# Patient Record
Sex: FEMALE | Race: WHITE | Employment: OTHER | URBAN - NONMETROPOLITAN AREA
[De-identification: names, ages, dates, MRNs, and addresses within clinical notes are randomized per-mention and may not be internally consistent; named-entity substitution may affect disease eponyms.]

---

## 2016-08-26 LAB — HBA1C MFR BLD: 7.8 %

## 2017-02-14 LAB
BUN BLDV-MCNC: NORMAL MG/DL
CHLORIDE: 100
CHLORIDE: NORMAL
CHOLESTEROL, TOTAL: 121 MG/DL
CHOLESTEROL/HDL RATIO: 2.9
CREATININE: 1.1 MG/DL
GFR CALCULATED: NORMAL
HDLC SERPL-MCNC: 42 MG/DL (ref 35–70)
LDL CHOLESTEROL CALCULATED: 50.8 MG/DL (ref 0–160)
POTASSIUM: 4.2
SODIUM: 140
TRIGL SERPL-MCNC: 141 MG/DL
VLDLC SERPL CALC-MCNC: 28 MG/DL

## 2017-06-13 DIAGNOSIS — I25.10 ATHEROSCLEROSIS OF NATIVE CORONARY ARTERY OF NATIVE HEART WITHOUT ANGINA PECTORIS: ICD-10-CM

## 2017-06-13 DIAGNOSIS — N18.1 TYPE 2 DIABETES MELLITUS WITH STAGE 1 CHRONIC KIDNEY DISEASE, WITHOUT LONG-TERM CURRENT USE OF INSULIN (HCC): ICD-10-CM

## 2017-06-13 DIAGNOSIS — E03.9 UNSPECIFIED HYPOTHYROIDISM: ICD-10-CM

## 2017-06-13 DIAGNOSIS — J30.0 VASOMOTOR RHINITIS: ICD-10-CM

## 2017-06-13 DIAGNOSIS — E11.22 TYPE 2 DIABETES MELLITUS WITH STAGE 1 CHRONIC KIDNEY DISEASE, WITHOUT LONG-TERM CURRENT USE OF INSULIN (HCC): ICD-10-CM

## 2017-06-13 DIAGNOSIS — G47.33 OBSTRUCTIVE SLEEP APNEA (ADULT) (PEDIATRIC): ICD-10-CM

## 2017-06-13 DIAGNOSIS — I10 ESSENTIAL HYPERTENSION: ICD-10-CM

## 2017-06-13 DIAGNOSIS — I34.0 MITRAL VALVE INSUFFICIENCY, UNSPECIFIED ETIOLOGY: ICD-10-CM

## 2017-06-13 DIAGNOSIS — I73.9 PERIPHERAL VASCULAR DISEASE, UNSPECIFIED (HCC): ICD-10-CM

## 2017-06-13 DIAGNOSIS — E78.49 FAMILIAL COMBINED HYPERLIPIDEMIA: ICD-10-CM

## 2017-06-13 RX ORDER — ACETAMINOPHEN 500 MG
1000 TABLET ORAL 3 TIMES DAILY
COMMUNITY

## 2017-06-13 RX ORDER — FLUTICASONE PROPIONATE 50 MCG
1 SPRAY, SUSPENSION (ML) NASAL DAILY
COMMUNITY

## 2017-06-13 RX ORDER — INSULIN GLARGINE 100 [IU]/ML
50 INJECTION, SOLUTION SUBCUTANEOUS NIGHTLY
COMMUNITY
End: 2022-05-17 | Stop reason: SDUPTHER

## 2017-06-13 RX ORDER — CHOLECALCIFEROL (VITAMIN D3) 125 MCG
1000 CAPSULE ORAL DAILY
COMMUNITY

## 2017-06-13 RX ORDER — CALCIUM CARBONATE 500(1250)
600 TABLET ORAL DAILY
COMMUNITY

## 2017-06-13 RX ORDER — ISOSORBIDE MONONITRATE 60 MG/1
60 TABLET, EXTENDED RELEASE ORAL EVERY MORNING
COMMUNITY

## 2017-06-13 RX ORDER — LEVOTHYROXINE SODIUM 0.15 MG/1
150 TABLET ORAL DAILY
COMMUNITY
End: 2017-06-28 | Stop reason: SDUPTHER

## 2017-06-13 RX ORDER — MONTELUKAST SODIUM 10 MG/1
10 TABLET ORAL NIGHTLY
COMMUNITY
End: 2017-06-28 | Stop reason: SDUPTHER

## 2017-06-13 RX ORDER — FUROSEMIDE 40 MG/1
40 TABLET ORAL DAILY PRN
COMMUNITY
End: 2019-11-27

## 2017-06-13 RX ORDER — IRBESARTAN 75 MG/1
75 TABLET ORAL NIGHTLY
COMMUNITY
End: 2017-06-29 | Stop reason: SDUPTHER

## 2017-06-13 RX ORDER — GLIMEPIRIDE 4 MG/1
4 TABLET ORAL
COMMUNITY
End: 2018-07-23 | Stop reason: ALTCHOICE

## 2017-06-13 RX ORDER — SIMVASTATIN 20 MG
20 TABLET ORAL NIGHTLY
COMMUNITY
End: 2017-06-28 | Stop reason: SDUPTHER

## 2017-06-13 RX ORDER — GABAPENTIN 300 MG/1
300 CAPSULE ORAL 3 TIMES DAILY
COMMUNITY
End: 2017-10-02 | Stop reason: SDUPTHER

## 2017-06-13 RX ORDER — FEXOFENADINE HCL 180 MG/1
180 TABLET ORAL DAILY
COMMUNITY
End: 2018-08-21 | Stop reason: ALTCHOICE

## 2017-06-15 ENCOUNTER — OFFICE VISIT (OUTPATIENT)
Dept: FAMILY MEDICINE CLINIC | Age: 77
End: 2017-06-15
Payer: MEDICARE

## 2017-06-15 VITALS
WEIGHT: 245 LBS | DIASTOLIC BLOOD PRESSURE: 74 MMHG | SYSTOLIC BLOOD PRESSURE: 138 MMHG | HEART RATE: 64 BPM | HEIGHT: 67 IN | BODY MASS INDEX: 38.45 KG/M2

## 2017-06-15 DIAGNOSIS — S98.132S: ICD-10-CM

## 2017-06-15 DIAGNOSIS — I10 ESSENTIAL HYPERTENSION: Primary | ICD-10-CM

## 2017-06-15 DIAGNOSIS — E03.9 UNSPECIFIED HYPOTHYROIDISM: ICD-10-CM

## 2017-06-15 DIAGNOSIS — E11.22 TYPE 2 DIABETES MELLITUS WITH STAGE 1 CHRONIC KIDNEY DISEASE, WITH LONG-TERM CURRENT USE OF INSULIN (HCC): ICD-10-CM

## 2017-06-15 DIAGNOSIS — E78.49 FAMILIAL COMBINED HYPERLIPIDEMIA: ICD-10-CM

## 2017-06-15 DIAGNOSIS — N18.1 TYPE 2 DIABETES MELLITUS WITH STAGE 1 CHRONIC KIDNEY DISEASE, WITH LONG-TERM CURRENT USE OF INSULIN (HCC): ICD-10-CM

## 2017-06-15 DIAGNOSIS — Z79.4 TYPE 2 DIABETES MELLITUS WITH STAGE 1 CHRONIC KIDNEY DISEASE, WITH LONG-TERM CURRENT USE OF INSULIN (HCC): ICD-10-CM

## 2017-06-15 DIAGNOSIS — L60.3 DYSTROPHIC NAIL: ICD-10-CM

## 2017-06-15 PROCEDURE — 3017F COLORECTAL CA SCREEN DOC REV: CPT | Performed by: FAMILY MEDICINE

## 2017-06-15 PROCEDURE — 1123F ACP DISCUSS/DSCN MKR DOCD: CPT | Performed by: FAMILY MEDICINE

## 2017-06-15 PROCEDURE — G8400 PT W/DXA NO RESULTS DOC: HCPCS | Performed by: FAMILY MEDICINE

## 2017-06-15 PROCEDURE — 1036F TOBACCO NON-USER: CPT | Performed by: FAMILY MEDICINE

## 2017-06-15 PROCEDURE — 99214 OFFICE O/P EST MOD 30 MIN: CPT | Performed by: FAMILY MEDICINE

## 2017-06-15 PROCEDURE — G8427 DOCREV CUR MEDS BY ELIG CLIN: HCPCS | Performed by: FAMILY MEDICINE

## 2017-06-15 PROCEDURE — 3046F HEMOGLOBIN A1C LEVEL >9.0%: CPT | Performed by: FAMILY MEDICINE

## 2017-06-15 PROCEDURE — G8598 ASA/ANTIPLAT THER USED: HCPCS | Performed by: FAMILY MEDICINE

## 2017-06-15 PROCEDURE — G8419 CALC BMI OUT NRM PARAM NOF/U: HCPCS | Performed by: FAMILY MEDICINE

## 2017-06-15 PROCEDURE — 4040F PNEUMOC VAC/ADMIN/RCVD: CPT | Performed by: FAMILY MEDICINE

## 2017-06-15 PROCEDURE — 1090F PRES/ABSN URINE INCON ASSESS: CPT | Performed by: FAMILY MEDICINE

## 2017-06-15 PROCEDURE — 3014F SCREEN MAMMO DOC REV: CPT | Performed by: FAMILY MEDICINE

## 2017-06-15 ASSESSMENT — ENCOUNTER SYMPTOMS: SHORTNESS OF BREATH: 1

## 2017-06-28 RX ORDER — MONTELUKAST SODIUM 10 MG/1
10 TABLET ORAL NIGHTLY
Qty: 30 TABLET | Refills: 5 | Status: SHIPPED | OUTPATIENT
Start: 2017-06-28 | End: 2017-12-26 | Stop reason: SDUPTHER

## 2017-06-28 RX ORDER — SIMVASTATIN 20 MG
20 TABLET ORAL NIGHTLY
Qty: 30 TABLET | Refills: 5 | Status: SHIPPED | OUTPATIENT
Start: 2017-06-28 | End: 2018-01-01 | Stop reason: SDUPTHER

## 2017-06-28 RX ORDER — LEVOTHYROXINE SODIUM 0.15 MG/1
150 TABLET ORAL DAILY
Qty: 30 TABLET | Refills: 5 | Status: SHIPPED | OUTPATIENT
Start: 2017-06-28 | End: 2017-12-26 | Stop reason: SDUPTHER

## 2017-06-29 ENCOUNTER — OFFICE VISIT (OUTPATIENT)
Dept: FAMILY MEDICINE CLINIC | Age: 77
End: 2017-06-29
Payer: MEDICARE

## 2017-06-29 VITALS
DIASTOLIC BLOOD PRESSURE: 62 MMHG | HEART RATE: 65 BPM | WEIGHT: 240 LBS | BODY MASS INDEX: 37.87 KG/M2 | OXYGEN SATURATION: 97 % | TEMPERATURE: 98.9 F | SYSTOLIC BLOOD PRESSURE: 106 MMHG

## 2017-06-29 DIAGNOSIS — Z79.4 TYPE 2 DIABETES MELLITUS WITH STAGE 1 CHRONIC KIDNEY DISEASE, WITH LONG-TERM CURRENT USE OF INSULIN (HCC): ICD-10-CM

## 2017-06-29 DIAGNOSIS — I10 ESSENTIAL HYPERTENSION: ICD-10-CM

## 2017-06-29 DIAGNOSIS — K52.9 GASTROENTERITIS, INFECTIOUS, PRESUMED: Primary | ICD-10-CM

## 2017-06-29 DIAGNOSIS — N18.1 TYPE 2 DIABETES MELLITUS WITH STAGE 1 CHRONIC KIDNEY DISEASE, WITH LONG-TERM CURRENT USE OF INSULIN (HCC): ICD-10-CM

## 2017-06-29 DIAGNOSIS — E11.22 TYPE 2 DIABETES MELLITUS WITH STAGE 1 CHRONIC KIDNEY DISEASE, WITH LONG-TERM CURRENT USE OF INSULIN (HCC): ICD-10-CM

## 2017-06-29 PROCEDURE — 1036F TOBACCO NON-USER: CPT | Performed by: FAMILY MEDICINE

## 2017-06-29 PROCEDURE — 1090F PRES/ABSN URINE INCON ASSESS: CPT | Performed by: FAMILY MEDICINE

## 2017-06-29 PROCEDURE — G8427 DOCREV CUR MEDS BY ELIG CLIN: HCPCS | Performed by: FAMILY MEDICINE

## 2017-06-29 PROCEDURE — 3014F SCREEN MAMMO DOC REV: CPT | Performed by: FAMILY MEDICINE

## 2017-06-29 PROCEDURE — 1123F ACP DISCUSS/DSCN MKR DOCD: CPT | Performed by: FAMILY MEDICINE

## 2017-06-29 PROCEDURE — 4040F PNEUMOC VAC/ADMIN/RCVD: CPT | Performed by: FAMILY MEDICINE

## 2017-06-29 PROCEDURE — G8400 PT W/DXA NO RESULTS DOC: HCPCS | Performed by: FAMILY MEDICINE

## 2017-06-29 PROCEDURE — 3046F HEMOGLOBIN A1C LEVEL >9.0%: CPT | Performed by: FAMILY MEDICINE

## 2017-06-29 PROCEDURE — G8417 CALC BMI ABV UP PARAM F/U: HCPCS | Performed by: FAMILY MEDICINE

## 2017-06-29 PROCEDURE — G8598 ASA/ANTIPLAT THER USED: HCPCS | Performed by: FAMILY MEDICINE

## 2017-06-29 PROCEDURE — 3017F COLORECTAL CA SCREEN DOC REV: CPT | Performed by: FAMILY MEDICINE

## 2017-06-29 PROCEDURE — 99213 OFFICE O/P EST LOW 20 MIN: CPT | Performed by: FAMILY MEDICINE

## 2017-06-29 RX ORDER — IRBESARTAN 75 MG/1
75 TABLET ORAL NIGHTLY
Qty: 30 TABLET | Refills: 5 | Status: SHIPPED | OUTPATIENT
Start: 2017-06-29 | End: 2017-12-26 | Stop reason: SDUPTHER

## 2017-06-29 RX ORDER — PROMETHAZINE HYDROCHLORIDE 25 MG/1
12.5 TABLET ORAL EVERY 6 HOURS PRN
Qty: 10 TABLET | Refills: 0 | Status: SHIPPED | OUTPATIENT
Start: 2017-06-29 | End: 2017-07-06

## 2017-06-29 ASSESSMENT — ENCOUNTER SYMPTOMS
VOMITING: 1
NAUSEA: 1
COUGH: 0
DIARRHEA: 0

## 2017-09-12 ENCOUNTER — OFFICE VISIT (OUTPATIENT)
Dept: FAMILY MEDICINE CLINIC | Age: 77
End: 2017-09-12
Payer: MEDICARE

## 2017-09-12 VITALS
HEART RATE: 100 BPM | HEIGHT: 67 IN | SYSTOLIC BLOOD PRESSURE: 152 MMHG | BODY MASS INDEX: 39.39 KG/M2 | WEIGHT: 251 LBS | DIASTOLIC BLOOD PRESSURE: 84 MMHG

## 2017-09-12 DIAGNOSIS — N18.1 TYPE 2 DIABETES MELLITUS WITH STAGE 1 CHRONIC KIDNEY DISEASE, WITH LONG-TERM CURRENT USE OF INSULIN (HCC): ICD-10-CM

## 2017-09-12 DIAGNOSIS — E03.9 UNSPECIFIED HYPOTHYROIDISM: ICD-10-CM

## 2017-09-12 DIAGNOSIS — Z79.4 TYPE 2 DIABETES MELLITUS WITH STAGE 1 CHRONIC KIDNEY DISEASE, WITH LONG-TERM CURRENT USE OF INSULIN (HCC): ICD-10-CM

## 2017-09-12 DIAGNOSIS — I73.9 PERIPHERAL VASCULAR DISEASE, UNSPECIFIED (HCC): ICD-10-CM

## 2017-09-12 DIAGNOSIS — I10 ESSENTIAL HYPERTENSION: Primary | ICD-10-CM

## 2017-09-12 DIAGNOSIS — E11.22 TYPE 2 DIABETES MELLITUS WITH STAGE 1 CHRONIC KIDNEY DISEASE, WITH LONG-TERM CURRENT USE OF INSULIN (HCC): ICD-10-CM

## 2017-09-12 DIAGNOSIS — Z23 NEED FOR PROPHYLACTIC VACCINATION AND INOCULATION AGAINST INFLUENZA: ICD-10-CM

## 2017-09-12 LAB — HBA1C MFR BLD: 8 %

## 2017-09-12 PROCEDURE — G8400 PT W/DXA NO RESULTS DOC: HCPCS | Performed by: FAMILY MEDICINE

## 2017-09-12 PROCEDURE — 3014F SCREEN MAMMO DOC REV: CPT | Performed by: FAMILY MEDICINE

## 2017-09-12 PROCEDURE — 83036 HEMOGLOBIN GLYCOSYLATED A1C: CPT | Performed by: FAMILY MEDICINE

## 2017-09-12 PROCEDURE — 3046F HEMOGLOBIN A1C LEVEL >9.0%: CPT | Performed by: FAMILY MEDICINE

## 2017-09-12 PROCEDURE — 4040F PNEUMOC VAC/ADMIN/RCVD: CPT | Performed by: FAMILY MEDICINE

## 2017-09-12 PROCEDURE — 1123F ACP DISCUSS/DSCN MKR DOCD: CPT | Performed by: FAMILY MEDICINE

## 2017-09-12 PROCEDURE — 1090F PRES/ABSN URINE INCON ASSESS: CPT | Performed by: FAMILY MEDICINE

## 2017-09-12 PROCEDURE — G0008 ADMIN INFLUENZA VIRUS VAC: HCPCS | Performed by: FAMILY MEDICINE

## 2017-09-12 PROCEDURE — G8598 ASA/ANTIPLAT THER USED: HCPCS | Performed by: FAMILY MEDICINE

## 2017-09-12 PROCEDURE — G8417 CALC BMI ABV UP PARAM F/U: HCPCS | Performed by: FAMILY MEDICINE

## 2017-09-12 PROCEDURE — 99214 OFFICE O/P EST MOD 30 MIN: CPT | Performed by: FAMILY MEDICINE

## 2017-09-12 PROCEDURE — G8427 DOCREV CUR MEDS BY ELIG CLIN: HCPCS | Performed by: FAMILY MEDICINE

## 2017-09-12 PROCEDURE — 90662 IIV NO PRSV INCREASED AG IM: CPT | Performed by: FAMILY MEDICINE

## 2017-09-12 PROCEDURE — 3017F COLORECTAL CA SCREEN DOC REV: CPT | Performed by: FAMILY MEDICINE

## 2017-09-12 PROCEDURE — 1036F TOBACCO NON-USER: CPT | Performed by: FAMILY MEDICINE

## 2017-09-12 RX ORDER — METOPROLOL TARTRATE 50 MG/1
50 TABLET, FILM COATED ORAL 2 TIMES DAILY
Qty: 180 TABLET | Refills: 1 | Status: SHIPPED | OUTPATIENT
Start: 2017-09-12 | End: 2018-02-06 | Stop reason: SDUPTHER

## 2017-09-12 ASSESSMENT — ENCOUNTER SYMPTOMS: SHORTNESS OF BREATH: 0

## 2017-10-02 RX ORDER — GABAPENTIN 300 MG/1
300 CAPSULE ORAL 3 TIMES DAILY
Qty: 90 CAPSULE | Refills: 5 | Status: SHIPPED | OUTPATIENT
Start: 2017-10-02 | End: 2018-03-28 | Stop reason: SDUPTHER

## 2017-11-02 ENCOUNTER — NURSE ONLY (OUTPATIENT)
Dept: FAMILY MEDICINE CLINIC | Age: 77
End: 2017-11-02

## 2017-11-02 VITALS — SYSTOLIC BLOOD PRESSURE: 168 MMHG | HEART RATE: 47 BPM | DIASTOLIC BLOOD PRESSURE: 113 MMHG

## 2017-11-02 NOTE — PROGRESS NOTES
In for BP check, didn't feel well earlier in the week and BP at home has been up and down with low pulse rate. Brought in a list of readings. Brought BP cuff to office and had 2 very different reading, both much higher than what was taken by office cuff.   Advised to stop checking with own cuff, either get a new one or check at pharmacy over the weekend, if not feeling well over the weekend or Monday to call for appt

## 2017-12-05 NOTE — TELEPHONE ENCOUNTER
[facility] is calling to request a refill on the following medication(s):  Requested Prescriptions     Pending Prescriptions Disp Refills    metFORMIN (GLUCOPHAGE) 1000 MG tablet 180 tablet 1     Sig: Take 1 tablet by mouth 2 times daily       Last Visit Date (If Applicable):  5/10/8192    Next Visit Date:    1/18/2018

## 2017-12-11 ENCOUNTER — OFFICE VISIT (OUTPATIENT)
Dept: FAMILY MEDICINE CLINIC | Age: 77
End: 2017-12-11
Payer: MEDICARE

## 2017-12-11 VITALS
WEIGHT: 249 LBS | BODY MASS INDEX: 39.29 KG/M2 | DIASTOLIC BLOOD PRESSURE: 64 MMHG | TEMPERATURE: 97 F | SYSTOLIC BLOOD PRESSURE: 140 MMHG | HEART RATE: 60 BPM

## 2017-12-11 DIAGNOSIS — E11.621 DIABETIC ULCER OF TOE OF LEFT FOOT ASSOCIATED WITH TYPE 2 DIABETES MELLITUS, LIMITED TO BREAKDOWN OF SKIN (HCC): Primary | ICD-10-CM

## 2017-12-11 DIAGNOSIS — L97.521 DIABETIC ULCER OF TOE OF LEFT FOOT ASSOCIATED WITH TYPE 2 DIABETES MELLITUS, LIMITED TO BREAKDOWN OF SKIN (HCC): Primary | ICD-10-CM

## 2017-12-11 PROCEDURE — 3017F COLORECTAL CA SCREEN DOC REV: CPT | Performed by: FAMILY MEDICINE

## 2017-12-11 PROCEDURE — 99213 OFFICE O/P EST LOW 20 MIN: CPT | Performed by: FAMILY MEDICINE

## 2017-12-11 PROCEDURE — G8417 CALC BMI ABV UP PARAM F/U: HCPCS | Performed by: FAMILY MEDICINE

## 2017-12-11 PROCEDURE — G8427 DOCREV CUR MEDS BY ELIG CLIN: HCPCS | Performed by: FAMILY MEDICINE

## 2017-12-11 PROCEDURE — G8400 PT W/DXA NO RESULTS DOC: HCPCS | Performed by: FAMILY MEDICINE

## 2017-12-11 PROCEDURE — 1090F PRES/ABSN URINE INCON ASSESS: CPT | Performed by: FAMILY MEDICINE

## 2017-12-11 PROCEDURE — 1036F TOBACCO NON-USER: CPT | Performed by: FAMILY MEDICINE

## 2017-12-11 PROCEDURE — G8484 FLU IMMUNIZE NO ADMIN: HCPCS | Performed by: FAMILY MEDICINE

## 2017-12-11 PROCEDURE — G8598 ASA/ANTIPLAT THER USED: HCPCS | Performed by: FAMILY MEDICINE

## 2017-12-11 PROCEDURE — 4040F PNEUMOC VAC/ADMIN/RCVD: CPT | Performed by: FAMILY MEDICINE

## 2017-12-11 PROCEDURE — 3045F PR MOST RECENT HEMOGLOBIN A1C LEVEL 7.0-9.0%: CPT | Performed by: FAMILY MEDICINE

## 2017-12-11 PROCEDURE — 1123F ACP DISCUSS/DSCN MKR DOCD: CPT | Performed by: FAMILY MEDICINE

## 2017-12-11 NOTE — LETTER
[address]  Phone: [phone]    Thomas Calvillo MD        December 11, 2017      To whom it may concern,    We saw Denis Romero in the office today with a wound on her left foot 2nd toe. I feel this is related to how her new diabetic shoe is fitting. If you have any questions please call my office.         Sincerely,        Thomas Calvillo MD

## 2017-12-11 NOTE — PROGRESS NOTES
[facility]  [address] [facility]  Dept: [phone]  Dept Fax: [phone]    Yaz Valerio is a 78 y.o. female who presents today for her medical conditions/complaints as noted below. Yaz Valerio c/o of Toe Pain      HPI:     HPI  Noted toe issue since Friday night. Got new shoes as last time couple weeks ago. Concerned with ensuring not going to lose another toe. Saw specialist and noted some changes in inserts and size of shoes compared to prior.        BP Readings from Last 3 Encounters:   12/11/17 (!) 140/64   11/02/17 (!) 168/113   09/12/17 (!) 152/84          (goal 120/80)    Past Medical History:   Diagnosis Date    CAD (coronary artery disease)     Diabetes mellitus ([facility] [address]     Hyperlipidemia     Hypertension     Hypothyroidism     Osteoarthritis     Sleep apnea       Past Surgical History:   Procedure Laterality Date    ANGIOPLASTY      EYE SURGERY      FOOT SURGERY Left 07/31/2009    3rd toe amputation    HYSTERECTOMY      TONSILLECTOMY         Family History   Problem Relation Age of Onset    High Blood Pressure Mother     Coronary Art Dis Father        Social History   Substance Use Topics    Smoking status: Never Smoker    Smokeless tobacco: Never Used    Alcohol use No      Current Outpatient Prescriptions   Medication Sig Dispense Refill    metFORMIN (GLUCOPHAGE) 1000 MG tablet Take 1 tablet by mouth 2 times daily 180 tablet 1    gabapentin (NEURONTIN) 300 MG capsule Take 1 capsule by mouth 3 times daily 90 capsule 5    fluticasone propionate (FLOVENT DISKUS) 100 MCG/BLIST AEPB inhaler Inhale 1 puff into the lungs daily      metoprolol tartrate (LOPRESSOR) 50 MG tablet Take 1 tablet by mouth 2 times daily 180 tablet 1    irbesartan (AVAPRO) 75 MG tablet Take 1 tablet by mouth nightly 30 tablet 5    simvastatin (ZOCOR) 20 MG tablet Take 1 tablet by mouth nightly 30 tablet 5    montelukast (SINGULAIR) 10 MG tablet Take 1 tablet by mouth nightly 30 tablet 5    levothyroxine (SYNTHROID) 150 MCG tablet Take 1 tablet by mouth Daily 30 tablet 5    fexofenadine (HM FEXOFENADINE HCL) 180 MG tablet Take 180 mg by mouth daily      insulin glargine (LANTUS) 100 UNIT/ML injection vial Inject 55 Units into the skin nightly      glimepiride (AMARYL) 4 MG tablet Take 4 mg by mouth every morning (before breakfast)      fluticasone (FLONASE) 50 MCG/ACT nasal spray 1 spray by Nasal route daily      furosemide (LASIX) 40 MG tablet Take 40 mg by mouth daily as needed      isosorbide mononitrate (IMDUR) 60 MG extended release tablet Take 60 mg by mouth every morning      insulin aspart (NOVOLOG) 100 UNIT/ML injection vial Inject 4 Units into the skin 3 times daily (before meals)       aspirin 81 MG tablet Take 81 mg by mouth daily      acetaminophen (TYLENOL) 500 MG tablet Take 1,000 mg by mouth nightly      vitamin B-12 (CYANOCOBALAMIN) 500 MCG tablet Take 500 mcg by mouth daily      CPAP Machine MISC by Does not apply route      calcium carbonate (OSCAL) 500 MG TABS tablet Take 500 mg by mouth daily      Omega-3 Fatty Acids (FISH OIL PO) Take by mouth 2 times daily       Multiple Vitamins-Minerals (MULTIVITAMIN ADULTS PO) Take by mouth daily        No current facility-administered medications for this visit.       Allergies   Allergen Reactions    Lisinopril Other (See Comments)     Cough    Penicillins Swelling     Facial swelling    Ranolazine Rash       Health Maintenance   Topic Date Due    Diabetic foot exam  09/26/1952    Diabetic retinal exam  09/26/1952    DTaP/Tdap/Td vaccine (1 - Tdap) 09/26/1961    Colon cancer screen colonoscopy  09/26/1992    Lipid screen  02/14/2018    Diabetic hemoglobin A1C test  09/12/2018    Zostavax vaccine  Completed    DEXA (modify frequency per FRAX score)  Completed    Flu vaccine  Completed    Pneumococcal low/med risk  Completed       Subjective:      Review of Systems Skin: Positive for wound (lt foot 2nd toe ulcer on top of toe,redness and swelling. first noticed on friday. ). Just got new Diabetic shoes beginning of November. She isn't sure the shoes are the correct one that she has had in the past    Objective:     BP (!) 140/64   Pulse 60   Temp 97 °F (36.1 °C)   Wt 249 lb (112.9 kg)   BMI 39.29 kg/m²     Physical Exam   Constitutional: She is oriented to person, place, and time. She appears well-developed and well-nourished. No distress. Pulmonary/Chest: Effort normal. No respiratory distress. Musculoskeletal: She exhibits no edema. Neurological: She is alert and oriented to person, place, and time. Skin:   6 x 7 mm ulceration no surrounding erythema or rolled edge. Psychiatric:   Anxious with foot    hammer toe deformity of left long toe      Assessment:     1. Diabetic ulcer of toe of left foot associated with type 2 diabetes mellitus, limited to breakdown of skin ([address]      No results found for this visit on 12/11/17. Plan:   No orders of the defined types were placed in this encounter. No orders of the defined types were placed in this encounter. Return in about 1 week (around 12/18/2017) for diabetic foot ulcer. Discussed use, benefit, and side effects of prescribed medications. All patient questions answered. Pt voiced understanding. Instructed to continue current medications, diet and exercise. Patient agreed with treatment plan. Follow up as directed.      Electronically signed by Noa Flores MD on 12/11/2017

## 2017-12-20 ENCOUNTER — OFFICE VISIT (OUTPATIENT)
Dept: FAMILY MEDICINE CLINIC | Age: 77
End: 2017-12-20
Payer: MEDICARE

## 2017-12-20 VITALS
BODY MASS INDEX: 39.24 KG/M2 | HEIGHT: 67 IN | DIASTOLIC BLOOD PRESSURE: 68 MMHG | HEART RATE: 88 BPM | WEIGHT: 250 LBS | SYSTOLIC BLOOD PRESSURE: 126 MMHG

## 2017-12-20 DIAGNOSIS — E11.621 DIABETIC ULCER OF TOE OF LEFT FOOT ASSOCIATED WITH TYPE 2 DIABETES MELLITUS, LIMITED TO BREAKDOWN OF SKIN (HCC): Primary | ICD-10-CM

## 2017-12-20 DIAGNOSIS — L97.521 DIABETIC ULCER OF TOE OF LEFT FOOT ASSOCIATED WITH TYPE 2 DIABETES MELLITUS, LIMITED TO BREAKDOWN OF SKIN (HCC): Primary | ICD-10-CM

## 2017-12-20 PROCEDURE — G8598 ASA/ANTIPLAT THER USED: HCPCS | Performed by: FAMILY MEDICINE

## 2017-12-20 PROCEDURE — G8417 CALC BMI ABV UP PARAM F/U: HCPCS | Performed by: FAMILY MEDICINE

## 2017-12-20 PROCEDURE — 1036F TOBACCO NON-USER: CPT | Performed by: FAMILY MEDICINE

## 2017-12-20 PROCEDURE — G8484 FLU IMMUNIZE NO ADMIN: HCPCS | Performed by: FAMILY MEDICINE

## 2017-12-20 PROCEDURE — 4040F PNEUMOC VAC/ADMIN/RCVD: CPT | Performed by: FAMILY MEDICINE

## 2017-12-20 PROCEDURE — G8400 PT W/DXA NO RESULTS DOC: HCPCS | Performed by: FAMILY MEDICINE

## 2017-12-20 PROCEDURE — 3045F PR MOST RECENT HEMOGLOBIN A1C LEVEL 7.0-9.0%: CPT | Performed by: FAMILY MEDICINE

## 2017-12-20 PROCEDURE — 3017F COLORECTAL CA SCREEN DOC REV: CPT | Performed by: FAMILY MEDICINE

## 2017-12-20 PROCEDURE — 1090F PRES/ABSN URINE INCON ASSESS: CPT | Performed by: FAMILY MEDICINE

## 2017-12-20 PROCEDURE — 1123F ACP DISCUSS/DSCN MKR DOCD: CPT | Performed by: FAMILY MEDICINE

## 2017-12-20 PROCEDURE — 99213 OFFICE O/P EST LOW 20 MIN: CPT | Performed by: FAMILY MEDICINE

## 2017-12-20 PROCEDURE — G8428 CUR MEDS NOT DOCUMENT: HCPCS | Performed by: FAMILY MEDICINE

## 2017-12-20 NOTE — PROGRESS NOTES
[facility]  [address] [facility]  Dept: [phone]  Dept Fax: [phone]    Eliezer Bain is a 78 y.o. female who presents today for her medical conditions/complaints as noted below. Eliezer Bain c/o of Wound Check      HPI:     HPI No new complaints  Had shoes stretched    BP Readings from Last 3 Encounters:   12/20/17 126/68   12/11/17 (!) 140/64   11/02/17 (!) 168/113          (goal 120/80)    Past Medical History:   Diagnosis Date    CAD (coronary artery disease)     Diabetes mellitus ([facility] [address]     Hyperlipidemia     Hypertension     Hypothyroidism     Osteoarthritis     Sleep apnea       Past Surgical History:   Procedure Laterality Date    ANGIOPLASTY      EYE SURGERY      FOOT SURGERY Left 07/31/2009    3rd toe amputation    HYSTERECTOMY      TONSILLECTOMY         Family History   Problem Relation Age of Onset    High Blood Pressure Mother     Coronary Art Dis Father        Social History   Substance Use Topics    Smoking status: Never Smoker    Smokeless tobacco: Never Used    Alcohol use No      Prior to Admission medications    Medication Sig Start Date End Date Taking?  Authorizing Provider   metFORMIN (GLUCOPHAGE) 1000 MG tablet Take 1 tablet by mouth 2 times daily 12/5/17   [address], MD   gabapentin (NEURONTIN) 300 MG capsule Take 1 capsule by mouth 3 times daily 10/2/17   [address], MD   fluticasone propionate (FLOVENT DISKUS) 100 MCG/BLIST AEPB inhaler Inhale 1 puff into the lungs daily    Historical Provider, MD   metoprolol tartrate (LOPRESSOR) 50 MG tablet Take 1 tablet by mouth 2 times daily 9/12/17   [address], MD   irbesartan (AVAPRO) 75 MG tablet Take 1 tablet by mouth nightly 6/29/17   [address], MD   simvastatin (ZOCOR) 20 MG tablet Take 1 tablet by mouth nightly 6/28/17   [address], MD   montelukast (SINGULAIR) 10 MG tablet Take 1 tablet by mouth nightly 6/28/17   [address], MD

## 2017-12-26 RX ORDER — LEVOTHYROXINE SODIUM 0.15 MG/1
TABLET ORAL
Qty: 30 TABLET | Refills: 0 | Status: SHIPPED | OUTPATIENT
Start: 2017-12-26 | End: 2018-01-23 | Stop reason: SDUPTHER

## 2017-12-26 RX ORDER — IRBESARTAN 75 MG/1
TABLET ORAL
Qty: 30 TABLET | Refills: 0 | Status: SHIPPED | OUTPATIENT
Start: 2017-12-26 | End: 2018-01-18 | Stop reason: SDUPTHER

## 2017-12-26 RX ORDER — MONTELUKAST SODIUM 10 MG/1
TABLET ORAL
Qty: 30 TABLET | Refills: 0 | Status: SHIPPED | OUTPATIENT
Start: 2017-12-26 | End: 2018-01-23 | Stop reason: SDUPTHER

## 2018-01-02 ENCOUNTER — OFFICE VISIT (OUTPATIENT)
Dept: FAMILY MEDICINE CLINIC | Age: 78
End: 2018-01-02
Payer: MEDICARE

## 2018-01-02 VITALS
HEIGHT: 67 IN | HEART RATE: 68 BPM | BODY MASS INDEX: 39.55 KG/M2 | WEIGHT: 252 LBS | DIASTOLIC BLOOD PRESSURE: 78 MMHG | SYSTOLIC BLOOD PRESSURE: 152 MMHG

## 2018-01-02 DIAGNOSIS — L97.521 DIABETIC ULCER OF TOE OF LEFT FOOT ASSOCIATED WITH TYPE 2 DIABETES MELLITUS, LIMITED TO BREAKDOWN OF SKIN (HCC): Primary | ICD-10-CM

## 2018-01-02 DIAGNOSIS — S98.132A AMPUTATED TOE OF LEFT FOOT (HCC): ICD-10-CM

## 2018-01-02 DIAGNOSIS — E11.22 TYPE 2 DIABETES MELLITUS WITH STAGE 1 CHRONIC KIDNEY DISEASE, WITHOUT LONG-TERM CURRENT USE OF INSULIN (HCC): ICD-10-CM

## 2018-01-02 DIAGNOSIS — E11.51 DIABETIC PERIPHERAL VASCULAR DISEASE (HCC): ICD-10-CM

## 2018-01-02 DIAGNOSIS — I73.9 PERIPHERAL VASCULAR DISEASE (HCC): ICD-10-CM

## 2018-01-02 DIAGNOSIS — E11.621 DIABETIC ULCER OF TOE OF LEFT FOOT ASSOCIATED WITH TYPE 2 DIABETES MELLITUS, LIMITED TO BREAKDOWN OF SKIN (HCC): Primary | ICD-10-CM

## 2018-01-02 DIAGNOSIS — E11.43 DIABETIC AUTONOMIC NEUROPATHY ASSOCIATED WITH TYPE 2 DIABETES MELLITUS (HCC): ICD-10-CM

## 2018-01-02 DIAGNOSIS — N18.1 TYPE 2 DIABETES MELLITUS WITH STAGE 1 CHRONIC KIDNEY DISEASE, WITHOUT LONG-TERM CURRENT USE OF INSULIN (HCC): ICD-10-CM

## 2018-01-02 DIAGNOSIS — I10 ESSENTIAL HYPERTENSION: ICD-10-CM

## 2018-01-02 PROCEDURE — 99213 OFFICE O/P EST LOW 20 MIN: CPT | Performed by: FAMILY MEDICINE

## 2018-01-02 RX ORDER — SIMVASTATIN 20 MG
TABLET ORAL
Qty: 30 TABLET | Refills: 11 | Status: SHIPPED | OUTPATIENT
Start: 2018-01-02 | End: 2018-02-06 | Stop reason: SDUPTHER

## 2018-01-02 ASSESSMENT — ENCOUNTER SYMPTOMS
COUGH: 1
CHEST TIGHTNESS: 0

## 2018-01-02 NOTE — PROGRESS NOTES
Health Maintenance   Topic Date Due    Diabetic foot exam  09/26/1950    Diabetic retinal exam  09/26/1950    DTaP/Tdap/Td vaccine (1 - Tdap) 09/26/1961    Colon cancer screen colonoscopy  09/26/1992    Lipid screen  02/14/2018    Diabetic hemoglobin A1C test  09/12/2018    Zostavax vaccine  Completed    DEXA (modify frequency per FRAX score)  Completed    Flu vaccine  Completed    Pneumococcal low/med risk  Completed       Subjective:      Review of Systems   Respiratory: Positive for cough (Has a cold, using OTC cold medicines (DM Tussin)). Negative for chest tightness. Cardiovascular: Negative for chest pain. Elevated Bp at home lately, brought cuff to compare   Skin: Positive for wound (Ulcer still draining on left foot long toe). Objective:     BP (!) 152/78   Pulse 68   Ht 5' 6.75\" (1.695 m)   Wt 252 lb (114.3 kg)   BMI 39.76 kg/m²     Physical Exam   Constitutional: She is oriented to person, place, and time. She appears well-developed. obese   Pulmonary/Chest: Effort normal.   Neurological: She is alert and oriented to person, place, and time. Skin:   Lesion top of left 2nd toe with 4 x 5 mm lesion decreased depth from prior. 1 x 4 mm area seems a little deeper. Lesion debrided thickened edge with good base granulation noted    Assessment:     1. Diabetic ulcer of toe of left foot associated with type 2 diabetes mellitus, limited to breakdown of skin ([address]    2. Essential hypertension      No results found for this visit on 01/02/18. Quality & Risk Score Accuracy - MEDICARE ADVANTAGE    Last edited 01/02/18 13:16 EST by Esperanza Kowalski MD           Plan:   No orders of the defined types were placed in this encounter. No orders of the defined types were placed in this encounter. Return in about 2 weeks (around 1/16/2018) for wound check. All patient questions answered. Patient agreed with treatment plan. Follow up as directed.    If not improving in

## 2018-01-15 ENCOUNTER — TELEPHONE (OUTPATIENT)
Dept: FAMILY MEDICINE CLINIC | Age: 78
End: 2018-01-15

## 2018-01-17 LAB
AGE FOR GFR: 75
ANION GAP SERPL CALCULATED.3IONS-SCNC: 15 MMOL/L
CHLORIDE BLD-SCNC: 100 MMOL/L
CO2: 30 MMOL/L
CREAT SERPL-MCNC: 1.3 MG/DL
EGFR BF: 48 ML/MIN/1.73 M2
EGFR BM: 65 ML/MIN/1.73 M2
EGFR WF: 40 ML/MIN/1.73 M2
EGFR WM: 54 ML/MIN/1.73 M2
POTASSIUM SERPL-SCNC: 4.7 MMOL/L
SODIUM BLD-SCNC: 140 MMOL/L
TSH SERPL DL<=0.05 MIU/L-ACNC: 2.94 MIU/ML

## 2018-01-18 ENCOUNTER — OFFICE VISIT (OUTPATIENT)
Dept: FAMILY MEDICINE CLINIC | Age: 78
End: 2018-01-18
Payer: MEDICARE

## 2018-01-18 VITALS
BODY MASS INDEX: 38.98 KG/M2 | SYSTOLIC BLOOD PRESSURE: 138 MMHG | HEART RATE: 80 BPM | WEIGHT: 247 LBS | DIASTOLIC BLOOD PRESSURE: 80 MMHG

## 2018-01-18 DIAGNOSIS — Z79.4 TYPE 2 DIABETES MELLITUS WITH STAGE 1 CHRONIC KIDNEY DISEASE, WITH LONG-TERM CURRENT USE OF INSULIN (HCC): ICD-10-CM

## 2018-01-18 DIAGNOSIS — I10 ESSENTIAL HYPERTENSION: Primary | ICD-10-CM

## 2018-01-18 DIAGNOSIS — E11.22 TYPE 2 DIABETES MELLITUS WITH STAGE 1 CHRONIC KIDNEY DISEASE, WITH LONG-TERM CURRENT USE OF INSULIN (HCC): ICD-10-CM

## 2018-01-18 DIAGNOSIS — N18.1 TYPE 2 DIABETES MELLITUS WITH STAGE 1 CHRONIC KIDNEY DISEASE, WITH LONG-TERM CURRENT USE OF INSULIN (HCC): ICD-10-CM

## 2018-01-18 LAB — HBA1C MFR BLD: 8.2 %

## 2018-01-18 PROCEDURE — 83036 HEMOGLOBIN GLYCOSYLATED A1C: CPT | Performed by: FAMILY MEDICINE

## 2018-01-18 PROCEDURE — G8427 DOCREV CUR MEDS BY ELIG CLIN: HCPCS | Performed by: FAMILY MEDICINE

## 2018-01-18 PROCEDURE — 3045F PR MOST RECENT HEMOGLOBIN A1C LEVEL 7.0-9.0%: CPT | Performed by: FAMILY MEDICINE

## 2018-01-18 PROCEDURE — 3017F COLORECTAL CA SCREEN DOC REV: CPT | Performed by: FAMILY MEDICINE

## 2018-01-18 PROCEDURE — 1036F TOBACCO NON-USER: CPT | Performed by: FAMILY MEDICINE

## 2018-01-18 PROCEDURE — G8417 CALC BMI ABV UP PARAM F/U: HCPCS | Performed by: FAMILY MEDICINE

## 2018-01-18 PROCEDURE — 1123F ACP DISCUSS/DSCN MKR DOCD: CPT | Performed by: FAMILY MEDICINE

## 2018-01-18 PROCEDURE — G8484 FLU IMMUNIZE NO ADMIN: HCPCS | Performed by: FAMILY MEDICINE

## 2018-01-18 PROCEDURE — G8598 ASA/ANTIPLAT THER USED: HCPCS | Performed by: FAMILY MEDICINE

## 2018-01-18 PROCEDURE — 1090F PRES/ABSN URINE INCON ASSESS: CPT | Performed by: FAMILY MEDICINE

## 2018-01-18 PROCEDURE — 4040F PNEUMOC VAC/ADMIN/RCVD: CPT | Performed by: FAMILY MEDICINE

## 2018-01-18 PROCEDURE — G8400 PT W/DXA NO RESULTS DOC: HCPCS | Performed by: FAMILY MEDICINE

## 2018-01-18 PROCEDURE — 99214 OFFICE O/P EST MOD 30 MIN: CPT | Performed by: FAMILY MEDICINE

## 2018-01-18 RX ORDER — IRBESARTAN 150 MG/1
150 TABLET ORAL DAILY
Qty: 90 TABLET | Refills: 1 | Status: SHIPPED | OUTPATIENT
Start: 2018-01-18 | End: 2018-07-23 | Stop reason: SDUPTHER

## 2018-01-18 ASSESSMENT — ENCOUNTER SYMPTOMS: SHORTNESS OF BREATH: 0

## 2018-01-23 RX ORDER — LEVOTHYROXINE SODIUM 0.15 MG/1
TABLET ORAL
Qty: 90 TABLET | Refills: 1 | Status: SHIPPED | OUTPATIENT
Start: 2018-01-23 | End: 2018-02-06 | Stop reason: SDUPTHER

## 2018-01-23 RX ORDER — MONTELUKAST SODIUM 10 MG/1
TABLET ORAL
Qty: 90 TABLET | Refills: 1 | Status: SHIPPED | OUTPATIENT
Start: 2018-01-23 | End: 2018-07-24 | Stop reason: SDUPTHER

## 2018-01-23 NOTE — TELEPHONE ENCOUNTER
[facility] is calling to request a refill on the following medication(s):  Requested Prescriptions     Pending Prescriptions Disp Refills    levothyroxine (SYNTHROID) 150 MCG tablet [Pharmacy Med Name: LEVOTHYROXIN 150MCG TAB] 30 tablet 0     Sig: TAKE ONE TABLET BY MOUTH ONCE DAILY    montelukast (SINGULAIR) 10 MG tablet [Pharmacy Med Name: MONTELUKAST 10MG TAB] 30 tablet 0     Sig: TAKE ONE TABLET BY MOUTH NIGHTLY       Last Visit Date (If Applicable):  2/84/6446    Next Visit Date:    2/1/2018

## 2018-02-01 ENCOUNTER — OFFICE VISIT (OUTPATIENT)
Dept: FAMILY MEDICINE CLINIC | Age: 78
End: 2018-02-01
Payer: MEDICARE

## 2018-02-01 VITALS
BODY MASS INDEX: 39.45 KG/M2 | DIASTOLIC BLOOD PRESSURE: 60 MMHG | SYSTOLIC BLOOD PRESSURE: 120 MMHG | WEIGHT: 250 LBS | HEART RATE: 72 BPM

## 2018-02-01 DIAGNOSIS — E11.621 DIABETIC ULCER OF TOE OF LEFT FOOT ASSOCIATED WITH TYPE 2 DIABETES MELLITUS, LIMITED TO BREAKDOWN OF SKIN (HCC): Primary | ICD-10-CM

## 2018-02-01 DIAGNOSIS — L97.521 DIABETIC ULCER OF TOE OF LEFT FOOT ASSOCIATED WITH TYPE 2 DIABETES MELLITUS, LIMITED TO BREAKDOWN OF SKIN (HCC): Primary | ICD-10-CM

## 2018-02-01 PROCEDURE — G8417 CALC BMI ABV UP PARAM F/U: HCPCS | Performed by: FAMILY MEDICINE

## 2018-02-01 PROCEDURE — 3045F PR MOST RECENT HEMOGLOBIN A1C LEVEL 7.0-9.0%: CPT | Performed by: FAMILY MEDICINE

## 2018-02-01 PROCEDURE — 1036F TOBACCO NON-USER: CPT | Performed by: FAMILY MEDICINE

## 2018-02-01 PROCEDURE — G8484 FLU IMMUNIZE NO ADMIN: HCPCS | Performed by: FAMILY MEDICINE

## 2018-02-01 PROCEDURE — 3017F COLORECTAL CA SCREEN DOC REV: CPT | Performed by: FAMILY MEDICINE

## 2018-02-01 PROCEDURE — 4040F PNEUMOC VAC/ADMIN/RCVD: CPT | Performed by: FAMILY MEDICINE

## 2018-02-01 PROCEDURE — G8400 PT W/DXA NO RESULTS DOC: HCPCS | Performed by: FAMILY MEDICINE

## 2018-02-01 PROCEDURE — G8427 DOCREV CUR MEDS BY ELIG CLIN: HCPCS | Performed by: FAMILY MEDICINE

## 2018-02-01 PROCEDURE — 1090F PRES/ABSN URINE INCON ASSESS: CPT | Performed by: FAMILY MEDICINE

## 2018-02-01 PROCEDURE — 1123F ACP DISCUSS/DSCN MKR DOCD: CPT | Performed by: FAMILY MEDICINE

## 2018-02-01 PROCEDURE — G8598 ASA/ANTIPLAT THER USED: HCPCS | Performed by: FAMILY MEDICINE

## 2018-02-01 PROCEDURE — 99212 OFFICE O/P EST SF 10 MIN: CPT | Performed by: FAMILY MEDICINE

## 2018-02-01 RX ORDER — CARVEDILOL 6.25 MG/1
1 TABLET ORAL 2 TIMES DAILY
COMMUNITY
Start: 2018-01-25 | End: 2019-11-20 | Stop reason: ALTCHOICE

## 2018-02-06 DIAGNOSIS — I10 ESSENTIAL HYPERTENSION: ICD-10-CM

## 2018-02-06 RX ORDER — SIMVASTATIN 20 MG
20 TABLET ORAL NIGHTLY
Qty: 90 TABLET | Refills: 1 | Status: SHIPPED | OUTPATIENT
Start: 2018-02-06 | End: 2018-08-21 | Stop reason: SDUPTHER

## 2018-02-06 RX ORDER — METOPROLOL TARTRATE 50 MG/1
50 TABLET, FILM COATED ORAL 2 TIMES DAILY
Qty: 180 TABLET | Refills: 1 | Status: SHIPPED | OUTPATIENT
Start: 2018-02-06 | End: 2018-02-12 | Stop reason: ALTCHOICE

## 2018-02-06 RX ORDER — LEVOTHYROXINE SODIUM 0.15 MG/1
150 TABLET ORAL DAILY
Qty: 90 TABLET | Refills: 1 | Status: SHIPPED | OUTPATIENT
Start: 2018-02-06 | End: 2018-07-24 | Stop reason: SDUPTHER

## 2018-02-12 ENCOUNTER — TELEPHONE (OUTPATIENT)
Dept: FAMILY MEDICINE CLINIC | Age: 78
End: 2018-02-12

## 2018-02-12 NOTE — TELEPHONE ENCOUNTER
Elma Santillan from [address] [place] [address] called to confirm that Yusuf Villalba is to be taking both Coreg and Metoprolol. In Dr. eRal Postal progress note date 1/25/18 Yusuf Villalba was to stop the Metoprolol. Elma Santillan notified to remove from her medication list. Yusuf Villalba was notified of this medication change as well.

## 2018-02-14 ENCOUNTER — OFFICE VISIT (OUTPATIENT)
Dept: FAMILY MEDICINE CLINIC | Age: 78
End: 2018-02-14
Payer: MEDICARE

## 2018-02-14 VITALS
DIASTOLIC BLOOD PRESSURE: 90 MMHG | SYSTOLIC BLOOD PRESSURE: 150 MMHG | HEART RATE: 76 BPM | WEIGHT: 242 LBS | BODY MASS INDEX: 38.19 KG/M2

## 2018-02-14 DIAGNOSIS — E11.22 TYPE 2 DIABETES MELLITUS WITH STAGE 1 CHRONIC KIDNEY DISEASE, WITH LONG-TERM CURRENT USE OF INSULIN (HCC): ICD-10-CM

## 2018-02-14 DIAGNOSIS — R42 DIZZINESS: ICD-10-CM

## 2018-02-14 DIAGNOSIS — I10 ESSENTIAL HYPERTENSION: ICD-10-CM

## 2018-02-14 DIAGNOSIS — Z79.4 TYPE 2 DIABETES MELLITUS WITH STAGE 1 CHRONIC KIDNEY DISEASE, WITH LONG-TERM CURRENT USE OF INSULIN (HCC): ICD-10-CM

## 2018-02-14 DIAGNOSIS — L97.521 DIABETIC ULCER OF TOE OF LEFT FOOT ASSOCIATED WITH TYPE 2 DIABETES MELLITUS, LIMITED TO BREAKDOWN OF SKIN (HCC): ICD-10-CM

## 2018-02-14 DIAGNOSIS — N18.1 TYPE 2 DIABETES MELLITUS WITH STAGE 1 CHRONIC KIDNEY DISEASE, WITH LONG-TERM CURRENT USE OF INSULIN (HCC): ICD-10-CM

## 2018-02-14 DIAGNOSIS — E11.621 DIABETIC ULCER OF TOE OF LEFT FOOT ASSOCIATED WITH TYPE 2 DIABETES MELLITUS, LIMITED TO BREAKDOWN OF SKIN (HCC): ICD-10-CM

## 2018-02-14 DIAGNOSIS — E16.2 HYPOGLYCEMIA: Primary | ICD-10-CM

## 2018-02-14 LAB — GLUCOSE BLD-MCNC: 297 MG/DL

## 2018-02-14 PROCEDURE — G8598 ASA/ANTIPLAT THER USED: HCPCS | Performed by: FAMILY MEDICINE

## 2018-02-14 PROCEDURE — 82962 GLUCOSE BLOOD TEST: CPT | Performed by: FAMILY MEDICINE

## 2018-02-14 PROCEDURE — G8427 DOCREV CUR MEDS BY ELIG CLIN: HCPCS | Performed by: FAMILY MEDICINE

## 2018-02-14 PROCEDURE — 3045F PR MOST RECENT HEMOGLOBIN A1C LEVEL 7.0-9.0%: CPT | Performed by: FAMILY MEDICINE

## 2018-02-14 PROCEDURE — 1123F ACP DISCUSS/DSCN MKR DOCD: CPT | Performed by: FAMILY MEDICINE

## 2018-02-14 PROCEDURE — 1090F PRES/ABSN URINE INCON ASSESS: CPT | Performed by: FAMILY MEDICINE

## 2018-02-14 PROCEDURE — G8484 FLU IMMUNIZE NO ADMIN: HCPCS | Performed by: FAMILY MEDICINE

## 2018-02-14 PROCEDURE — G8417 CALC BMI ABV UP PARAM F/U: HCPCS | Performed by: FAMILY MEDICINE

## 2018-02-14 PROCEDURE — 1036F TOBACCO NON-USER: CPT | Performed by: FAMILY MEDICINE

## 2018-02-14 PROCEDURE — 4040F PNEUMOC VAC/ADMIN/RCVD: CPT | Performed by: FAMILY MEDICINE

## 2018-02-14 PROCEDURE — 99214 OFFICE O/P EST MOD 30 MIN: CPT | Performed by: FAMILY MEDICINE

## 2018-02-14 PROCEDURE — G8400 PT W/DXA NO RESULTS DOC: HCPCS | Performed by: FAMILY MEDICINE

## 2018-02-14 PROCEDURE — 3017F COLORECTAL CA SCREEN DOC REV: CPT | Performed by: FAMILY MEDICINE

## 2018-02-14 RX ORDER — ACARBOSE 25 MG/1
25 TABLET ORAL
Qty: 90 TABLET | Refills: 3 | Status: SHIPPED | OUTPATIENT
Start: 2018-02-14 | End: 2018-03-14 | Stop reason: ALTCHOICE

## 2018-02-15 ENCOUNTER — TELEPHONE (OUTPATIENT)
Dept: FAMILY MEDICINE CLINIC | Age: 78
End: 2018-02-15

## 2018-02-16 PROBLEM — R42 DIZZINESS: Status: ACTIVE | Noted: 2018-02-16

## 2018-02-28 ENCOUNTER — OFFICE VISIT (OUTPATIENT)
Dept: FAMILY MEDICINE CLINIC | Age: 78
End: 2018-02-28
Payer: MEDICARE

## 2018-02-28 VITALS
WEIGHT: 248 LBS | HEART RATE: 64 BPM | SYSTOLIC BLOOD PRESSURE: 140 MMHG | BODY MASS INDEX: 39.13 KG/M2 | DIASTOLIC BLOOD PRESSURE: 60 MMHG

## 2018-02-28 DIAGNOSIS — L97.521 DIABETIC ULCER OF TOE OF LEFT FOOT ASSOCIATED WITH TYPE 2 DIABETES MELLITUS, LIMITED TO BREAKDOWN OF SKIN (HCC): Primary | ICD-10-CM

## 2018-02-28 DIAGNOSIS — E11.621 DIABETIC ULCER OF TOE OF LEFT FOOT ASSOCIATED WITH TYPE 2 DIABETES MELLITUS, LIMITED TO BREAKDOWN OF SKIN (HCC): Primary | ICD-10-CM

## 2018-02-28 PROCEDURE — G8427 DOCREV CUR MEDS BY ELIG CLIN: HCPCS | Performed by: FAMILY MEDICINE

## 2018-02-28 PROCEDURE — G8598 ASA/ANTIPLAT THER USED: HCPCS | Performed by: FAMILY MEDICINE

## 2018-02-28 PROCEDURE — 1090F PRES/ABSN URINE INCON ASSESS: CPT | Performed by: FAMILY MEDICINE

## 2018-02-28 PROCEDURE — G8400 PT W/DXA NO RESULTS DOC: HCPCS | Performed by: FAMILY MEDICINE

## 2018-02-28 PROCEDURE — 3045F PR MOST RECENT HEMOGLOBIN A1C LEVEL 7.0-9.0%: CPT | Performed by: FAMILY MEDICINE

## 2018-02-28 PROCEDURE — 1123F ACP DISCUSS/DSCN MKR DOCD: CPT | Performed by: FAMILY MEDICINE

## 2018-02-28 PROCEDURE — 3017F COLORECTAL CA SCREEN DOC REV: CPT | Performed by: FAMILY MEDICINE

## 2018-02-28 PROCEDURE — 4040F PNEUMOC VAC/ADMIN/RCVD: CPT | Performed by: FAMILY MEDICINE

## 2018-02-28 PROCEDURE — G8484 FLU IMMUNIZE NO ADMIN: HCPCS | Performed by: FAMILY MEDICINE

## 2018-02-28 PROCEDURE — G8417 CALC BMI ABV UP PARAM F/U: HCPCS | Performed by: FAMILY MEDICINE

## 2018-02-28 PROCEDURE — 99213 OFFICE O/P EST LOW 20 MIN: CPT | Performed by: FAMILY MEDICINE

## 2018-02-28 PROCEDURE — 1036F TOBACCO NON-USER: CPT | Performed by: FAMILY MEDICINE

## 2018-02-28 NOTE — PROGRESS NOTES
tablet Take 1 tablet by mouth 2 times daily 12/5/17  Yes Atilio Ibrahim MD   gabapentin (NEURONTIN) 300 MG capsule Take 1 capsule by mouth 3 times daily 10/2/17  Yes Atilio Ibrahim MD   fluticasone propionate (FLOVENT DISKUS) 100 MCG/BLIST AEPB inhaler Inhale 1 puff into the lungs daily   Yes Historical Provider, MD   fexofenadine (HM FEXOFENADINE HCL) 180 MG tablet Take 180 mg by mouth daily   Yes Historical Provider, MD   insulin glargine (LANTUS) 100 UNIT/ML injection vial Inject 55 Units into the skin nightly   Yes Historical Provider, MD   fluticasone (FLONASE) 50 MCG/ACT nasal spray 1 spray by Nasal route daily   Yes Historical Provider, MD   furosemide (LASIX) 40 MG tablet Take 40 mg by mouth daily as needed   Yes Historical Provider, MD   isosorbide mononitrate (IMDUR) 60 MG extended release tablet Take 60 mg by mouth every morning   Yes Historical Provider, MD   insulin aspart (NOVOLOG) 100 UNIT/ML injection vial Inject 4 Units into the skin 3 times daily (before meals)    Yes Atilio Ibrahim MD   aspirin 81 MG tablet Take 81 mg by mouth daily   Yes Historical Provider, MD   acetaminophen (TYLENOL) 500 MG tablet Take 1,000 mg by mouth nightly   Yes Historical Provider, MD   vitamin B-12 (CYANOCOBALAMIN) 500 MCG tablet Take 500 mcg by mouth daily   Yes Historical Provider, MD   CPAP Machine MISC by Does not apply route   Yes Atilio Ibrahim MD   calcium carbonate (OSCAL) 500 MG TABS tablet Take 500 mg by mouth daily   Yes Historical Provider, MD   Omega-3 Fatty Acids (FISH OIL PO) Take by mouth 2 times daily    Yes Historical Provider, MD   Multiple Vitamins-Minerals (MULTIVITAMIN ADULTS PO) Take by mouth daily    Yes Historical Provider, MD     Allergies   Allergen Reactions    Lisinopril Other (See Comments)     Cough    Penicillins Swelling     Facial swelling    Ranolazine Rash       Health Maintenance   Topic Date Due    Diabetic foot exam  09[birth year]26/1950    Diabetic retinal exam  09/26/1952   

## 2018-03-14 ENCOUNTER — OFFICE VISIT (OUTPATIENT)
Dept: FAMILY MEDICINE CLINIC | Age: 78
End: 2018-03-14
Payer: MEDICARE

## 2018-03-14 VITALS — BODY MASS INDEX: 38.82 KG/M2 | WEIGHT: 246 LBS | DIASTOLIC BLOOD PRESSURE: 72 MMHG | SYSTOLIC BLOOD PRESSURE: 130 MMHG

## 2018-03-14 DIAGNOSIS — E11.621 DIABETIC ULCER OF TOE OF LEFT FOOT ASSOCIATED WITH TYPE 2 DIABETES MELLITUS, LIMITED TO BREAKDOWN OF SKIN (HCC): Primary | ICD-10-CM

## 2018-03-14 DIAGNOSIS — L97.521 DIABETIC ULCER OF TOE OF LEFT FOOT ASSOCIATED WITH TYPE 2 DIABETES MELLITUS, LIMITED TO BREAKDOWN OF SKIN (HCC): Primary | ICD-10-CM

## 2018-03-14 DIAGNOSIS — L60.3 ONYCHODYSTROPHY: ICD-10-CM

## 2018-03-14 PROCEDURE — G8598 ASA/ANTIPLAT THER USED: HCPCS | Performed by: FAMILY MEDICINE

## 2018-03-14 PROCEDURE — G8482 FLU IMMUNIZE ORDER/ADMIN: HCPCS | Performed by: FAMILY MEDICINE

## 2018-03-14 PROCEDURE — 4040F PNEUMOC VAC/ADMIN/RCVD: CPT | Performed by: FAMILY MEDICINE

## 2018-03-14 PROCEDURE — G8427 DOCREV CUR MEDS BY ELIG CLIN: HCPCS | Performed by: FAMILY MEDICINE

## 2018-03-14 PROCEDURE — 3045F PR MOST RECENT HEMOGLOBIN A1C LEVEL 7.0-9.0%: CPT | Performed by: FAMILY MEDICINE

## 2018-03-14 PROCEDURE — 1090F PRES/ABSN URINE INCON ASSESS: CPT | Performed by: FAMILY MEDICINE

## 2018-03-14 PROCEDURE — 3017F COLORECTAL CA SCREEN DOC REV: CPT | Performed by: FAMILY MEDICINE

## 2018-03-14 PROCEDURE — G8417 CALC BMI ABV UP PARAM F/U: HCPCS | Performed by: FAMILY MEDICINE

## 2018-03-14 PROCEDURE — 99213 OFFICE O/P EST LOW 20 MIN: CPT | Performed by: FAMILY MEDICINE

## 2018-03-14 PROCEDURE — 1123F ACP DISCUSS/DSCN MKR DOCD: CPT | Performed by: FAMILY MEDICINE

## 2018-03-14 PROCEDURE — G8400 PT W/DXA NO RESULTS DOC: HCPCS | Performed by: FAMILY MEDICINE

## 2018-03-14 PROCEDURE — 1036F TOBACCO NON-USER: CPT | Performed by: FAMILY MEDICINE

## 2018-03-14 ASSESSMENT — PATIENT HEALTH QUESTIONNAIRE - PHQ9
SUM OF ALL RESPONSES TO PHQ9 QUESTIONS 1 & 2: 0
2. FEELING DOWN, DEPRESSED OR HOPELESS: 0
SUM OF ALL RESPONSES TO PHQ QUESTIONS 1-9: 0
1. LITTLE INTEREST OR PLEASURE IN DOING THINGS: 0

## 2018-03-14 ASSESSMENT — ENCOUNTER SYMPTOMS: COUGH: 1

## 2018-03-14 NOTE — PROGRESS NOTES
colonoscopy  09/26/1992    Lipid screen  02/14/2018    TSH testing  01/17/2019    Potassium monitoring  01/17/2019    Creatinine monitoring  01/17/2019    A1C test (Diabetic or Prediabetic)  01/18/2019    DEXA (modify frequency per FRAX score)  Completed    Flu vaccine  Completed    Pneumococcal low/med risk  Completed       Subjective:      Review of Systems   HENT: Positive for congestion (Nasal congestion, using Diabetic safe tussin). Respiratory: Positive for cough. Shortness of breath: using diabetic tussin. Endocrine:        Seen Endocrinology yesterday and some changes were made to her meds. Skin:        Here for follow up on left foot, 2nd digit wound       Objective:     /72   Wt 246 lb (111.6 kg)   BMI 38.82 kg/m²     Physical Exam  Left 2nd toe with area 6 x 10 mm crusted though when debrided only 2 x 5 mm lesion with 3 mm depth noted. Onychodystrophy bilateral great toes. Assessment:     1. Diabetic ulcer of toe of left foot associated with type 2 diabetes mellitus, limited to breakdown of skin ([address]    2. Onychodystrophy      No results found for this visit on 03/14/18. Plan:     Orders Placed This Encounter   Procedures   [address], Sima Banks, [place], Podiatry [place]     Referral Priority:   Routine     Referral Type:   Consult for Advice and Opinion     Referral Reason:   Specialty Services Required     Referred to Provider:   Timi Khan DPM     Requested Specialty:   Podiatry     Number of Visits Requested:   1       No orders of the defined types were placed in this encounter. Attempt wet to dry dressing into slight depth of wound. Return in about 2 weeks (around 3/28/2018) for wound check unless seeing podiatry for this. All patient questions answered. Pt voiced understanding. Patient agreed with treatment plan. Follow up as directed.      Electronically signed by Suhas Edwards MD on 3/14/2018

## 2018-03-19 ENCOUNTER — OFFICE VISIT (OUTPATIENT)
Dept: WOUND CARE | Age: 78
End: 2018-03-19
Payer: MEDICARE

## 2018-03-19 VITALS
BODY MASS INDEX: 38.82 KG/M2 | DIASTOLIC BLOOD PRESSURE: 80 MMHG | HEART RATE: 64 BPM | SYSTOLIC BLOOD PRESSURE: 142 MMHG | WEIGHT: 246 LBS

## 2018-03-19 DIAGNOSIS — S98.132A AMPUTATED TOE OF LEFT FOOT (HCC): ICD-10-CM

## 2018-03-19 DIAGNOSIS — L97.521 SKIN ULCER OF SECOND TOE OF LEFT FOOT, LIMITED TO BREAKDOWN OF SKIN (HCC): Primary | ICD-10-CM

## 2018-03-19 DIAGNOSIS — N18.1 TYPE 2 DIABETES MELLITUS WITH STAGE 1 CHRONIC KIDNEY DISEASE, WITH LONG-TERM CURRENT USE OF INSULIN (HCC): ICD-10-CM

## 2018-03-19 DIAGNOSIS — Z79.4 TYPE 2 DIABETES MELLITUS WITH STAGE 1 CHRONIC KIDNEY DISEASE, WITH LONG-TERM CURRENT USE OF INSULIN (HCC): ICD-10-CM

## 2018-03-19 DIAGNOSIS — E11.22 TYPE 2 DIABETES MELLITUS WITH STAGE 1 CHRONIC KIDNEY DISEASE, WITH LONG-TERM CURRENT USE OF INSULIN (HCC): ICD-10-CM

## 2018-03-19 DIAGNOSIS — M20.41 HAMMER TOES OF BOTH FEET: ICD-10-CM

## 2018-03-19 DIAGNOSIS — M20.42 HAMMER TOES OF BOTH FEET: ICD-10-CM

## 2018-03-19 PROCEDURE — 97597 DBRDMT OPN WND 1ST 20 CM/<: CPT | Performed by: PODIATRIST

## 2018-03-19 PROCEDURE — 3045F PR MOST RECENT HEMOGLOBIN A1C LEVEL 7.0-9.0%: CPT | Performed by: PODIATRIST

## 2018-03-19 PROCEDURE — G8427 DOCREV CUR MEDS BY ELIG CLIN: HCPCS | Performed by: PODIATRIST

## 2018-03-19 PROCEDURE — 1123F ACP DISCUSS/DSCN MKR DOCD: CPT | Performed by: PODIATRIST

## 2018-03-19 PROCEDURE — 4040F PNEUMOC VAC/ADMIN/RCVD: CPT | Performed by: PODIATRIST

## 2018-03-19 PROCEDURE — 99202 OFFICE O/P NEW SF 15 MIN: CPT | Performed by: PODIATRIST

## 2018-03-19 PROCEDURE — G8482 FLU IMMUNIZE ORDER/ADMIN: HCPCS | Performed by: PODIATRIST

## 2018-03-19 PROCEDURE — G8598 ASA/ANTIPLAT THER USED: HCPCS | Performed by: PODIATRIST

## 2018-03-19 PROCEDURE — G8400 PT W/DXA NO RESULTS DOC: HCPCS | Performed by: PODIATRIST

## 2018-03-19 PROCEDURE — 1036F TOBACCO NON-USER: CPT | Performed by: PODIATRIST

## 2018-03-19 PROCEDURE — 3017F COLORECTAL CA SCREEN DOC REV: CPT | Performed by: PODIATRIST

## 2018-03-19 PROCEDURE — 1090F PRES/ABSN URINE INCON ASSESS: CPT | Performed by: PODIATRIST

## 2018-03-19 PROCEDURE — L3260 AMBULATORY SURGICAL BOOT EAC: HCPCS | Performed by: PODIATRIST

## 2018-03-19 PROCEDURE — G8417 CALC BMI ABV UP PARAM F/U: HCPCS | Performed by: PODIATRIST

## 2018-03-19 NOTE — PATIENT INSTRUCTIONS
Wear surgical shoe at all times weight bearing. Discontinue previous dressing. Apply Silvadene, an antimicrobial cream which is a prescription medicine. Apply once or twice a day to your wound as instructed by your doctor. Cover with a dry dressing. Call with any concerns. SIGNS OF INFECTION  - Redness, swelling, skin hot  - Wound bed turns black or stringy yellow  - Foul odor  - Increased drainage or pus  - Increased pain  - Fever greater than 100F    CALL YOUR DOCTOR OR SEEK MEDICAL ATTENTION IF SIGNS OF INFECTION.   DO NOT WAIT UNTIL YOUR NEXT APPOINTMENT    Call the Wound Care Nurse with any other questions or concerns- [phone]

## 2018-03-19 NOTE — PROGRESS NOTES
Subjective: Yadira Tran is a 78 y.o. female who presents with the ulceration of the toe. Pt has seen PCP for a few months for this, getting better slowly. Pt states it started after her new DM shoes. Patient has been compliant with off loading. Patient relates pain is Absent . Pain is rated 0 out of 10 and is described as none. Currently denies F/C/N/V. Overall diabetic control is not changed. Allergies   Allergen Reactions    Lisinopril Other (See Comments)     Cough    Penicillins Swelling     Facial swelling    Ranolazine Rash       Past Medical History:   Diagnosis Date    CAD (coronary artery disease)     Diabetes mellitus ([facility] [address]     Hyperlipidemia     Hypertension     Hypothyroidism     Osteoarthritis     Sleep apnea        Prior to Admission medications    Medication Sig Start Date End Date Taking?  Authorizing Provider   levothyroxine (SYNTHROID) 150 MCG tablet Take 1 tablet by mouth daily 2/6/18  Yes Arch Dancer, MD   simvastatin (ZOCOR) 20 MG tablet Take 1 tablet by mouth nightly 2/6/18  Yes Arch Dancer, MD   carvedilol (COREG) 6.25 MG tablet 1 tablet 2 times daily 1/25/18  Yes Historical Provider, MD   montelukast (SINGULAIR) 10 MG tablet TAKE ONE TABLET BY MOUTH NIGHTLY 1/23/18  Yes Arch Dancer, MD   irbesartan (AVAPRO) 150 MG tablet Take 1 tablet by mouth daily 1/18/18  Yes Arch Dancer, MD   metFORMIN (GLUCOPHAGE) 1000 MG tablet Take 1 tablet by mouth 2 times daily 12/5/17  Yes Arch Dancer, MD   gabapentin (NEURONTIN) 300 MG capsule Take 1 capsule by mouth 3 times daily 10/2/17  Yes Arch Dancer, MD   fluticasone propionate (FLOVENT DISKUS) 100 MCG/BLIST AEPB inhaler Inhale 1 puff into the lungs daily   Yes Historical Provider, MD   fexofenadine (HM FEXOFENADINE HCL) 180 MG tablet Take 180 mg by mouth daily   Yes Historical Provider, MD   insulin glargine (LANTUS) 100 UNIT/ML injection vial Inject 55 Units into the skin nightly   Yes Historical Provider, MD intact  10/10 sites via 5.07/10g Gardner-Kiko Monofilament, bilateral.  negative Tinel's, bilateral.  negative Valleix sign, bilateral.  Vibratory abnormal  bilateral.  Reflexes Decreased bilateral.  Paresthesias positive. Dysthesias negative. Sharp/dull abnormal  bilateral.  Musculoskeletal: Muscle strength 5/5, bilateral.  Pain absent upon palpation bilateral. Normal medial longitudinal arch, bilateral.  Ankle ROM decreased,bilateral.  1st MPJ ROM within normal limits, bilateral.  Dorsally contracted digits present b/l. L 3 amp. No other foot deformities. Integument:   Open lesion present, Left. Ulcer dorsal l 2 PIPJ, 0.4x0.2x0.2cm, non viable tissue, no probing no undermining no malodor. No surrounding signs of infx. Interdigital maceration absent to web spaces , Bilateral.  Nails b/l thickened, dystrophic and crumbly, discolored with subungual debris. Fissures absent, Bilateral. Hyperkeratotic tissue is present. Wound 03/19/18 left 2nd toe wound (Active)   Dressing/Treatment Dry dressing 3/19/2018 11:16 AM   Wound Cleansed Rinsed/Irrigated with saline 3/19/2018 11:16 AM   Drainage Amount Scant 3/19/2018 11:16 AM   Drainage Description Sanguinous 3/19/2018 11:16 AM   Odor None 3/19/2018 11:16 AM   Dinorah-wound Assessment Dry;Calloused 3/19/2018 11:16 AM   [place]%Wound Bed 100 3/19/2018 11:16 AM   Number of days: 0         Asessment: Patient is a 78 y.o. female with:   1. Skin ulcer of second toe of left foot, limited to breakdown of skin (HCC)  Post Op Shoe    MN DEBRIDEMENT OPEN WOUND 20 SQ CM<   2. 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 Amputated toe of left foot (HCC)  Post Op Shoe    MN DEBRIDEMENT OPEN WOUND 20 SQ CM<   4. Hammer toes of both feet  Post Op Shoe    MN DEBRIDEMENT OPEN WOUND 20 SQ CM<     Ulcer Classification:  Diabetic ulcer grade 1 C.   IDSA  no

## 2018-04-02 ENCOUNTER — OFFICE VISIT (OUTPATIENT)
Dept: WOUND CARE | Age: 78
End: 2018-04-02
Payer: MEDICARE

## 2018-04-02 VITALS
SYSTOLIC BLOOD PRESSURE: 122 MMHG | HEART RATE: 76 BPM | HEIGHT: 67 IN | BODY MASS INDEX: 38.14 KG/M2 | DIASTOLIC BLOOD PRESSURE: 62 MMHG | WEIGHT: 243 LBS | RESPIRATION RATE: 20 BRPM | TEMPERATURE: 98.5 F

## 2018-04-02 DIAGNOSIS — L84 CORNS AND CALLOSITIES: ICD-10-CM

## 2018-04-02 DIAGNOSIS — E11.51 CONTROLLED TYPE 2 DM WITH PERIPHERAL CIRCULATORY DISORDER (HCC): ICD-10-CM

## 2018-04-02 DIAGNOSIS — L97.521 SKIN ULCER OF TOE OF LEFT FOOT, LIMITED TO BREAKDOWN OF SKIN (HCC): Primary | ICD-10-CM

## 2018-04-02 DIAGNOSIS — B35.1 DERMATOPHYTOSIS OF NAIL: ICD-10-CM

## 2018-04-02 DIAGNOSIS — S98.132A AMPUTATED TOE OF LEFT FOOT (HCC): ICD-10-CM

## 2018-04-02 PROCEDURE — 97597 DBRDMT OPN WND 1ST 20 CM/<: CPT | Performed by: PODIATRIST

## 2018-04-02 PROCEDURE — 11055 PARING/CUTG B9 HYPRKER LES 1: CPT | Performed by: PODIATRIST

## 2018-04-02 PROCEDURE — 11721 DEBRIDE NAIL 6 OR MORE: CPT | Performed by: PODIATRIST

## 2018-04-09 ENCOUNTER — OFFICE VISIT (OUTPATIENT)
Dept: WOUND CARE | Age: 78
End: 2018-04-09
Payer: MEDICARE

## 2018-04-09 VITALS
SYSTOLIC BLOOD PRESSURE: 130 MMHG | BODY MASS INDEX: 38.58 KG/M2 | TEMPERATURE: 98.3 F | WEIGHT: 245.8 LBS | DIASTOLIC BLOOD PRESSURE: 70 MMHG | HEART RATE: 64 BPM | HEIGHT: 67 IN | RESPIRATION RATE: 20 BRPM

## 2018-04-09 DIAGNOSIS — E11.51 CONTROLLED TYPE 2 DM WITH PERIPHERAL CIRCULATORY DISORDER (HCC): ICD-10-CM

## 2018-04-09 DIAGNOSIS — L97.521 SKIN ULCER OF SECOND TOE OF LEFT FOOT, LIMITED TO BREAKDOWN OF SKIN (HCC): Primary | ICD-10-CM

## 2018-04-09 PROCEDURE — 1123F ACP DISCUSS/DSCN MKR DOCD: CPT | Performed by: PODIATRIST

## 2018-04-09 PROCEDURE — G8598 ASA/ANTIPLAT THER USED: HCPCS | Performed by: PODIATRIST

## 2018-04-09 PROCEDURE — 4040F PNEUMOC VAC/ADMIN/RCVD: CPT | Performed by: PODIATRIST

## 2018-04-09 PROCEDURE — 3017F COLORECTAL CA SCREEN DOC REV: CPT | Performed by: PODIATRIST

## 2018-04-09 PROCEDURE — 3045F PR MOST RECENT HEMOGLOBIN A1C LEVEL 7.0-9.0%: CPT | Performed by: PODIATRIST

## 2018-04-09 PROCEDURE — G8400 PT W/DXA NO RESULTS DOC: HCPCS | Performed by: PODIATRIST

## 2018-04-09 PROCEDURE — 1090F PRES/ABSN URINE INCON ASSESS: CPT | Performed by: PODIATRIST

## 2018-04-09 PROCEDURE — 1036F TOBACCO NON-USER: CPT | Performed by: PODIATRIST

## 2018-04-09 PROCEDURE — G8427 DOCREV CUR MEDS BY ELIG CLIN: HCPCS | Performed by: PODIATRIST

## 2018-04-09 PROCEDURE — 99212 OFFICE O/P EST SF 10 MIN: CPT | Performed by: PODIATRIST

## 2018-04-09 PROCEDURE — G8417 CALC BMI ABV UP PARAM F/U: HCPCS | Performed by: PODIATRIST

## 2018-04-11 ENCOUNTER — OFFICE VISIT (OUTPATIENT)
Dept: FAMILY MEDICINE CLINIC | Age: 78
End: 2018-04-11
Payer: MEDICARE

## 2018-04-11 VITALS
WEIGHT: 245 LBS | DIASTOLIC BLOOD PRESSURE: 60 MMHG | BODY MASS INDEX: 38.66 KG/M2 | SYSTOLIC BLOOD PRESSURE: 136 MMHG | HEART RATE: 60 BPM

## 2018-04-11 DIAGNOSIS — Z91.81 AT HIGH RISK FOR FALLS: ICD-10-CM

## 2018-04-11 DIAGNOSIS — E11.22 TYPE 2 DIABETES MELLITUS WITH STAGE 1 CHRONIC KIDNEY DISEASE, WITH LONG-TERM CURRENT USE OF INSULIN (HCC): ICD-10-CM

## 2018-04-11 DIAGNOSIS — I73.9 PERIPHERAL VASCULAR DISEASE (HCC): ICD-10-CM

## 2018-04-11 DIAGNOSIS — E03.9 ACQUIRED HYPOTHYROIDISM: ICD-10-CM

## 2018-04-11 DIAGNOSIS — I10 ESSENTIAL HYPERTENSION: Primary | ICD-10-CM

## 2018-04-11 DIAGNOSIS — N18.1 TYPE 2 DIABETES MELLITUS WITH STAGE 1 CHRONIC KIDNEY DISEASE, WITH LONG-TERM CURRENT USE OF INSULIN (HCC): ICD-10-CM

## 2018-04-11 DIAGNOSIS — Z79.4 TYPE 2 DIABETES MELLITUS WITH STAGE 1 CHRONIC KIDNEY DISEASE, WITH LONG-TERM CURRENT USE OF INSULIN (HCC): ICD-10-CM

## 2018-04-11 DIAGNOSIS — L91.8 SKIN TAG: ICD-10-CM

## 2018-04-11 PROCEDURE — G8400 PT W/DXA NO RESULTS DOC: HCPCS | Performed by: FAMILY MEDICINE

## 2018-04-11 PROCEDURE — G8417 CALC BMI ABV UP PARAM F/U: HCPCS | Performed by: FAMILY MEDICINE

## 2018-04-11 PROCEDURE — 1090F PRES/ABSN URINE INCON ASSESS: CPT | Performed by: FAMILY MEDICINE

## 2018-04-11 PROCEDURE — 3017F COLORECTAL CA SCREEN DOC REV: CPT | Performed by: FAMILY MEDICINE

## 2018-04-11 PROCEDURE — G8427 DOCREV CUR MEDS BY ELIG CLIN: HCPCS | Performed by: FAMILY MEDICINE

## 2018-04-11 PROCEDURE — G8598 ASA/ANTIPLAT THER USED: HCPCS | Performed by: FAMILY MEDICINE

## 2018-04-11 PROCEDURE — 4040F PNEUMOC VAC/ADMIN/RCVD: CPT | Performed by: FAMILY MEDICINE

## 2018-04-11 PROCEDURE — 3045F PR MOST RECENT HEMOGLOBIN A1C LEVEL 7.0-9.0%: CPT | Performed by: FAMILY MEDICINE

## 2018-04-11 PROCEDURE — 1036F TOBACCO NON-USER: CPT | Performed by: FAMILY MEDICINE

## 2018-04-11 PROCEDURE — 2022F DILAT RTA XM EVC RTNOPTHY: CPT | Performed by: FAMILY MEDICINE

## 2018-04-11 PROCEDURE — 1123F ACP DISCUSS/DSCN MKR DOCD: CPT | Performed by: FAMILY MEDICINE

## 2018-04-11 PROCEDURE — 99215 OFFICE O/P EST HI 40 MIN: CPT | Performed by: FAMILY MEDICINE

## 2018-04-11 ASSESSMENT — ENCOUNTER SYMPTOMS: SHORTNESS OF BREATH: 0

## 2018-06-18 ENCOUNTER — OFFICE VISIT (OUTPATIENT)
Dept: PODIATRY | Age: 78
End: 2018-06-18
Payer: MEDICARE

## 2018-06-18 VITALS
BODY MASS INDEX: 37.98 KG/M2 | DIASTOLIC BLOOD PRESSURE: 74 MMHG | SYSTOLIC BLOOD PRESSURE: 136 MMHG | WEIGHT: 242 LBS | HEIGHT: 67 IN | RESPIRATION RATE: 20 BRPM | HEART RATE: 68 BPM

## 2018-06-18 DIAGNOSIS — E11.51 CONTROLLED TYPE 2 DM WITH PERIPHERAL CIRCULATORY DISORDER (HCC): Primary | ICD-10-CM

## 2018-06-18 DIAGNOSIS — L84 CORNS AND CALLOSITIES: ICD-10-CM

## 2018-06-18 DIAGNOSIS — B35.1 DERMATOPHYTOSIS OF NAIL: ICD-10-CM

## 2018-06-18 PROCEDURE — 99999 PR OFFICE/OUTPT VISIT,PROCEDURE ONLY: CPT | Performed by: PODIATRIST

## 2018-06-18 PROCEDURE — 11721 DEBRIDE NAIL 6 OR MORE: CPT | Performed by: PODIATRIST

## 2018-06-18 PROCEDURE — 11055 PARING/CUTG B9 HYPRKER LES 1: CPT | Performed by: PODIATRIST

## 2018-06-29 LAB
ALBUMIN SERPL-MCNC: NORMAL G/DL
ALP BLD-CCNC: NORMAL U/L
ALT SERPL-CCNC: NORMAL U/L
ANION GAP SERPL CALCULATED.3IONS-SCNC: NORMAL MMOL/L
AST SERPL-CCNC: NORMAL U/L
BILIRUB SERPL-MCNC: 0.4 MG/DL (ref 0.1–1.4)
BUN BLDV-MCNC: 20 MG/DL
CALCIUM SERPL-MCNC: 9.8 MG/DL
CHLORIDE BLD-SCNC: 103 MMOL/L
CHOLESTEROL, TOTAL: 124 MG/DL
CHOLESTEROL/HDL RATIO: 2.5
CO2: 29 MMOL/L
CREAT SERPL-MCNC: 1.3 MG/DL
GFR CALCULATED: NORMAL
GLUCOSE BLD-MCNC: 144 MG/DL
HDLC SERPL-MCNC: 49 MG/DL (ref 35–70)
LDL CHOLESTEROL CALCULATED: 45 MG/DL (ref 0–160)
MICROALBUMIN UR-MCNC: 1140
POTASSIUM SERPL-SCNC: 4.7 MMOL/L
SODIUM BLD-SCNC: 140 MMOL/L
TOTAL PROTEIN: 6
TRIGL SERPL-MCNC: 150 MG/DL
VITAMIN B-12: 522
VLDLC SERPL CALC-MCNC: 30 MG/DL

## 2018-07-23 ENCOUNTER — OFFICE VISIT (OUTPATIENT)
Dept: FAMILY MEDICINE CLINIC | Age: 78
End: 2018-07-23
Payer: MEDICARE

## 2018-07-23 VITALS
BODY MASS INDEX: 38.34 KG/M2 | HEART RATE: 56 BPM | WEIGHT: 243 LBS | SYSTOLIC BLOOD PRESSURE: 160 MMHG | DIASTOLIC BLOOD PRESSURE: 80 MMHG

## 2018-07-23 DIAGNOSIS — N39.46 MIXED STRESS AND URGE URINARY INCONTINENCE: ICD-10-CM

## 2018-07-23 DIAGNOSIS — R42 DIZZINESS: ICD-10-CM

## 2018-07-23 DIAGNOSIS — I10 ESSENTIAL HYPERTENSION: ICD-10-CM

## 2018-07-23 DIAGNOSIS — I95.1 ORTHOSTATIC HYPOTENSION: Primary | ICD-10-CM

## 2018-07-23 PROCEDURE — 0509F URINE INCON PLAN DOCD: CPT | Performed by: FAMILY MEDICINE

## 2018-07-23 PROCEDURE — G8598 ASA/ANTIPLAT THER USED: HCPCS | Performed by: FAMILY MEDICINE

## 2018-07-23 PROCEDURE — 1101F PT FALLS ASSESS-DOCD LE1/YR: CPT | Performed by: FAMILY MEDICINE

## 2018-07-23 PROCEDURE — 1123F ACP DISCUSS/DSCN MKR DOCD: CPT | Performed by: FAMILY MEDICINE

## 2018-07-23 PROCEDURE — G8428 CUR MEDS NOT DOCUMENT: HCPCS | Performed by: FAMILY MEDICINE

## 2018-07-23 PROCEDURE — 1090F PRES/ABSN URINE INCON ASSESS: CPT | Performed by: FAMILY MEDICINE

## 2018-07-23 PROCEDURE — 3017F COLORECTAL CA SCREEN DOC REV: CPT | Performed by: FAMILY MEDICINE

## 2018-07-23 PROCEDURE — G8417 CALC BMI ABV UP PARAM F/U: HCPCS | Performed by: FAMILY MEDICINE

## 2018-07-23 PROCEDURE — 1036F TOBACCO NON-USER: CPT | Performed by: FAMILY MEDICINE

## 2018-07-23 PROCEDURE — 99214 OFFICE O/P EST MOD 30 MIN: CPT | Performed by: FAMILY MEDICINE

## 2018-07-23 PROCEDURE — G8400 PT W/DXA NO RESULTS DOC: HCPCS | Performed by: FAMILY MEDICINE

## 2018-07-23 PROCEDURE — 4040F PNEUMOC VAC/ADMIN/RCVD: CPT | Performed by: FAMILY MEDICINE

## 2018-07-23 RX ORDER — OXYBUTYNIN CHLORIDE 5 MG/1
5 TABLET, EXTENDED RELEASE ORAL DAILY
Qty: 30 TABLET | Refills: 3 | Status: SHIPPED | OUTPATIENT
Start: 2018-07-23 | End: 2018-11-19 | Stop reason: SDUPTHER

## 2018-07-23 RX ORDER — IRBESARTAN 150 MG/1
300 TABLET ORAL DAILY
Qty: 180 TABLET | Refills: 1 | Status: SHIPPED | OUTPATIENT
Start: 2018-07-23 | End: 2018-07-25 | Stop reason: ALTCHOICE

## 2018-07-23 NOTE — PATIENT INSTRUCTIONS
Increase fluids water, gatorade or juice need at least 64 oz daily, does not include pop.  Not effective to use coffee, tea or alcohol

## 2018-07-23 NOTE — PROGRESS NOTES
[facility]  [address] [facility]  Dept: [phone]  Dept Fax: [phone]    Emily Sidhu is a 78 y.o. female who presents today for her medical conditions/complaints as noted below. Emily Sidhu c/o of Other (f/u ER 7/18/18)      HPI:     HPI  Pt had visit in [facility] ER with report at [place]  had eaten and taken insulin. Had fruit and no protein per pt. When pt went to [facility] had symptoms. BS when checked was not high, noted at 108 prior to meal and insulin. Dr Mariella Mercado adjusting higher with consistantly high levels to 15 units from 10 prior,   Pt reports when running lower has been using old scale per pt. When checked at 126    Vulcan faint with getting up to walk over and  script. BS at 98. BP Readings from Last 3 Encounters:   07/23/18 (!) 160/80   06/18/18 136/74   04/11/18 136/60          (goal 120/80)    Past Medical History:   Diagnosis Date    CAD (coronary artery disease)     Diabetes mellitus ([facility] [address]     Hyperlipidemia     Hypertension     Hypothyroidism     Osteoarthritis     Sleep apnea       Past Surgical History:   Procedure Laterality Date    ANGIOPLASTY      EYE SURGERY      FOOT SURGERY Left 07/31/2009    3rd toe amputation    HYSTERECTOMY      TOE AMPUTATION      left 3rd toe    TONSILLECTOMY         Family History   Problem Relation Age of Onset    High Blood Pressure Mother     Coronary Art Dis Father        Social History   Substance Use Topics    Smoking status: Never Smoker    Smokeless tobacco: Never Used    Alcohol use No      Prior to Admission medications    Medication Sig Start Date End Date Taking?  Authorizing Provider   metFORMIN (GLUCOPHAGE) 1000 MG tablet TAKE ONE TABLET BY MOUTH TWICE DAILY 6/20/18  Yes Mandi Harris MD   gabapentin (NEURONTIN) 300 MG capsule TAKE ONE CAPSULE BY MOUTH THREE TIMES DAILY 3/28/18 9/24/18 Yes Mandi Harris MD   levothyroxine (SYNTHROID) 150 MCG tablet Take 1 tablet by mouth daily 2/6/18  Yes Nick Hernández MD   simvastatin (ZOCOR) 20 MG tablet Take 1 tablet by mouth nightly 2/6/18  Yes Nick Hrenández MD   carvedilol (COREG) 6.25 MG tablet 1 tablet 2 times daily 1/25/18  Yes Historical Provider, MD   montelukast (SINGULAIR) 10 MG tablet TAKE ONE TABLET BY MOUTH NIGHTLY 1/23/18  Yes Nick Hernández MD   irbesartan (AVAPRO) 150 MG tablet Take 1 tablet by mouth daily 1/18/18  Yes Nick Hernández MD   fluticasone propionate (FLOVENT DISKUS) 100 MCG/BLIST AEPB inhaler Inhale 1 puff into the lungs daily   Yes Historical Provider, MD   fexofenadine (HM FEXOFENADINE HCL) 180 MG tablet Take 180 mg by mouth daily   Yes Historical Provider, MD   insulin glargine (LANTUS) 100 UNIT/ML injection vial Inject 60 Units into the skin nightly    Yes Historical Provider, MD   fluticasone (FLONASE) 50 MCG/ACT nasal spray 1 spray by Nasal route daily   Yes Historical Provider, MD   furosemide (LASIX) 40 MG tablet Take 40 mg by mouth daily as needed   Yes Historical Provider, MD   isosorbide mononitrate (IMDUR) 60 MG extended release tablet Take 60 mg by mouth every morning   Yes Historical Provider, MD   insulin aspart (NOVOLOG) 100 UNIT/ML injection vial Inject 4 Units into the skin 3 times daily (before meals)    Yes Nick Hernández MD   aspirin 81 MG tablet Take 81 mg by mouth daily   Yes Historical Provider, MD   acetaminophen (TYLENOL) 500 MG tablet Take 1,000 mg by mouth nightly   Yes Historical Provider, MD   vitamin B-12 (CYANOCOBALAMIN) 500 MCG tablet Take 500 mcg by mouth daily   Yes Historical Provider, MD   CPAP Machine MISC by Does not apply route   Yes Nick Hernández MD   calcium carbonate (OSCAL) 500 MG TABS tablet Take 500 mg by mouth daily   Yes Historical Provider, MD   Omega-3 Fatty Acids (FISH OIL PO) Take by mouth 2 times daily    Yes Historical Provider, MD   Multiple Vitamins-Minerals (MULTIVITAMIN ADULTS PO) Take by mouth daily    Yes Historical Provider, MD Musculoskeletal: She exhibits no edema. Neurological: She is alert and oriented to person, place, and time. Pt refocussing several times with diabetic use and diet as well as recommendations from endocrinology. Assessment:     1. Orthostatic hypotension    2. Dizziness    3. Mixed stress and urge urinary incontinence      No results found for this visit on 07/23/18. Plan:   No orders of the defined types were placed in this encounter. No orders of the defined types were placed in this encounter. Need to monitor for recurrent symptoms      Return in about 3 weeks (around 8/14/2018) for orthostasis, incontinence. Discussed use, benefit, and side effects of prescribed medications. All patient questions answered. Pt voiced understanding need to drink more fluids. Instructed to continue current medications, diet and exercise. Patient agreed with treatment plan. Follow up as directed.      Electronically signed by Judith Weston MD on 7/23/2018

## 2018-07-24 RX ORDER — MONTELUKAST SODIUM 10 MG/1
TABLET ORAL
Qty: 90 TABLET | Refills: 1 | Status: SHIPPED | OUTPATIENT
Start: 2018-07-24

## 2018-07-24 RX ORDER — LEVOTHYROXINE SODIUM 0.15 MG/1
150 TABLET ORAL DAILY
Qty: 90 TABLET | Refills: 1 | Status: SHIPPED | OUTPATIENT
Start: 2018-07-24 | End: 2019-01-15 | Stop reason: SDUPTHER

## 2018-07-25 ENCOUNTER — TELEPHONE (OUTPATIENT)
Dept: FAMILY MEDICINE CLINIC | Age: 78
End: 2018-07-25

## 2018-07-25 DIAGNOSIS — I10 ESSENTIAL HYPERTENSION: Primary | ICD-10-CM

## 2018-07-25 RX ORDER — LOSARTAN POTASSIUM 100 MG/1
100 TABLET ORAL DAILY
Qty: 30 TABLET | Refills: 5 | Status: SHIPPED | OUTPATIENT
Start: 2018-07-25 | End: 2018-12-19 | Stop reason: SDUPTHER

## 2018-08-21 ENCOUNTER — OFFICE VISIT (OUTPATIENT)
Dept: FAMILY MEDICINE CLINIC | Age: 78
End: 2018-08-21
Payer: MEDICARE

## 2018-08-21 VITALS
HEART RATE: 76 BPM | HEIGHT: 67 IN | WEIGHT: 245 LBS | BODY MASS INDEX: 38.45 KG/M2 | DIASTOLIC BLOOD PRESSURE: 64 MMHG | SYSTOLIC BLOOD PRESSURE: 126 MMHG

## 2018-08-21 DIAGNOSIS — N39.46 MIXED STRESS AND URGE URINARY INCONTINENCE: ICD-10-CM

## 2018-08-21 DIAGNOSIS — I10 ESSENTIAL HYPERTENSION: Primary | ICD-10-CM

## 2018-08-21 DIAGNOSIS — Z79.4 TYPE 2 DIABETES MELLITUS WITH STAGE 1 CHRONIC KIDNEY DISEASE, WITH LONG-TERM CURRENT USE OF INSULIN (HCC): ICD-10-CM

## 2018-08-21 DIAGNOSIS — S98.132A AMPUTATED TOE OF LEFT FOOT (HCC): ICD-10-CM

## 2018-08-21 DIAGNOSIS — E11.22 TYPE 2 DIABETES MELLITUS WITH STAGE 1 CHRONIC KIDNEY DISEASE, WITH LONG-TERM CURRENT USE OF INSULIN (HCC): ICD-10-CM

## 2018-08-21 DIAGNOSIS — E78.49 FAMILIAL COMBINED HYPERLIPIDEMIA: ICD-10-CM

## 2018-08-21 DIAGNOSIS — N18.1 TYPE 2 DIABETES MELLITUS WITH STAGE 1 CHRONIC KIDNEY DISEASE, WITH LONG-TERM CURRENT USE OF INSULIN (HCC): ICD-10-CM

## 2018-08-21 DIAGNOSIS — E03.9 ACQUIRED HYPOTHYROIDISM: ICD-10-CM

## 2018-08-21 LAB — HBA1C MFR BLD: 6.8 %

## 2018-08-21 PROCEDURE — 1123F ACP DISCUSS/DSCN MKR DOCD: CPT | Performed by: FAMILY MEDICINE

## 2018-08-21 PROCEDURE — G8400 PT W/DXA NO RESULTS DOC: HCPCS | Performed by: FAMILY MEDICINE

## 2018-08-21 PROCEDURE — 3017F COLORECTAL CA SCREEN DOC REV: CPT | Performed by: FAMILY MEDICINE

## 2018-08-21 PROCEDURE — 2022F DILAT RTA XM EVC RTNOPTHY: CPT | Performed by: FAMILY MEDICINE

## 2018-08-21 PROCEDURE — 3044F HG A1C LEVEL LT 7.0%: CPT | Performed by: FAMILY MEDICINE

## 2018-08-21 PROCEDURE — G8417 CALC BMI ABV UP PARAM F/U: HCPCS | Performed by: FAMILY MEDICINE

## 2018-08-21 PROCEDURE — G8427 DOCREV CUR MEDS BY ELIG CLIN: HCPCS | Performed by: FAMILY MEDICINE

## 2018-08-21 PROCEDURE — 0509F URINE INCON PLAN DOCD: CPT | Performed by: FAMILY MEDICINE

## 2018-08-21 PROCEDURE — 1036F TOBACCO NON-USER: CPT | Performed by: FAMILY MEDICINE

## 2018-08-21 PROCEDURE — 83036 HEMOGLOBIN GLYCOSYLATED A1C: CPT | Performed by: FAMILY MEDICINE

## 2018-08-21 PROCEDURE — 1101F PT FALLS ASSESS-DOCD LE1/YR: CPT | Performed by: FAMILY MEDICINE

## 2018-08-21 PROCEDURE — 1090F PRES/ABSN URINE INCON ASSESS: CPT | Performed by: FAMILY MEDICINE

## 2018-08-21 PROCEDURE — G8598 ASA/ANTIPLAT THER USED: HCPCS | Performed by: FAMILY MEDICINE

## 2018-08-21 PROCEDURE — 99214 OFFICE O/P EST MOD 30 MIN: CPT | Performed by: FAMILY MEDICINE

## 2018-08-21 PROCEDURE — 4040F PNEUMOC VAC/ADMIN/RCVD: CPT | Performed by: FAMILY MEDICINE

## 2018-08-21 RX ORDER — SIMVASTATIN 20 MG
20 TABLET ORAL NIGHTLY
Qty: 90 TABLET | Refills: 3 | Status: SHIPPED | OUTPATIENT
Start: 2018-08-21 | End: 2019-09-04 | Stop reason: SDUPTHER

## 2018-08-21 RX ORDER — GLIMEPIRIDE 4 MG/1
4 TABLET ORAL
COMMUNITY
End: 2018-12-13 | Stop reason: SINTOL

## 2018-08-21 RX ORDER — FEXOFENADINE HCL 180 MG/1
180 TABLET ORAL DAILY
COMMUNITY

## 2018-08-21 ASSESSMENT — ENCOUNTER SYMPTOMS: SHORTNESS OF BREATH: 0

## 2018-08-21 NOTE — PROGRESS NOTES
[facility]  [address] [facility] [place]  Dept: [phone]  Dept Fax: [phone]    Brant oCx is a 78 y.o. female who presents today for her medical conditions/complaints as noted below. Brant Cox is c/o of 3 Month Follow-Up; Hypertension; Diabetes; and Hyperthyroidism            HPI:     HPI   Here for follow up of HTN, DM and Hypothyroid  Taking all medications regularly, changes from prior avapro attempted to increase twice daily not covered by [employer], Noted change to losartan only at 100 mg. Did not have lab slip prior to today  No side effects noted    No longer having issues with urgency noted better. Noted following Dr Victorino Joshi for DM and A1C reviewed. Post amputation stable. No other complaint currently    Getting back from couple weeks at [employer]. Was noting more active and noted blood sugar dropped to 60's on 3 separate occasions.   Lowered during this high activity time      BP Readings from Last 3 Encounters:   08/21/18 126/64   07/23/18 (!) 160/80   06/18/18 136/74          (goal 120/80)    Past Medical History:   Diagnosis Date    CAD (coronary artery disease)     Diabetes mellitus ([address]     Hyperlipidemia     Hypertension     Hypothyroidism     Osteoarthritis     Sleep apnea       Past Surgical History:   Procedure Laterality Date    ANGIOPLASTY      EYE SURGERY      FOOT SURGERY Left 07/31/2009    3rd toe amputation    HYSTERECTOMY      TOE AMPUTATION      left 3rd toe    TONSILLECTOMY         Family History   Problem Relation Age of Onset    High Blood Pressure Mother     Coronary Art Dis Father        Social History   Substance Use Topics    Smoking status: Never Smoker    Smokeless tobacco: Never Used    Alcohol use No      Current Outpatient Prescriptions   Medication Sig Dispense Refill    fexofenadine (ALLEGRA) 180 MG tablet Take 180 mg by mouth daily      glimepiride (AMARYL) 4 MG tablet Take 4 mg by mouth every morning (before breakfast)      simvastatin (ZOCOR) 20 MG tablet Take 1 tablet by mouth nightly 90 tablet 3    losartan (COZAAR) 100 MG tablet Take 1 tablet by mouth daily 30 tablet 5    levothyroxine (SYNTHROID) 150 MCG tablet Take 1 tablet by mouth daily 90 tablet 1    montelukast (SINGULAIR) 10 MG tablet TAKE ONE TABLET BY MOUTH NIGHTLY 90 tablet 1    oxybutynin (DITROPAN-XL) 5 MG extended release tablet Take 1 tablet by mouth daily 30 tablet 3    metFORMIN (GLUCOPHAGE) 1000 MG tablet TAKE ONE TABLET BY MOUTH TWICE DAILY 180 tablet 1    gabapentin (NEURONTIN) 300 MG capsule TAKE ONE CAPSULE BY MOUTH THREE TIMES DAILY 270 capsule 1    carvedilol (COREG) 6.25 MG tablet 1 tablet 2 times daily      fluticasone propionate (FLOVENT DISKUS) 100 MCG/BLIST AEPB inhaler Inhale 1 puff into the lungs daily      insulin glargine (LANTUS) 100 UNIT/ML injection vial Inject 60 Units into the skin nightly       fluticasone (FLONASE) 50 MCG/ACT nasal spray 1 spray by Nasal route daily      furosemide (LASIX) 40 MG tablet Take 40 mg by mouth daily as needed      isosorbide mononitrate (IMDUR) 60 MG extended release tablet Take 60 mg by mouth every morning      insulin aspart (NOVOLOG) 100 UNIT/ML injection vial Inject 4 Units into the skin 3 times daily (before meals)       aspirin 81 MG tablet Take 81 mg by mouth daily      acetaminophen (TYLENOL) 500 MG tablet Take 500 mg by mouth nightly       vitamin B-12 (CYANOCOBALAMIN) 500 MCG tablet Take 500 mcg by mouth daily      CPAP Machine MISC by Does not apply route      calcium carbonate (OSCAL) 500 MG TABS tablet Take 600 mg by mouth daily       Omega-3 Fatty Acids (FISH OIL PO) Take 1,040 mg by mouth 2 times daily       Multiple Vitamins-Minerals (MULTIVITAMIN ADULTS PO) Take by mouth daily        No current facility-administered medications for this visit.       Allergies   Allergen Reactions    Lisinopril Other (See Comments)

## 2018-09-11 ENCOUNTER — OFFICE VISIT (OUTPATIENT)
Dept: PODIATRY | Age: 78
End: 2018-09-11
Payer: MEDICARE

## 2018-09-11 VITALS
HEIGHT: 66 IN | WEIGHT: 240 LBS | SYSTOLIC BLOOD PRESSURE: 118 MMHG | DIASTOLIC BLOOD PRESSURE: 60 MMHG | BODY MASS INDEX: 38.57 KG/M2 | HEART RATE: 90 BPM

## 2018-09-11 DIAGNOSIS — B35.1 DERMATOPHYTOSIS OF NAIL: ICD-10-CM

## 2018-09-11 DIAGNOSIS — Z79.4 TYPE 2 DIABETES MELLITUS WITH STAGE 1 CHRONIC KIDNEY DISEASE, WITH LONG-TERM CURRENT USE OF INSULIN (HCC): Primary | ICD-10-CM

## 2018-09-11 DIAGNOSIS — E11.22 TYPE 2 DIABETES MELLITUS WITH STAGE 1 CHRONIC KIDNEY DISEASE, WITH LONG-TERM CURRENT USE OF INSULIN (HCC): Primary | ICD-10-CM

## 2018-09-11 DIAGNOSIS — N18.1 TYPE 2 DIABETES MELLITUS WITH STAGE 1 CHRONIC KIDNEY DISEASE, WITH LONG-TERM CURRENT USE OF INSULIN (HCC): Primary | ICD-10-CM

## 2018-09-11 PROCEDURE — 11720 DEBRIDE NAIL 1-5: CPT | Performed by: PODIATRIST

## 2018-09-11 PROCEDURE — 99999 PR OFFICE/OUTPT VISIT,PROCEDURE ONLY: CPT | Performed by: PODIATRIST

## 2018-09-11 RX ORDER — OXYBUTYNIN CHLORIDE 5 MG/1
5 TABLET, EXTENDED RELEASE ORAL
COMMUNITY
Start: 2018-08-22 | End: 2018-11-19 | Stop reason: SDUPTHER

## 2018-09-11 RX ORDER — AMLODIPINE BESYLATE 5 MG/1
5 TABLET ORAL
COMMUNITY
Start: 2018-08-23 | End: 2019-02-14 | Stop reason: SDUPTHER

## 2018-09-11 NOTE — PROGRESS NOTES
Subjective:  Patient presents to [facility] today for routine diabetic foot care. Patient denies any new problems with their feet. Patient's diabetic control has been not changed. Allergies   Allergen Reactions    Lisinopril Other (See Comments)     Cough    Penicillins Swelling     Facial swelling    Ranolazine Rash       Past Medical History:   Diagnosis Date    CAD (coronary artery disease)     Diabetes mellitus ([address]     Hyperlipidemia     Hypertension     Hypothyroidism     Osteoarthritis     Sleep apnea        Prior to Admission medications    Medication Sig Start Date End Date Taking?  Authorizing Provider   oxybutynin (DITROPAN-XL) 5 MG extended release tablet Take 5 mg by mouth 8/22/18  Yes Historical Provider, MD   amLODIPine (NORVASC) 5 MG tablet Take 5 mg by mouth 8/23/18  Yes Historical Provider, MD   fexofenadine (ALLEGRA) 180 MG tablet Take 180 mg by mouth daily   Yes Historical Provider, MD   glimepiride (AMARYL) 4 MG tablet Take 4 mg by mouth every morning (before breakfast)   Yes Historical Provider, MD   simvastatin (ZOCOR) 20 MG tablet Take 1 tablet by mouth nightly 8/21/18  Yes Maryellen Fonseca MD   losartan (COZAAR) 100 MG tablet Take 1 tablet by mouth daily 7/25/18  Yes Maryellen Fonseca MD   levothyroxine (SYNTHROID) 150 MCG tablet Take 1 tablet by mouth daily 7/24/18  Yes Maryellen Fonseca MD   montelukast (SINGULAIR) 10 MG tablet TAKE ONE TABLET BY MOUTH NIGHTLY 7/24/18  Yes Maryellen Fonseca MD   oxybutynin (DITROPAN-XL) 5 MG extended release tablet Take 1 tablet by mouth daily 7/23/18  Yes Maryellen Fonseca MD   metFORMIN (GLUCOPHAGE) 1000 MG tablet TAKE ONE TABLET BY MOUTH TWICE DAILY 6/20/18  Yes Maryellen Fonseca MD   gabapentin (NEURONTIN) 300 MG capsule TAKE ONE CAPSULE BY MOUTH THREE TIMES DAILY 3/28/18 9/24/18 Yes Maryellen Fonseca MD   carvedilol (COREG) 6.25 MG tablet 1 tablet 2 times daily 1/25/18  Yes Historical Provider, MD   fluticasone propionate (FLOVENT DISKUS) 100 MCG/BLIST AEPB inhaler Inhale 1 puff into the lungs daily   Yes Historical Provider, MD   insulin glargine (LANTUS) 100 UNIT/ML injection vial Inject 60 Units into the skin nightly    Yes Historical Provider, MD   fluticasone (FLONASE) 50 MCG/ACT nasal spray 1 spray by Nasal route daily   Yes Historical Provider, MD   furosemide (LASIX) 40 MG tablet Take 40 mg by mouth daily as needed   Yes Historical Provider, MD   isosorbide mononitrate (IMDUR) 60 MG extended release tablet Take 60 mg by mouth every morning   Yes Historical Provider, MD   insulin aspart (NOVOLOG) 100 UNIT/ML injection vial Inject 4 Units into the skin 3 times daily (before meals)    Yes Shruthi Rodas MD   aspirin 81 MG tablet Take 81 mg by mouth daily   Yes Historical Provider, MD   acetaminophen (TYLENOL) 500 MG tablet Take 500 mg by mouth nightly    Yes Historical Provider, MD   vitamin B-12 (CYANOCOBALAMIN) 500 MCG tablet Take 500 mcg by mouth daily   Yes Historical Provider, MD   CPAP Machine MISC by Does not apply route   Yes Shruthi Rodas MD   calcium carbonate (OSCAL) 500 MG TABS tablet Take 600 mg by mouth daily    Yes Historical Provider, MD   Omega-3 Fatty Acids (FISH OIL PO) Take 1,040 mg by mouth 2 times daily    Yes Historical Provider, MD   Multiple Vitamins-Minerals (MULTIVITAMIN ADULTS PO) Take by mouth daily    Yes Historical Provider, MD       Social History   Substance Use Topics    Smoking status: Never Smoker    Smokeless tobacco: Never Used    Alcohol use No     Review of Systems: All 12 systems reviewed and pertinent positives noted above.   Objective:  Vascular: DP and PT pulses palpable 2/4, bilateral.  CFT <3 seconds, bilateral.  Hair growth present to the level of the digits, bilateral.  Edema present, bilateral.  Varicosities present, bilateral. Erythema absent, bilateral. Distal Rubor absent bilateral.  Temperature within normal limits bilateral. Hyperpigmentation present bilateral. Atrophic skin yes.  Neurological: Sensation intact to light touch to level of digits, bilateral.  Protective sensation intact 10/10 sites via 5.07/10g Zaleski-Kiko Monofilament, bilateral.  negative Tinel's, bilateral.  negative Valleix sign, bilateral.  Vibratory intact bilateral.  Reflexes Decreased bilateral.  Paresthesias negative. Dysthesias negative. Sharp/dull intact bilateral.    Musculoskeletal: Muscle strength 5/5, bilateral.  Pain absent upon palpation bilateral. Normal medial longitudinal arch, bilateral.  Ankle ROM decreased,bilateral.  1st MPJ ROM within normal limits, bilateral.  Dorsally contracted digits present. L 3 toe amp. No other foot deformities. Integument:   Open lesion absent, Bilateral.  Interdigital maceration absent to web spaces,absent Bilateral.  Nails left 1, 2, 4, 5 and right 1, 2 thickened, dystrophic and crumbly, discolored with subungual debris. Fissures absent, Bilateral. Hyperkeratotic tissue is absent. Seen dorsal l 2nd toe PIPJ    Assessment:    Diagnosis Orders   1. 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 Dermatophytosis of nail         Plan:  Diabetic foot education and exam.  Nails as mentioned above debrided in length and thickness. Patient advised about OTC treatments for nails and callous. Patient will follow up in 10 weeks for routine foot care or PRN if any further problems arise.

## 2018-10-08 LAB
AVERAGE GLUCOSE: NORMAL
HBA1C MFR BLD: 7.5 %

## 2018-10-18 RX ORDER — GABAPENTIN 300 MG/1
CAPSULE ORAL
Qty: 270 CAPSULE | Refills: 1 | Status: SHIPPED | OUTPATIENT
Start: 2018-10-18 | End: 2019-05-07 | Stop reason: SDUPTHER

## 2018-11-19 RX ORDER — OXYBUTYNIN CHLORIDE 5 MG/1
5 TABLET, EXTENDED RELEASE ORAL DAILY
Qty: 30 TABLET | Refills: 5 | Status: SHIPPED | OUTPATIENT
Start: 2018-11-19 | End: 2019-05-14 | Stop reason: SDUPTHER

## 2018-12-11 ENCOUNTER — OFFICE VISIT (OUTPATIENT)
Dept: PODIATRY | Age: 78
End: 2018-12-11
Payer: MEDICARE

## 2018-12-11 VITALS
BODY MASS INDEX: 38.73 KG/M2 | HEART RATE: 80 BPM | WEIGHT: 241 LBS | DIASTOLIC BLOOD PRESSURE: 74 MMHG | HEIGHT: 66 IN | SYSTOLIC BLOOD PRESSURE: 118 MMHG

## 2018-12-11 DIAGNOSIS — B35.1 DERMATOPHYTOSIS OF NAIL: ICD-10-CM

## 2018-12-11 DIAGNOSIS — E11.51 CONTROLLED TYPE 2 DM WITH PERIPHERAL CIRCULATORY DISORDER (HCC): Primary | ICD-10-CM

## 2018-12-11 PROCEDURE — 11721 DEBRIDE NAIL 6 OR MORE: CPT | Performed by: PODIATRIST

## 2018-12-11 PROCEDURE — 99999 PR OFFICE/OUTPT VISIT,PROCEDURE ONLY: CPT | Performed by: PODIATRIST

## 2018-12-11 ASSESSMENT — PATIENT HEALTH QUESTIONNAIRE - PHQ9
1. LITTLE INTEREST OR PLEASURE IN DOING THINGS: 0
SUM OF ALL RESPONSES TO PHQ QUESTIONS 1-9: 0
SUM OF ALL RESPONSES TO PHQ QUESTIONS 1-9: 0
SUM OF ALL RESPONSES TO PHQ9 QUESTIONS 1 & 2: 0
2. FEELING DOWN, DEPRESSED OR HOPELESS: 0

## 2018-12-11 NOTE — PROGRESS NOTES
Subjective:  Patient presents to [facility] today for routine diabetic foot care. Patient denies any new problems with their feet. Patient's diabetic control has been not changed. Allergies   Allergen Reactions    Lisinopril Other (See Comments)     Cough    Penicillins Swelling     Facial swelling    Ranolazine Rash       Past Medical History:   Diagnosis Date    CAD (coronary artery disease)     Diabetes mellitus ([facility] [address]     Hyperlipidemia     Hypertension     Hypothyroidism     Osteoarthritis     Sleep apnea        Prior to Admission medications    Medication Sig Start Date End Date Taking? Authorizing Provider   oxybutynin (DITROPAN-XL) 5 MG extended release tablet Take 1 tablet by mouth daily 11/19/18  Yes Malina Manuel MD   gabapentin (NEURONTIN) 300 MG capsule TAKE 1 CAPSULE BY MOUTH THREE TIMES DAILY.  10/18/18 4/18/19 Yes Malina Manuel MD   amLODIPine (NORVASC) 5 MG tablet Take 5 mg by mouth 8/23/18  Yes Historical Provider, MD   fexofenadine (ALLEGRA) 180 MG tablet Take 180 mg by mouth daily   Yes Historical Provider, MD   glimepiride (AMARYL) 4 MG tablet Take 4 mg by mouth every morning (before breakfast)   Yes Historical Provider, MD   simvastatin (ZOCOR) 20 MG tablet Take 1 tablet by mouth nightly 8/21/18  Yes Malina Manuel MD   losartan (COZAAR) 100 MG tablet Take 1 tablet by mouth daily 7/25/18  Yes Malina Manuel MD   levothyroxine (SYNTHROID) 150 MCG tablet Take 1 tablet by mouth daily 7/24/18  Yes Malina Manuel MD   montelukast (SINGULAIR) 10 MG tablet TAKE ONE TABLET BY MOUTH NIGHTLY 7/24/18  Yes Malina Manuel MD   metFORMIN (GLUCOPHAGE) 1000 MG tablet TAKE ONE TABLET BY MOUTH TWICE DAILY 6/20/18  Yes Malina Manuel MD   carvedilol (COREG) 6.25 MG tablet 1 tablet 2 times daily 1/25/18  Yes Historical Provider, MD   fluticasone propionate (FLOVENT DISKUS) 100 MCG/BLIST AEPB inhaler Inhale 1 puff into the lungs daily   Yes Historical Provider, MD   insulin glargine (LANTUS) 100 UNIT/ML injection vial Inject 60 Units into the skin nightly    Yes Historical Provider, MD   fluticasone (FLONASE) 50 MCG/ACT nasal spray 1 spray by Nasal route daily   Yes Historical Provider, MD   furosemide (LASIX) 40 MG tablet Take 40 mg by mouth daily as needed   Yes Historical Provider, MD   isosorbide mononitrate (IMDUR) 60 MG extended release tablet Take 60 mg by mouth every morning   Yes Historical Provider, MD   insulin aspart (NOVOLOG) 100 UNIT/ML injection vial Inject 4 Units into the skin 3 times daily (before meals)    Yes José Miguel Box MD   aspirin 81 MG tablet Take 81 mg by mouth daily   Yes Historical Provider, MD   acetaminophen (TYLENOL) 500 MG tablet Take 500 mg by mouth nightly    Yes Historical Provider, MD   vitamin B-12 (CYANOCOBALAMIN) 500 MCG tablet Take 500 mcg by mouth daily   Yes Historical Provider, MD   CPAP Machine MISC by Does not apply route   Yes José Miguel Box MD   calcium carbonate (OSCAL) 500 MG TABS tablet Take 600 mg by mouth daily    Yes Historical Provider, MD   Omega-3 Fatty Acids (FISH OIL PO) Take 1,040 mg by mouth 2 times daily    Yes Historical Provider, MD   Multiple Vitamins-Minerals (MULTIVITAMIN ADULTS PO) Take by mouth daily    Yes Historical Provider, MD       Social History   Substance Use Topics    Smoking status: Never Smoker    Smokeless tobacco: Never Used    Alcohol use No     Review of Systems: All 12 systems reviewed and pertinent positives noted above. Objective:  Vascular: DP and PT pulses palpable 2/4, bilateral.  CFT <3 seconds, bilateral.  Hair growth present to the level of the digits, bilateral.  Edema present, bilateral.  Varicosities present, bilateral. Erythema absent, bilateral. Distal Rubor absent bilateral.  Temperature within normal limits bilateral. Hyperpigmentation present bilateral. Atrophic skin yes.   Neurological: Sensation intact to light touch to level of digits, bilateral.  Protective sensation intact 10/10 sites

## 2018-12-13 ENCOUNTER — OFFICE VISIT (OUTPATIENT)
Dept: FAMILY MEDICINE CLINIC | Age: 78
End: 2018-12-13
Payer: MEDICARE

## 2018-12-13 VITALS
HEIGHT: 66 IN | SYSTOLIC BLOOD PRESSURE: 130 MMHG | DIASTOLIC BLOOD PRESSURE: 68 MMHG | BODY MASS INDEX: 39.37 KG/M2 | WEIGHT: 245 LBS

## 2018-12-13 DIAGNOSIS — N18.1 TYPE 2 DIABETES MELLITUS WITH STAGE 1 CHRONIC KIDNEY DISEASE, WITH LONG-TERM CURRENT USE OF INSULIN (HCC): ICD-10-CM

## 2018-12-13 DIAGNOSIS — E16.2 HYPOGLYCEMIA: Primary | ICD-10-CM

## 2018-12-13 DIAGNOSIS — I10 ESSENTIAL HYPERTENSION: ICD-10-CM

## 2018-12-13 DIAGNOSIS — E11.22 TYPE 2 DIABETES MELLITUS WITH STAGE 1 CHRONIC KIDNEY DISEASE, WITH LONG-TERM CURRENT USE OF INSULIN (HCC): ICD-10-CM

## 2018-12-13 DIAGNOSIS — Z79.4 TYPE 2 DIABETES MELLITUS WITH STAGE 1 CHRONIC KIDNEY DISEASE, WITH LONG-TERM CURRENT USE OF INSULIN (HCC): ICD-10-CM

## 2018-12-13 PROCEDURE — 1036F TOBACCO NON-USER: CPT | Performed by: FAMILY MEDICINE

## 2018-12-13 PROCEDURE — 1101F PT FALLS ASSESS-DOCD LE1/YR: CPT | Performed by: FAMILY MEDICINE

## 2018-12-13 PROCEDURE — 1090F PRES/ABSN URINE INCON ASSESS: CPT | Performed by: FAMILY MEDICINE

## 2018-12-13 PROCEDURE — 1123F ACP DISCUSS/DSCN MKR DOCD: CPT | Performed by: FAMILY MEDICINE

## 2018-12-13 PROCEDURE — 4040F PNEUMOC VAC/ADMIN/RCVD: CPT | Performed by: FAMILY MEDICINE

## 2018-12-13 PROCEDURE — G8400 PT W/DXA NO RESULTS DOC: HCPCS | Performed by: FAMILY MEDICINE

## 2018-12-13 PROCEDURE — 99214 OFFICE O/P EST MOD 30 MIN: CPT | Performed by: FAMILY MEDICINE

## 2018-12-13 PROCEDURE — G8417 CALC BMI ABV UP PARAM F/U: HCPCS | Performed by: FAMILY MEDICINE

## 2018-12-13 PROCEDURE — G8598 ASA/ANTIPLAT THER USED: HCPCS | Performed by: FAMILY MEDICINE

## 2018-12-13 PROCEDURE — G8427 DOCREV CUR MEDS BY ELIG CLIN: HCPCS | Performed by: FAMILY MEDICINE

## 2018-12-13 PROCEDURE — G8484 FLU IMMUNIZE NO ADMIN: HCPCS | Performed by: FAMILY MEDICINE

## 2018-12-19 DIAGNOSIS — I10 ESSENTIAL HYPERTENSION: ICD-10-CM

## 2018-12-19 RX ORDER — LOSARTAN POTASSIUM 100 MG/1
100 TABLET ORAL DAILY
Qty: 90 TABLET | Refills: 1 | Status: SHIPPED | OUTPATIENT
Start: 2018-12-19 | End: 2019-03-12 | Stop reason: ALTCHOICE

## 2019-02-12 DIAGNOSIS — E03.9 ACQUIRED HYPOTHYROIDISM: ICD-10-CM

## 2019-02-12 DIAGNOSIS — I10 ESSENTIAL HYPERTENSION: ICD-10-CM

## 2019-02-12 LAB
AGE FOR GFR: 76
ANION GAP SERPL CALCULATED.3IONS-SCNC: 13 MMOL/L
CHLORIDE BLD-SCNC: 100 MMOL/L
CO2: 29 MMOL/L
CREAT SERPL-MCNC: 1.7 MG/DL
CREAT SERPL-MCNC: 1.7 MG/DL
EGFR BF: 35 ML/MIN/1.73 M2
EGFR BM: 48 ML/MIN/1.73 M2
EGFR WF: 29 ML/MIN/1.73 M2
EGFR WM: 39 ML/MIN/1.73 M2
POTASSIUM SERPL-SCNC: 4.6 MMOL/L
SODIUM BLD-SCNC: 137 MMOL/L
TSH SERPL DL<=0.05 MIU/L-ACNC: 1.17 MIU/ML
TSH SERPL DL<=0.05 MIU/L-ACNC: 1.17 UIU/ML

## 2019-02-12 PROCEDURE — 82565 ASSAY OF CREATININE: CPT | Performed by: FAMILY MEDICINE

## 2019-02-12 PROCEDURE — 84443 ASSAY THYROID STIM HORMONE: CPT | Performed by: FAMILY MEDICINE

## 2019-02-12 PROCEDURE — 80051 ELECTROLYTE PANEL: CPT | Performed by: FAMILY MEDICINE

## 2019-02-14 ENCOUNTER — OFFICE VISIT (OUTPATIENT)
Dept: FAMILY MEDICINE CLINIC | Age: 79
End: 2019-02-14
Payer: MEDICARE

## 2019-02-14 VITALS
WEIGHT: 239 LBS | BODY MASS INDEX: 38.41 KG/M2 | OXYGEN SATURATION: 99 % | DIASTOLIC BLOOD PRESSURE: 70 MMHG | HEART RATE: 55 BPM | HEIGHT: 66 IN | SYSTOLIC BLOOD PRESSURE: 118 MMHG

## 2019-02-14 DIAGNOSIS — Z79.4 TYPE 2 DIABETES MELLITUS WITH STAGE 1 CHRONIC KIDNEY DISEASE, WITH LONG-TERM CURRENT USE OF INSULIN (HCC): ICD-10-CM

## 2019-02-14 DIAGNOSIS — I10 ESSENTIAL HYPERTENSION: Primary | ICD-10-CM

## 2019-02-14 DIAGNOSIS — E03.9 ACQUIRED HYPOTHYROIDISM: ICD-10-CM

## 2019-02-14 DIAGNOSIS — L97.521 DIABETIC ULCER OF TOE OF LEFT FOOT ASSOCIATED WITH TYPE 2 DIABETES MELLITUS, LIMITED TO BREAKDOWN OF SKIN (HCC): ICD-10-CM

## 2019-02-14 DIAGNOSIS — S98.132A AMPUTATED TOE OF LEFT FOOT (HCC): ICD-10-CM

## 2019-02-14 DIAGNOSIS — N18.1 TYPE 2 DIABETES MELLITUS WITH STAGE 1 CHRONIC KIDNEY DISEASE, WITH LONG-TERM CURRENT USE OF INSULIN (HCC): ICD-10-CM

## 2019-02-14 DIAGNOSIS — N39.46 MIXED STRESS AND URGE URINARY INCONTINENCE: ICD-10-CM

## 2019-02-14 DIAGNOSIS — I73.9 PERIPHERAL VASCULAR DISEASE (HCC): ICD-10-CM

## 2019-02-14 DIAGNOSIS — E78.49 FAMILIAL COMBINED HYPERLIPIDEMIA: ICD-10-CM

## 2019-02-14 DIAGNOSIS — E11.621 DIABETIC ULCER OF TOE OF LEFT FOOT ASSOCIATED WITH TYPE 2 DIABETES MELLITUS, LIMITED TO BREAKDOWN OF SKIN (HCC): ICD-10-CM

## 2019-02-14 DIAGNOSIS — E11.22 TYPE 2 DIABETES MELLITUS WITH STAGE 1 CHRONIC KIDNEY DISEASE, WITH LONG-TERM CURRENT USE OF INSULIN (HCC): ICD-10-CM

## 2019-02-14 PROCEDURE — 1123F ACP DISCUSS/DSCN MKR DOCD: CPT | Performed by: FAMILY MEDICINE

## 2019-02-14 PROCEDURE — G8400 PT W/DXA NO RESULTS DOC: HCPCS | Performed by: FAMILY MEDICINE

## 2019-02-14 PROCEDURE — G8417 CALC BMI ABV UP PARAM F/U: HCPCS | Performed by: FAMILY MEDICINE

## 2019-02-14 PROCEDURE — 99214 OFFICE O/P EST MOD 30 MIN: CPT | Performed by: FAMILY MEDICINE

## 2019-02-14 PROCEDURE — 1090F PRES/ABSN URINE INCON ASSESS: CPT | Performed by: FAMILY MEDICINE

## 2019-02-14 PROCEDURE — 1036F TOBACCO NON-USER: CPT | Performed by: FAMILY MEDICINE

## 2019-02-14 PROCEDURE — G8427 DOCREV CUR MEDS BY ELIG CLIN: HCPCS | Performed by: FAMILY MEDICINE

## 2019-02-14 PROCEDURE — 1101F PT FALLS ASSESS-DOCD LE1/YR: CPT | Performed by: FAMILY MEDICINE

## 2019-02-14 PROCEDURE — G8598 ASA/ANTIPLAT THER USED: HCPCS | Performed by: FAMILY MEDICINE

## 2019-02-14 PROCEDURE — 4040F PNEUMOC VAC/ADMIN/RCVD: CPT | Performed by: FAMILY MEDICINE

## 2019-02-14 PROCEDURE — G8482 FLU IMMUNIZE ORDER/ADMIN: HCPCS | Performed by: FAMILY MEDICINE

## 2019-02-14 PROCEDURE — 0509F URINE INCON PLAN DOCD: CPT | Performed by: FAMILY MEDICINE

## 2019-02-14 RX ORDER — AMLODIPINE BESYLATE 5 MG/1
2.5 TABLET ORAL DAILY
Qty: 45 TABLET | Refills: 1 | Status: SHIPPED | OUTPATIENT
Start: 2019-02-14 | End: 2019-10-31 | Stop reason: SDUPTHER

## 2019-02-14 RX ORDER — INSULIN GLARGINE 100 [IU]/ML
60 INJECTION, SOLUTION SUBCUTANEOUS
COMMUNITY
End: 2019-02-14 | Stop reason: SDUPTHER

## 2019-02-14 RX ORDER — INSULIN ASPART 100 [IU]/ML
INJECTION, SUSPENSION SUBCUTANEOUS
COMMUNITY
End: 2019-02-14 | Stop reason: SDUPTHER

## 2019-02-14 RX ORDER — CARVEDILOL 6.25 MG/1
TABLET ORAL
COMMUNITY
Start: 2019-01-21 | End: 2019-02-14 | Stop reason: SDUPTHER

## 2019-02-14 ASSESSMENT — PATIENT HEALTH QUESTIONNAIRE - PHQ9
SUM OF ALL RESPONSES TO PHQ9 QUESTIONS 1 & 2: 0
SUM OF ALL RESPONSES TO PHQ QUESTIONS 1-9: 0
1. LITTLE INTEREST OR PLEASURE IN DOING THINGS: 0
SUM OF ALL RESPONSES TO PHQ QUESTIONS 1-9: 0
2. FEELING DOWN, DEPRESSED OR HOPELESS: 0

## 2019-02-14 ASSESSMENT — ENCOUNTER SYMPTOMS: SHORTNESS OF BREATH: 0

## 2019-03-12 ENCOUNTER — OFFICE VISIT (OUTPATIENT)
Dept: PODIATRY | Age: 79
End: 2019-03-12
Payer: MEDICARE

## 2019-03-12 ENCOUNTER — TELEPHONE (OUTPATIENT)
Dept: FAMILY MEDICINE CLINIC | Age: 79
End: 2019-03-12

## 2019-03-12 VITALS
SYSTOLIC BLOOD PRESSURE: 124 MMHG | DIASTOLIC BLOOD PRESSURE: 62 MMHG | HEART RATE: 64 BPM | WEIGHT: 241 LBS | BODY MASS INDEX: 38.73 KG/M2 | HEIGHT: 66 IN

## 2019-03-12 DIAGNOSIS — L84 CORNS AND CALLOSITIES: ICD-10-CM

## 2019-03-12 DIAGNOSIS — E11.51 CONTROLLED TYPE 2 DM WITH PERIPHERAL CIRCULATORY DISORDER (HCC): Primary | ICD-10-CM

## 2019-03-12 DIAGNOSIS — B35.1 DERMATOPHYTOSIS OF NAIL: ICD-10-CM

## 2019-03-12 PROCEDURE — 11055 PARING/CUTG B9 HYPRKER LES 1: CPT | Performed by: PODIATRIST

## 2019-03-12 PROCEDURE — 11721 DEBRIDE NAIL 6 OR MORE: CPT | Performed by: PODIATRIST

## 2019-03-12 PROCEDURE — 99999 PR OFFICE/OUTPT VISIT,PROCEDURE ONLY: CPT | Performed by: PODIATRIST

## 2019-03-12 RX ORDER — OLMESARTAN MEDOXOMIL 40 MG/1
40 TABLET ORAL DAILY
Qty: 90 TABLET | Refills: 1 | Status: SHIPPED | OUTPATIENT
Start: 2019-03-12 | End: 2019-06-13 | Stop reason: ALTCHOICE

## 2019-03-14 LAB
AGE FOR GFR: 76
ANION GAP SERPL CALCULATED.3IONS-SCNC: 15 MMOL/L
CHLORIDE BLD-SCNC: 101 MMOL/L (ref 98–120)
CO2: 29 MMOL/L (ref 22–31)
CREAT SERPL-MCNC: 1.6 MG/DL (ref 0.5–1)
EGFR BF: 38 ML/MIN/1.73 M2
EGFR BM: 51 ML/MIN/1.73 M2
EGFR WF: 31 ML/MIN/1.73 M2
EGFR WM: 42 ML/MIN/1.73 M2
POTASSIUM SERPL-SCNC: 4.9 MMOL/L (ref 3.6–5)
SODIUM BLD-SCNC: 140 MMOL/L (ref 135–145)

## 2019-04-04 ENCOUNTER — NURSE ONLY (OUTPATIENT)
Dept: FAMILY MEDICINE CLINIC | Age: 79
End: 2019-04-04

## 2019-04-04 VITALS — WEIGHT: 245 LBS | BODY MASS INDEX: 39.54 KG/M2

## 2019-04-17 RX ORDER — LEVOTHYROXINE SODIUM 0.15 MG/1
TABLET ORAL
Qty: 90 TABLET | Refills: 1 | Status: SHIPPED | OUTPATIENT
Start: 2019-04-17 | End: 2019-04-23 | Stop reason: SDUPTHER

## 2019-04-17 NOTE — TELEPHONE ENCOUNTER
[address] [place] [address] is requesting a refill on the following medication(s):  Requested Prescriptions     Pending Prescriptions Disp Refills    levothyroxine (SYNTHROID) 150 MCG tablet [Pharmacy Med Name: LEVOTHYROXIN 150MCG TAB] 90 tablet 1     Sig: TAKE 1 TABLET BY MOUTH ONCE DAILY       Last Visit Date (If Applicable):  9/73/3266    Next Visit Date:    6/13/2019

## 2019-04-20 LAB
TOTAL CK: 123 U/L
TROPONIN: 0.01

## 2019-04-21 LAB
ALBUMIN SERPL-MCNC: NORMAL G/DL
ALP BLD-CCNC: NORMAL U/L
ALT SERPL-CCNC: NORMAL U/L
ANION GAP SERPL CALCULATED.3IONS-SCNC: 10 MMOL/L
AST SERPL-CCNC: NORMAL U/L
BASOPHILS ABSOLUTE: 0.08 /ΜL
BASOPHILS RELATIVE PERCENT: 1.12 %
BILIRUB SERPL-MCNC: NORMAL MG/DL (ref 0.1–1.4)
BUN BLDV-MCNC: 28 MG/DL
CALCIUM SERPL-MCNC: 8.1 MG/DL
CHLORIDE BLD-SCNC: 103 MMOL/L
CO2: 25 MMOL/L
CREAT SERPL-MCNC: 1.8 MG/DL
EOSINOPHILS ABSOLUTE: 0.14 /ΜL
EOSINOPHILS RELATIVE PERCENT: 1.79 %
GFR CALCULATED: 33
GLUCOSE BLD-MCNC: 264 MG/DL
HCT VFR BLD CALC: 33.7 % (ref 36–46)
HEMOGLOBIN: 11 G/DL (ref 12–16)
LYMPHOCYTES ABSOLUTE: 2.37 /ΜL
LYMPHOCYTES RELATIVE PERCENT: 31.1 %
MCH RBC QN AUTO: 28.7 PG
MCHC RBC AUTO-ENTMCNC: 32.6 G/DL
MCV RBC AUTO: 88.3 FL
MONOCYTES ABSOLUTE: 0.68 /ΜL
MONOCYTES RELATIVE PERCENT: 9 %
NEUTROPHILS ABSOLUTE: 4.34 /ΜL
NEUTROPHILS RELATIVE PERCENT: 57 %
PLATELET # BLD: 267 K/ΜL
PMV BLD AUTO: 7.3 FL
POTASSIUM SERPL-SCNC: 4 MMOL/L
RBC # BLD: 3.81 10^6/ΜL
SODIUM BLD-SCNC: 134 MMOL/L
TOTAL PROTEIN: NORMAL
WBC # BLD: 7.62 10^3/ML

## 2019-04-22 ENCOUNTER — TELEPHONE (OUTPATIENT)
Dept: FAMILY MEDICINE CLINIC | Age: 79
End: 2019-04-22

## 2019-04-22 NOTE — TELEPHONE ENCOUNTER
[facility] Initial Follow Up Call    Outreach made within 2 business days of discharge: Yes    Patient: Elin Arroyo Patient : 1940   MRN: [id number]  Reason for Admission: Low Blood Sugar  Discharge Date: 19       Spoke with: Keysha Baltazar    Discharge department/facility: [facility]    TCM Interactive Patient Contact:  Was patient able to fill all prescriptions: Yes  Was patient instructed to bring all medications to the follow-up visit: Yes  Is patient taking all medications as directed in the discharge summary?  Yes  Does patient understand their discharge instructions: Yes  Does patient have questions or concerns that need addressed prior to 7-14 day follow up office visit: no    Scheduled appointment with PCP within 7-14 days    Follow Up  Future Appointments   Date Time Provider Sachi Torres   2019 10:30 AM MD Jose Antonio Sanches [place]   2019  2:00 PM JENIFFER Mcclellan [place]   2019  2:00 PM MD PAO Sanches [place]

## 2019-04-23 ENCOUNTER — OFFICE VISIT (OUTPATIENT)
Dept: FAMILY MEDICINE CLINIC | Age: 79
End: 2019-04-23
Payer: MEDICARE

## 2019-04-23 VITALS
WEIGHT: 244 LBS | HEIGHT: 66 IN | SYSTOLIC BLOOD PRESSURE: 124 MMHG | BODY MASS INDEX: 39.21 KG/M2 | DIASTOLIC BLOOD PRESSURE: 68 MMHG | HEART RATE: 60 BPM | OXYGEN SATURATION: 98 %

## 2019-04-23 DIAGNOSIS — D50.9 IRON DEFICIENCY ANEMIA, UNSPECIFIED IRON DEFICIENCY ANEMIA TYPE: ICD-10-CM

## 2019-04-23 DIAGNOSIS — I10 ESSENTIAL HYPERTENSION: ICD-10-CM

## 2019-04-23 DIAGNOSIS — N18.30 STAGE 3 CHRONIC KIDNEY DISEASE (HCC): ICD-10-CM

## 2019-04-23 DIAGNOSIS — N18.1 TYPE 2 DIABETES MELLITUS WITH STAGE 1 CHRONIC KIDNEY DISEASE, UNSPECIFIED WHETHER LONG TERM INSULIN USE (HCC): Primary | ICD-10-CM

## 2019-04-23 DIAGNOSIS — E03.9 ACQUIRED HYPOTHYROIDISM: ICD-10-CM

## 2019-04-23 DIAGNOSIS — E11.22 TYPE 2 DIABETES MELLITUS WITH STAGE 1 CHRONIC KIDNEY DISEASE, UNSPECIFIED WHETHER LONG TERM INSULIN USE (HCC): Primary | ICD-10-CM

## 2019-04-23 LAB — HBA1C MFR BLD: 8 %

## 2019-04-23 PROCEDURE — 99496 TRANSJ CARE MGMT HIGH F2F 7D: CPT | Performed by: FAMILY MEDICINE

## 2019-04-23 PROCEDURE — 83036 HEMOGLOBIN GLYCOSYLATED A1C: CPT | Performed by: FAMILY MEDICINE

## 2019-04-23 PROCEDURE — 1111F DSCHRG MED/CURRENT MED MERGE: CPT | Performed by: FAMILY MEDICINE

## 2019-04-23 RX ORDER — LEVOTHYROXINE SODIUM 0.15 MG/1
150 TABLET ORAL DAILY
Qty: 90 TABLET | Refills: 1 | Status: SHIPPED | OUTPATIENT
Start: 2019-04-23 | End: 2019-11-22 | Stop reason: SDUPTHER

## 2019-04-23 ASSESSMENT — ENCOUNTER SYMPTOMS: SHORTNESS OF BREATH: 0

## 2019-04-23 NOTE — PATIENT INSTRUCTIONS
Must not take glimeperide   Need to see dietary from Dr Tessy Duvall office and appt with endocrinology to review

## 2019-04-23 NOTE — PROGRESS NOTES
[facility] [address] [facility], [address] [place], [place] [address]  Dept: [phone]  Dept Fax: [phone]    Transition of Care Office Visit    Date of Face-to-Face: 4/23/2019    Personsat visit: spouse    Here for follow after hospitalization for: Diabetes Mellitus after hypoglycemia admission. Reported had taken glimeperide which takes intermittantly when sugars are high  Follows with Dr Paula Gallagher for DM, has not had dietary follow up recently  Has not made appt yet with Dr Paula Gallagher after discharge ( reports very hard to get appts per pt)     Activity: activity as tolerated    Any medication changes sincepost-hospitalization phone call? No    Any treatment changes since post-hospitalization phone call? No    Any further education needed on medications/treatment plan? No    Reports sugars were high in hospital and has not been taking oral medications since home  Noted sugar over 400 at home 2 days ago after discharge.     Using crystal light for hydration    Current Medication List  Outpatient Encounter Medications as of 4/23/2019   Medication Sig Dispense Refill    levothyroxine (SYNTHROID) 150 MCG tablet TAKE 1 TABLET BY MOUTH ONCE DAILY 90 tablet 1    olmesartan (BENICAR) 40 MG tablet Take 1 tablet by mouth daily 90 tablet 1    B-D INS SYR ULTRAFINE 1CC/31G 31G X 5/16\" 1 ML MISC       amLODIPine (NORVASC) 5 MG tablet Take 0.5 tablets by mouth daily 45 tablet 1    oxybutynin (DITROPAN-XL) 5 MG extended release tablet Take 1 tablet by mouth daily 30 tablet 5    fexofenadine (ALLEGRA) 180 MG tablet Take 180 mg by mouth daily      simvastatin (ZOCOR) 20 MG tablet Take 1 tablet by mouth nightly 90 tablet 3    montelukast (SINGULAIR) 10 MG tablet TAKE ONE TABLET BY MOUTH NIGHTLY 90 tablet 1    carvedilol (COREG) 6.25 MG tablet 1 tablet 2 times daily      fluticasone propionate (FLOVENT DISKUS) 100 MCG/BLIST AEPB inhaler Inhale 1 puff into the lungs daily      insulin glargine (LANTUS) 100 UNIT/ML injection vial Inject 60 Units into the skin nightly       fluticasone (FLONASE) 50 MCG/ACT nasal spray 1 spray by Nasal route daily      furosemide (LASIX) 40 MG tablet Take 40 mg by mouth daily as needed      isosorbide mononitrate (IMDUR) 60 MG extended release tablet Take 60 mg by mouth every morning      insulin aspart (NOVOLOG) 100 UNIT/ML injection vial Inject 4 Units into the skin 3 times daily (before meals)       aspirin 81 MG tablet Take 81 mg by mouth daily      acetaminophen (TYLENOL) 500 MG tablet Take 500 mg by mouth nightly       vitamin B-12 (CYANOCOBALAMIN) 500 MCG tablet Take 500 mcg by mouth daily      CPAP Machine MISC by Does not apply route      calcium carbonate (OSCAL) 500 MG TABS tablet Take 600 mg by mouth daily       Omega-3 Fatty Acids (FISH OIL PO) Take 1,040 mg by mouth 2 times daily       Multiple Vitamins-Minerals (MULTIVITAMIN ADULTS PO) Take by mouth daily       metFORMIN (GLUCOPHAGE) 1000 MG tablet TAKE 1 TABLET BY MOUTH TWICE DAILY 180 tablet 1    gabapentin (NEURONTIN) 300 MG capsule TAKE 1 CAPSULE BY MOUTH THREE TIMES DAILY. 270 capsule 1     No facility-administered encounter medications on file as of 4/23/2019. Medication Reconciliation  Provider has reviewed medication record and it has been modified as necessary to accurately depict current medications since hospitalization. [facility] has been instructed on these changes. See transitional caretelephone note. HPI:     HPI       Review of Systems:     Review of Systems   Constitutional: Negative for chills and unexpected weight change. Here to follow up from [facility] overnight observation after having altered mental status d/t hypoglycemia. She was taken to [facility] ER by ambulance with BS of 40. She has appt with Dr. Derrick Mejia in June. [facility] stopped her Metformin that she has been taking since 1995 and she is a little anxious about that.  They did gp back on her sliding scale once she returned home, she was on a different one while in hospital. She is very anxious about the changes that were made and having some trouble understanding why the changes were made and how to count carbs. Eyes: Negative for visual disturbance. Respiratory: Negative for shortness of breath. Cardiovascular: Negative for chest pain, palpitations and leg swelling. Endocrine:        Since being home BS readings are 200-420. Genitourinary: Negative for frequency. Neurological: Negative for dizziness. Pt desired review of carb counting with dietary. Exam:     Physical Exam   Constitutional: She is oriented to person, place, and time. She appears well-developed and well-nourished. No distress. Obese, using rolling walker   HENT:   Head: Atraumatic. Neck: Neck supple. Carotid bruit is not present. No thyromegaly present. Cardiovascular: Normal rate and regular rhythm. No murmur heard. Pulmonary/Chest: Effort normal and breath sounds normal.   Abdominal: Bowel sounds are normal.   Musculoskeletal: She exhibits no edema. Neurological: She is alert and oriented to person, place, and time. Psychiatric: She has a normal mood and affect. Vitals:    04/23/19 1038   BP: 124/68   Pulse: 60   SpO2: 98%         A1C 8.0 noted     Assessment:      Diagnosis Orders   1. 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 BMI 39.0-39.9,adult     3. Essential hypertension     4. Stage 3 chronic kidney disease ([address]     5. Acquired hypothyroidism  levothyroxine (SYNTHROID) 150 MCG tablet   6. Iron deficiency anemia, unspecified iron deficiency anemia type           Plan:         Diagnostic Tests performed in hospital: noted  Requested/reviewed: Yes    Disease Education:  Diabetes Mellitus, Renal Failure      Home Health Care : Other dietary      Discussed with other providers:  To see dietary with Dr Elberta Burkitt and endocrinology    Decision making noted high with potential for worsening. Encourage addition of nephrology evaluation.   Monitoring thyroid dosing    Note:  CPT Coding - 07116 (Moderate level - seen within 14 days)      25001 (High level - seen within 7 days)  Needs to have associated phone contact within 2 business days of inpatient discharge      Electronically signed by Desiree Davila MD on 4/23/2019 at 11:32 AM

## 2019-04-24 LAB — TSH SERPL DL<=0.05 MIU/L-ACNC: 1.7 MIU/ML (ref 0.49–4.6)

## 2019-05-07 RX ORDER — GABAPENTIN 300 MG/1
CAPSULE ORAL
Qty: 270 CAPSULE | Refills: 1 | Status: SHIPPED | OUTPATIENT
Start: 2019-05-07 | End: 2019-12-02 | Stop reason: SDUPTHER

## 2019-05-07 NOTE — TELEPHONE ENCOUNTER
[facility] is calling to request a refill on the following medication(s):  Requested Prescriptions     Pending Prescriptions Disp Refills    gabapentin (NEURONTIN) 300 MG capsule [Pharmacy Med Name: GABAPENTIN 300MG    CAP] 270 capsule 1     Sig: TAKE 1 CAPSULE BY MOUTH THREE TIMES DAILY       Last Visit Date (If Applicable):  5/50/5658    Next Visit Date:    6/13/2019

## 2019-05-14 RX ORDER — OXYBUTYNIN CHLORIDE 5 MG/1
TABLET, EXTENDED RELEASE ORAL
Qty: 90 TABLET | Refills: 1 | Status: SHIPPED | OUTPATIENT
Start: 2019-05-14 | End: 2019-12-02 | Stop reason: SDUPTHER

## 2019-05-14 NOTE — TELEPHONE ENCOUNTER
[facility] is calling to request a refill on the following medication(s):  Requested Prescriptions     Pending Prescriptions Disp Refills    oxybutynin (DITROPAN-XL) 5 MG extended release tablet [Pharmacy Med Name: OXYBUTYNIN ER 5MG   TAB] 90 tablet 1     Sig: TAKE 1 TABLET BY MOUTH ONCE DAILY       Last Visit Date (If Applicable):  9/59/7427    Next Visit Date:    6/13/2019

## 2019-05-21 ENCOUNTER — OFFICE VISIT (OUTPATIENT)
Dept: PODIATRY | Age: 79
End: 2019-05-21
Payer: MEDICARE

## 2019-05-21 VITALS
BODY MASS INDEX: 38.6 KG/M2 | DIASTOLIC BLOOD PRESSURE: 68 MMHG | WEIGHT: 240.2 LBS | HEART RATE: 60 BPM | HEIGHT: 66 IN | SYSTOLIC BLOOD PRESSURE: 124 MMHG | RESPIRATION RATE: 20 BRPM

## 2019-05-21 DIAGNOSIS — B35.1 DERMATOPHYTOSIS OF NAIL: ICD-10-CM

## 2019-05-21 DIAGNOSIS — L84 CORNS AND CALLOSITIES: ICD-10-CM

## 2019-05-21 DIAGNOSIS — E11.51 CONTROLLED TYPE 2 DM WITH PERIPHERAL CIRCULATORY DISORDER (HCC): Primary | ICD-10-CM

## 2019-05-21 PROCEDURE — 11055 PARING/CUTG B9 HYPRKER LES 1: CPT | Performed by: PODIATRIST

## 2019-05-21 PROCEDURE — 99999 PR OFFICE/OUTPT VISIT,PROCEDURE ONLY: CPT | Performed by: PODIATRIST

## 2019-05-21 PROCEDURE — 11721 DEBRIDE NAIL 6 OR MORE: CPT | Performed by: PODIATRIST

## 2019-05-21 NOTE — PROGRESS NOTES
Subjective:  Patient presents to [facility] today for routine diabetic foot care. Patient denies any new problems with their feet. Patient's diabetic control has been not changed. Allergies   Allergen Reactions    Lisinopril Other (See Comments)     Cough    Penicillins Swelling     Facial swelling    Ranolazine Rash       Past Medical History:   Diagnosis Date    CAD (coronary artery disease)     Diabetes mellitus ([address]     Hyperlipidemia     Hypertension     Hypothyroidism     Osteoarthritis     Sleep apnea        Prior to Admission medications    Medication Sig Start Date End Date Taking?  Authorizing Provider   oxybutynin (DITROPAN-XL) 5 MG extended release tablet TAKE 1 TABLET BY MOUTH ONCE DAILY 5/14/19  Yes Shauna Dumont MD   gabapentin (NEURONTIN) 300 MG capsule TAKE 1 CAPSULE BY MOUTH THREE TIMES DAILY 5/7/19 11/7/19 Yes Shauna Dumont MD   levothyroxine (SYNTHROID) 150 MCG tablet Take 1 tablet by mouth Daily 4/23/19  Yes Shauna Dumont MD   olmesartan (BENICAR) 40 MG tablet Take 1 tablet by mouth daily 3/12/19  Yes Shauna Dumont MD   B-D INS SYR ULTRAFINE 1CC/31G 31G X 5/16\" 1 ML MISC  12/19/18  Yes Historical Provider, MD   amLODIPine (NORVASC) 5 MG tablet Take 0.5 tablets by mouth daily 2/14/19  Yes Shauna Dumont MD   metFORMIN (GLUCOPHAGE) 1000 MG tablet TAKE 1 TABLET BY MOUTH TWICE DAILY 12/18/18  Yes Shauna Dumont MD   fexofenadine (ALLEGRA) 180 MG tablet Take 180 mg by mouth daily   Yes Historical Provider, MD   simvastatin (ZOCOR) 20 MG tablet Take 1 tablet by mouth nightly 8/21/18  Yes Shauna Dumont MD   montelukast (SINGULAIR) 10 MG tablet TAKE ONE TABLET BY MOUTH NIGHTLY 7/24/18  Yes Shauna Dumont MD   carvedilol (COREG) 6.25 MG tablet 1 tablet 2 times daily 1/25/18  Yes Historical Provider, MD   fluticasone propionate (FLOVENT DISKUS) 100 MCG/BLIST AEPB inhaler Inhale 1 puff into the lungs daily   Yes Historical Provider, MD   insulin glargine (LANTUS) 100 UNIT/ML 5.07/10g [place] Monofilament, bilateral.  negative Tinel's, bilateral.  negative Valleix sign, bilateral.  Vibratory intact bilateral.  Reflexes Decreased bilateral.  Paresthesias negative. Dysthesias negative. Sharp/dull intact bilateral.    Musculoskeletal: Muscle strength 5/5, bilateral.  Pain absent upon palpation bilateral. Normal medial longitudinal arch, bilateral.  Ankle ROM decreased,bilateral.  1st MPJ ROM within normal limits, bilateral.  Dorsally contracted digits present. L 3 toe amp. No other foot deformities. Integument:   Open lesion absent, Bilateral.  Interdigital maceration absent to web spaces,absent Bilateral.  Nails left 1, 2, 4, 5 and right 1, 2 thickened, dystrophic and crumbly, discolored with subungual debris. Fissures absent, Bilateral. Hyperkeratotic tissue is present. Seen plantar medial R 1st ray, not reuruned dorsal l 2nd toe PIPJ    Assessment:    Diagnosis Orders   1. Controlled type 2 DM with peripheral circulatory disorder ([facility]     2. Dermatophytosis of nail     3. Corns and callosities         Plan:  Diabetic foot education and exam.  All nails as mentioned above debrided in length and thickness. Paring of 1 benign hyperkeratotic lesions (as listed above) took place with #10 blade or tissue nippers. Patient advised OTC methods for treatment of the mycotic nails. Patient will follow up in 10 weeks.

## 2019-05-21 NOTE — PROGRESS NOTES
Foot Care Worksheet  PCP: Dominic Iglesias MD  Last visit: 04/23/2019    Nail description:  Thick , Yellow , Crumbly , Marked limitation of ambulation     Pain resulting from thickened and dystrophy of nail plate No    Nails involved  Right   1 2 (T5-T9)  Left     1, 2, 4, 5  (TA-T4)    Q7 1 Class A Finding - Non traumatic amputation of foot Yes

## 2019-06-10 LAB
ALBUMIN SERPL-MCNC: 3.4 G/DL
ALP BLD-CCNC: 66 U/L
ALT SERPL-CCNC: 34 U/L
ANION GAP SERPL CALCULATED.3IONS-SCNC: 8 MMOL/L
AST SERPL-CCNC: 26 U/L
AVERAGE GLUCOSE: NORMAL
BILIRUB SERPL-MCNC: 0.4 MG/DL (ref 0.1–1.4)
BUN BLDV-MCNC: 20 MG/DL
CALCIUM SERPL-MCNC: 9.8 MG/DL
CHLORIDE BLD-SCNC: 103 MMOL/L
CHOLESTEROL, TOTAL: 124 MG/DL
CHOLESTEROL/HDL RATIO: 2.5
CO2: 29 MMOL/L
CREAT SERPL-MCNC: 1.3 MG/DL
CREATININE URINE: 97.9 MG/DL
GFR CALCULATED: 42.4
GLUCOSE BLD-MCNC: 119 MG/DL
HBA1C MFR BLD: 6.7 %
HDLC SERPL-MCNC: 49 MG/DL (ref 35–70)
LDL CHOLESTEROL CALCULATED: 45 MG/DL (ref 0–160)
MICROALBUMIN/CREAT 24H UR: >1140 MG/G{CREAT}
POTASSIUM SERPL-SCNC: 4.7 MMOL/L
SODIUM BLD-SCNC: 140 MMOL/L
TOTAL PROTEIN: 6
TRIGL SERPL-MCNC: 150 MG/DL
VITAMIN B-12: 522
VLDLC SERPL CALC-MCNC: 30 MG/DL

## 2019-06-13 ENCOUNTER — OFFICE VISIT (OUTPATIENT)
Dept: FAMILY MEDICINE CLINIC | Age: 79
End: 2019-06-13
Payer: MEDICARE

## 2019-06-13 VITALS
DIASTOLIC BLOOD PRESSURE: 68 MMHG | HEIGHT: 66 IN | WEIGHT: 247 LBS | OXYGEN SATURATION: 97 % | SYSTOLIC BLOOD PRESSURE: 126 MMHG | BODY MASS INDEX: 39.7 KG/M2 | HEART RATE: 60 BPM

## 2019-06-13 DIAGNOSIS — Z79.4 TYPE 2 DIABETES MELLITUS WITH STAGE 1 CHRONIC KIDNEY DISEASE, WITH LONG-TERM CURRENT USE OF INSULIN (HCC): ICD-10-CM

## 2019-06-13 DIAGNOSIS — L90.0 LICHEN SCLEROSUS: ICD-10-CM

## 2019-06-13 DIAGNOSIS — N89.8 VAGINAL IRRITATION: ICD-10-CM

## 2019-06-13 DIAGNOSIS — Z00.00 ROUTINE GENERAL MEDICAL EXAMINATION AT A HEALTH CARE FACILITY: Primary | ICD-10-CM

## 2019-06-13 DIAGNOSIS — I10 ESSENTIAL HYPERTENSION: ICD-10-CM

## 2019-06-13 DIAGNOSIS — E03.9 ACQUIRED HYPOTHYROIDISM: ICD-10-CM

## 2019-06-13 DIAGNOSIS — E11.22 TYPE 2 DIABETES MELLITUS WITH STAGE 1 CHRONIC KIDNEY DISEASE, WITH LONG-TERM CURRENT USE OF INSULIN (HCC): ICD-10-CM

## 2019-06-13 DIAGNOSIS — N90.89 LESION OF LABIA: ICD-10-CM

## 2019-06-13 DIAGNOSIS — N18.1 TYPE 2 DIABETES MELLITUS WITH STAGE 1 CHRONIC KIDNEY DISEASE, WITH LONG-TERM CURRENT USE OF INSULIN (HCC): ICD-10-CM

## 2019-06-13 PROCEDURE — 1090F PRES/ABSN URINE INCON ASSESS: CPT | Performed by: FAMILY MEDICINE

## 2019-06-13 PROCEDURE — G8598 ASA/ANTIPLAT THER USED: HCPCS | Performed by: FAMILY MEDICINE

## 2019-06-13 PROCEDURE — G8427 DOCREV CUR MEDS BY ELIG CLIN: HCPCS | Performed by: FAMILY MEDICINE

## 2019-06-13 PROCEDURE — 4040F PNEUMOC VAC/ADMIN/RCVD: CPT | Performed by: FAMILY MEDICINE

## 2019-06-13 PROCEDURE — 99213 OFFICE O/P EST LOW 20 MIN: CPT | Performed by: FAMILY MEDICINE

## 2019-06-13 PROCEDURE — G8400 PT W/DXA NO RESULTS DOC: HCPCS | Performed by: FAMILY MEDICINE

## 2019-06-13 PROCEDURE — 1036F TOBACCO NON-USER: CPT | Performed by: FAMILY MEDICINE

## 2019-06-13 PROCEDURE — 1123F ACP DISCUSS/DSCN MKR DOCD: CPT | Performed by: FAMILY MEDICINE

## 2019-06-13 PROCEDURE — G0439 PPPS, SUBSEQ VISIT: HCPCS | Performed by: FAMILY MEDICINE

## 2019-06-13 PROCEDURE — G8417 CALC BMI ABV UP PARAM F/U: HCPCS | Performed by: FAMILY MEDICINE

## 2019-06-13 ASSESSMENT — PATIENT HEALTH QUESTIONNAIRE - PHQ9
SUM OF ALL RESPONSES TO PHQ9 QUESTIONS 1 & 2: 0
1. LITTLE INTEREST OR PLEASURE IN DOING THINGS: 0
SUM OF ALL RESPONSES TO PHQ QUESTIONS 1-9: 0
2. FEELING DOWN, DEPRESSED OR HOPELESS: 0
SUM OF ALL RESPONSES TO PHQ QUESTIONS 1-9: 0

## 2019-06-13 ASSESSMENT — ANXIETY QUESTIONNAIRES: GAD7 TOTAL SCORE: 0

## 2019-06-13 ASSESSMENT — LIFESTYLE VARIABLES: HOW OFTEN DO YOU HAVE A DRINK CONTAINING ALCOHOL: 0

## 2019-06-13 NOTE — PATIENT INSTRUCTIONS
Learning About Durable Power of  for Health Care  What is a durable power of  for health care? A durable power of  for health care is one type of the legal forms called advance directives. It lets you decide who you want to make treatment decisions for you if you cannot speak or decide for yourself. The person you choose is called your health care agent. Another type of advance directive is a living will. It lets you write down what kinds of treatment or life support you want or do not want. What should you think about when choosing a health care agent? Choose your health care agent carefully. This person may or may not be a family member. Talk to the person before you make your final decision. Make sure he or she is comfortable with this responsibility. It's a good idea to choose someone who:  · Is at least 25years old. · Knows you well and understands what makes life meaningful for you. · Understands your Sikh and moral values. · Will do what you want, not what he or she wants. · Will be able to make difficult choices at a stressful time. · Will be able to refuse or stop treatment, if that is what you would want, even if you could die. · Will be firm and confident with health professionals if needed. · Will ask questions to get necessary information. · Lives near you or agrees to travel to you if needed. Your family may help you make medical decisions while you can still be part of that process. But it is important to choose one person to be your health care agent in case you are not able to make decisions for yourself. If you don't fill out the legal form and name a health care agent, the decisions your family can make may be limited. Who will make decisions for you if you do not have a health care agent? If you don't have a health care agent or a living will, your family members may disagree about your medical care.  And then some medical professionals who may not know you as well might have to make decisions for you. In some cases, a  makes the decisions. When you name a health care agent, it is very clear who has the power to make health decisions for you. How do you name a health care agent? You name your health care agent on a legal form. It is usually called a durable power of  for health care. Ask your hospital, state bar association, or office on aging where to find these forms. You must sign the form to make it legal. Some states require you to get the form notarized. This means that a person called a  watches you sign the form and then he or she signs the form. Some states also require that two or more witnesses sign the form. Be sure to tell your family members and doctors who your health care agent is. Keep your forms in a safe place. But make sure that your loved ones know where the forms are. This could be in your desk where you keep other important papers. Make sure your doctor has a copy of your forms. Where can you learn more? Go to [email or web address]. org and sign in to your [employer] account. Enter [id number] in the Gogo box to learn more about \"Learning About Durable Power of  for Health Care. \"     If you do not have an account, please click on the \"Sign Up Now\" link. Current as of: April 18, 2018  Content Version: 12.0  © 5529-5706 Healthwise, Incorporated. Care instructions adapted under license by [facility] ([facility]). If you have questions about a medical condition or this instruction, always ask your healthcare professional. [place] [address] any warranty or liability for your use of this information. Personalized Preventive Plan for Ap Born - 6/13/2019  Medicare offers a range of preventive health benefits. Some of the tests and screenings are paid in full while other may be subject to a deductible, co-insurance, and/or copay.     Some of these benefits include a comprehensive review of your medical history including lifestyle, illnesses that may run in your family, and various assessments and screenings as appropriate. After reviewing your medical record and screening and assessments performed today your provider may have ordered immunizations, labs, imaging, and/or referrals for you. A list of these orders (if applicable) as well as your Preventive Care list are included within your After Visit Summary for your review. Other Preventive Recommendations:    · A preventive eye exam performed by an eye specialist is recommended every 1-2 years to screen for glaucoma; cataracts, macular degeneration, and other eye disorders. · A preventive dental visit is recommended every 6 months. · Try to get at least 150 minutes of exercise per week or 10,000 steps per day on a pedometer . · Order or download the FREE \"Exercise & Physical Activity: Your Everyday Guide\" from [employer]. Call 9-918.197.2061 or search [employer] online. · You need 5747-3771 mg of calcium and 1346-0354 IU of vitamin D per day. It is possible to meet your calcium requirement with diet alone, but a vitamin D supplement is usually necessary to meet this goal.  · When exposed to the sun, use a sunscreen that protects against both UVA and UVB radiation with an SPF of 30 or greater. Reapply every 2 to 3 hours or after sweating, drying off with a towel, or swimming. · Always wear a seat belt when traveling in a car. Always wear a helmet when riding a bicycle or motorcycle.

## 2019-06-13 NOTE — PROGRESS NOTES
[address] [address]  Dept: [phone]  Dept Fax: [phone]    Iliana Harrison is a 70 y.o. female who presents today for her medical conditions/complaintsas noted below. Iliana Harrison c/o of Medicare AWV    MINI-MENTAL STATUS EXAM (Breanna Even)    What is the Year? Season? Date? Day? Month?   (indicate # correct)        5    Where are we: State? County? Town? Place? Floor? (indicate # correct)        5    Name 3 objects and have pt repeat.                (indicate # correct)        3    Serial 7's or spell \"world\" backwards.                 (indicate # correct)        5    Recall 3 objects                (indicate # correct)        3    Patient names a pencil & a watch                (indicate # correct)        2    Read and obey \"Close your eyes\"                (indicate 1 if correct)     1    Copy intersecting pentagons                (indicate 1 if correct)     1    Write a sentence                (indicate 1 if correct)     1    Repeat \"no ifs, ands, or buts\"                (indicate 1 if correct)     1    Follow 3-stage command                (indicate # done correctly) 3                                            ------------  TOTAL SCORE   (max = 30)                  30      REFERENCE INFORMATION FOR THE CLINICIAN:    \"Low\" score    = 0-23  \"Normal\" score = 24-30    HPI:     HPI  Pt here for annual medicare wellness visit. As well as HTN, CKD  Noted Cr improved to 1.3 on 6/10/19 lab  Taking all medications some improvement with ditropan. Not currently on benicar, back on losartan      Pt complaining of irritation like a burn on inside of right labia for past 3 years not better. Treating topically with monistat type creams    Past week have not been having as good of foods as prior.     BP Readings from Last 3 Encounters:   06/13/19 126/68   05/21/19 124/68   04/23/19 124/68          (goal 120/80)    Past Medical History: Diagnosis Date    CAD (coronary artery disease)     Diabetes mellitus ([facility] [address]     Hyperlipidemia     Hypertension     Hypothyroidism     Osteoarthritis     Sleep apnea       Past Surgical History:   Procedure Laterality Date    ANGIOPLASTY      EYE SURGERY      FOOT SURGERY Left 07/31/2009    3rd toe amputation    HYSTERECTOMY      TOE AMPUTATION      left 3rd toe    TONSILLECTOMY         Family History   Problem Relation Age of Onset    High Blood Pressure Mother     Coronary Art Dis Father        Social History     Tobacco Use    Smoking status: Never Smoker    Smokeless tobacco: Never Used   Substance Use Topics    Alcohol use: No      Prior to Admission medications    Medication Sig Start Date End Date Taking?  Authorizing Provider   losartan (COZAAR) 100 MG tablet TAKE 1 TABLET BY MOUTH ONCE DAILY 6/3/19  Yes Ingrid Montalvo MD   oxybutynin (DITROPAN-XL) 5 MG extended release tablet TAKE 1 TABLET BY MOUTH ONCE DAILY 5/14/19  Yes Ingrid Montalvo MD   gabapentin (NEURONTIN) 300 MG capsule TAKE 1 CAPSULE BY MOUTH THREE TIMES DAILY 5/7/19 11/7/19 Yes Ingrid Montalvo MD   levothyroxine (SYNTHROID) 150 MCG tablet Take 1 tablet by mouth Daily 4/23/19  Yes Ingrid Montalvo MD   B-D INS SYR ULTRAFINE 1CC/31G 31G X 5/16\" 1 ML MISC  12/19/18  Yes Historical Provider, MD   amLODIPine (NORVASC) 5 MG tablet Take 0.5 tablets by mouth daily 2/14/19  Yes Ingrid Montalvo MD   metFORMIN (GLUCOPHAGE) 1000 MG tablet TAKE 1 TABLET BY MOUTH TWICE DAILY 12/18/18  Yes Ingrid Montalvo MD   fexofenadine (ALLEGRA) 180 MG tablet Take 180 mg by mouth daily   Yes Historical Provider, MD   simvastatin (ZOCOR) 20 MG tablet Take 1 tablet by mouth nightly 8/21/18  Yes Ingrid Montalvo MD   montelukast (SINGULAIR) 10 MG tablet TAKE ONE TABLET BY MOUTH NIGHTLY 7/24/18  Yes Ingrid Montalvo MD   carvedilol (COREG) 6.25 MG tablet 1 tablet 2 times daily 1/25/18  Yes Historical Provider, MD   fluticasone propionate (FLOVENT DISKUS) 100 MCG/BLIST Vaccine  Completed       Subjective:      Review of Systems   Constitutional:        Here for Medicare wellness visit without concern. Following Endocrinology for DM, her insulin was just decreased, and her most recent A1C is in her chart. Objective:     /68   Pulse 60   Ht 5' 6\" (1.676 m)   Wt 247 lb (112 kg)   SpO2 97%   BMI 39.87 kg/m²     Physical Exam   Constitutional: She is oriented to person, place, and time. She appears well-developed and well-nourished. No distress. Obesity bumped back up a bit   Neck: Neck supple. No thyromegaly present. Cardiovascular: Normal rate and regular rhythm. No murmur heard. Pulmonary/Chest: Effort normal and breath sounds normal. No respiratory distress. She has no wheezes. Abdominal: Bowel sounds are normal.   Musculoskeletal: She exhibits edema (trace-1+). Lymphadenopathy:     She has no cervical adenopathy. Neurological: She is alert and oriented to person, place, and time. Psychiatric: She has a normal mood and affect. A1C 6/10/19 6.7  TSH 1.7 on 4/24/19    Assessment:     1. Vaginal irritation    2. Acquired hypothyroidism    3. Essential hypertension    4. Type 2 diabetes mellitus with stage 1 chronic kidney disease, with long-term current use of insulin ([facility] [address]    5. BMI 39.0-39.9,adult      No results found for this visit on 06/13/19. Plan:   No orders of the defined types were placed in this encounter. No orders of the defined types were placed in this encounter. No follow-ups on file. Discussed use, benefit, and side effects of prescribed medications. All patient questions answered. Pt voiced understanding. Instructed to continue current medications, diet and exercise. Patient agreed with treatment plan. Follow up as directed. Electronically signed by Scottie Pereira MD on 6/13/2019     Medicare Annual Wellness Visit  Name: Elías Morgan Date: 6/13/2019   MRN: [id number] Sex: Female   Age: 70 y.o. Ethnicity: Non-/Non    : 1940 Race: Milan Mcbride is here for Medicare AWV    Screenings for behavioral, psychosocial and functional/safety risks, and cognitive dysfunction are all negative except as indicated below. These results, as well as other patient data from the [address] form, are documented in Flowsheets linked to this Encounter. Allergies   Allergen Reactions    Lisinopril Other (See Comments)     Cough    Penicillins Swelling     Facial swelling    Ranolazine Rash     Prior to Visit Medications    Medication Sig Taking?  Authorizing Provider   losartan (COZAAR) 100 MG tablet TAKE 1 TABLET BY MOUTH ONCE DAILY Yes Ira Traylor MD   oxybutynin (DITROPAN-XL) 5 MG extended release tablet TAKE 1 TABLET BY MOUTH ONCE DAILY Yes Ira Traylor MD   gabapentin (NEURONTIN) 300 MG capsule TAKE 1 CAPSULE BY MOUTH THREE TIMES DAILY Yes Ira Traylor MD   levothyroxine (SYNTHROID) 150 MCG tablet Take 1 tablet by mouth Daily Yes Ira Traylor MD   B-D INS SYR ULTRAFINE 1CC/31G 31G X 5/16\" 1 ML MISC  Yes Historical Provider, MD   amLODIPine (NORVASC) 5 MG tablet Take 0.5 tablets by mouth daily Yes Ira Traylor MD   metFORMIN (GLUCOPHAGE) 1000 MG tablet TAKE 1 TABLET BY MOUTH TWICE DAILY Yes Ira Traylor MD   fexofenadine (ALLEGRA) 180 MG tablet Take 180 mg by mouth daily Yes Historical Provider, MD   simvastatin (ZOCOR) 20 MG tablet Take 1 tablet by mouth nightly Yes Ira Traylor MD   montelukast (SINGULAIR) 10 MG tablet TAKE ONE TABLET BY MOUTH NIGHTLY Yes Ira Traylor MD   carvedilol (COREG) 6.25 MG tablet 1 tablet 2 times daily Yes Historical Provider, MD   fluticasone propionate (FLOVENT DISKUS) 100 MCG/BLIST AEPB inhaler Inhale 1 puff into the lungs daily Yes Historical Provider, MD   insulin glargine (LANTUS) 100 UNIT/ML injection vial Inject 54 Units into the skin nightly  Yes Historical Provider, MD   fluticasone (FLONASE) 50 MCG/ACT nasal spray 1 spray by Nasal route daily Yes Historical Provider, MD   furosemide (LASIX) 40 MG tablet Take 40 mg by mouth daily as needed Yes Historical Provider, MD   isosorbide mononitrate (IMDUR) 60 MG extended release tablet Take 60 mg by mouth every morning Yes Historical Provider, MD   insulin aspart (NOVOLOG) 100 UNIT/ML injection vial Inject 4 Units into the skin 3 times daily (before meals) Indications: 10 units in AM, 10 units in afternoon, 10 units in PM with meals Plus sliding scale  Yes Luke Fan MD   aspirin 81 MG tablet Take 81 mg by mouth daily Yes Historical Provider, MD   acetaminophen (TYLENOL) 500 MG tablet Take 500 mg by mouth nightly  Yes Historical Provider, MD   vitamin B-12 (CYANOCOBALAMIN) 500 MCG tablet Take 500 mcg by mouth daily Yes Historical Provider, MD   CPAP Machine MISC by Does not apply route Yes Luke Fan MD   calcium carbonate (OSCAL) 500 MG TABS tablet Take 600 mg by mouth daily  Yes Historical Provider, MD   Omega-3 Fatty Acids (FISH OIL PO) Take 1,040 mg by mouth 2 times daily  Yes Historical Provider, MD   Multiple Vitamins-Minerals (MULTIVITAMIN ADULTS PO) Take by mouth daily  Yes Historical Provider, MD     Past Medical History:   Diagnosis Date    CAD (coronary artery disease)     Diabetes mellitus ([facility] [address]     Hyperlipidemia     Hypertension     Hypothyroidism     Osteoarthritis     Sleep apnea      Past Surgical History:   Procedure Laterality Date    ANGIOPLASTY      EYE SURGERY      FOOT SURGERY Left 07/31/2009    3rd toe amputation    HYSTERECTOMY      TOE AMPUTATION      left 3rd toe    TONSILLECTOMY       Family History   Problem Relation Age of Onset    High Blood Pressure Mother     Coronary Art Dis Father        CareTeam (Including outside providers/suppliers regularly involved in providing care):   Patient Care Team:  Luke Fan MD as PCP - General (Family Medicine)  Luke Fan MD as PCP - [facility] Empaneled Provider  Carlos A Barksdale LPN as LPN    Wt Readings from Last 3 Encounters:   06/13/19 247 lb (112 kg)   05/21/19 240 lb 3.2 oz (109 kg)   04/23/19 244 lb (110.7 kg)     Vitals:    06/13/19 1404   BP: 126/68   Pulse: 60   SpO2: 97%   Weight: 247 lb (112 kg)   Height: 5' 6\" (1.676 m)     Body mass index is 39.87 kg/m². Based upon direct observation of the patient, evaluation of cognition reveals recent and remote memory intact. A&O x 3,   Heart RRR without murmur  Lungs CTAB  Abd good BS soft NT  Ext no edema  Neck supple, no thyromegaly, no adenopathy, no bruits  Arms with ecchymosis noted. Patient's complete Health Risk Assessment and screening values have been reviewed and are found in Flowsheets. The following problems were reviewed today and where indicated follow up appointments were made and/or referrals ordered. Positive Risk Factor Screenings with Interventions:     General Health:  General  In general, how would you say your health is?: Fair  In the past 7 days, have you experienced any of the following? New or Increased Pain, New or Increased Fatigue, Loneliness, Social Isolation, Stress or Anger?: None of These  Do you get the social and emotional support that you need?: Yes  Do you have a Living Will?: (!) No  General Health Risk Interventions:  · No Living Will: patient declined and reviewed medical POA  · POA reviewed    Health Habits/Nutrition:  Health Habits/Nutrition  Do you exercise for at least 20 minutes 2-3 times per week?: (!) No  Have you lost any weight without trying in the past 3 months?: No  Do you eat fewer than 2 meals per day?: No  Have you seen a dentist within the past year?: (!) No  Body mass index is 39.87 kg/m².   Health Habits/Nutrition Interventions:  · Inadequate physical activity:  patient agrees to exercise for at least 150 minutes/week    Personalized Preventive Plan   Current Health Maintenance Status  Immunization History   Administered Date(s) Administered    Influenza Vaccine, unspecified

## 2019-06-13 NOTE — PROGRESS NOTES
Irritation of vaginal wall    Lichen sclerosis with lesion need to ensure no cancerous change reviewed    Treat with topical lidex twice daily x 2 weeks and evaluation with GYN regarding lesion

## 2019-07-01 PROBLEM — N90.4 LICHEN SCLEROSUS ET ATROPHICUS OF THE VULVA: Status: ACTIVE | Noted: 2019-07-01

## 2019-07-23 ENCOUNTER — HOSPITAL ENCOUNTER (OUTPATIENT)
Dept: LAB | Age: 79
Discharge: HOME | End: 2019-07-23
Payer: MEDICARE

## 2019-07-23 ENCOUNTER — OFFICE VISIT (OUTPATIENT)
Dept: NEPHROLOGY | Age: 79
End: 2019-07-23
Payer: MEDICARE

## 2019-07-23 VITALS
SYSTOLIC BLOOD PRESSURE: 146 MMHG | BODY MASS INDEX: 38.18 KG/M2 | HEIGHT: 66 IN | DIASTOLIC BLOOD PRESSURE: 58 MMHG | WEIGHT: 237.6 LBS

## 2019-07-23 DIAGNOSIS — I10 ESSENTIAL HYPERTENSION: ICD-10-CM

## 2019-07-23 DIAGNOSIS — N17.0 ACUTE RENAL FAILURE WITH TUBULAR NECROSIS (HCC): ICD-10-CM

## 2019-07-23 DIAGNOSIS — N18.30 CKD (CHRONIC KIDNEY DISEASE), STAGE III (HCC): ICD-10-CM

## 2019-07-23 DIAGNOSIS — N18.30 CKD (CHRONIC KIDNEY DISEASE), STAGE III (HCC): Primary | ICD-10-CM

## 2019-07-23 LAB
ANION GAP SERPL CALCULATED.3IONS-SCNC: 11 MMOL/L (ref 9–17)
BUN BLDV-MCNC: 37 MG/DL (ref 8–23)
BUN/CREAT BLD: 17 (ref 9–20)
CALCIUM IONIZED: 1.3 MMOL/L (ref 1.13–1.33)
CALCIUM SERPL-MCNC: 10 MG/DL (ref 8.6–10.4)
CHLORIDE BLD-SCNC: 101 MMOL/L (ref 98–107)
CO2: 28 MMOL/L (ref 20–31)
COMPLEMENT C3: 129 MG/DL (ref 90–180)
COMPLEMENT C4: 22 MG/DL (ref 10–40)
CREAT SERPL-MCNC: 2.14 MG/DL (ref 0.5–0.9)
CREATININE URINE: 38.5 MG/DL (ref 28–217)
FREE KAPPA/LAMBDA RATIO: 2.76 (ref 0.26–1.65)
GFR AFRICAN AMERICAN: 27 ML/MIN
GFR NON-AFRICAN AMERICAN: 22 ML/MIN
GFR SERPL CREATININE-BSD FRML MDRD: ABNORMAL ML/MIN/{1.73_M2}
GFR SERPL CREATININE-BSD FRML MDRD: ABNORMAL ML/MIN/{1.73_M2}
GLUCOSE BLD-MCNC: 168 MG/DL (ref 70–99)
KAPPA FREE LIGHT CHAINS QNT: 5.58 MG/DL (ref 0.37–1.94)
LAMBDA FREE LIGHT CHAINS QNT: 2.02 MG/DL (ref 0.57–2.63)
POTASSIUM SERPL-SCNC: 4.6 MMOL/L (ref 3.7–5.3)
PTH INTACT: 23.39 PG/ML (ref 15–65)
SODIUM BLD-SCNC: 140 MMOL/L (ref 135–144)
TOTAL PROTEIN, URINE: 109 MG/DL
URINE TOTAL PROTEIN CREATININE RATIO: 2.83 (ref 0–0.2)

## 2019-07-23 PROCEDURE — 99204 OFFICE O/P NEW MOD 45 MIN: CPT | Performed by: INTERNAL MEDICINE

## 2019-07-23 PROCEDURE — 83883 ASSAY NEPHELOMETRY NOT SPEC: CPT

## 2019-07-23 PROCEDURE — 84155 ASSAY OF PROTEIN SERUM: CPT

## 2019-07-23 PROCEDURE — 1036F TOBACCO NON-USER: CPT | Performed by: INTERNAL MEDICINE

## 2019-07-23 PROCEDURE — 1123F ACP DISCUSS/DSCN MKR DOCD: CPT | Performed by: INTERNAL MEDICINE

## 2019-07-23 PROCEDURE — 84165 PROTEIN E-PHORESIS SERUM: CPT

## 2019-07-23 PROCEDURE — 86038 ANTINUCLEAR ANTIBODIES: CPT

## 2019-07-23 PROCEDURE — G8400 PT W/DXA NO RESULTS DOC: HCPCS | Performed by: INTERNAL MEDICINE

## 2019-07-23 PROCEDURE — 1090F PRES/ABSN URINE INCON ASSESS: CPT | Performed by: INTERNAL MEDICINE

## 2019-07-23 PROCEDURE — G8427 DOCREV CUR MEDS BY ELIG CLIN: HCPCS | Performed by: INTERNAL MEDICINE

## 2019-07-23 PROCEDURE — G8417 CALC BMI ABV UP PARAM F/U: HCPCS | Performed by: INTERNAL MEDICINE

## 2019-07-23 PROCEDURE — 82330 ASSAY OF CALCIUM: CPT

## 2019-07-23 PROCEDURE — 80048 BASIC METABOLIC PNL TOTAL CA: CPT

## 2019-07-23 PROCEDURE — 83970 ASSAY OF PARATHORMONE: CPT

## 2019-07-23 PROCEDURE — 36415 COLL VENOUS BLD VENIPUNCTURE: CPT

## 2019-07-23 PROCEDURE — 82570 ASSAY OF URINE CREATININE: CPT

## 2019-07-23 PROCEDURE — G8598 ASA/ANTIPLAT THER USED: HCPCS | Performed by: INTERNAL MEDICINE

## 2019-07-23 PROCEDURE — 4040F PNEUMOC VAC/ADMIN/RCVD: CPT | Performed by: INTERNAL MEDICINE

## 2019-07-23 PROCEDURE — 86160 COMPLEMENT ANTIGEN: CPT

## 2019-07-23 PROCEDURE — 84156 ASSAY OF PROTEIN URINE: CPT

## 2019-07-23 RX ORDER — BETAMETHASONE DIPROPIONATE 0.05 %
OINTMENT (GRAM) TOPICAL 2 TIMES DAILY
COMMUNITY
End: 2021-09-02 | Stop reason: ALTCHOICE

## 2019-07-23 NOTE — PROGRESS NOTES
Nephrology Consult Note    Reason for Consult: Elevated creatinine   requesting Physician: Dr. Indio Ledezma  Chief Complaint: Elevated creatinine   history Obtained From: Patient and chart review    History of Present Illness: This is a 70 y.o. female who presents to the office for evaluation of an elevated creatinine. Patient is diabetic. She is taking oral hypoglycemics and then insulin since 1985. She states that her blood sugars are under fair control. Patient also has a history of hypertension. She was first diagnosed with hypertension and start taking medication in mid 1990s. Patient also suffers from coronary artery disease. She was admitted in [place] in 2001 with chest pain. She underwent angiogram and stent placement. Patient also has a history of diabetic retinopathy and diabetic neuropathy  Patient also suffers from obstructive sleep apnea. She is using CPAP from last 10 years. She states that she used CPAP most of the nights. Patient also suffers from congestive cardiac failure. She has increase in leg swelling and compression stockings were prescribed that she is using it on a very regular basis. Patient was referred to our office because of elevated creatinine. Pattern of her creatinine were as follows. Results for Mary Yee (MRN [id number]) as of 7/23/2019 09:43   Ref. Range 2/12/2019 00:00 2/12/2019 14:01 3/14/2019 14:56 4/21/2019 00:00 6/10/2019 00:00   Creatinine Unknown 1.7 1.7 (H) 1.6 (H) 1.8 1.3         In the later part of 2018 her creatinine was 1.3 however from 2019 her creatinine was in the range of 1.6 to 1.8 mg/dL. Therefore a referral was sent to our office. Her most recent creatinine is 1.3 mg/dL. Patient denies any history of recurrent urinary tract infection. She used to take Motrin Aleve and meloxicam over the. Of last 5 to 10 years but she stopped taking all NSAIDs around 3 years ago.   Patient denies any recent hospitalization due MOUTH TWICE DAILY 180 tablet 1    fexofenadine (ALLEGRA) 180 MG tablet Take 180 mg by mouth daily      simvastatin (ZOCOR) 20 MG tablet Take 1 tablet by mouth nightly 90 tablet 3    montelukast (SINGULAIR) 10 MG tablet TAKE ONE TABLET BY MOUTH NIGHTLY 90 tablet 1    carvedilol (COREG) 6.25 MG tablet 1 tablet 2 times daily      fluticasone propionate (FLOVENT DISKUS) 100 MCG/BLIST AEPB inhaler Inhale 1 puff into the lungs daily      insulin glargine (LANTUS) 100 UNIT/ML injection vial Inject 54 Units into the skin nightly       fluticasone (FLONASE) 50 MCG/ACT nasal spray 1 spray by Nasal route daily      furosemide (LASIX) 40 MG tablet Take 40 mg by mouth daily as needed      isosorbide mononitrate (IMDUR) 60 MG extended release tablet Take 60 mg by mouth every morning      insulin aspart (NOVOLOG) 100 UNIT/ML injection vial Inject 4 Units into the skin 3 times daily (before meals) Indications: 10 units in AM, 10 units in afternoon, 10 units in PM with meals Plus sliding scale       aspirin 81 MG tablet Take 81 mg by mouth daily      acetaminophen (TYLENOL) 500 MG tablet Take 500 mg by mouth nightly       vitamin B-12 (CYANOCOBALAMIN) 500 MCG tablet Take 500 mcg by mouth daily      CPAP Machine MISC by Does not apply route      calcium carbonate (OSCAL) 500 MG TABS tablet Take 600 mg by mouth daily       Omega-3 Fatty Acids (FISH OIL PO) Take 1,040 mg by mouth 2 times daily       Multiple Vitamins-Minerals (MULTIVITAMIN ADULTS PO) Take by mouth daily        No current facility-administered medications for this visit.         Allergies:  Lisinopril; Penicillins; and Ranolazine    Social History:   Social History     Socioeconomic History    Marital status:      Spouse name: Not on file    Number of children: Not on file    Years of education: Not on file    Highest education level: Not on file   Occupational History    Not on file   Social Needs    Financial resource strain: Not on file Physical Exam:  General appearance: Sitting comfortably alert and oriented x3. Skin: Warm to touch and no erythema  Eyes: Conjunctiva was pink  Neck: No JVD  pulmonary: Bilateral air entry and clear to auscultation  Cardiovascular: S1 and S2 audible no S3 or murmur   abdomen: Soft and nontender bowel sounds was positive  Extremities: Trace edema    Labs:  Results for Chelsy Dillon (MRN [id number]) as of 7/23/2019 09:43   Ref. Range 3/14/2019 14:56 4/21/2019 00:00 6/10/2019 00:00   Sodium Latest Units: mmol/L 140 134 140.0   Potassium Latest Units: mmol/L 4.9 4.0 4.7   Chloride Latest Units: mmol/L 101 103 103.0   CO2 Latest Units: mmol/L 29 25 29.0   BUN Latest Units: mg/dL  28 20.0   Creatinine Unknown 1.6 (H) 1.8 1.3   Anion Gap Latest Units: mmol/L 15 10 8.0   Gfr Calculated Unknown  33 42.4   Glucose Latest Units: mg/dL  264 119   Calcium Latest Units: mg/dL  8.1 9.8   Total Protein Unknown   6.0       Radiology:  Reviewed as available. Assessment:  1. Acute kidney injury etiology not clear may be related to prerenal azotemia due to hyperglycemia or excessive caffeine intake. However her creatinine is now back to her baseline of 1.3 mg/dL. 2.  Chronic kidney disease with a baseline creatinine of 1.3 mg/dL and stage III chronic kidney disease. Most likely due to diabetic kidney disease further complicated by use of NSAIDs in the past  3. History of coronary artery disease  4. Morbid obesity  5. Obstructive sleep apnea patient is fairly compliant to CPAP  6. Diabetic retinopathy and diabetic neuropathy    Plan:  1. Will Check Renal Ultrasound to r/o element of obstruction and to assess the kidney size/echotexture. 2. Comprehensive urine testing including Urinalysis, Urine  protein and creatinine ratio to   quantify the proteinuria   3. Will Order serum and urine protein electrophoresis to r/o element to occult paraprotein disease. 4. Will order , GIOVANI, ANCA, Complement levels.   5. Avoid any

## 2019-07-24 LAB
ALBUMIN (CALCULATED): 4.1 G/DL (ref 3.2–5.2)
ALBUMIN PERCENT: 61 % (ref 45–65)
ALPHA 1 PERCENT: 3 % (ref 3–6)
ALPHA 2 PERCENT: 14 % (ref 6–13)
ALPHA-1-GLOBULIN: 0.2 G/DL (ref 0.1–0.4)
ALPHA-2-GLOBULIN: 0.9 G/DL (ref 0.5–0.9)
ANTI-NUCLEAR ANTIBODY (ANA): NEGATIVE
BETA GLOBULIN: 0.8 G/DL (ref 0.5–1.1)
BETA PERCENT: 12 % (ref 11–19)
GAMMA GLOBULIN %: 11 % (ref 9–20)
GAMMA GLOBULIN: 0.7 G/DL (ref 0.5–1.5)
PATHOLOGIST: ABNORMAL
PROTEIN ELECTROPHORESIS, SERUM: ABNORMAL
TOTAL PROT. SUM,%: 101 % (ref 98–102)
TOTAL PROT. SUM: 6.7 G/DL (ref 6.3–8.2)
TOTAL PROTEIN: 6.7 G/DL (ref 6.4–8.3)

## 2019-07-29 LAB
AGE FOR GFR: 76
CREAT SERPL-MCNC: 1.8 MG/DL (ref 0.5–1)
EGFR BF: 33 ML/MIN/1.73 M2
EGFR BM: 45 ML/MIN/1.73 M2
EGFR WF: 27 ML/MIN/1.73 M2
EGFR WM: 37 ML/MIN/1.73 M2

## 2019-09-04 RX ORDER — SIMVASTATIN 20 MG
20 TABLET ORAL NIGHTLY
Qty: 90 TABLET | Refills: 3 | Status: SHIPPED | OUTPATIENT
Start: 2019-09-04 | End: 2020-10-06

## 2019-09-04 NOTE — TELEPHONE ENCOUNTER
Elke is requesting a refill on the following medication(s):  Requested Prescriptions     Pending Prescriptions Disp Refills    simvastatin (ZOCOR) 20 MG tablet [Pharmacy Med Name: SIMVASTATIN 20MG  TAB] 90 tablet 3     Sig: TAKE 1 TABLET BY MOUTH NIGHTLY       Last Visit Date (If Applicable):  2/64/0224    Next Visit Date:    10/14/2019

## 2019-09-05 ENCOUNTER — OFFICE VISIT (OUTPATIENT)
Dept: PODIATRY | Age: 79
End: 2019-09-05
Payer: MEDICARE

## 2019-09-05 VITALS
WEIGHT: 237 LBS | SYSTOLIC BLOOD PRESSURE: 138 MMHG | HEIGHT: 66 IN | BODY MASS INDEX: 38.09 KG/M2 | DIASTOLIC BLOOD PRESSURE: 80 MMHG | HEART RATE: 76 BPM

## 2019-09-05 DIAGNOSIS — S98.132A AMPUTATED TOE OF LEFT FOOT (HCC): ICD-10-CM

## 2019-09-05 DIAGNOSIS — E11.51 CONTROLLED TYPE 2 DM WITH PERIPHERAL CIRCULATORY DISORDER (HCC): Primary | ICD-10-CM

## 2019-09-05 DIAGNOSIS — B35.1 DERMATOPHYTOSIS OF NAIL: ICD-10-CM

## 2019-09-05 DIAGNOSIS — L84 CORNS AND CALLOSITIES: ICD-10-CM

## 2019-09-05 PROCEDURE — 11056 PARNG/CUTG B9 HYPRKR LES 2-4: CPT | Performed by: PODIATRIST

## 2019-09-05 PROCEDURE — 11721 DEBRIDE NAIL 6 OR MORE: CPT | Performed by: PODIATRIST

## 2019-09-05 PROCEDURE — 99999 PR OFFICE/OUTPT VISIT,PROCEDURE ONLY: CPT | Performed by: PODIATRIST

## 2019-09-05 NOTE — PROGRESS NOTES
Varicosities present, bilateral. Erythema absent, bilateral. Distal Rubor absent bilateral.  Temperature within normal limits bilateral. Hyperpigmentation present bilateral. Atrophic skin yes. Neurological: Sensation intact to light touch to level of digits, bilateral.  Protective sensation intact 10/10 sites via 5.07/10g [place] Monofilament, bilateral.  negative Tinel's, bilateral.  negative Valleix sign, bilateral.  Vibratory intact bilateral.  Reflexes Decreased bilateral.  Paresthesias negative. Dysthesias negative. Sharp/dull intact bilateral.    Musculoskeletal: Muscle strength 5/5, bilateral.  Pain absent upon palpation bilateral. Normal medial longitudinal arch, bilateral.  Ankle ROM decreased,bilateral.  1st MPJ ROM within normal limits, bilateral.  Dorsally contracted digits present. L 3 toe amp. No other foot deformities. Integument:   Open lesion absent, Bilateral.  Interdigital maceration absent to web spaces,absent Bilateral.  Nails left 1, 2, 4, 5 and right 1, 2 thickened, dystrophic and crumbly, discolored with subungual debris. Fissures absent, Bilateral. Hyperkeratotic tissue is present. Seen plantar medial b/l 1st ray, duistal l 2nd toe,  not reuruned dorsal l 2nd toe PIPJ    Assessment:    Diagnosis Orders   1. Controlled type 2 DM with peripheral circulatory disorder ([address]     2. Dermatophytosis of nail     3. Corns and callosities     4. Amputated toe of left foot ([address]         Plan:  Diabetic foot education and exam.  All nails as mentioned above debrided in length and thickness. Paring of 3 benign hyperkeratotic lesions (as listed above) took place with #10 blade or tissue nippers. Patient advised OTC methods for treatment of the mycotic nails. Patient will follow up in 10 weeks.

## 2019-09-18 ENCOUNTER — OFFICE VISIT (OUTPATIENT)
Dept: PODIATRY | Age: 79
End: 2019-09-18
Payer: MEDICARE

## 2019-09-18 VITALS
HEART RATE: 60 BPM | BODY MASS INDEX: 38.09 KG/M2 | RESPIRATION RATE: 20 BRPM | WEIGHT: 237 LBS | HEIGHT: 66 IN | SYSTOLIC BLOOD PRESSURE: 132 MMHG | DIASTOLIC BLOOD PRESSURE: 70 MMHG

## 2019-09-18 DIAGNOSIS — S98.132A AMPUTATED TOE OF LEFT FOOT (HCC): ICD-10-CM

## 2019-09-18 DIAGNOSIS — E11.51 CONTROLLED TYPE 2 DM WITH PERIPHERAL CIRCULATORY DISORDER (HCC): Primary | ICD-10-CM

## 2019-09-18 DIAGNOSIS — S91.109A OPEN WOUND OF TOE, INITIAL ENCOUNTER: ICD-10-CM

## 2019-09-18 PROCEDURE — 1036F TOBACCO NON-USER: CPT | Performed by: PODIATRIST

## 2019-09-18 PROCEDURE — 4040F PNEUMOC VAC/ADMIN/RCVD: CPT | Performed by: PODIATRIST

## 2019-09-18 PROCEDURE — G8598 ASA/ANTIPLAT THER USED: HCPCS | Performed by: PODIATRIST

## 2019-09-18 PROCEDURE — 1090F PRES/ABSN URINE INCON ASSESS: CPT | Performed by: PODIATRIST

## 2019-09-18 PROCEDURE — G8400 PT W/DXA NO RESULTS DOC: HCPCS | Performed by: PODIATRIST

## 2019-09-18 PROCEDURE — 97597 DBRDMT OPN WND 1ST 20 CM/<: CPT | Performed by: PODIATRIST

## 2019-09-18 PROCEDURE — 99213 OFFICE O/P EST LOW 20 MIN: CPT | Performed by: PODIATRIST

## 2019-09-18 PROCEDURE — G8427 DOCREV CUR MEDS BY ELIG CLIN: HCPCS | Performed by: PODIATRIST

## 2019-09-18 PROCEDURE — 1123F ACP DISCUSS/DSCN MKR DOCD: CPT | Performed by: PODIATRIST

## 2019-09-18 PROCEDURE — G8417 CALC BMI ABV UP PARAM F/U: HCPCS | Performed by: PODIATRIST

## 2019-09-18 NOTE — PROGRESS NOTES
use: No       Review of Systems: All 12 systems reviewed and pertinent positives noted above. Lower Extremity Physical Examination:     Vitals:   Vitals:    09/18/19 0843   BP: 132/70   Pulse: 60   Resp: 20     General: AAO x 3 in NAD. Vascular: DP and PT pulses palpable 2/4, bilateral.  CFT <3 seconds, bilateral.  Hair growth present to the level of the digits, bilateral.  Edema present, bilateral.  Varicosities present, bilateral. Erythema absent, bilateral. Distal Rubor absent bilateral.  Temperature within normal limits bilateral. Hyperpigmentation present bilateral. Atrophic skin yes. Neurological: Sensation Impaired to light touch to level of digits, bilateral.  Protective sensation intact  0/10 sites via 5.07/10g [place]iko Monofilament, bilateral.  negative Tinel's, bilateral.  negative Valleix sign, bilateral.  Vibratory abnormal  bilateral.  Reflexes Decreased bilateral.  Paresthesias positive. Dysthesias negative. Sharp/dull abnormal  bilateral.    Musculoskeletal: Muscle strength 5/5, bilateral.  Pain absent upon palpation bilateral. Normal medial longitudinal arch, bilateral.  Ankle ROM within normal limits,bilateral.  1st MPJ ROM within normal limits, bilateral.  Dorsally contracted digits absent. L toe amp. No other foot deformities. Integument:   Open lesion present, Left. Wound 1.3x0.6x0.1cm, positive fibrin, healthy red granular base, no probing no undermining no malodor. Mild proximal edema, no erythema or streaking. Interdigital maceration absent to web spaces , Bilateral.  Nails b/l thickened, dystrophic and crumbly, discolored with subungual debris. Fissures absent, Bilateral. Hyperkeratotic tissue is present. Asessment: Patient is a 70 y.o. female with:    Diagnosis Orders   1.  Controlled type 2 DM with peripheral circulatory disorder (HCC)  OK DEBRIDEMENT OPEN WOUND 20 SQ CM<   2. Amputated toe of left foot (HCC)  OK DEBRIDEMENT OPEN WOUND 20 SQ CM<   3. Open wound

## 2019-10-02 ENCOUNTER — OFFICE VISIT (OUTPATIENT)
Dept: FAMILY MEDICINE CLINIC | Age: 79
End: 2019-10-02
Payer: MEDICARE

## 2019-10-02 ENCOUNTER — HOSPITAL ENCOUNTER (OUTPATIENT)
Age: 79
Setting detail: SPECIMEN
Discharge: HOME | End: 2019-10-02
Payer: MEDICARE

## 2019-10-02 VITALS
HEIGHT: 66 IN | OXYGEN SATURATION: 98 % | WEIGHT: 235 LBS | TEMPERATURE: 97.8 F | SYSTOLIC BLOOD PRESSURE: 128 MMHG | HEART RATE: 74 BPM | DIASTOLIC BLOOD PRESSURE: 68 MMHG | BODY MASS INDEX: 37.77 KG/M2

## 2019-10-02 DIAGNOSIS — R35.0 INCREASED URINARY FREQUENCY: ICD-10-CM

## 2019-10-02 DIAGNOSIS — N30.00 ACUTE CYSTITIS WITHOUT HEMATURIA: ICD-10-CM

## 2019-10-02 DIAGNOSIS — N30.00 ACUTE CYSTITIS WITHOUT HEMATURIA: Primary | ICD-10-CM

## 2019-10-02 DIAGNOSIS — Z91.81 AT HIGH RISK FOR FALLS: ICD-10-CM

## 2019-10-02 LAB
BILIRUBIN, POC: NORMAL
BLOOD URINE, POC: NORMAL
CLARITY, POC: CLEAR
COLOR, POC: YELLOW
GLUCOSE URINE, POC: NORMAL
KETONES, POC: NORMAL
LEUKOCYTE EST, POC: NORMAL
NITRITE, POC: NORMAL
PH, POC: 5
PROTEIN, POC: NORMAL
SPECIFIC GRAVITY, POC: 1
UROBILINOGEN, POC: NORMAL

## 2019-10-02 PROCEDURE — 1090F PRES/ABSN URINE INCON ASSESS: CPT | Performed by: FAMILY MEDICINE

## 2019-10-02 PROCEDURE — G8427 DOCREV CUR MEDS BY ELIG CLIN: HCPCS | Performed by: FAMILY MEDICINE

## 2019-10-02 PROCEDURE — G8598 ASA/ANTIPLAT THER USED: HCPCS | Performed by: FAMILY MEDICINE

## 2019-10-02 PROCEDURE — G8400 PT W/DXA NO RESULTS DOC: HCPCS | Performed by: FAMILY MEDICINE

## 2019-10-02 PROCEDURE — 81002 URINALYSIS NONAUTO W/O SCOPE: CPT | Performed by: FAMILY MEDICINE

## 2019-10-02 PROCEDURE — 87077 CULTURE AEROBIC IDENTIFY: CPT

## 2019-10-02 PROCEDURE — 87086 URINE CULTURE/COLONY COUNT: CPT

## 2019-10-02 PROCEDURE — 4040F PNEUMOC VAC/ADMIN/RCVD: CPT | Performed by: FAMILY MEDICINE

## 2019-10-02 PROCEDURE — G8417 CALC BMI ABV UP PARAM F/U: HCPCS | Performed by: FAMILY MEDICINE

## 2019-10-02 PROCEDURE — G8484 FLU IMMUNIZE NO ADMIN: HCPCS | Performed by: FAMILY MEDICINE

## 2019-10-02 PROCEDURE — 1036F TOBACCO NON-USER: CPT | Performed by: FAMILY MEDICINE

## 2019-10-02 PROCEDURE — 1123F ACP DISCUSS/DSCN MKR DOCD: CPT | Performed by: FAMILY MEDICINE

## 2019-10-02 PROCEDURE — 99213 OFFICE O/P EST LOW 20 MIN: CPT | Performed by: FAMILY MEDICINE

## 2019-10-02 PROCEDURE — 87186 SC STD MICRODIL/AGAR DIL: CPT

## 2019-10-02 RX ORDER — SULFAMETHOXAZOLE AND TRIMETHOPRIM 800; 160 MG/1; MG/1
1 TABLET ORAL 2 TIMES DAILY
Qty: 20 TABLET | Refills: 0 | Status: SHIPPED | OUTPATIENT
Start: 2019-10-02 | End: 2019-10-12

## 2019-10-06 LAB
CULTURE: ABNORMAL
Lab: ABNORMAL
SPECIMEN DESCRIPTION: ABNORMAL

## 2019-10-14 ENCOUNTER — TELEPHONE (OUTPATIENT)
Dept: FAMILY MEDICINE CLINIC | Age: 79
End: 2019-10-14

## 2019-10-21 LAB
AGE FOR GFR: 77
ANION GAP SERPL CALCULATED.3IONS-SCNC: 13 MMOL/L
BASOPHILS # BLD: 0.12 THOU/MM3 (ref 0–0.3)
BUN BLDV-MCNC: 40 MG/DL (ref 7–17)
CALCIUM SERPL-MCNC: 8.5 MG/DL (ref 8.4–10.2)
CHLORIDE BLD-SCNC: 104 MMOL/L (ref 98–120)
CO2: 24 MMOL/L (ref 22–31)
CREAT SERPL-MCNC: 1.8 MG/DL (ref 0.5–1)
DIFFERENTIAL: AUTOMATED DIFF
EGFR BF: 33 ML/MIN/1.73 M2
EGFR BM: 45 ML/MIN/1.73 M2
EGFR WF: 27 ML/MIN/1.73 M2
EGFR WM: 37 ML/MIN/1.73 M2
EOSINOPHIL # BLD: 0.09 THOU/MM3 (ref 0–1.1)
GLUCOSE: 122 MG/DL (ref 65–105)
HCT VFR BLD CALC: 31.5 % (ref 37–47)
HEMOGLOBIN: 9.6 G/DL (ref 12–16)
LYMPHOCYTES # BLD: 1.58 THOU/MM3 (ref 1–5.5)
MCH RBC QN AUTO: 27.7 PG (ref 28.5–32)
MCHC RBC AUTO-ENTMCNC: 30.6 G/DL (ref 32–37)
MCV RBC AUTO: 90.6 FL (ref 80–94)
MONOCYTES # BLD: 0.74 THOU/MM3 (ref 0.1–1)
NEUTROPHILS: 5.69 THOU/MM3 (ref 2–8.1)
PDW BLD-RTO: 12.5 % (ref 8.5–15.5)
PLATELET # BLD: 339 THOU/MM3 (ref 130–400)
PMV BLD AUTO: 7.1 FL (ref 7.4–11)
POTASSIUM SERPL-SCNC: 4.6 MMOL/L (ref 3.6–5)
RBC # BLD: 3.48 M/UL (ref 4.2–5.4)
SODIUM BLD-SCNC: 136 MMOL/L (ref 135–145)
WBC # BLD: 8.22 THOU/ML3 (ref 4.8–10)

## 2019-10-23 DIAGNOSIS — M79.671 RIGHT FOOT PAIN: Primary | ICD-10-CM

## 2019-10-23 DIAGNOSIS — M25.571 ACUTE RIGHT ANKLE PAIN: ICD-10-CM

## 2019-10-30 ENCOUNTER — OFFICE VISIT (OUTPATIENT)
Dept: PODIATRY | Age: 79
End: 2019-10-30
Payer: MEDICARE

## 2019-10-30 ENCOUNTER — TELEPHONE (OUTPATIENT)
Dept: FAMILY MEDICINE CLINIC | Age: 79
End: 2019-10-30

## 2019-10-30 VITALS
WEIGHT: 235 LBS | DIASTOLIC BLOOD PRESSURE: 78 MMHG | BODY MASS INDEX: 37.77 KG/M2 | SYSTOLIC BLOOD PRESSURE: 128 MMHG | HEART RATE: 82 BPM | HEIGHT: 66 IN

## 2019-10-30 DIAGNOSIS — E11.51 CONTROLLED TYPE 2 DM WITH PERIPHERAL CIRCULATORY DISORDER (HCC): ICD-10-CM

## 2019-10-30 DIAGNOSIS — S93.601A FOOT SPRAIN, RIGHT, INITIAL ENCOUNTER: Primary | ICD-10-CM

## 2019-10-30 PROCEDURE — G8598 ASA/ANTIPLAT THER USED: HCPCS | Performed by: PODIATRIST

## 2019-10-30 PROCEDURE — G8427 DOCREV CUR MEDS BY ELIG CLIN: HCPCS | Performed by: PODIATRIST

## 2019-10-30 PROCEDURE — 1123F ACP DISCUSS/DSCN MKR DOCD: CPT | Performed by: PODIATRIST

## 2019-10-30 PROCEDURE — 1090F PRES/ABSN URINE INCON ASSESS: CPT | Performed by: PODIATRIST

## 2019-10-30 PROCEDURE — 4040F PNEUMOC VAC/ADMIN/RCVD: CPT | Performed by: PODIATRIST

## 2019-10-30 PROCEDURE — 99213 OFFICE O/P EST LOW 20 MIN: CPT | Performed by: PODIATRIST

## 2019-10-30 PROCEDURE — G8400 PT W/DXA NO RESULTS DOC: HCPCS | Performed by: PODIATRIST

## 2019-10-30 PROCEDURE — G8417 CALC BMI ABV UP PARAM F/U: HCPCS | Performed by: PODIATRIST

## 2019-10-30 PROCEDURE — 1036F TOBACCO NON-USER: CPT | Performed by: PODIATRIST

## 2019-10-30 PROCEDURE — G8484 FLU IMMUNIZE NO ADMIN: HCPCS | Performed by: PODIATRIST

## 2019-10-31 ENCOUNTER — OFFICE VISIT (OUTPATIENT)
Dept: FAMILY MEDICINE CLINIC | Age: 79
End: 2019-10-31
Payer: MEDICARE

## 2019-10-31 VITALS
HEART RATE: 64 BPM | WEIGHT: 235 LBS | HEIGHT: 66 IN | DIASTOLIC BLOOD PRESSURE: 60 MMHG | SYSTOLIC BLOOD PRESSURE: 126 MMHG | BODY MASS INDEX: 37.77 KG/M2 | OXYGEN SATURATION: 98 %

## 2019-10-31 DIAGNOSIS — E87.5 HYPERKALEMIA: Primary | ICD-10-CM

## 2019-10-31 DIAGNOSIS — N17.9 ACUTE RENAL FAILURE SUPERIMPOSED ON STAGE 3 CHRONIC KIDNEY DISEASE, UNSPECIFIED ACUTE RENAL FAILURE TYPE: ICD-10-CM

## 2019-10-31 DIAGNOSIS — N18.3 ACUTE RENAL FAILURE SUPERIMPOSED ON STAGE 3 CHRONIC KIDNEY DISEASE, UNSPECIFIED ACUTE RENAL FAILURE TYPE: ICD-10-CM

## 2019-10-31 DIAGNOSIS — I10 ESSENTIAL HYPERTENSION: ICD-10-CM

## 2019-10-31 DIAGNOSIS — D63.1 ANEMIA DUE TO CHRONIC KIDNEY DISEASE, UNSPECIFIED CKD STAGE: ICD-10-CM

## 2019-10-31 DIAGNOSIS — N18.9 ANEMIA DUE TO CHRONIC KIDNEY DISEASE, UNSPECIFIED CKD STAGE: ICD-10-CM

## 2019-10-31 DIAGNOSIS — R00.1 BRADYCARDIA: ICD-10-CM

## 2019-10-31 PROCEDURE — 1111F DSCHRG MED/CURRENT MED MERGE: CPT | Performed by: FAMILY MEDICINE

## 2019-10-31 PROCEDURE — 99496 TRANSJ CARE MGMT HIGH F2F 7D: CPT | Performed by: FAMILY MEDICINE

## 2019-10-31 RX ORDER — AMLODIPINE BESYLATE 2.5 MG/1
2.5 TABLET ORAL 2 TIMES DAILY
Qty: 180 TABLET | Refills: 1 | Status: SHIPPED | OUTPATIENT
Start: 2019-10-31 | End: 2019-11-27 | Stop reason: DRUGHIGH

## 2019-10-31 RX ORDER — LOSARTAN POTASSIUM 50 MG/1
50 TABLET ORAL DAILY
Qty: 90 TABLET | Refills: 1 | Status: SHIPPED | OUTPATIENT
Start: 2019-10-31 | End: 2020-09-21

## 2019-10-31 ASSESSMENT — ENCOUNTER SYMPTOMS: SHORTNESS OF BREATH: 1

## 2019-11-05 ENCOUNTER — CARE COORDINATION (OUTPATIENT)
Dept: CASE MANAGEMENT | Age: 79
End: 2019-11-05

## 2019-11-18 DIAGNOSIS — E03.9 ACQUIRED HYPOTHYROIDISM: Primary | ICD-10-CM

## 2019-11-19 ENCOUNTER — OFFICE VISIT (OUTPATIENT)
Dept: PODIATRY | Age: 79
End: 2019-11-19
Payer: MEDICARE

## 2019-11-19 VITALS
SYSTOLIC BLOOD PRESSURE: 128 MMHG | HEART RATE: 68 BPM | BODY MASS INDEX: 38.65 KG/M2 | DIASTOLIC BLOOD PRESSURE: 80 MMHG | WEIGHT: 232 LBS | HEIGHT: 65 IN

## 2019-11-19 DIAGNOSIS — M79.671 RIGHT FOOT PAIN: ICD-10-CM

## 2019-11-19 DIAGNOSIS — E11.51 CONTROLLED TYPE 2 DM WITH PERIPHERAL CIRCULATORY DISORDER (HCC): ICD-10-CM

## 2019-11-19 DIAGNOSIS — S93.601D SPRAIN OF RIGHT FOOT, SUBSEQUENT ENCOUNTER: Primary | ICD-10-CM

## 2019-11-19 PROCEDURE — G8400 PT W/DXA NO RESULTS DOC: HCPCS | Performed by: PODIATRIST

## 2019-11-19 PROCEDURE — 1036F TOBACCO NON-USER: CPT | Performed by: PODIATRIST

## 2019-11-19 PROCEDURE — G8482 FLU IMMUNIZE ORDER/ADMIN: HCPCS | Performed by: PODIATRIST

## 2019-11-19 PROCEDURE — 99213 OFFICE O/P EST LOW 20 MIN: CPT | Performed by: PODIATRIST

## 2019-11-19 PROCEDURE — G8417 CALC BMI ABV UP PARAM F/U: HCPCS | Performed by: PODIATRIST

## 2019-11-19 PROCEDURE — 1090F PRES/ABSN URINE INCON ASSESS: CPT | Performed by: PODIATRIST

## 2019-11-19 PROCEDURE — 1123F ACP DISCUSS/DSCN MKR DOCD: CPT | Performed by: PODIATRIST

## 2019-11-19 PROCEDURE — G8427 DOCREV CUR MEDS BY ELIG CLIN: HCPCS | Performed by: PODIATRIST

## 2019-11-19 PROCEDURE — 4040F PNEUMOC VAC/ADMIN/RCVD: CPT | Performed by: PODIATRIST

## 2019-11-19 PROCEDURE — G8598 ASA/ANTIPLAT THER USED: HCPCS | Performed by: PODIATRIST

## 2019-11-20 LAB
ANION GAP SERPL CALCULATED.3IONS-SCNC: 9.8 MMOL/L
CHLORIDE BLD-SCNC: 103 MMOL/L
CO2: 27 MMOL/L
CREAT SERPL-MCNC: 1.8 MG/DL
POTASSIUM SERPL-SCNC: 4.9 MMOL/L
SODIUM BLD-SCNC: 139 MMOL/L
TSH SERPL DL<=0.05 MIU/L-ACNC: 1.7 UIU/ML

## 2019-11-21 DIAGNOSIS — N17.9 ACUTE RENAL FAILURE SUPERIMPOSED ON STAGE 3 CHRONIC KIDNEY DISEASE, UNSPECIFIED ACUTE RENAL FAILURE TYPE: ICD-10-CM

## 2019-11-21 DIAGNOSIS — E03.9 ACQUIRED HYPOTHYROIDISM: ICD-10-CM

## 2019-11-21 DIAGNOSIS — N18.3 ACUTE RENAL FAILURE SUPERIMPOSED ON STAGE 3 CHRONIC KIDNEY DISEASE, UNSPECIFIED ACUTE RENAL FAILURE TYPE: ICD-10-CM

## 2019-11-21 DIAGNOSIS — I10 ESSENTIAL HYPERTENSION: ICD-10-CM

## 2019-11-21 PROCEDURE — 82565 ASSAY OF CREATININE: CPT | Performed by: FAMILY MEDICINE

## 2019-11-21 PROCEDURE — 80051 ELECTROLYTE PANEL: CPT | Performed by: FAMILY MEDICINE

## 2019-11-22 RX ORDER — LEVOTHYROXINE SODIUM 0.15 MG/1
TABLET ORAL
Qty: 90 TABLET | Refills: 1 | Status: SHIPPED | OUTPATIENT
Start: 2019-11-22 | End: 2019-11-22 | Stop reason: SDUPTHER

## 2019-11-26 ENCOUNTER — OFFICE VISIT (OUTPATIENT)
Dept: PODIATRY | Age: 79
End: 2019-11-26
Payer: MEDICARE

## 2019-11-26 VITALS
SYSTOLIC BLOOD PRESSURE: 132 MMHG | DIASTOLIC BLOOD PRESSURE: 80 MMHG | HEART RATE: 88 BPM | HEIGHT: 65 IN | BODY MASS INDEX: 38.15 KG/M2 | WEIGHT: 229 LBS

## 2019-11-26 DIAGNOSIS — L89.891 PRESSURE INJURY OF TOE OF LEFT FOOT, STAGE 1: Primary | ICD-10-CM

## 2019-11-26 DIAGNOSIS — E11.51 CONTROLLED TYPE 2 DM WITH PERIPHERAL CIRCULATORY DISORDER (HCC): ICD-10-CM

## 2019-11-26 DIAGNOSIS — M20.40 HAMMER TOE, UNSPECIFIED LATERALITY: ICD-10-CM

## 2019-11-26 PROCEDURE — 1036F TOBACCO NON-USER: CPT | Performed by: PODIATRIST

## 2019-11-26 PROCEDURE — G8427 DOCREV CUR MEDS BY ELIG CLIN: HCPCS | Performed by: PODIATRIST

## 2019-11-26 PROCEDURE — G8417 CALC BMI ABV UP PARAM F/U: HCPCS | Performed by: PODIATRIST

## 2019-11-26 PROCEDURE — 4040F PNEUMOC VAC/ADMIN/RCVD: CPT | Performed by: PODIATRIST

## 2019-11-26 PROCEDURE — G8482 FLU IMMUNIZE ORDER/ADMIN: HCPCS | Performed by: PODIATRIST

## 2019-11-26 PROCEDURE — G8598 ASA/ANTIPLAT THER USED: HCPCS | Performed by: PODIATRIST

## 2019-11-26 PROCEDURE — 99213 OFFICE O/P EST LOW 20 MIN: CPT | Performed by: PODIATRIST

## 2019-11-26 PROCEDURE — 1123F ACP DISCUSS/DSCN MKR DOCD: CPT | Performed by: PODIATRIST

## 2019-11-26 PROCEDURE — 1090F PRES/ABSN URINE INCON ASSESS: CPT | Performed by: PODIATRIST

## 2019-11-26 PROCEDURE — G8400 PT W/DXA NO RESULTS DOC: HCPCS | Performed by: PODIATRIST

## 2019-11-27 ENCOUNTER — OFFICE VISIT (OUTPATIENT)
Dept: FAMILY MEDICINE CLINIC | Age: 79
End: 2019-11-27
Payer: MEDICARE

## 2019-11-27 VITALS
OXYGEN SATURATION: 98 % | BODY MASS INDEX: 38.15 KG/M2 | HEART RATE: 78 BPM | DIASTOLIC BLOOD PRESSURE: 62 MMHG | HEIGHT: 65 IN | WEIGHT: 229 LBS | SYSTOLIC BLOOD PRESSURE: 110 MMHG

## 2019-11-27 DIAGNOSIS — I10 ESSENTIAL HYPERTENSION: ICD-10-CM

## 2019-11-27 DIAGNOSIS — R00.1 BRADYCARDIA: ICD-10-CM

## 2019-11-27 DIAGNOSIS — E87.5 HYPERKALEMIA: ICD-10-CM

## 2019-11-27 DIAGNOSIS — N18.9 ANEMIA DUE TO CHRONIC KIDNEY DISEASE, UNSPECIFIED CKD STAGE: ICD-10-CM

## 2019-11-27 DIAGNOSIS — N18.30 STAGE 3 CHRONIC KIDNEY DISEASE (HCC): Primary | ICD-10-CM

## 2019-11-27 DIAGNOSIS — E03.9 ACQUIRED HYPOTHYROIDISM: ICD-10-CM

## 2019-11-27 DIAGNOSIS — D63.1 ANEMIA DUE TO CHRONIC KIDNEY DISEASE, UNSPECIFIED CKD STAGE: ICD-10-CM

## 2019-11-27 DIAGNOSIS — I73.9 PERIPHERAL VASCULAR DISEASE (HCC): ICD-10-CM

## 2019-11-27 PROCEDURE — G8482 FLU IMMUNIZE ORDER/ADMIN: HCPCS | Performed by: FAMILY MEDICINE

## 2019-11-27 PROCEDURE — G8417 CALC BMI ABV UP PARAM F/U: HCPCS | Performed by: FAMILY MEDICINE

## 2019-11-27 PROCEDURE — 1123F ACP DISCUSS/DSCN MKR DOCD: CPT | Performed by: FAMILY MEDICINE

## 2019-11-27 PROCEDURE — G8598 ASA/ANTIPLAT THER USED: HCPCS | Performed by: FAMILY MEDICINE

## 2019-11-27 PROCEDURE — 1090F PRES/ABSN URINE INCON ASSESS: CPT | Performed by: FAMILY MEDICINE

## 2019-11-27 PROCEDURE — G8400 PT W/DXA NO RESULTS DOC: HCPCS | Performed by: FAMILY MEDICINE

## 2019-11-27 PROCEDURE — 1036F TOBACCO NON-USER: CPT | Performed by: FAMILY MEDICINE

## 2019-11-27 PROCEDURE — 99214 OFFICE O/P EST MOD 30 MIN: CPT | Performed by: FAMILY MEDICINE

## 2019-11-27 PROCEDURE — G8427 DOCREV CUR MEDS BY ELIG CLIN: HCPCS | Performed by: FAMILY MEDICINE

## 2019-11-27 PROCEDURE — 4040F PNEUMOC VAC/ADMIN/RCVD: CPT | Performed by: FAMILY MEDICINE

## 2019-11-27 RX ORDER — AMLODIPINE BESYLATE 2.5 MG/1
2.5 TABLET ORAL DAILY
Qty: 180 TABLET | Refills: 1 | Status: SHIPPED
Start: 2019-11-27 | End: 2020-10-06 | Stop reason: SDUPTHER

## 2019-11-27 RX ORDER — FERROUS GLUCONATE 324(37.5)
324 TABLET ORAL DAILY
Qty: 30 TABLET | Refills: 5 | Status: SHIPPED | OUTPATIENT
Start: 2019-11-27 | End: 2020-06-03

## 2019-11-27 ASSESSMENT — ENCOUNTER SYMPTOMS: SHORTNESS OF BREATH: 0

## 2019-12-02 RX ORDER — GABAPENTIN 300 MG/1
CAPSULE ORAL
Qty: 270 CAPSULE | Refills: 1 | Status: SHIPPED | OUTPATIENT
Start: 2019-12-02 | End: 2020-07-09

## 2019-12-02 RX ORDER — OXYBUTYNIN CHLORIDE 5 MG/1
TABLET, EXTENDED RELEASE ORAL
Qty: 90 TABLET | Refills: 1 | Status: SHIPPED | OUTPATIENT
Start: 2019-12-02 | End: 2020-06-16

## 2019-12-05 ENCOUNTER — HOSPITAL ENCOUNTER (OUTPATIENT)
Dept: GENERAL RADIOLOGY | Age: 79
Discharge: HOME | End: 2019-12-07
Payer: MEDICARE

## 2019-12-05 ENCOUNTER — OFFICE VISIT (OUTPATIENT)
Dept: PODIATRY | Age: 79
End: 2019-12-05
Payer: MEDICARE

## 2019-12-05 VITALS
BODY MASS INDEX: 38.25 KG/M2 | HEART RATE: 68 BPM | DIASTOLIC BLOOD PRESSURE: 66 MMHG | RESPIRATION RATE: 20 BRPM | WEIGHT: 229.6 LBS | HEIGHT: 65 IN | SYSTOLIC BLOOD PRESSURE: 132 MMHG

## 2019-12-05 DIAGNOSIS — E11.51 CONTROLLED TYPE 2 DM WITH PERIPHERAL CIRCULATORY DISORDER (HCC): ICD-10-CM

## 2019-12-05 DIAGNOSIS — B35.1 DERMATOPHYTOSIS OF NAIL: ICD-10-CM

## 2019-12-05 DIAGNOSIS — M19.079 INFLAMMATION OF FOOT JOINT: ICD-10-CM

## 2019-12-05 DIAGNOSIS — S98.132A AMPUTATED TOE OF LEFT FOOT (HCC): ICD-10-CM

## 2019-12-05 DIAGNOSIS — R60.0 EDEMA OF RIGHT FOOT: Primary | ICD-10-CM

## 2019-12-05 DIAGNOSIS — R60.0 EDEMA OF RIGHT FOOT: ICD-10-CM

## 2019-12-05 PROCEDURE — G8598 ASA/ANTIPLAT THER USED: HCPCS | Performed by: PODIATRIST

## 2019-12-05 PROCEDURE — 11721 DEBRIDE NAIL 6 OR MORE: CPT | Performed by: PODIATRIST

## 2019-12-05 PROCEDURE — 1036F TOBACCO NON-USER: CPT | Performed by: PODIATRIST

## 2019-12-05 PROCEDURE — G8482 FLU IMMUNIZE ORDER/ADMIN: HCPCS | Performed by: PODIATRIST

## 2019-12-05 PROCEDURE — 1123F ACP DISCUSS/DSCN MKR DOCD: CPT | Performed by: PODIATRIST

## 2019-12-05 PROCEDURE — 73630 X-RAY EXAM OF FOOT: CPT

## 2019-12-05 PROCEDURE — 1090F PRES/ABSN URINE INCON ASSESS: CPT | Performed by: PODIATRIST

## 2019-12-05 PROCEDURE — G8427 DOCREV CUR MEDS BY ELIG CLIN: HCPCS | Performed by: PODIATRIST

## 2019-12-05 PROCEDURE — G8417 CALC BMI ABV UP PARAM F/U: HCPCS | Performed by: PODIATRIST

## 2019-12-05 PROCEDURE — 4040F PNEUMOC VAC/ADMIN/RCVD: CPT | Performed by: PODIATRIST

## 2019-12-05 PROCEDURE — 99213 OFFICE O/P EST LOW 20 MIN: CPT | Performed by: PODIATRIST

## 2019-12-05 PROCEDURE — G8400 PT W/DXA NO RESULTS DOC: HCPCS | Performed by: PODIATRIST

## 2019-12-10 ENCOUNTER — OFFICE VISIT (OUTPATIENT)
Dept: PODIATRY | Age: 79
End: 2019-12-10
Payer: MEDICARE

## 2019-12-10 ENCOUNTER — HOSPITAL ENCOUNTER (OUTPATIENT)
Dept: GENERAL RADIOLOGY | Age: 79
Discharge: HOME | End: 2019-12-12
Payer: MEDICARE

## 2019-12-10 VITALS
RESPIRATION RATE: 20 BRPM | HEIGHT: 65 IN | DIASTOLIC BLOOD PRESSURE: 74 MMHG | SYSTOLIC BLOOD PRESSURE: 132 MMHG | BODY MASS INDEX: 38.25 KG/M2 | HEART RATE: 76 BPM | WEIGHT: 229.6 LBS

## 2019-12-10 DIAGNOSIS — S82.891A CLOSED FRACTURE OF RIGHT ANKLE, INITIAL ENCOUNTER: Primary | ICD-10-CM

## 2019-12-10 DIAGNOSIS — M19.079 ARTHRITIS, MIDFOOT: ICD-10-CM

## 2019-12-10 DIAGNOSIS — M25.571 ACUTE RIGHT ANKLE PAIN: ICD-10-CM

## 2019-12-10 DIAGNOSIS — M79.671 RIGHT FOOT PAIN: ICD-10-CM

## 2019-12-10 PROCEDURE — 27786 TREATMENT OF ANKLE FRACTURE: CPT | Performed by: PODIATRIST

## 2019-12-10 PROCEDURE — 1123F ACP DISCUSS/DSCN MKR DOCD: CPT | Performed by: PODIATRIST

## 2019-12-10 PROCEDURE — 73630 X-RAY EXAM OF FOOT: CPT

## 2019-12-10 PROCEDURE — 1090F PRES/ABSN URINE INCON ASSESS: CPT | Performed by: PODIATRIST

## 2019-12-10 PROCEDURE — 73610 X-RAY EXAM OF ANKLE: CPT

## 2019-12-10 PROCEDURE — G8427 DOCREV CUR MEDS BY ELIG CLIN: HCPCS | Performed by: PODIATRIST

## 2019-12-10 PROCEDURE — G8400 PT W/DXA NO RESULTS DOC: HCPCS | Performed by: PODIATRIST

## 2019-12-10 PROCEDURE — G8598 ASA/ANTIPLAT THER USED: HCPCS | Performed by: PODIATRIST

## 2019-12-10 PROCEDURE — 4040F PNEUMOC VAC/ADMIN/RCVD: CPT | Performed by: PODIATRIST

## 2019-12-10 PROCEDURE — 1036F TOBACCO NON-USER: CPT | Performed by: PODIATRIST

## 2019-12-10 PROCEDURE — 99213 OFFICE O/P EST LOW 20 MIN: CPT | Performed by: PODIATRIST

## 2019-12-10 PROCEDURE — G8417 CALC BMI ABV UP PARAM F/U: HCPCS | Performed by: PODIATRIST

## 2019-12-10 PROCEDURE — G8482 FLU IMMUNIZE ORDER/ADMIN: HCPCS | Performed by: PODIATRIST

## 2019-12-26 LAB
ANION GAP SERPL CALCULATED.3IONS-SCNC: 13.3 MMOL/L
BASOPHILS ABSOLUTE: 0.11 /ΜL
BASOPHILS RELATIVE PERCENT: 1.39 %
CHLORIDE BLD-SCNC: 103 MMOL/L
CO2: 25 MMOL/L
CREAT SERPL-MCNC: 2.1 MG/DL
EOSINOPHILS ABSOLUTE: 0.19 /ΜL
EOSINOPHILS RELATIVE PERCENT: 2.33 %
HCT VFR BLD CALC: 33.2 % (ref 36–46)
HEMOGLOBIN: 10.4 G/DL (ref 12–16)
LYMPHOCYTES ABSOLUTE: 1.52 /ΜL
LYMPHOCYTES RELATIVE PERCENT: 19.05 %
MCH RBC QN AUTO: 27.9 PG
MCHC RBC AUTO-ENTMCNC: 31.3 G/DL
MCV RBC AUTO: 89.2 FL
MONOCYTES ABSOLUTE: 0.63 /ΜL
MONOCYTES RELATIVE PERCENT: 7.87 %
NEUTROPHILS ABSOLUTE: 5.53 /ΜL
NEUTROPHILS RELATIVE PERCENT: 69.36 %
PDW BLD-RTO: 12 %
PLATELET # BLD: 321.4 K/ΜL
PMV BLD AUTO: ABNORMAL FL
POTASSIUM SERPL-SCNC: 5.3 MMOL/L
RBC # BLD: 3.72 10^6/ΜL
SODIUM BLD-SCNC: 136 MMOL/L
WBC # BLD: 8 10^3/ML

## 2019-12-27 DIAGNOSIS — N18.9 ANEMIA DUE TO CHRONIC KIDNEY DISEASE, UNSPECIFIED CKD STAGE: ICD-10-CM

## 2019-12-27 DIAGNOSIS — I10 ESSENTIAL HYPERTENSION: ICD-10-CM

## 2019-12-27 DIAGNOSIS — D63.1 ANEMIA DUE TO CHRONIC KIDNEY DISEASE, UNSPECIFIED CKD STAGE: ICD-10-CM

## 2019-12-30 ENCOUNTER — OFFICE VISIT (OUTPATIENT)
Dept: FAMILY MEDICINE CLINIC | Age: 79
End: 2019-12-30
Payer: MEDICARE

## 2019-12-30 VITALS
HEART RATE: 62 BPM | WEIGHT: 224 LBS | SYSTOLIC BLOOD PRESSURE: 116 MMHG | OXYGEN SATURATION: 98 % | DIASTOLIC BLOOD PRESSURE: 78 MMHG | BODY MASS INDEX: 37.32 KG/M2 | HEIGHT: 65 IN

## 2019-12-30 DIAGNOSIS — C51.9 VULVAR CANCER (HCC): ICD-10-CM

## 2019-12-30 DIAGNOSIS — K59.00 CONSTIPATION, UNSPECIFIED CONSTIPATION TYPE: ICD-10-CM

## 2019-12-30 DIAGNOSIS — N18.30 STAGE 3 CHRONIC KIDNEY DISEASE (HCC): ICD-10-CM

## 2019-12-30 DIAGNOSIS — D63.1 ANEMIA DUE TO CHRONIC KIDNEY DISEASE, UNSPECIFIED CKD STAGE: Primary | ICD-10-CM

## 2019-12-30 DIAGNOSIS — N18.9 ANEMIA DUE TO CHRONIC KIDNEY DISEASE, UNSPECIFIED CKD STAGE: Primary | ICD-10-CM

## 2019-12-30 PROCEDURE — 1090F PRES/ABSN URINE INCON ASSESS: CPT | Performed by: FAMILY MEDICINE

## 2019-12-30 PROCEDURE — G8427 DOCREV CUR MEDS BY ELIG CLIN: HCPCS | Performed by: FAMILY MEDICINE

## 2019-12-30 PROCEDURE — 1036F TOBACCO NON-USER: CPT | Performed by: FAMILY MEDICINE

## 2019-12-30 PROCEDURE — G8400 PT W/DXA NO RESULTS DOC: HCPCS | Performed by: FAMILY MEDICINE

## 2019-12-30 PROCEDURE — 4040F PNEUMOC VAC/ADMIN/RCVD: CPT | Performed by: FAMILY MEDICINE

## 2019-12-30 PROCEDURE — G8598 ASA/ANTIPLAT THER USED: HCPCS | Performed by: FAMILY MEDICINE

## 2019-12-30 PROCEDURE — 1123F ACP DISCUSS/DSCN MKR DOCD: CPT | Performed by: FAMILY MEDICINE

## 2019-12-30 PROCEDURE — G8482 FLU IMMUNIZE ORDER/ADMIN: HCPCS | Performed by: FAMILY MEDICINE

## 2019-12-30 PROCEDURE — G8417 CALC BMI ABV UP PARAM F/U: HCPCS | Performed by: FAMILY MEDICINE

## 2019-12-30 PROCEDURE — 99214 OFFICE O/P EST MOD 30 MIN: CPT | Performed by: FAMILY MEDICINE

## 2019-12-30 ASSESSMENT — ENCOUNTER SYMPTOMS
ABDOMINAL PAIN: 0
SINUS PAIN: 0
SHORTNESS OF BREATH: 0
VOMITING: 0
COUGH: 0
NAUSEA: 0
CONSTIPATION: 1

## 2020-01-07 ENCOUNTER — OFFICE VISIT (OUTPATIENT)
Dept: PODIATRY | Age: 80
End: 2020-01-07
Payer: MEDICARE

## 2020-01-07 ENCOUNTER — HOSPITAL ENCOUNTER (OUTPATIENT)
Dept: GENERAL RADIOLOGY | Age: 80
Discharge: HOME | End: 2020-01-09
Payer: MEDICARE

## 2020-01-07 VITALS
SYSTOLIC BLOOD PRESSURE: 138 MMHG | DIASTOLIC BLOOD PRESSURE: 70 MMHG | RESPIRATION RATE: 20 BRPM | HEIGHT: 65 IN | HEART RATE: 84 BPM | BODY MASS INDEX: 37.49 KG/M2 | WEIGHT: 225 LBS

## 2020-01-07 PROCEDURE — 73610 X-RAY EXAM OF ANKLE: CPT

## 2020-01-07 PROCEDURE — 99024 POSTOP FOLLOW-UP VISIT: CPT | Performed by: PODIATRIST

## 2020-01-07 PROCEDURE — 99214 OFFICE O/P EST MOD 30 MIN: CPT | Performed by: PODIATRIST

## 2020-01-07 NOTE — PROGRESS NOTES
TAKE 1 CAPSULE BY MOUTH THREE TIMES DAILY 270 capsule 1    amLODIPine (NORVASC) 2.5 MG tablet Take 1 tablet by mouth daily 180 tablet 1    ferrous gluconate 324 (37.5 Fe) MG TABS Take 1 tablet by mouth daily 30 tablet 5    EUTHYROX 150 MCG tablet TAKE 1 TABLET BY MOUTH ONCE DAILY 90 tablet 1    losartan (COZAAR) 50 MG tablet Take 1 tablet by mouth daily (Patient taking differently: Take 50 mg by mouth 2 times daily ) 90 tablet 1    simvastatin (ZOCOR) 20 MG tablet TAKE 1 TABLET BY MOUTH NIGHTLY 90 tablet 3    betamethasone dipropionate (DIPROLENE) 0.05 % ointment Apply topically 2 times daily Apply topically 2 times daily.  fluocinonide (LIDEX) 0.05 % cream Apply topically 2 times daily.  30 g 1    B-D INS SYR ULTRAFINE 1CC/31G 31G X 5/16\" 1 ML MISC       fexofenadine (ALLEGRA) 180 MG tablet Take 180 mg by mouth daily      montelukast (SINGULAIR) 10 MG tablet TAKE ONE TABLET BY MOUTH NIGHTLY 90 tablet 1    fluticasone propionate (FLOVENT DISKUS) 100 MCG/BLIST AEPB inhaler Inhale 1 puff into the lungs daily      insulin glargine (LANTUS) 100 UNIT/ML injection vial Inject 54 Units into the skin nightly       fluticasone (FLONASE) 50 MCG/ACT nasal spray 1 spray by Nasal route daily      isosorbide mononitrate (IMDUR) 60 MG extended release tablet Take 60 mg by mouth every morning      insulin aspart (NOVOLOG) 100 UNIT/ML injection vial Inject 4 Units into the skin 3 times daily (before meals) Indications: 10 units in AM, 12 units in afternoon, 12 units in PM with meals Plus sliding scale       aspirin 81 MG tablet Take 81 mg by mouth daily      acetaminophen (TYLENOL) 500 MG tablet Take 500 mg by mouth nightly       vitamin B-12 (CYANOCOBALAMIN) 500 MCG tablet Take 500 mcg by mouth daily      CPAP Machine MISC by Does not apply route      calcium carbonate (OSCAL) 500 MG TABS tablet Take 600 mg by mouth daily       Omega-3 Fatty Acids (FISH OIL PO) Take 1,040 mg by mouth 2 times daily       Multiple Vitamins-Minerals (MULTIVITAMIN ADULTS PO) Take by mouth daily        No current facility-administered medications for this visit. Allergies   Allergen Reactions    Lisinopril Other (See Comments)     Cough    Penicillins Swelling     Facial swelling    Ranolazine Rash       Review of Systems: All 12 systems reviewed and pertinent positives noted above. Lower Extremity Physical Examination:     Vitals:   Vitals:    01/07/20 1503   BP: 138/70   Pulse: 84   Resp: 20     General: AAO x 3 in NAD. Vascular: DP and PT pulses palpable 2/4, bilateral.  CFT <3 seconds, bilateral.  Hair growth present to the level of the digits, bilateral.  Edema Present, bilateral.  Ecchymosis is Absent . Varicosities absent, bilateral. Erythema absent, bilateral. Distal Rubor absent bilateral.  Temperature within normal limits bilateral. Hyperpigmentation absent bilateral. No atrophic skin. Neurological: Sensation intact to light touch to level of digits, bilateral.  Protective sensation intact 10/10 sites via 5.07/10g [place] Monofilament, bilateral.  negative Tinel's, bilateral.  negative Valleix sign, bilateral.  Vibratory intact bilateral.  Reflexes Decreased bilateral.  Paresthesias negative. Dysthesias negative. Sharp/dull intact bilateral.  Musculoskeletal: Muscle strength 5/5, Bilateral.  Pain present upon palpation of distal fibula  Right. within normal limits medial longitudinal arch, Bilateral.  Ankle ROM decreased,Bilateral.  1st MPJ ROM within normal limits, Bilateral.  Dorsally contracted digits present digits  Bilateral. Other foot deformities none. Integument: Warm, dry, supple, bilateral.  Open lesion absent, bilateral.  Interdigital maceration absent to web spaces bilateral.  Nails within normal limits. Fissures absent, bilateral. Hyperkeratotic tissue is absent.    Radiographic interpretation:  3 views ankle weightbearing right : no soft tissue deficits, no soft tissue emphysema, no masses, all joints appear well-aligned. Fracture is distal tibia non-displaced and oblique Fracture position acceptable with healing appropriate for time frame no real bone consolidation seen. No other significant foot deformities. Assessment:   Diagnosis Orders   1. Closed fracture of right ankle with delayed healing, subsequent encounter         Plan:  Patient condition was discussed and all questions answered. X-rays were reviewed with the patient. Patient will continue fracture care due to the distal fibula fracture. Patient will ice and elevate as directed. Patient will be PWB with use of cam walker for appropriate offloading. Education on normal bone healing in 4-6 weeks and risks of bone healing complications including malunion, delayed union, and nonunion if patient is non-compliant with offloading. For pain management patient will use OTC anti-inflammatory PRN. Patient will be seen back in  3 week(s) with new x-rays 1st.  Ankle X rays reviewed with the pt in detail. All questions answered.

## 2020-01-21 ENCOUNTER — TELEPHONE (OUTPATIENT)
Dept: FAMILY MEDICINE CLINIC | Age: 80
End: 2020-01-21

## 2020-01-21 NOTE — TELEPHONE ENCOUNTER
[facility] Transitions Initial Follow Up Call    Outreach made within 2 business days of discharge: Yes    Patient: Lazarus Banda Patient : 1940   MRN: [id number]  Reason for Admission: Carcinoma of vulva  Discharge Date: 2020       Spoke with: patient     Discharge department/facility: [facility]    TCM Interactive Patient Contact:  Was patient able to fill all prescriptions: Yes  Was patient instructed to bring all medications to the follow-up visit: Yes  Is patient taking all medications as directed in the discharge summary? She is taking all d/c meds except the pain med that was prescribed. She does not want to be on a pain medication and is tolerating the pain with just tylenol. Does patient understand their discharge instructions: Yes  Does patient have questions or concerns that need addressed prior to 7-14 day follow up office visit: no    Scheduled appointment with PCP within 7-14 days    Patient is doing ok since d/c from hospital. She states she is really tired today. She has home health set up and they will start coming to her house today. She was able to  all her medications and is taking them with exception of the pain medication. She is taking tylenol for pain prn. She denies any questions or concerns right now. Advised patient to call office if needed prior to her follow up appointment.      Follow Up  Future Appointments   Date Time Provider Sachi Torres   2020 11:40 AM Anuel Connolly MD [facility]   2020  4:00 PM JENIFFER UlloaOD [facility]   2020  1:30 PM Anitra Gardner MD [facility] None   2020  2:00 PM JENIFFER UlloaOD [facility]   2020  2:20 PM Anuel Connolly MD [address] [facility]       Merlin Mora, LPN

## 2020-01-23 ENCOUNTER — OFFICE VISIT (OUTPATIENT)
Dept: FAMILY MEDICINE CLINIC | Age: 80
End: 2020-01-23
Payer: MEDICARE

## 2020-01-23 VITALS
OXYGEN SATURATION: 99 % | HEART RATE: 68 BPM | WEIGHT: 228 LBS | BODY MASS INDEX: 37.99 KG/M2 | HEIGHT: 65 IN | SYSTOLIC BLOOD PRESSURE: 126 MMHG | DIASTOLIC BLOOD PRESSURE: 64 MMHG

## 2020-01-23 PROBLEM — Z89.419 HISTORY OF AMPUTATION OF GREAT TOE (HCC): Status: ACTIVE | Noted: 2017-09-12

## 2020-01-23 PROCEDURE — 99211 OFF/OP EST MAY X REQ PHY/QHP: CPT | Performed by: FAMILY MEDICINE

## 2020-01-23 PROCEDURE — 99495 TRANSJ CARE MGMT MOD F2F 14D: CPT | Performed by: FAMILY MEDICINE

## 2020-01-23 PROCEDURE — 1111F DSCHRG MED/CURRENT MED MERGE: CPT | Performed by: FAMILY MEDICINE

## 2020-01-23 RX ORDER — SENNA PLUS 8.6 MG/1
1 TABLET ORAL DAILY
COMMUNITY
End: 2020-01-23 | Stop reason: SDUPTHER

## 2020-01-23 RX ORDER — SENNA PLUS 8.6 MG/1
1 TABLET ORAL DAILY
Qty: 90 TABLET | Refills: 3 | Status: SHIPPED | OUTPATIENT
Start: 2020-01-23 | End: 2021-01-07 | Stop reason: SDUPTHER

## 2020-01-23 RX ORDER — OXYCODONE HYDROCHLORIDE 5 MG/1
5 TABLET ORAL EVERY 4 HOURS PRN
COMMUNITY
End: 2020-01-23 | Stop reason: ALTCHOICE

## 2020-01-23 ASSESSMENT — ENCOUNTER SYMPTOMS
NAUSEA: 0
ABDOMINAL PAIN: 0
VOMITING: 0
SHORTNESS OF BREATH: 0

## 2020-01-23 ASSESSMENT — PATIENT HEALTH QUESTIONNAIRE - PHQ9
SUM OF ALL RESPONSES TO PHQ9 QUESTIONS 1 & 2: 0
2. FEELING DOWN, DEPRESSED OR HOPELESS: 0
1. LITTLE INTEREST OR PLEASURE IN DOING THINGS: 0
SUM OF ALL RESPONSES TO PHQ QUESTIONS 1-9: 0
SUM OF ALL RESPONSES TO PHQ QUESTIONS 1-9: 0

## 2020-01-23 NOTE — PROGRESS NOTES
[place] [address] [address] [address]  Dept: [phone]  Dept Fax: [phone]    Transition of Care Office Visit    Date of Face-to-Face: 1/23/2020    Personsat visit: self    Here for follow after hospitalization for: vulvectomy, vulvar carcinoma per Dr Marvin Matrin    Activity: activity as tolerated    Any medication changes since post-hospitalization phone call? No  No further need for oxycontin- only took initial dose in hospital, controlling pain with only tylenol  10/12/4 units lowered insulin with hospitalization plus SS  Lantus still 48 units. Any treatment changes since post-hospitalization phone call? No    Any further education needed on medications/treatment plan? No    Questions taking tylenol prior twice daily vs as directed by discharge  No longer taking iron due to constipation prior    Current Medication List  Outpatient Encounter Medications as of 1/23/2020   Medication Sig Dispense Refill    senna (SENOKOT) 8.6 MG tablet Take 1 tablet by mouth daily 90 tablet 3    oxybutynin (DITROPAN-XL) 5 MG extended release tablet TAKE 1 TABLET BY MOUTH ONCE DAILY 90 tablet 1    gabapentin (NEURONTIN) 300 MG capsule TAKE 1 CAPSULE BY MOUTH THREE TIMES DAILY 270 capsule 1    amLODIPine (NORVASC) 2.5 MG tablet Take 1 tablet by mouth daily 180 tablet 1    EUTHYROX 150 MCG tablet TAKE 1 TABLET BY MOUTH ONCE DAILY 90 tablet 1    losartan (COZAAR) 50 MG tablet Take 1 tablet by mouth daily (Patient taking differently: Take 50 mg by mouth 2 times daily ) 90 tablet 1    simvastatin (ZOCOR) 20 MG tablet TAKE 1 TABLET BY MOUTH NIGHTLY 90 tablet 3    betamethasone dipropionate (DIPROLENE) 0.05 % ointment Apply topically 2 times daily Apply topically 2 times daily.  fluocinonide (LIDEX) 0.05 % cream Apply topically 2 times daily.  30 g 1    B-D INS SYR ULTRAFINE 1CC/31G 31G X 5/16\" 1 ML MISC       fexofenadine (ALLEGRA) 180 MG tablet Take 180 mg by mouth daily as requested. HPI:     HPI     Had endocrinology evaluation 1/2020 last visit with Dr Teddie Saint. Still at 10/10/12 per that visit. Had sugar as low as 25 per pt. Review of Systems:     Review of Systems   Constitutional:        Has a home health nurse coming out three times a week. Not taking the oxycodone for pain, taking tylenol. Respiratory: Negative for shortness of breath. Cardiovascular: Negative for chest pain. Gastrointestinal: Negative for abdominal pain, nausea and vomiting. Neurological: Negative for dizziness and headaches. Exam:     Physical Exam  Constitutional:       Appearance: Normal appearance. She is obese. She is not ill-appearing. HENT:      Head: Normocephalic. Cardiovascular:      Rate and Rhythm: Normal rate and regular rhythm. Heart sounds: No murmur. Pulmonary:      Effort: Pulmonary effort is normal. No respiratory distress. Breath sounds: Normal breath sounds. Abdominal:      General: Bowel sounds are normal. There is no distension. Palpations: Abdomen is soft. Tenderness: There is no tenderness. Musculoskeletal:         General: No swelling. Neurological:      Mental Status: She is alert. Psychiatric:      Comments: Pt still with slight flight of ideas. Anemia noted still at 10.3 Hgb on 1/20/2020    Assessment:      Diagnosis Orders   1. Vulvar cancer ([facility] [address]     2. BMI 37.0-37.9, adult     3. Stage 3 chronic kidney disease ([address]     4. Type 2 diabetes mellitus with stage 1 chronic kidney disease, with long-term current use of insulin ([address]     5. Peripheral vascular disease ([address]     6. History of amputation of great toe ([address]     7. Iron deficiency anemia, unspecified iron deficiency anemia type     8.  Constipation, unspecified constipation type  senna (SENOKOT) 8.6 MG tablet         Plan:         Diagnostic Tests performed in hospital: reviewed bx/reviewed: Yes  Noted invasive SCC vulvar carcinoma on chart review.     Disease Education:  None, unchanged      Home Health Care :  [address] 3x week after initial set up daily per pt  Monitoring for redness or signs of infection ( pt unable to see)      Discussed with other providers: Dr Kyle Rinaldi notes reviewed    NOted A1C 1/13/20 on endocrine note of 8.7      Note:  CPT Coding - 30561 (Moderate level - seen within 14 days)      44742 (High level - seen within 7 days)  Needs to have associated phone contact within 2 business days of inpatient discharge      Electronically signed by Speedy Hammer MD on 1/23/2020 at 12:33 PM

## 2020-01-29 ENCOUNTER — OFFICE VISIT (OUTPATIENT)
Dept: PODIATRY | Age: 80
End: 2020-01-29
Payer: MEDICARE

## 2020-01-29 ENCOUNTER — HOSPITAL ENCOUNTER (OUTPATIENT)
Dept: GENERAL RADIOLOGY | Age: 80
Discharge: HOME | End: 2020-01-31
Payer: MEDICARE

## 2020-01-29 VITALS
SYSTOLIC BLOOD PRESSURE: 118 MMHG | HEART RATE: 88 BPM | HEIGHT: 65 IN | DIASTOLIC BLOOD PRESSURE: 78 MMHG | BODY MASS INDEX: 37.15 KG/M2 | WEIGHT: 223 LBS

## 2020-01-29 PROCEDURE — 99213 OFFICE O/P EST LOW 20 MIN: CPT | Performed by: PODIATRIST

## 2020-01-29 PROCEDURE — 99214 OFFICE O/P EST MOD 30 MIN: CPT | Performed by: PODIATRIST

## 2020-01-29 PROCEDURE — 99214 OFFICE O/P EST MOD 30 MIN: CPT

## 2020-01-29 PROCEDURE — 97597 DBRDMT OPN WND 1ST 20 CM/<: CPT | Performed by: PODIATRIST

## 2020-01-29 PROCEDURE — 99024 POSTOP FOLLOW-UP VISIT: CPT | Performed by: PODIATRIST

## 2020-01-29 PROCEDURE — 73610 X-RAY EXAM OF ANKLE: CPT

## 2020-01-29 NOTE — PROGRESS NOTES
Subjective: Jordyn Vickers is a 70 y.o. female who presents with the new issue of ulceration of the toe l foot Patient has been compliant with off loading. Went back to sx shoe once she saw drainage last week. Patient relates pain is Absent . Pain is rated 0 out of 10 and is described as none. Currently denies F/C/N/V. Overall diabetic control is not changed. Pt also has new xrays R ankle. No pain. Pt has been compliant with wearing offloading boot. 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  Authorizing Provider   senna (SENOKOT) 8.6 MG tablet Take 1 tablet by mouth daily 1/23/20  Yes Tracy Liang MD   oxybutynin (DITROPAN-XL) 5 MG extended release tablet TAKE 1 TABLET BY MOUTH ONCE DAILY 12/2/19  Yes Tracy Liang MD   gabapentin (NEURONTIN) 300 MG capsule TAKE 1 CAPSULE BY MOUTH THREE TIMES DAILY 12/2/19 6/2/20 Yes Tracy Liang MD   amLODIPine (NORVASC) 2.5 MG tablet Take 1 tablet by mouth daily 11/27/19  Yes Tracy Liang MD   ferrous gluconate 324 (37.5 Fe) MG TABS Take 1 tablet by mouth daily 11/27/19  Yes Tracy Liang MD   EUTHYROX 150 MCG tablet TAKE 1 TABLET BY MOUTH ONCE DAILY 11/22/19  Yes Tracy Liang MD   losartan (COZAAR) 50 MG tablet Take 1 tablet by mouth daily  Patient taking differently: Take 50 mg by mouth 2 times daily  10/31/19  Yes Tracy Liang MD   simvastatin (ZOCOR) 20 MG tablet TAKE 1 TABLET BY MOUTH NIGHTLY 9/4/19  Yes Tracy Liang MD   betamethasone dipropionate (DIPROLENE) 0.05 % ointment Apply topically 2

## 2020-02-05 LAB
ANION GAP SERPL CALCULATED.3IONS-SCNC: 10.1 MMOL/L
BASOPHILS %: 1.04 (ref 0–3)
BASOPHILS ABSOLUTE: 0.07 (ref 0–0.3)
BUN BLDV-MCNC: 44 MG/DL (ref 7–17)
CALCIUM SERPL-MCNC: 9.3 MG/DL (ref 8.4–10.2)
CHLORIDE BLD-SCNC: 102 MMOL/L (ref 98–120)
CO2: 26 MMOL/L (ref 22–31)
CREAT SERPL-MCNC: 2 MG/DL (ref 0.5–1)
EOSINOPHILS %: 1.4 (ref 0–10)
EOSINOPHILS ABSOLUTE: 0.1 (ref 0–1.1)
GFR CALCULATED: 25.7
GLUCOSE: 220 MG/DL (ref 65–105)
HCT VFR BLD CALC: 35.7 % (ref 37–47)
HEMOGLOBIN: 11.3 (ref 12–16)
LYMPHOCYTE %: 23.45 (ref 20–51.1)
LYMPHOCYTES ABSOLUTE: 1.62 (ref 1–5.5)
MCH RBC QN AUTO: 28.8 PG (ref 28.5–32.5)
MCHC RBC AUTO-ENTMCNC: 31.7 G/DL (ref 32–37)
MCV RBC AUTO: 90.8 FL (ref 80–94)
MONOCYTES %: 7.72 (ref 1.7–9.3)
MONOCYTES ABSOLUTE: 0.53 (ref 0.1–1)
NEUTROPHILS %: 66.39 (ref 42.2–75.2)
NEUTROPHILS ABSOLUTE: 4.6 (ref 2–8.1)
PDW BLD-RTO: 12.9 % (ref 8.5–15.5)
PLATELET # BLD: 391.1 THOU/MM3 (ref 130–400)
POTASSIUM SERPL-SCNC: 4.8 MMOL/L (ref 3.6–5)
RBC: 3.93 M/UL (ref 4.2–5.4)
SODIUM BLD-SCNC: 138 MMOL/L (ref 135–145)
WBC: 6.9 THOU/ML3 (ref 4.8–10.8)

## 2020-02-06 LAB
CREATININE, RANDOM URINE: 100.3 MG/DL (ref 20–370)
FREE KAPPA LIGHT CHAINS: NORMAL
FREE KAPPA/LAMBDA RATIO: NORMAL
FREE LAMBDA LIGHT CHAINS: NORMAL
PROTEIN, URINE: 370 MG/DL (ref 0–12)

## 2020-02-10 ENCOUNTER — OFFICE VISIT (OUTPATIENT)
Dept: WOUND CARE | Age: 80
End: 2020-02-10
Payer: MEDICARE

## 2020-02-10 VITALS
DIASTOLIC BLOOD PRESSURE: 80 MMHG | SYSTOLIC BLOOD PRESSURE: 122 MMHG | BODY MASS INDEX: 37.32 KG/M2 | WEIGHT: 224 LBS | HEIGHT: 65 IN | HEART RATE: 68 BPM

## 2020-02-10 PROCEDURE — 97597 DBRDMT OPN WND 1ST 20 CM/<: CPT | Performed by: PODIATRIST

## 2020-02-10 PROCEDURE — 99214 OFFICE O/P EST MOD 30 MIN: CPT

## 2020-02-10 PROCEDURE — 99214 OFFICE O/P EST MOD 30 MIN: CPT | Performed by: PODIATRIST

## 2020-02-10 PROCEDURE — 99999 PR OFFICE/OUTPT VISIT,PROCEDURE ONLY: CPT | Performed by: PODIATRIST

## 2020-02-10 NOTE — PROGRESS NOTES
Per Dr Jamil Joshi patient to be seen in 1 week, patient can not be seen until until 2/20/2020 due to other medical appointments
Size % (l*w) -400 2/10/2020  1:02 PM   Drainage Amount Scant 2/10/2020  1:02 PM   Drainage Description Serosanguinous 2/10/2020  1:02 PM   Odor None 2/10/2020  1:02 PM   [place]-wound Assessment [place] 2/10/2020  1:02 PM   [place]%Wound Bed 100 2/10/2020  1:02 PM   Number of days: 693       Asessment: Patient is a 70 y.o. female with:    Diagnosis Orders   1. Skin ulcer of toe of left foot, limited to breakdown of skin ([address]     2. Controlled type 2 DM with peripheral circulatory disorder (HCC)     3. Hammer toe of left foot       Ulcer Classification:  Diabetic ulcer grade 2 C. IDSA  no infection. Plan:   Patient examined and evaluated. Diabetic foot education and exam.  Current condition and treatment options discussed in detail. Topical lidocaine applied to wound. Debridement Active wound management took place 100% non excisional with the use of  tissue nippers. All non viable tissue (including epidermis and/or dermis) and bio burden was removed to promote healing. Bleeding was present. Please see chart for exact measurements, if not documented then size was less than 20 sq cm. .  Minimal blood loss noted and hemostasis was obtained with direct pressure. Patient related pain absent post debridement. Patient advised importance of overall diabetic control and will continue offloading as advised and stressed the importance of this in regards to wound healing. Today's dressing:silver alginate qd  Pt has sx shoe to wear L foot  Contact clinic with any questions/problems/concerns or new signs of infection. Follow-up at [facility] in 1 week(s).

## 2020-02-20 ENCOUNTER — OFFICE VISIT (OUTPATIENT)
Dept: PODIATRY | Age: 80
End: 2020-02-20
Payer: MEDICARE

## 2020-02-20 VITALS
HEART RATE: 64 BPM | WEIGHT: 230 LBS | DIASTOLIC BLOOD PRESSURE: 74 MMHG | HEIGHT: 67 IN | BODY MASS INDEX: 36.1 KG/M2 | SYSTOLIC BLOOD PRESSURE: 130 MMHG

## 2020-02-20 PROCEDURE — 11721 DEBRIDE NAIL 6 OR MORE: CPT | Performed by: PODIATRIST

## 2020-02-20 PROCEDURE — 97597 DBRDMT OPN WND 1ST 20 CM/<: CPT | Performed by: PODIATRIST

## 2020-02-20 PROCEDURE — 99999 PR OFFICE/OUTPT VISIT,PROCEDURE ONLY: CPT | Performed by: PODIATRIST

## 2020-02-20 PROCEDURE — 99214 OFFICE O/P EST MOD 30 MIN: CPT

## 2020-02-20 NOTE — PROGRESS NOTES
Foot Care Worksheet  PCP: Alex Pace MD  Last visit: 1/23/2020        Nail description:  Thick , Yellow , Crumbly , Marked limitation of ambulation     Pain resulting from thickened and dystrophy of nail plate No    Nails involved  Right   1 2 (T5-T9)  Left     1, 2, 4, 5  (TA-T4)    Q7 1 Class A Finding - Non traumatic amputation of foot Yes

## 2020-02-20 NOTE — PATIENT INSTRUCTIONS
Cleanse wound with normal saline or in the shower if allowed. Pat dry. Cut silver alginate to fit toe wound, pack lightly. (Silver Alginate will swell as it absorbs drainage.) Cover with dry gauze. Hold with tape, roll gauze or coban. Change at least every three days and whenever drainage comes through the outer dressing. Mail order dressings are from [facility] [employer] [facility]. You have 2 refills. Call [phone], ext. [phone]. SIGNS OF INFECTION  - Redness, swelling, skin hot  - Wound bed turns black or stringy yellow  - Foul odor  - Increased drainage or pus  - Increased pain  - Fever greater than 100F    CALL YOUR DOCTOR OR SEEK MEDICAL ATTENTION IF SIGNS OF INFECTION. DO NOT WAIT UNTIL YOUR NEXT APPOINTMENT    Call the Wound Care Nurse with any other questions or concerns- [phone]    Continue to wear surgical shoe at all times. Return to see Denman Hashimoto as scheduled.

## 2020-02-20 NOTE — PROGRESS NOTES
Subjective: Nba Mao is a 70 y.o. female who presents with the ulceration of the toe L toe. Patient has been compliant with off loading. Patient relates pain is Absent . Pain is rated 0 out of 10 and is described as none. Currently denies F/C/N/V. Overall diabetic control is not changed. Pt also presents for routine foot care. 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  Authorizing Provider   enoxaparin (LOVENOX) 30 MG/0.3ML injection INJECT 0.3 ML SUBCUTANEOUSLY ONCE DAILY 1/22/20  Yes Historical Provider, MD   senna (SENOKOT) 8.6 MG tablet Take 1 tablet by mouth daily 1/23/20  Yes Ambika Mendoza MD   oxybutynin (DITROPAN-XL) 5 MG extended release tablet TAKE 1 TABLET BY MOUTH ONCE DAILY 12/2/19  Yes Ambika Mendoza MD   gabapentin (NEURONTIN) 300 MG capsule TAKE 1 CAPSULE BY MOUTH THREE TIMES DAILY 12/2/19 6/2/20 Yes Ambika Mendoza MD   amLODIPine (NORVASC) 2.5 MG tablet Take 1 tablet by mouth daily 11/27/19  Yes Ambika Mendoza MD   ferrous gluconate 324 (37.5 Fe) MG TABS Take 1 tablet by mouth daily 11/27/19  Yes Ambika Mendoaz MD   EUTHYROX 150 MCG tablet TAKE 1 TABLET BY MOUTH ONCE DAILY 11/22/19  Yes Ambika Mendoza MD   losartan (COZAAR) 50 MG tablet Take 1 tablet by mouth daily  Patient taking differently: Take 50 mg by mouth 2 times daily  10/31/19  Yes Ambika Mendoza MD   simvastatin (ZOCOR) 20 MG tablet TAKE 1 TABLET BY MOUTH NIGHTLY 9/4/19  Yes Ambika Mendoza MD   betamethasone dipropionate (DIPROLENE) 0.05 % ointment Apply topically 2 times daily Apply topically 2 times daily. Yes Historical Provider, MD   fluocinonide (LIDEX) 0.05 % cream Apply topically 2 times daily.  6/13/19  Yes Maritza Salmon MD   B-D INS SYR ULTRAFINE 1CC/31G 31G X 5/16\" 1 ML MISC  12/19/18  Yes Historical Provider, MD   fexofenadine (ALLEGRA) 180 MG tablet Take 180 mg by mouth daily   Yes Historical Provider, MD   montelukast (SINGULAIR) 10 MG tablet TAKE ONE TABLET BY MOUTH NIGHTLY 7/24/18  Yes Maritza Salmon MD   fluticasone propionate (FLOVENT DISKUS) 100 MCG/BLIST AEPB inhaler Inhale 1 puff into the lungs daily   Yes Historical Provider, MD   insulin glargine (LANTUS) 100 UNIT/ML injection vial Inject 48 Units into the skin nightly    Yes Historical Provider, MD   fluticasone (FLONASE) 50 MCG/ACT nasal spray 1 spray by Nasal route daily   Yes Historical Provider, MD   isosorbide mononitrate (IMDUR) 60 MG extended release tablet Take 60 mg by mouth every morning   Yes Historical Provider, MD   insulin aspart (NOVOLOG) 100 UNIT/ML injection vial Inject 4 Units into the skin 3 times daily (before meals) Indications: 10 units in AM, 12 units in afternoon, 4 units in PM with meals Plus sliding scale    Yes Maritza Salmon MD   aspirin 81 MG tablet Take 81 mg by mouth daily   Yes Historical Provider, MD   acetaminophen (TYLENOL) 500 MG tablet Take 500 mg by mouth nightly    Yes Historical Provider, MD   vitamin B-12 (CYANOCOBALAMIN) 500 MCG tablet Take 500 mcg by mouth daily   Yes Historical Provider, MD   CPAP Machine MISC by Does not apply route   Yes Maritza Salmon MD   calcium carbonate (OSCAL) 500 MG TABS tablet Take 600 mg by mouth daily    Yes Historical Provider, MD   Omega-3 Fatty Acids (FISH OIL PO) Take 1,040 mg by mouth 2 times daily    Yes Historical Provider, MD   Multiple Vitamins-Minerals (MULTIVITAMIN ADULTS PO) Take by mouth daily    Yes Historical Provider, MD       Social History     Tobacco Use    Smoking status: Never Smoker    Smokeless tobacco: Never Used   Substance Use Topics    Alcohol use: No       ROS: All 14 ROS systems reviewed and pertinent positives noted above, all others negative. Lower Extremity Physical Examination:     Vitals:   Vitals:    02/20/20 1407   BP: 130/74   Pulse: 64     General: AAO x 3 in NAD. Vascular: DP and PT pulses palpable 2/4, bilateral.  CFT <3 seconds, bilateral.  Hair growth absent to the level of the digits, bilateral.  Edema present, bilateral.  Varicosities present, bilateral. Erythema absent, bilateral. Distal Rubor absent bilateral.  Temperature decreased bilateral. Hyperpigmentation present bilateral. Atrophic skin yes. Neurological: Sensation Impaired to light touch to level of digits, bilateral.  Protective sensation intact  0/10 sites via 5.07/10g [place]-Kiko Monofilament, bilateral.  negative Tinel's, bilateral.  negative Valleix sign, bilateral.  Vibratory abnormal  bilateral.  Reflexes Decreased bilateral.  Paresthesias positive. Dysthesias negative. Sharp/dull abnormal  bilateral.    Musculoskeletal: Muscle strength 5/5, bilateral.  Pain absent upon palpation bilateral. Normal medial longitudinal arch, bilateral.  Ankle ROM decreased,bilateral.  1st MPJ ROM within normal limits, bilateral.  Dorsally contracted digits present. No other foot deformities. Integument:   Open lesion present, Left. Ulcer dorsal L 2nd toe PIPJ, 0.3x0.2x0.2cm,  positive fibrin, filled in more this week, healthy red granular base, no probing no undermining no malodor. No surrounding signs of infx. Interdigital maceration absent to web spaces , Bilateral.  Nails L 1 2 4 5 R 1 2  thickened, dystrophic and crumbly, discolored with subungual debris. Fissures absent, Bilateral. Hyperkeratotic tissue is absent    Asessment: Patient is a 70 y.o. female with:    Diagnosis Orders   1. Skin ulcer of toe of left foot, limited to breakdown of skin ([address]     2. Amputated toe of left foot ([address]     3.  Controlled

## 2020-03-02 ENCOUNTER — OFFICE VISIT (OUTPATIENT)
Dept: WOUND CARE | Age: 80
End: 2020-03-02
Payer: MEDICARE

## 2020-03-02 VITALS
BODY MASS INDEX: 35.94 KG/M2 | HEART RATE: 68 BPM | SYSTOLIC BLOOD PRESSURE: 128 MMHG | DIASTOLIC BLOOD PRESSURE: 74 MMHG | WEIGHT: 229 LBS | HEIGHT: 67 IN

## 2020-03-02 PROCEDURE — 99213 OFFICE O/P EST LOW 20 MIN: CPT

## 2020-03-02 PROCEDURE — 97597 DBRDMT OPN WND 1ST 20 CM/<: CPT | Performed by: PODIATRIST

## 2020-03-02 PROCEDURE — 99999 PR OFFICE/OUTPT VISIT,PROCEDURE ONLY: CPT | Performed by: PODIATRIST

## 2020-03-02 NOTE — PROGRESS NOTES
Subjective: Mariela Beach is a 70 y.o. female who presents with the ulceration of the toe L toe. Patient has not been compliant with off loading. Presents with regular shoes on today. Patient relates pain is Absent . Pain is rated 0 out of 10 and is described as none. No signs of infx seen at home. Currently denies F/C/N/V. Overall diabetic control is not changed. 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  Authorizing Provider   enoxaparin (LOVENOX) 30 MG/0.3ML injection INJECT 0.3 ML SUBCUTANEOUSLY ONCE DAILY 1/22/20  Yes Historical Provider, MD   senna (SENOKOT) 8.6 MG tablet Take 1 tablet by mouth daily 1/23/20  Yes Teddy Hernandez MD   oxybutynin (DITROPAN-XL) 5 MG extended release tablet TAKE 1 TABLET BY MOUTH ONCE DAILY 12/2/19  Yes Teddy Hernandez MD   gabapentin (NEURONTIN) 300 MG capsule TAKE 1 CAPSULE BY MOUTH THREE TIMES DAILY 12/2/19 6/2/20 Yes Teddy Hernandez MD   amLODIPine (NORVASC) 2.5 MG tablet Take 1 tablet by mouth daily 11/27/19  Yes Teddy Hernandez MD   ferrous gluconate 324 (37.5 Fe) MG TABS Take 1 tablet by mouth daily 11/27/19  Yes Teddy Hernandez MD   EUTHYROX 150 MCG tablet TAKE 1 TABLET BY MOUTH ONCE DAILY 11/22/19  Yes Teddy Hernandez MD   losartan (COZAAR) 50 MG tablet Take 1 tablet by mouth daily  Patient taking differently: Take 50 mg by mouth 2 times daily  10/31/19  Yes Teddy Hernandez MD   simvastatin (ZOCOR) 20 MG tablet TAKE 1 TABLET BY MOUTH NIGHTLY 9/4/19  Yes Teddy Hernandez MD   betamethasone dipropionate (DIPROLENE) 0.05 % ointment Apply topically 2 times daily Apply topically 2 times daily. Yes Historical Provider, MD   fluocinonide (LIDEX) 0.05 % cream Apply topically 2 times daily.  6/13/19  Yes Speedy Hammer MD   B-D INS SYR ULTRAFINE 1CC/31G 31G X 5/16\" 1 ML MISC  12/19/18  Yes Historical Provider, MD   fexofenadine (ALLEGRA) 180 MG tablet Take 180 mg by mouth daily   Yes Historical Provider, MD   montelukast (SINGULAIR) 10 MG tablet TAKE ONE TABLET BY MOUTH NIGHTLY 7/24/18  Yes Speedy Hammer MD   fluticasone propionate (FLOVENT DISKUS) 100 MCG/BLIST AEPB inhaler Inhale 1 puff into the lungs daily   Yes Historical Provider, MD   insulin glargine (LANTUS) 100 UNIT/ML injection vial Inject 48 Units into the skin nightly    Yes Historical Provider, MD   fluticasone (FLONASE) 50 MCG/ACT nasal spray 1 spray by Nasal route daily   Yes Historical Provider, MD   isosorbide mononitrate (IMDUR) 60 MG extended release tablet Take 60 mg by mouth every morning   Yes Historical Provider, MD   insulin aspart (NOVOLOG) 100 UNIT/ML injection vial Inject 4 Units into the skin 3 times daily (before meals) Indications: 10 units in AM, 12 units in afternoon, 4 units in PM with meals Plus sliding scale    Yes Speedy Hammer MD   aspirin 81 MG tablet Take 81 mg by mouth daily   Yes Historical Provider, MD   acetaminophen (TYLENOL) 500 MG tablet Take 500 mg by mouth nightly    Yes Historical Provider, MD   vitamin B-12 (CYANOCOBALAMIN) 500 MCG tablet Take 500 mcg by mouth daily   Yes Historical Provider, MD   CPAP Machine MISC by Does not apply route   Yes Speedy Hammer MD   calcium carbonate (OSCAL) 500 MG TABS tablet Take 600 mg by mouth daily    Yes Historical Provider, MD   Omega-3 Fatty Acids (FISH OIL PO) Take 1,040 mg by mouth 2 times daily    Yes Historical Provider, MD   Multiple Vitamins-Minerals (MULTIVITAMIN ADULTS PO) Take by mouth daily    Yes Historical Provider, MD       Social History     Tobacco Use    Smoking status: Never Smoker    Smokeless tobacco: Never Used   Substance Use Topics    Alcohol use: No       ROS: All 14 ROS systems reviewed and pertinent positives noted above, all others negative. Lower Extremity Physical Examination:     Vitals:   Vitals:    03/02/20 1304   BP: 128/74   Pulse: 68     General: AAO x 3 in NAD. Vascular: DP and PT pulses palpable 2/4, bilateral.  CFT <3 seconds, bilateral.  Hair growth absent to the level of the digits, bilateral.  Edema present, bilateral.  Varicosities present, bilateral. Erythema absent, bilateral. Distal Rubor absent bilateral.  Temperature decreased bilateral. Hyperpigmentation present bilateral. Atrophic skin yes. Neurological: Sensation Impaired to light touch to level of digits, bilateral.  Protective sensation intact  0/10 sites via 5.07/10g [place] Monofilament, bilateral.  negative Tinel's, bilateral.  negative Valleix sign, bilateral.  Vibratory abnormal  bilateral.  Reflexes Decreased bilateral.  Paresthesias positive. Dysthesias negative. Sharp/dull abnormal  bilateral.    Musculoskeletal: Muscle strength 5/5, bilateral.  Pain absent upon palpation bilateral. Normal medial longitudinal arch, bilateral.  Ankle ROM decreased,bilateral.  1st MPJ ROM within normal limits, bilateral.  Dorsally contracted digits present. No other foot deformities. Integument:   Open lesion present, Left. Ulcer dorsal L 2nd toe PIPJ,  positive fibrin, filled in lateral edge, medial most edge probes to bone this week, healthy red granular base lateral only, positive probing no undermining no malodor. No surrounding signs of infx. Interdigital maceration absent to web spaces , Bilateral.  Nails L 1 2 4 5 R 1 2  thickened, dystrophic and crumbly, discolored with subungual debris.   Fissures absent, Bilateral. Hyperkeratotic tissue is absent  Wound 03/19/18 left 2nd toe wound (Active)   Dressing/Treatment Alginate with Ag 3/2/2020 12:56 PM   Wound Length (cm) 0.5 cm 3/2/2020 12:56 PM   Wound Width (cm) 0.5 cm 3/2/2020 12:56 PM   Wound Depth (cm)  0.1 3/2/2020 12:56 PM   Calculated Wound Size (cm^2) (l*w) 0.25 cm^2 3/2/2020 12:56 PM   Change in Wound Size % (l*w) -525 3/2/2020 12:56 PM   Drainage Amount Scant 3/2/2020 12:56 PM   Drainage Description Serosanguinous 3/2/2020 12:56 PM   Odor None 3/2/2020 12:56 PM   Dinorah-wound Assessment Calloused 3/2/2020 12:56 PM   [place]%Wound Bed 100 3/2/2020 12:56 PM   Red%Wound Bed 100 2/20/2020  2:38 PM   Number of days: 12           Asessment: Patient is a 70 y.o. female with:    Diagnosis Orders   1. Skin ulcer of toe of left foot, limited to breakdown of skin ([address]     2. Controlled type 2 DM with peripheral circulatory disorder (HCC)         Ulcer Classification:  Diabetic ulcer grade 2 C. IDSA  no infection. Plan:   Patient examined and evaluated. Diabetic foot education and exam.  Current condition and treatment options discussed in detail. Topical lidocaine applied to wound. Debridement Active wound management took place 100% non excisional with the use of  tissue nippers. All non viable tissue (including epidermis and/or dermis) and bio burden was removed to promote healing. Bleeding was present. Please see chart for exact measurements, if not documented then size was less than 20 sq cm. .  Minimal blood loss noted and hemostasis was obtained with direct pressure. Patient related pain absent post debridement. Patient advised importance of overall diabetic control and will continue offloading as advised and stressed the importance of this in regards to wound healing. Today's dressing:silver alginate qd  continue sx shoe to wear L foot  Contact clinic with any questions/problems/concerns or new signs of infection. Follow-up at [facility] in 1 week(s).

## 2020-03-09 ENCOUNTER — OFFICE VISIT (OUTPATIENT)
Dept: WOUND CARE | Age: 80
End: 2020-03-09
Payer: MEDICARE

## 2020-03-09 ENCOUNTER — HOSPITAL ENCOUNTER (OUTPATIENT)
Dept: GENERAL RADIOLOGY | Age: 80
Discharge: HOME | End: 2020-03-11
Payer: MEDICARE

## 2020-03-09 VITALS
BODY MASS INDEX: 35.82 KG/M2 | RESPIRATION RATE: 20 BRPM | TEMPERATURE: 98.1 F | HEIGHT: 67 IN | WEIGHT: 228.2 LBS | SYSTOLIC BLOOD PRESSURE: 136 MMHG | HEART RATE: 76 BPM | DIASTOLIC BLOOD PRESSURE: 80 MMHG

## 2020-03-09 PROCEDURE — 73630 X-RAY EXAM OF FOOT: CPT

## 2020-03-09 PROCEDURE — 99999 PR OFFICE/OUTPT VISIT,PROCEDURE ONLY: CPT | Performed by: PODIATRIST

## 2020-03-09 PROCEDURE — 11043 DBRDMT MUSC&/FSCA 1ST 20/<: CPT | Performed by: PODIATRIST

## 2020-03-09 PROCEDURE — 99214 OFFICE O/P EST MOD 30 MIN: CPT

## 2020-03-09 RX ORDER — SULFAMETHOXAZOLE AND TRIMETHOPRIM 800; 160 MG/1; MG/1
1 TABLET ORAL 2 TIMES DAILY
Qty: 20 TABLET | Refills: 0 | Status: SHIPPED | OUTPATIENT
Start: 2020-03-09 | End: 2020-03-19

## 2020-03-09 NOTE — PATIENT INSTRUCTIONS
Cleanse wound with normal saline or in the shower if allowed. Pat dry. Cut silver alginate to fit wound, pack lightly. (Silver Alginate will swell as it absorbs drainage.) Cover with dry gauze. Hold with tape, roll gauze or coban. Change every day and whenever drainage comes through the outer dressing. Mail order dressings are from [facility]. You have 2 refills. Call [phone], ext. [phone]. SIGNS OF INFECTION  - Redness, swelling, skin hot  - Wound bed turns black or stringy yellow  - Foul odor  - Increased drainage or pus  - Increased pain  - Fever greater than 100F    CALL YOUR DOCTOR OR SEEK MEDICAL ATTENTION IF SIGNS OF INFECTION. DO NOT WAIT UNTIL YOUR NEXT APPOINTMENT    Call the Wound Care Nurse with any other questions or concerns- [phone]    Get antibiotic from pharmacy. Get xray of left foot. Continue to wear off loading foot gear at all times.

## 2020-03-09 NOTE — PROGRESS NOTES
Subjective: Wing Wright is a 70 y.o. female who presents with the ulceration of the toe L pt was a lot more active and noticed a lot more redness today . Pt states it wasn't there a couple days ago when she last changed the dressing. .  Patient has not been compliant with off loading. Patient relates pain is Absent . Pain is rated 0 out of 10 and is described as none. Currently denies F/C/N/V. Overall diabetic control is not changed. 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  Authorizing Provider   enoxaparin (LOVENOX) 30 MG/0.3ML injection INJECT 0.3 ML SUBCUTANEOUSLY ONCE DAILY 1/22/20  Yes Historical Provider, MD   senna (SENOKOT) 8.6 MG tablet Take 1 tablet by mouth daily 1/23/20  Yes Speedy Hammer MD   oxybutynin (DITROPAN-XL) 5 MG extended release tablet TAKE 1 TABLET BY MOUTH ONCE DAILY 12/2/19  Yes Speedy Hammer MD   gabapentin (NEURONTIN) 300 MG capsule TAKE 1 CAPSULE BY MOUTH THREE TIMES DAILY 12/2/19 6/2/20 Yes Speedy Hammer MD   amLODIPine (NORVASC) 2.5 MG tablet Take 1 tablet by mouth daily 11/27/19  Yes Speedy Hammer MD   ferrous gluconate 324 (37.5 Fe) MG TABS Take 1 tablet by mouth daily 11/27/19  Yes Speedy Hammer MD   EUTHYROX 150 MCG tablet TAKE 1 TABLET BY MOUTH ONCE DAILY 11/22/19  Yes Speedy Hammer MD   losartan (COZAAR) 50 MG tablet Take 1 tablet by mouth daily  Patient taking differently: Take 50 mg by mouth 2 times daily  10/31/19  Yes Speedy Hammer MD   simvastatin (ZOCOR) 20 MG tablet TAKE 1 TABLET BY MOUTH NIGHTLY 9/4/19  Yes Kaylin Gallagher MD   betamethasone dipropionate (DIPROLENE) 0.05 % ointment Apply topically 2 times daily Apply topically 2 times daily. Yes Historical Provider, MD   fluocinonide (LIDEX) 0.05 % cream Apply topically 2 times daily.  6/13/19  Yes Kaylin Gallagher MD   B-D INS SYR ULTRAFINE 1CC/31G 31G X 5/16\" 1 ML MISC  12/19/18  Yes Historical Provider, MD   fexofenadine (ALLEGRA) 180 MG tablet Take 180 mg by mouth daily   Yes Historical Provider, MD   montelukast (SINGULAIR) 10 MG tablet TAKE ONE TABLET BY MOUTH NIGHTLY 7/24/18  Yes Kaylin Gallagher MD   fluticasone propionate (FLOVENT DISKUS) 100 MCG/BLIST AEPB inhaler Inhale 1 puff into the lungs daily   Yes Historical Provider, MD   insulin glargine (LANTUS) 100 UNIT/ML injection vial Inject 48 Units into the skin nightly    Yes Historical Provider, MD   fluticasone (FLONASE) 50 MCG/ACT nasal spray 1 spray by Nasal route daily   Yes Historical Provider, MD   isosorbide mononitrate (IMDUR) 60 MG extended release tablet Take 60 mg by mouth every morning   Yes Historical Provider, MD   insulin aspart (NOVOLOG) 100 UNIT/ML injection vial Inject 4 Units into the skin 3 times daily (before meals) Indications: 10 units in AM, 12 units in afternoon, 4 units in PM with meals Plus sliding scale    Yes Kaylin Gallagher MD   aspirin 81 MG tablet Take 81 mg by mouth daily   Yes Historical Provider, MD   acetaminophen (TYLENOL) 500 MG tablet Take 500 mg by mouth nightly    Yes Historical Provider, MD   vitamin B-12 (CYANOCOBALAMIN) 500 MCG tablet Take 500 mcg by mouth daily   Yes Historical Provider, MD   CPAP Machine MISC by Does not apply route   Yes Kaylin Gallagher MD   calcium carbonate (OSCAL) 500 MG TABS tablet Take 600 mg by mouth daily    Yes Historical Provider, MD   Omega-3 Fatty Acids (FISH OIL PO) Take 1,040 mg by mouth 2 times daily    Yes Historical Provider, MD   Multiple Vitamins-Minerals (MULTIVITAMIN ADULTS PO) Take by mouth daily    Yes Hyperkeratotic tissue is absent  Wound 03/19/18 left 2nd toe wound (Active)   Dressing/Treatment Alginate with Ag 3/2/2020 12:56 PM   Wound Length (cm) 0.4 cm 3/9/2020  1:10 PM   Wound Width (cm) 0.2 cm 3/9/2020  1:10 PM   Wound Depth (cm)  0.1 3/9/2020  1:10 PM   Calculated Wound Size (cm^2) (l*w) 0.08 cm^2 3/9/2020  1:10 PM   Change in Wound Size % (l*w) -100 3/9/2020  1:10 PM   Drainage Amount Scant 3/9/2020  1:10 PM   Drainage Description Serous 3/9/2020  1:10 PM   Odor None 3/9/2020  1:10 PM   Dinorah-wound Assessment Calloused 3/2/2020 12:56 PM   [place]%Wound Bed 100 3/2/2020 12:56 PM   Red%Wound Bed 100 3/9/2020  1:10 PM   Number of days: 721               Asessment: Patient is a 70 y.o. female with:    Diagnosis Orders   1. 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 Controlled type 2 DM with peripheral circulatory disorder (HCC)  OK DEBRIDEMENT, SKIN, SUB-Q TISSUE,MUSCLE,=<20 SQ CM   3. Cellulitis of toe of left foot  OK DEBRIDEMENT, SKIN, SUB-Q TISSUE,MUSCLE,=<20 SQ CM    XR FOOT LEFT (MIN 3 VIEWS)       Ulcer Classification:  Diabetic ulcer grade 2 C. IDSA  no infection. Plan:   Patient examined and evaluated. Diabetic foot education and exam.  Current condition and treatment options discussed in detail. Sharp excisional debridement took place of the ulceration, tendon muscle or fascia was debrided,  tissue nippers were used for debridement. All non viable and necrotic tissue was removed to promote healing. Bleeding was present. See wound assessment note for post debridement measurements. Patient advised importance of overall diabetic control and will continue offloading as advised and stressed the importance of this in regards to wound healing.    Today's dressing:silver alginate qd  continue sx shoe to wear L foot  Orders Placed This Encounter   Procedures    XR FOOT LEFT (MIN 3 VIEWS)

## 2020-03-10 ENCOUNTER — TELEPHONE (OUTPATIENT)
Dept: PODIATRY | Age: 80
End: 2020-03-10

## 2020-03-10 NOTE — TELEPHONE ENCOUNTER
Patient called the office today. Patient could not find the supplies at [facility] last night. Patient would like to speak to a nurse about what she purchased. Please call her back today @ # [phone].

## 2020-03-12 ENCOUNTER — OFFICE VISIT (OUTPATIENT)
Dept: FAMILY MEDICINE CLINIC | Age: 80
End: 2020-03-12
Payer: MEDICARE

## 2020-03-12 VITALS
HEART RATE: 62 BPM | TEMPERATURE: 97.8 F | OXYGEN SATURATION: 98 % | BODY MASS INDEX: 35.16 KG/M2 | SYSTOLIC BLOOD PRESSURE: 120 MMHG | HEIGHT: 67 IN | DIASTOLIC BLOOD PRESSURE: 80 MMHG | WEIGHT: 224 LBS

## 2020-03-12 PROCEDURE — 99211 OFF/OP EST MAY X REQ PHY/QHP: CPT | Performed by: FAMILY MEDICINE

## 2020-03-12 PROCEDURE — G8482 FLU IMMUNIZE ORDER/ADMIN: HCPCS | Performed by: FAMILY MEDICINE

## 2020-03-12 PROCEDURE — 1090F PRES/ABSN URINE INCON ASSESS: CPT | Performed by: FAMILY MEDICINE

## 2020-03-12 PROCEDURE — 1123F ACP DISCUSS/DSCN MKR DOCD: CPT | Performed by: FAMILY MEDICINE

## 2020-03-12 PROCEDURE — G8427 DOCREV CUR MEDS BY ELIG CLIN: HCPCS | Performed by: FAMILY MEDICINE

## 2020-03-12 PROCEDURE — 1036F TOBACCO NON-USER: CPT | Performed by: FAMILY MEDICINE

## 2020-03-12 PROCEDURE — G8417 CALC BMI ABV UP PARAM F/U: HCPCS | Performed by: FAMILY MEDICINE

## 2020-03-12 PROCEDURE — 4040F PNEUMOC VAC/ADMIN/RCVD: CPT | Performed by: FAMILY MEDICINE

## 2020-03-12 PROCEDURE — 99213 OFFICE O/P EST LOW 20 MIN: CPT | Performed by: FAMILY MEDICINE

## 2020-03-12 PROCEDURE — G8400 PT W/DXA NO RESULTS DOC: HCPCS | Performed by: FAMILY MEDICINE

## 2020-03-12 RX ORDER — TRIAMCINOLONE ACETONIDE 0.25 MG/G
CREAM TOPICAL
Qty: 80 G | Refills: 1 | Status: SHIPPED | OUTPATIENT
Start: 2020-03-12 | End: 2020-11-02 | Stop reason: ALTCHOICE

## 2020-03-12 ASSESSMENT — ENCOUNTER SYMPTOMS
ABDOMINAL PAIN: 0
SHORTNESS OF BREATH: 0
NAUSEA: 0
VOMITING: 0

## 2020-03-12 NOTE — PATIENT INSTRUCTIONS
Ensure if any breathing difficulty or spreading rash to stop bactrim and let us know  Continue lubriderm twice daily, avoid rubbing or scratching arms. Ensure taking fexofenidine daily  Keep follow up with Dr Debra Carter.

## 2020-03-12 NOTE — PROGRESS NOTES
[address] [facility]  Dept: [phone]  Dept Fax:[phone]      Migdalia Dang is a 70 y.o. female who presents today for her medical conditions/complaints as noted below. Chief Complaint   Patient presents with    Rash       HPI:     HPI   Pt here for evaluation of rash, feels has had similar issues in past also. Seems to get worse as day goes on. Patient reports she was placed on Bactrim beginning on Monday itching in both arms started on Tuesday has attempted hydrocortisone cream in cold water only helping for short time. Similar sensations noted prior when not on bactrim. No fevers been noted  No respiratory difficulty. Patient has been treated for potential osteomyelitis lesion in the toe. Still taking fexofenadine nightly yet for allergies. Prior to Visit Medications    Medication Sig Taking?  Authorizing Provider   sulfamethoxazole-trimethoprim (BACTRIM DS) 800-160 MG per tablet Take 1 tablet by mouth 2 times daily for 10 days  Rene Brown DPM   enoxaparin (LOVENOX) 30 MG/0.3ML injection INJECT 0.3 ML SUBCUTANEOUSLY ONCE DAILY  Historical Provider, MD   senna (SENOKOT) 8.6 MG tablet Take 1 tablet by mouth daily  Saji Gold MD   oxybutynin (DITROPAN-XL) 5 MG extended release tablet TAKE 1 TABLET BY MOUTH ONCE DAILY  Saji Gold MD   gabapentin (NEURONTIN) 300 MG capsule TAKE 1 CAPSULE BY MOUTH THREE TIMES DAILY  Ki Sandhu MD   amLODIPine (NORVASC) 2.5 MG tablet Take 1 tablet by mouth daily  Saji Gold MD   ferrous gluconate 324 (37.5 Fe) MG TABS Take 1 tablet by mouth daily  Saji Gold MD   EUTHYROX 150 MCG tablet TAKE 1 TABLET BY MOUTH ONCE DAILY  Saji Gold MD   losartan (COZAAR) 50 MG tablet Take 1 tablet by mouth daily  Patient taking differently: Take 50 mg by mouth 2 times daily   Saji Gold MD   simvastatin (ZOCOR) 20 MG tablet TAKE 1 TABLET BY MOUTH NIGHTLY  Saji Gold MD betamethasone dipropionate (DIPROLENE) 0.05 % ointment Apply topically 2 times daily Apply topically 2 times daily. Historical Provider, MD   fluocinonide (LIDEX) 0.05 % cream Apply topically 2 times daily.   Speedy Hammer MD   B-D INS SYR ULTRAFINE 1CC/31G 31G X 5/16\" 1 ML MISC   Historical Provider, MD   fexofenadine (ALLEGRA) 180 MG tablet Take 180 mg by mouth daily  Historical Provider, MD   montelukast (SINGULAIR) 10 MG tablet TAKE ONE TABLET BY MOUTH NIGHTLY  Ki Sandhu MD   fluticasone propionate (FLOVENT DISKUS) 100 MCG/BLIST AEPB inhaler Inhale 1 puff into the lungs daily  Historical Provider, MD   insulin glargine (LANTUS) 100 UNIT/ML injection vial Inject 48 Units into the skin nightly   Historical Provider, MD   fluticasone (FLONASE) 50 MCG/ACT nasal spray 1 spray by Nasal route daily  Historical Provider, MD   isosorbide mononitrate (IMDUR) 60 MG extended release tablet Take 60 mg by mouth every morning  Historical Provider, MD   insulin aspart (NOVOLOG) 100 UNIT/ML injection vial Inject 4 Units into the skin 3 times daily (before meals) Indications: 10 units in AM, 12 units in afternoon, 4 units in PM with meals Plus sliding scale   Speedy Hammer MD   aspirin 81 MG tablet Take 81 mg by mouth daily  Historical Provider, MD   acetaminophen (TYLENOL) 500 MG tablet Take 500 mg by mouth nightly   Historical Provider, MD   vitamin B-12 (CYANOCOBALAMIN) 500 MCG tablet Take 500 mcg by mouth daily  Historical Provider, MD   CPAP Machine MISC by Does not apply route  Speedy Hammer MD   calcium carbonate (OSCAL) 500 MG TABS tablet Take 600 mg by mouth daily   Historical Provider, MD   Omega-3 Fatty Acids (FISH OIL PO) Take 1,040 mg by mouth 2 times daily   Historical Provider, MD   Multiple Vitamins-Minerals (MULTIVITAMIN ADULTS PO) Take by mouth daily   Historical Provider, MD       Allergies   Allergen Reactions    Lisinopril Other (See Comments)     Cough    Penicillins Swelling     Facial swelling  Ranolazine Rash       Past Medical History:   Diagnosis Date    CAD (coronary artery disease)     Diabetes mellitus ([address]     Hyperlipidemia     Hypertension     Hypothyroidism     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    Bx proven by Dr Sana Argueta Sleep apnea      Past Surgical History:   Procedure Laterality Date    ANGIOPLASTY      EYE SURGERY      FOOT SURGERY Left 07/31/2009    3rd toe amputation    HYSTERECTOMY      TOE AMPUTATION      left 3rd toe    TONSILLECTOMY      VULVECTOMY  01/17/2020    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     Social History     Socioeconomic History    Marital status:      Spouse name: Not on file    Number of children: Not on file    Years of education: Not on file    Highest education level: Not on file   Occupational History    Not on file   Social Needs    Financial resource strain: Not on file    Food insecurity     Worry: Not on file     Inability: Not on file    Transportation needs     Medical: Not on file     Non-medical: Not on file   Tobacco Use    Smoking status: Never Smoker    Smokeless tobacco: Never Used   Substance and Sexual Activity    Alcohol use: No    Drug use: No    Sexual activity: Not on file   Lifestyle    Physical activity     Days per week: Not on file     Minutes per session: Not on file    Stress: Not on file   Relationships    Social connections     Talks on phone: Not on file     Gets together: Not on file     Attends Bahai service: Not on file     Active member of club or organization: Not on file     Attends meetings of clubs or organizations: Not on file     Relationship status: Not on file    Intimate partner violence     Fear of current or ex partner: Not on file     Emotionally abused: Not on file     Physically abused: Not on file     Forced sexual activity: Not on file   Other Topics Concern    Not on file   Social History Narrative    Not on file     Family History defined types were placed in this encounter.       Electronically signed by Wilmer Flood MD on 3/12/2020 at10:25 PM

## 2020-03-16 ENCOUNTER — HOSPITAL ENCOUNTER (OUTPATIENT)
Age: 80
Setting detail: SPECIMEN
Discharge: HOME | End: 2020-03-16
Payer: MEDICARE

## 2020-03-16 ENCOUNTER — OFFICE VISIT (OUTPATIENT)
Dept: WOUND CARE | Age: 80
End: 2020-03-16
Payer: MEDICARE

## 2020-03-16 VITALS
BODY MASS INDEX: 35.07 KG/M2 | RESPIRATION RATE: 20 BRPM | SYSTOLIC BLOOD PRESSURE: 130 MMHG | TEMPERATURE: 98.5 F | DIASTOLIC BLOOD PRESSURE: 72 MMHG | WEIGHT: 223.99 LBS | HEART RATE: 78 BPM

## 2020-03-16 PROCEDURE — 1090F PRES/ABSN URINE INCON ASSESS: CPT | Performed by: PODIATRIST

## 2020-03-16 PROCEDURE — 87176 TISSUE HOMOGENIZATION CULTR: CPT

## 2020-03-16 PROCEDURE — 87070 CULTURE OTHR SPECIMN AEROBIC: CPT

## 2020-03-16 PROCEDURE — 87075 CULTR BACTERIA EXCEPT BLOOD: CPT

## 2020-03-16 PROCEDURE — G8482 FLU IMMUNIZE ORDER/ADMIN: HCPCS | Performed by: PODIATRIST

## 2020-03-16 PROCEDURE — 99214 OFFICE O/P EST MOD 30 MIN: CPT

## 2020-03-16 PROCEDURE — G8400 PT W/DXA NO RESULTS DOC: HCPCS | Performed by: PODIATRIST

## 2020-03-16 PROCEDURE — 1036F TOBACCO NON-USER: CPT | Performed by: PODIATRIST

## 2020-03-16 PROCEDURE — 87205 SMEAR GRAM STAIN: CPT

## 2020-03-16 PROCEDURE — G8417 CALC BMI ABV UP PARAM F/U: HCPCS | Performed by: PODIATRIST

## 2020-03-16 PROCEDURE — 4040F PNEUMOC VAC/ADMIN/RCVD: CPT | Performed by: PODIATRIST

## 2020-03-16 PROCEDURE — G8427 DOCREV CUR MEDS BY ELIG CLIN: HCPCS | Performed by: PODIATRIST

## 2020-03-16 PROCEDURE — 1123F ACP DISCUSS/DSCN MKR DOCD: CPT | Performed by: PODIATRIST

## 2020-03-16 PROCEDURE — 99213 OFFICE O/P EST LOW 20 MIN: CPT | Performed by: PODIATRIST

## 2020-03-16 NOTE — PATIENT INSTRUCTIONS
Continue with silver alginate dressing to left foot. Place alginate in wound bed and apply dry dressing. Change dressing every other day. SIGNS OF INFECTION  - Redness, swelling, skin hot  - Wound bed turns black or stringy yellow  - Foul odor  - Increased drainage or pus  - Increased pain  - Fever greater than 100F    CALL YOUR DOCTOR OR SEEK MEDICAL ATTENTION IF SIGNS OF INFECTION. DO NOT WAIT UNTIL YOUR NEXT APPOINTMENT    Call the Wound Care Nurse with any other questions or concerns- [phone]    Follow up with Dr. Michelle Mendoza.

## 2020-03-16 NOTE — PROGRESS NOTES
differently: Take 50 mg by mouth 2 times daily  10/31/19  Yes Valentina Francis MD   simvastatin (ZOCOR) 20 MG tablet TAKE 1 TABLET BY MOUTH NIGHTLY 9/4/19  Yes Valentina Francis MD   betamethasone dipropionate (DIPROLENE) 0.05 % ointment Apply topically 2 times daily Apply topically 2 times daily. Yes Historical Provider, MD   fluocinonide (LIDEX) 0.05 % cream Apply topically 2 times daily.  6/13/19  Yes Valentina Francis MD   B-D INS SYR ULTRAFINE 1CC/31G 31G X 5/16\" 1 ML MISC  12/19/18  Yes Historical Provider, MD   fexofenadine (ALLEGRA) 180 MG tablet Take 180 mg by mouth daily   Yes Historical Provider, MD   montelukast (SINGULAIR) 10 MG tablet TAKE ONE TABLET BY MOUTH NIGHTLY 7/24/18  Yes Valentina Francis MD   fluticasone propionate (FLOVENT DISKUS) 100 MCG/BLIST AEPB inhaler Inhale 1 puff into the lungs daily   Yes Historical Provider, MD   insulin glargine (LANTUS) 100 UNIT/ML injection vial Inject 48 Units into the skin nightly    Yes Historical Provider, MD   fluticasone (FLONASE) 50 MCG/ACT nasal spray 1 spray by Nasal route daily   Yes Historical Provider, MD   isosorbide mononitrate (IMDUR) 60 MG extended release tablet Take 60 mg by mouth every morning   Yes Historical Provider, MD   insulin aspart (NOVOLOG) 100 UNIT/ML injection vial Inject 4 Units into the skin 3 times daily (before meals) Indications: 10 units in AM, 12 units in afternoon, 4 units in PM with meals Plus sliding scale    Yes Valentina Francis MD   aspirin 81 MG tablet Take 81 mg by mouth daily   Yes Historical Provider, MD   acetaminophen (TYLENOL) 500 MG tablet Take 500 mg by mouth nightly    Yes Historical Provider, MD   vitamin B-12 (CYANOCOBALAMIN) 500 MCG tablet Take 500 mcg by mouth daily   Yes Historical Provider, MD   CPAP Machine MISC by Does not apply route   Yes Valentina Francis MD   calcium carbonate (OSCAL) 500 MG TABS tablet Take 600 mg by mouth daily    Yes Historical Provider, MD   Omega-3 Fatty Acids (FISH OIL PO) Take 1,040 mg by mouth 2 times daily    Yes Historical Provider, MD   Multiple Vitamins-Minerals (MULTIVITAMIN ADULTS PO) Take by mouth daily    Yes Historical Provider, MD       Social History     Tobacco Use    Smoking status: Never Smoker    Smokeless tobacco: Never Used   Substance Use Topics    Alcohol use: No       ROS: All 14 ROS systems reviewed and pertinent positives noted above, all others negative. Lower Extremity Physical Examination:     Vitals:   Vitals:    03/16/20 1318   BP: 130/72   Pulse: 78   Resp: 20   Temp: 98.5 °F (36.9 °C)     General: AAO x 3 in NAD. Vascular: DP and PT pulses palpable 2/4, bilateral.  CFT <3 seconds, bilateral.  Hair growth absent to the level of the digits, bilateral.  Edema present, bilateral.  Varicosities present, bilateral. Erythema absent, bilateral. Distal Rubor absent bilateral.  Temperature decreased bilateral. Hyperpigmentation present bilateral. Atrophic skin yes. Neurological: Sensation Impaired to light touch to level of digits, bilateral.  Protective sensation intact  0/10 sites via 5.07/10g Hannacroix-Kiko Monofilament, bilateral.  negative Tinel's, bilateral.  negative Valleix sign, bilateral.  Vibratory abnormal  bilateral.  Reflexes Decreased bilateral.  Paresthesias positive. Dysthesias negative. Sharp/dull abnormal  bilateral.    Musculoskeletal: Muscle strength 5/5, bilateral.  Pain absent upon palpation bilateral. Normal medial longitudinal arch, bilateral.  Ankle ROM decreased,bilateral.  1st MPJ ROM within normal limits, bilateral.  Dorsally contracted digits present. No other foot deformities. Integument:   Open lesion present, Left. Ulcer dorsal L 2nd toe PIPJ,  Depth to bone post debridement,positive fibrin,  medial most edge probes to bone this week, positive probing no undermining no malodor. Dinorah ulcer edema and erythema are mild but improved. . Interdigital maceration absent to web spaces , Bilateral.  Nails L 1 2 4 5 R 1 2  thickened, dystrophic and crumbly, discolored with subungual debris. Fissures absent, Bilateral. Hyperkeratotic tissue is absent  Wound 03/19/18 left 2nd toe wound (Active)   Wound Type Wound 3/16/2020  1:24 PM   Wound Traumatic 3/16/2020  1:24 PM   Dressing Status Old drainage 3/16/2020  1:24 PM   Dressing Changed Changed/New 3/16/2020  1:24 PM   Dressing/Treatment Alginate with Ag 3/16/2020  1:24 PM   Wound Cleansed Rinsed/Irrigated with saline 3/16/2020  1:24 PM   Dressing Change Due 03/18/20 3/16/2020  1:24 PM   Wound Length (cm) 0.5 cm 3/16/2020  1:24 PM   Wound Width (cm) 0.5 cm 3/16/2020  1:24 PM   Wound Depth (cm)  0.3 3/16/2020  1:24 PM   Calculated Wound Size (cm^2) (l*w) 0.25 cm^2 3/16/2020  1:24 PM   Change in Wound Size % (l*w) -525 3/16/2020  1:24 PM   Drainage Amount Scant 3/16/2020  1:24 PM   Drainage Description Serous 3/16/2020  1:24 PM   Odor None 3/16/2020  1:24 PM   Margins Defined edges 3/16/2020  1:24 PM   Exposed structure Bone 3/16/2020  1:24 PM   Dinorah-wound Assessment Pink 3/16/2020  1:24 PM   Non-staged Wound Description Full thickness 3/16/2020  1:24 PM   [place]%Wound Bed 100 3/2/2020 12:56 PM   Red%Wound Bed 100 3/16/2020  1:24 PM   Culture Taken Yes 3/16/2020  1:24 PM   Number of days: 12             Asessment: Patient is a 70 y.o. female with:    Diagnosis Orders   1. 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 Controlled type 2 DM with peripheral circulatory disorder (HCC)         Ulcer Classification:  Diabetic ulcer grade 2 C. IDSA  no infection. Plan:   Patient examined and evaluated. Diabetic foot education and exam.  Current condition and treatment options discussed in detail. Sharp excisional debridement took place of the ulceration, bone was debrided,  tissue nippers were used for debridement. All non viable and necrotic tissue was removed to promote healing. Bleeding was present. See wound assessment note for post debridement measurements.   Bone sent for

## 2020-03-19 ENCOUNTER — TELEPHONE (OUTPATIENT)
Dept: PODIATRY | Age: 80
End: 2020-03-19

## 2020-03-20 ENCOUNTER — TELEPHONE (OUTPATIENT)
Dept: PODIATRY | Age: 80
End: 2020-03-20

## 2020-03-21 LAB
CULTURE: ABNORMAL
CULTURE: ABNORMAL
DIRECT EXAM: ABNORMAL
DIRECT EXAM: ABNORMAL
Lab: ABNORMAL
SPECIMEN DESCRIPTION: ABNORMAL

## 2020-03-23 ENCOUNTER — OFFICE VISIT (OUTPATIENT)
Dept: WOUND CARE | Age: 80
End: 2020-03-23
Payer: MEDICARE

## 2020-03-23 VITALS
DIASTOLIC BLOOD PRESSURE: 74 MMHG | HEART RATE: 72 BPM | SYSTOLIC BLOOD PRESSURE: 118 MMHG | WEIGHT: 227 LBS | TEMPERATURE: 97.6 F | HEIGHT: 67 IN | BODY MASS INDEX: 35.63 KG/M2

## 2020-03-23 PROCEDURE — 99999 PR OFFICE/OUTPT VISIT,PROCEDURE ONLY: CPT | Performed by: PODIATRIST

## 2020-03-23 PROCEDURE — 11042 DBRDMT SUBQ TIS 1ST 20SQCM/<: CPT | Performed by: PODIATRIST

## 2020-03-23 PROCEDURE — 99214 OFFICE O/P EST MOD 30 MIN: CPT | Performed by: PODIATRIST

## 2020-03-23 PROCEDURE — 99214 OFFICE O/P EST MOD 30 MIN: CPT

## 2020-03-23 RX ORDER — SULFAMETHOXAZOLE AND TRIMETHOPRIM 800; 160 MG/1; MG/1
1 TABLET ORAL 2 TIMES DAILY
Qty: 20 TABLET | Refills: 0 | Status: SHIPPED | OUTPATIENT
Start: 2020-03-23 | End: 2020-04-02

## 2020-03-23 NOTE — PROGRESS NOTES
Subjective: Angy Emery is a 70 y.o. female who presents with the ulceration of the toe L. Patient has been compliant with off loading. Redness seems to have gotten worse when she finished the abx last week. Patient relates pain is Absent . Pain is rated 0 out of 10 and is described as none. Currently denies F/C/N/V. Overall diabetic control is not changed. 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 Authorizing Provider   triamcinolone (KENALOG) 0.025 % cream Apply topically 2 times daily.  No more than 2 weeks max 3/12/20  Yes Aramis Nash MD   enoxaparin (LOVENOX) 30 MG/0.3ML injection INJECT 0.3 ML SUBCUTANEOUSLY ONCE DAILY 1/22/20  Yes Historical Provider, MD   senna (SENOKOT) 8.6 MG tablet Take 1 tablet by mouth daily 1/23/20  Yes Aramis Nash MD   oxybutynin (DITROPAN-XL) 5 MG extended release tablet TAKE 1 TABLET BY MOUTH ONCE DAILY 12/2/19  Yes Aramis Nash MD   gabapentin (NEURONTIN) 300 MG capsule TAKE 1 CAPSULE BY MOUTH THREE TIMES DAILY 12/2/19 6/2/20 Yes Aramis Nash MD   amLODIPine (NORVASC) 2.5 MG tablet Take 1 tablet by mouth daily 11/27/19  Yes Aramis Nash MD   ferrous gluconate 324 (37.5 Fe) MG TABS Take 1 tablet by mouth daily 11/27/19  Yes Aramis Nash MD   EUTHYROX 150 MCG tablet TAKE 1 TABLET BY MOUTH ONCE DAILY 11/22/19  Yes Aramis Nash MD   losartan (COZAAR) 50 MG tablet Take 1 tablet by mouth daily  Patient taking differently: Take 50 mg by mouth 2 times daily  10/31/19  Yes Aramis Nash MD simvastatin (ZOCOR) 20 MG tablet TAKE 1 TABLET BY MOUTH NIGHTLY 9/4/19  Yes Ki Sandhu MD   betamethasone dipropionate (DIPROLENE) 0.05 % ointment Apply topically 2 times daily Apply topically 2 times daily. Yes Historical Provider, MD   fluocinonide (LIDEX) 0.05 % cream Apply topically 2 times daily.  6/13/19  Yes Kaylin Gallagher MD   B-D INS SYR ULTRAFINE 1CC/31G 31G X 5/16\" 1 ML MISC  12/19/18  Yes Historical Provider, MD   fexofenadine (ALLEGRA) 180 MG tablet Take 180 mg by mouth daily   Yes Historical Provider, MD   montelukast (SINGULAIR) 10 MG tablet TAKE ONE TABLET BY MOUTH NIGHTLY 7/24/18  Yes Kaylin Gallagher MD   fluticasone propionate (FLOVENT DISKUS) 100 MCG/BLIST AEPB inhaler Inhale 1 puff into the lungs daily   Yes Historical Provider, MD   insulin glargine (LANTUS) 100 UNIT/ML injection vial Inject 48 Units into the skin nightly    Yes Historical Provider, MD   fluticasone (FLONASE) 50 MCG/ACT nasal spray 1 spray by Nasal route daily   Yes Historical Provider, MD   isosorbide mononitrate (IMDUR) 60 MG extended release tablet Take 60 mg by mouth every morning   Yes Historical Provider, MD   insulin aspart (NOVOLOG) 100 UNIT/ML injection vial Inject 4 Units into the skin 3 times daily (before meals) Indications: 10 units in AM, 12 units in afternoon, 4 units in PM with meals Plus sliding scale    Yes Kaylin Gallagher MD   aspirin 81 MG tablet Take 81 mg by mouth daily   Yes Historical Provider, MD   acetaminophen (TYLENOL) 500 MG tablet Take 500 mg by mouth nightly    Yes Historical Provider, MD   vitamin B-12 (CYANOCOBALAMIN) 500 MCG tablet Take 500 mcg by mouth daily   Yes Historical Provider, MD   CPAP Machine MISC by Does not apply route   Yes Kaylin Gallagher MD   calcium carbonate (OSCAL) 500 MG TABS tablet Take 600 mg by mouth daily    Yes Historical Provider, MD   Omega-3 Fatty Acids (FISH OIL PO) Take 1,040 mg by mouth 2 times daily    Yes Historical Provider, MD   Multiple Vitamins-Minerals removed to promote healing. Bleeding was present. See wound assessment note for post debridement measurements. Patient advised importance of overall diabetic control and will continue offloading as advised and stressed the importance of this in regards to wound healing. Orders Placed This Encounter   Medications    sulfamethoxazole-trimethoprim (BACTRIM DS) 800-160 MG per tablet     Sig: Take 1 tablet by mouth 2 times daily for 10 days     Dispense:  20 tablet     Refill:  0     Labs reviewed with pt in detail. All questions answered. Today's dressing:betadine then silver alginate qd  continue sx shoe to wear L foot  Contact clinic with any questions/problems/concerns or new signs of infection. Follow-up at [facility] in 1 week(s).

## 2020-03-23 NOTE — PATIENT INSTRUCTIONS
Betadine OTC, apply first to left toe wound, apply silver alginate and dry dressing   To wear hammer toe crest to left foot   Continue with surgical shoe at all times weight bearing  Follow up in 1 week in wound care

## 2020-03-30 ENCOUNTER — OFFICE VISIT (OUTPATIENT)
Dept: WOUND CARE | Age: 80
End: 2020-03-30
Payer: MEDICARE

## 2020-03-30 ENCOUNTER — HOSPITAL ENCOUNTER (OUTPATIENT)
Age: 80
Setting detail: SPECIMEN
Discharge: HOME | End: 2020-03-30
Payer: MEDICARE

## 2020-03-30 VITALS
TEMPERATURE: 98.4 F | RESPIRATION RATE: 20 BRPM | WEIGHT: 231.8 LBS | HEART RATE: 72 BPM | BODY MASS INDEX: 36.38 KG/M2 | HEIGHT: 67 IN

## 2020-03-30 PROCEDURE — G8400 PT W/DXA NO RESULTS DOC: HCPCS | Performed by: PODIATRIST

## 2020-03-30 PROCEDURE — 1123F ACP DISCUSS/DSCN MKR DOCD: CPT | Performed by: PODIATRIST

## 2020-03-30 PROCEDURE — 4040F PNEUMOC VAC/ADMIN/RCVD: CPT | Performed by: PODIATRIST

## 2020-03-30 PROCEDURE — G8417 CALC BMI ABV UP PARAM F/U: HCPCS | Performed by: PODIATRIST

## 2020-03-30 PROCEDURE — 87075 CULTR BACTERIA EXCEPT BLOOD: CPT

## 2020-03-30 PROCEDURE — 1090F PRES/ABSN URINE INCON ASSESS: CPT | Performed by: PODIATRIST

## 2020-03-30 PROCEDURE — 1036F TOBACCO NON-USER: CPT | Performed by: PODIATRIST

## 2020-03-30 PROCEDURE — 87070 CULTURE OTHR SPECIMN AEROBIC: CPT

## 2020-03-30 PROCEDURE — G8482 FLU IMMUNIZE ORDER/ADMIN: HCPCS | Performed by: PODIATRIST

## 2020-03-30 PROCEDURE — 11044 DBRDMT BONE 1ST 20 SQ CM/<: CPT | Performed by: PODIATRIST

## 2020-03-30 PROCEDURE — G8427 DOCREV CUR MEDS BY ELIG CLIN: HCPCS | Performed by: PODIATRIST

## 2020-03-30 PROCEDURE — 99215 OFFICE O/P EST HI 40 MIN: CPT

## 2020-03-30 PROCEDURE — 99213 OFFICE O/P EST LOW 20 MIN: CPT | Performed by: PODIATRIST

## 2020-03-30 PROCEDURE — 87205 SMEAR GRAM STAIN: CPT

## 2020-03-30 NOTE — PROGRESS NOTES
Subjective: Brenda Mauricio is a 70 y.o. female who presents with the worsening ulceration of the toe L. Patient has been partially compliant with off loading. Redness worse again. Pt worried about look of the toe this week. Patient relates pain is Absent . Pain is rated 0 out of 10 and is described as none. Currently denies F/C/N/V. Overall diabetic control is not changed. 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 Authorizing Provider   sulfamethoxazole-trimethoprim (BACTRIM DS) 800-160 MG per tablet Take 1 tablet by mouth 2 times daily for 10 days 3/23/20 4/2/20 Yes Larissa Malone DPM   triamcinolone (KENALOG) 0.025 % cream Apply topically 2 times daily.  No more than 2 weeks max 3/12/20  Yes Gadiel West MD   enoxaparin (LOVENOX) 30 MG/0.3ML injection INJECT 0.3 ML SUBCUTANEOUSLY ONCE DAILY 1/22/20  Yes Historical Provider, MD   senna (SENOKOT) 8.6 MG tablet Take 1 tablet by mouth daily 1/23/20  Yes Gadiel West MD   oxybutynin (DITROPAN-XL) 5 MG extended release tablet TAKE 1 TABLET BY MOUTH ONCE DAILY 12/2/19  Yes Gadiel West MD   gabapentin (NEURONTIN) 300 MG capsule TAKE 1 CAPSULE BY MOUTH THREE TIMES DAILY 12/2/19 6/2/20 Yes Gadiel West MD   amLODIPine (NORVASC) 2.5 MG tablet Take 1 tablet by mouth daily 11/27/19  Yes Gadiel West MD   ferrous gluconate 324 (37.5 Fe) MG TABS Take 1 tablet by mouth daily 11/27/19  Yes Gadiel West MD   EUTHYROX 150 MCG tablet TAKE 1 TABLET BY MOUTH ONCE DAILY 11/22/19  Yes Gadiel West MD   losartan (COZAAR) 50 MG tablet Take 1 tablet by mouth daily  Patient taking differently: Take 50 mg by mouth 2 times daily  10/31/19  Yes Mami Ac MD   simvastatin (ZOCOR) 20 MG tablet TAKE 1 TABLET BY MOUTH NIGHTLY 9/4/19  Yes Mami Ac MD   betamethasone dipropionate (DIPROLENE) 0.05 % ointment Apply topically 2 times daily Apply topically 2 times daily. Yes Historical Provider, MD   fluocinonide (LIDEX) 0.05 % cream Apply topically 2 times daily.  6/13/19  Yes Mami Ac MD   B-D INS SYR ULTRAFINE 1CC/31G 31G X 5/16\" 1 ML MISC  12/19/18  Yes Historical Provider, MD   fexofenadine (ALLEGRA) 180 MG tablet Take 180 mg by mouth daily   Yes Historical Provider, MD   montelukast (SINGULAIR) 10 MG tablet TAKE ONE TABLET BY MOUTH NIGHTLY 7/24/18  Yes Mami Ac MD   fluticasone propionate (FLOVENT DISKUS) 100 MCG/BLIST AEPB inhaler Inhale 1 puff into the lungs daily   Yes Historical Provider, MD   insulin glargine (LANTUS) 100 UNIT/ML injection vial Inject 48 Units into the skin nightly    Yes Historical Provider, MD   fluticasone (FLONASE) 50 MCG/ACT nasal spray 1 spray by Nasal route daily   Yes Historical Provider, MD   isosorbide mononitrate (IMDUR) 60 MG extended release tablet Take 60 mg by mouth every morning   Yes Historical Provider, MD   insulin aspart (NOVOLOG) 100 UNIT/ML injection vial Inject 4 Units into the skin 3 times daily (before meals) Indications: 10 units in AM, 12 units in afternoon, 4 units in PM with meals Plus sliding scale    Yes Mami Ac MD   aspirin 81 MG tablet Take 81 mg by mouth daily   Yes Historical Provider, MD   acetaminophen (TYLENOL) 500 MG tablet Take 500 mg by mouth nightly    Yes Historical Provider, MD   vitamin B-12 (CYANOCOBALAMIN) 500 MCG tablet Take 500 mcg by mouth daily   Yes Historical Provider, MD   CPAP Machine MISC by Does not apply route   Yes Mami Ac MD   calcium carbonate (OSCAL) 500 MG TABS tablet Take 600 mg by mouth daily Yes Historical Provider, MD   Omega-3 Fatty Acids (FISH OIL PO) Take 1,040 mg by mouth 2 times daily    Yes Historical Provider, MD   Multiple Vitamins-Minerals (MULTIVITAMIN ADULTS PO) Take by mouth daily    Yes Historical Provider, MD       Social History     Tobacco Use    Smoking status: Never Smoker    Smokeless tobacco: Never Used   Substance Use Topics    Alcohol use: No       ROS: All 14 ROS systems reviewed and pertinent positives noted above, all others negative. Lower Extremity Physical Examination:     Vitals:   Vitals:    03/30/20 1303   Pulse: 72   Resp: 20   Temp: 98.4 °F (36.9 °C)     General: AAO x 3 in NAD. Vascular: DP and PT pulses palpable 2/4, bilateral.  CFT <3 seconds, bilateral.  Hair growth absent to the level of the digits, bilateral.  Edema present, bilateral.  Varicosities present, bilateral. Erythema absent, bilateral. Distal Rubor absent bilateral.  Temperature decreased bilateral. Hyperpigmentation present bilateral. Atrophic skin yes. Neurological: Sensation Impaired to light touch to level of digits, bilateral.  Protective sensation intact  0/10 sites via 5.07/10g Ideal-Kiko Monofilament, bilateral.  negative Tinel's, bilateral.  negative Valleix sign, bilateral.  Vibratory abnormal  bilateral.  Reflexes Decreased bilateral.  Paresthesias positive. Dysthesias negative. Sharp/dull abnormal  bilateral.    Musculoskeletal: Muscle strength 5/5, bilateral.  Pain absent upon palpation bilateral. Normal medial longitudinal arch, bilateral.  Ankle ROM decreased,bilateral.  1st MPJ ROM within normal limits, bilateral.  Dorsally contracted digits present. No other foot deformities. Integument:   Open lesion present, Left. Ulcer dorsal L 2nd toe PIPJ, lateral edge early abscess formation with granuloma tissue also noted, Probing to bone positive fibrin and non viable drainage,  medial most edge ulcer still present also, positive probing no undermining no malodor.   [place] ([facility] [address]  DE DEBRIDEMENT, SKIN, SUB-Q TISSUE,MUSCLE,BONE,=<20 SQ CM           Ulcer Classification:  Diabetic ulcer UT grade 3D  IDSA  no infection. Plan:   Patient examined and evaluated. Diabetic foot education and exam.  Current condition and treatment options discussed in detail. Sharp excisional debridement took place of the ulceration, bone was debrided,  curette and tissue nippers were used for debridement. Bone was very soft at this visit upon debridement. All non viable and necrotic tissue was removed to promote healing. Bleeding was present. See wound assessment note for post debridement measurements. Bone sent for culture from L 2nd toe. Long term tx options for osteomyelitis discussed. Removal of infected bone vs longer term abx therapy discussed. Pt has very low healing potential due to destructive nature of bone and bone being soft in the ulcer at this time. Pt in agreement that more aggressive intervention is best tx choice at this time. Patient advised importance of overall diabetic control and will continue offloading as advised and stressed the importance of this in regards to wound healing. Pt requesting sx intervention at this time for the infected L 2nd toe. Procedure will be a amputation left, 2nd toe foot. Patient was educated on the pre-op, anesthesia, and post op course. Risks and complications were discussed such as wound infection, wound dehiscence, re-ocurrence of deformity, hematoma/seroma, neurovascular injury, and RSD. All questions were answered and patient should call back quicker if any further questions arise. Patient was also set up for pre-op clearance from their PCP. Today's dressing:betadine then silver alginate qd  continue sx shoe to wear L foot  Contact clinic with any questions/problems/concerns or new signs of infection. Follow-up at [facility] in 1 week(s).

## 2020-03-31 ENCOUNTER — HOSPITAL ENCOUNTER (OUTPATIENT)
Age: 80
Setting detail: OUTPATIENT SURGERY
Discharge: HOME | End: 2020-03-31
Attending: PODIATRIST | Admitting: PODIATRIST
Payer: MEDICARE

## 2020-03-31 ENCOUNTER — ANESTHESIA (OUTPATIENT)
Dept: OPERATING ROOM | Age: 80
End: 2020-03-31
Payer: MEDICARE

## 2020-03-31 ENCOUNTER — ANESTHESIA EVENT (OUTPATIENT)
Dept: OPERATING ROOM | Age: 80
End: 2020-03-31
Payer: MEDICARE

## 2020-03-31 ENCOUNTER — OFFICE VISIT (OUTPATIENT)
Dept: FAMILY MEDICINE CLINIC | Age: 80
End: 2020-03-31
Payer: MEDICARE

## 2020-03-31 ENCOUNTER — APPOINTMENT (OUTPATIENT)
Dept: GENERAL RADIOLOGY | Age: 80
End: 2020-03-31
Attending: PODIATRIST
Payer: MEDICARE

## 2020-03-31 VITALS
OXYGEN SATURATION: 95 % | TEMPERATURE: 98.4 F | SYSTOLIC BLOOD PRESSURE: 173 MMHG | RESPIRATION RATE: 14 BRPM | DIASTOLIC BLOOD PRESSURE: 76 MMHG

## 2020-03-31 VITALS
OXYGEN SATURATION: 97 % | SYSTOLIC BLOOD PRESSURE: 166 MMHG | RESPIRATION RATE: 16 BRPM | TEMPERATURE: 98.4 F | HEART RATE: 74 BPM | DIASTOLIC BLOOD PRESSURE: 76 MMHG

## 2020-03-31 VITALS
HEART RATE: 56 BPM | TEMPERATURE: 97.5 F | SYSTOLIC BLOOD PRESSURE: 120 MMHG | WEIGHT: 229 LBS | DIASTOLIC BLOOD PRESSURE: 60 MMHG | BODY MASS INDEX: 35.94 KG/M2 | HEIGHT: 67 IN | OXYGEN SATURATION: 98 %

## 2020-03-31 LAB — GLUCOSE BLD-MCNC: 109 MG/DL (ref 65–105)

## 2020-03-31 PROCEDURE — 3600000012 HC SURGERY LEVEL 2 ADDTL 15MIN: Performed by: PODIATRIST

## 2020-03-31 PROCEDURE — 7100000010 HC PHASE II RECOVERY - FIRST 15 MIN: Performed by: PODIATRIST

## 2020-03-31 PROCEDURE — 88305 TISSUE EXAM BY PATHOLOGIST: CPT

## 2020-03-31 PROCEDURE — 3600000002 HC SURGERY LEVEL 2 BASE: Performed by: PODIATRIST

## 2020-03-31 PROCEDURE — 6360000002 HC RX W HCPCS: Performed by: PODIATRIST

## 2020-03-31 PROCEDURE — 73630 X-RAY EXAM OF FOOT: CPT

## 2020-03-31 PROCEDURE — 28820 AMPUTATION OF TOE: CPT | Performed by: PODIATRIST

## 2020-03-31 PROCEDURE — G8482 FLU IMMUNIZE ORDER/ADMIN: HCPCS | Performed by: FAMILY MEDICINE

## 2020-03-31 PROCEDURE — G8427 DOCREV CUR MEDS BY ELIG CLIN: HCPCS | Performed by: FAMILY MEDICINE

## 2020-03-31 PROCEDURE — 2580000003 HC RX 258: Performed by: PODIATRIST

## 2020-03-31 PROCEDURE — 82947 ASSAY GLUCOSE BLOOD QUANT: CPT

## 2020-03-31 PROCEDURE — 99211 OFF/OP EST MAY X REQ PHY/QHP: CPT

## 2020-03-31 PROCEDURE — 1090F PRES/ABSN URINE INCON ASSESS: CPT | Performed by: FAMILY MEDICINE

## 2020-03-31 PROCEDURE — 6360000002 HC RX W HCPCS: Performed by: NURSE ANESTHETIST, CERTIFIED REGISTERED

## 2020-03-31 PROCEDURE — 3700000000 HC ANESTHESIA ATTENDED CARE: Performed by: PODIATRIST

## 2020-03-31 PROCEDURE — 2709999900 HC NON-CHARGEABLE SUPPLY: Performed by: PODIATRIST

## 2020-03-31 PROCEDURE — G8417 CALC BMI ABV UP PARAM F/U: HCPCS | Performed by: FAMILY MEDICINE

## 2020-03-31 PROCEDURE — 7100000011 HC PHASE II RECOVERY - ADDTL 15 MIN: Performed by: PODIATRIST

## 2020-03-31 PROCEDURE — 2500000003 HC RX 250 WO HCPCS: Performed by: PODIATRIST

## 2020-03-31 PROCEDURE — 88311 DECALCIFY TISSUE: CPT

## 2020-03-31 PROCEDURE — 3700000001 HC ADD 15 MINUTES (ANESTHESIA): Performed by: PODIATRIST

## 2020-03-31 PROCEDURE — 99213 OFFICE O/P EST LOW 20 MIN: CPT | Performed by: FAMILY MEDICINE

## 2020-03-31 RX ORDER — ONDANSETRON 2 MG/ML
4 INJECTION INTRAMUSCULAR; INTRAVENOUS
Status: DISCONTINUED | OUTPATIENT
Start: 2020-03-31 | End: 2020-03-31 | Stop reason: HOSPADM

## 2020-03-31 RX ORDER — HYDROCODONE BITARTRATE AND ACETAMINOPHEN 5; 325 MG/1; MG/1
2 TABLET ORAL PRN
Status: DISCONTINUED | OUTPATIENT
Start: 2020-03-31 | End: 2020-03-31 | Stop reason: HOSPADM

## 2020-03-31 RX ORDER — HYDROCODONE BITARTRATE AND ACETAMINOPHEN 5; 325 MG/1; MG/1
1 TABLET ORAL PRN
Status: DISCONTINUED | OUTPATIENT
Start: 2020-03-31 | End: 2020-03-31 | Stop reason: HOSPADM

## 2020-03-31 RX ORDER — SODIUM CHLORIDE, SODIUM LACTATE, POTASSIUM CHLORIDE, CALCIUM CHLORIDE 600; 310; 30; 20 MG/100ML; MG/100ML; MG/100ML; MG/100ML
INJECTION, SOLUTION INTRAVENOUS CONTINUOUS
Status: DISCONTINUED | OUTPATIENT
Start: 2020-03-31 | End: 2020-03-31 | Stop reason: HOSPADM

## 2020-03-31 RX ORDER — MIDAZOLAM HYDROCHLORIDE 1 MG/ML
INJECTION INTRAMUSCULAR; INTRAVENOUS PRN
Status: DISCONTINUED | OUTPATIENT
Start: 2020-03-31 | End: 2020-03-31 | Stop reason: SDUPTHER

## 2020-03-31 RX ORDER — DIPHENHYDRAMINE HYDROCHLORIDE 50 MG/ML
12.5 INJECTION INTRAMUSCULAR; INTRAVENOUS
Status: DISCONTINUED | OUTPATIENT
Start: 2020-03-31 | End: 2020-03-31 | Stop reason: HOSPADM

## 2020-03-31 RX ORDER — MORPHINE SULFATE 2 MG/ML
2 INJECTION, SOLUTION INTRAMUSCULAR; INTRAVENOUS EVERY 5 MIN PRN
Status: DISCONTINUED | OUTPATIENT
Start: 2020-03-31 | End: 2020-03-31 | Stop reason: HOSPADM

## 2020-03-31 RX ORDER — HYDROCODONE BITARTRATE AND ACETAMINOPHEN 5; 325 MG/1; MG/1
1 TABLET ORAL EVERY 6 HOURS PRN
Qty: 20 TABLET | Refills: 0 | Status: SHIPPED | OUTPATIENT
Start: 2020-03-31 | End: 2020-04-05

## 2020-03-31 RX ADMIN — SODIUM CHLORIDE, POTASSIUM CHLORIDE, SODIUM LACTATE AND CALCIUM CHLORIDE: 600; 310; 30; 20 INJECTION, SOLUTION INTRAVENOUS at 11:47

## 2020-03-31 RX ADMIN — MIDAZOLAM HYDROCHLORIDE 1 MG: 1 INJECTION, SOLUTION INTRAMUSCULAR; INTRAVENOUS at 12:41

## 2020-03-31 RX ADMIN — MIDAZOLAM HYDROCHLORIDE 1 MG: 1 INJECTION, SOLUTION INTRAMUSCULAR; INTRAVENOUS at 12:59

## 2020-03-31 RX ADMIN — VANCOMYCIN HYDROCHLORIDE 1500 MG: 1 INJECTION, POWDER, LYOPHILIZED, FOR SOLUTION INTRAVENOUS at 12:03

## 2020-03-31 ASSESSMENT — ENCOUNTER SYMPTOMS
SHORTNESS OF BREATH: 1
ABDOMINAL PAIN: 0
NAUSEA: 0
SINUS PAIN: 0
CHEST TIGHTNESS: 0
SHORTNESS OF BREATH: 0
COUGH: 0
VOMITING: 0

## 2020-03-31 ASSESSMENT — PAIN SCALES - GENERAL
PAINLEVEL_OUTOF10: 0
PAINLEVEL_OUTOF10: 0

## 2020-03-31 ASSESSMENT — PAIN - FUNCTIONAL ASSESSMENT: PAIN_FUNCTIONAL_ASSESSMENT: 0-10

## 2020-03-31 NOTE — OP NOTE
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 This patient has failed outpatient conservative therapy. Patient is NPO since midnight. H&P, allergy history, and consent form are signed and in the chart. In the pre-operative area an IV was started then patient taken to the operating room and placed on the operating table in a supine position. After induction of anesthesia, a total of 10 cc 50:50 mixture of 1% lidocaine plain and .25% marcaine plain were used to block the operative site. The Left foot was prepped and draped in the usual sterile manner. Anesthesia was checked and was adequate. A #15 blade was used for a racquet type incision. Dissection went deep and care was used to protect or ligate any neurovascular structures as needed. During freeing up of proximal phalanx working towards the mpj level, the toe itself was removed due to the bone easily giving way due to underlying destructive bone osteomyelitis. Remaining bone was then freed up down to mpj level and complete toe amputated, tissue healthy at that level, area flushed with normal saline, 3-0 vicryl to aurelia sub q tissue, 4-0 nylon to close skin in simple interuppted technique. A dry sterile dressing took place with adaptic, 4X4s,abd pad  adrian and ace wrap. Patient had a prompt hyperemic response noted to the digits upon release of the tourniquet. Patient was taken to the post operative unit with vital signs stable and neurovascular status intact. They will ice and elevate as directed. Other orders were: use walker at home.   A sx shoe was

## 2020-03-31 NOTE — ANESTHESIA PRE PROCEDURE
Department of Anesthesiology  Preprocedure Note       Name:  Sindhu Way   Age:  70 y.o.  :  1940                                          MRN:  [id number]         Date:  3/31/2020      Surgeon: Nancy Zepeda):  Chad Merlos DPM    Procedure: Left 2nd TOE AMPUTATION (Left Foot)    Medications prior to admission:   Prior to Admission medications    Medication Sig Start Date End Date Taking?  Authorizing Provider   sulfamethoxazole-trimethoprim (BACTRIM DS) 800-160 MG per tablet Take 1 tablet by mouth 2 times daily for 10 days 3/23/20 4/2/20 Yes Chad Merlos DPM   senna (SENOKOT) 8.6 MG tablet Take 1 tablet by mouth daily 20  Yes Kaylin Gallagher MD   oxybutynin (DITROPAN-XL) 5 MG extended release tablet TAKE 1 TABLET BY MOUTH ONCE DAILY 19  Yes Kaylin Gallagher MD   gabapentin (NEURONTIN) 300 MG capsule TAKE 1 CAPSULE BY MOUTH THREE TIMES DAILY 19 Yes Kaylin Gallagher MD   amLODIPine (NORVASC) 2.5 MG tablet Take 1 tablet by mouth daily 19  Yes Kaylin Gallagher MD   ferrous gluconate 324 (37.5 Fe) MG TABS Take 1 tablet by mouth daily 19  Yes Kaylin Galalgher MD   EUTHYROX 150 MCG tablet TAKE 1 TABLET BY MOUTH ONCE DAILY 19  Yes Kaylin Gallagher MD   losartan (COZAAR) 50 MG tablet Take 1 tablet by mouth daily  Patient taking differently: Take 50 mg by mouth 2 times daily  10/31/19  Yes Kaylin Gallagher MD   simvastatin (ZOCOR) 20 MG tablet TAKE 1 TABLET BY MOUTH NIGHTLY 19  Yes Kaylin Gallagher MD   fexofenadine (ALLEGRA) 180 MG tablet Take 180 mg by mouth daily   Yes Historical Provider, MD   montelukast (SINGULAIR) 10 MG tablet TAKE ONE TABLET BY MOUTH NIGHTLY 18  Yes Kaylin Gallagher MD   fluticasone propionate (FLOVENT DISKUS) 100 MCG/BLIST AEPB inhaler Inhale 1 puff into the lungs daily   Yes Historical Provider, MD   insulin glargine (LANTUS) 100 UNIT/ML injection vial Inject 48 Units into the skin nightly    Yes Historical Provider, MD   isosorbide mononitrate (IMDUR) 60 MG extended release tablet Take 60 mg by mouth every morning   Yes Historical Provider, MD   insulin aspart (NOVOLOG) 100 UNIT/ML injection vial Inject 4 Units into the skin 3 times daily (before meals) Indications: 10 units in AM, 12 units in afternoon, 4 units in PM with meals Plus sliding scale    Yes Connie Craven MD   acetaminophen (TYLENOL) 500 MG tablet Take 500 mg by mouth nightly    Yes Historical Provider, MD   vitamin B-12 (CYANOCOBALAMIN) 500 MCG tablet Take 500 mcg by mouth daily   Yes Historical Provider, MD   CPAP Machine MISC by Does not apply route   Yes Connie Craven MD   calcium carbonate (OSCAL) 500 MG TABS tablet Take 600 mg by mouth daily    Yes Historical Provider, MD   Omega-3 Fatty Acids (FISH OIL PO) Take 1,040 mg by mouth 2 times daily    Yes Historical Provider, MD   Multiple Vitamins-Minerals (MULTIVITAMIN ADULTS PO) Take by mouth daily    Yes Historical Provider, MD   triamcinolone (KENALOG) 0.025 % cream Apply topically 2 times daily. No more than 2 weeks max 3/12/20   Ki Sandhu MD   betamethasone dipropionate (DIPROLENE) 0.05 % ointment Apply topically 2 times daily Apply topically 2 times daily. Historical Provider, MD   fluocinonide (LIDEX) 0.05 % cream Apply topically 2 times daily. 6/13/19   Ki Jones MD   B-D INS SYR ULTRAFINE 1CC/31G 31G X 5/16\" 1 ML MISC  12/19/18   Historical Provider, MD   fluticasone (FLONASE) 50 MCG/ACT nasal spray 1 spray by Nasal route daily    Historical Provider, MD   aspirin 81 MG tablet Take 81 mg by mouth daily    Historical Provider, MD       Current medications:    Current Facility-Administered Medications   Medication Dose Route Frequency Provider Last Rate Last Dose    lactated ringers infusion   Intravenous Continuous Hans Holt DPM 50 mL/hr at 03/31/20 1147      vancomycin (VANCOCIN) 1,500 mg in dextrose 5 % 250 mL IVPB  1,500 mg Intravenous On Call to [address] [facility], [place]           Allergies:     Allergies Allergen Reactions    Lisinopril Other (See Comments)     Cough    Penicillins Swelling     Facial swelling    Ranolazine Rash       Problem List:    Patient Active Problem List   Diagnosis Code    Essential hypertension I10    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    Hypothyroidism E03.9    Vasomotor rhinitis J30.0    Coronary atherosclerosis of native coronary artery I25.10    Peripheral vascular disease (HCC) I73.9    Mitral regurgitation I34.0    Obstructive sleep apnea G47.33    Type 2 diabetes mellitus with stage 1 chronic kidney disease (HCC) E11.22, N18.1    History of amputation of great toe ([facility] [address] Z89.419    Diabetic ulcer of toe of left foot associated with type 2 diabetes mellitus, limited to breakdown of skin ([facility] [address] E11.621, L97.521    Dizziness R42    Orthostatic hypotension I95.1    Mixed stress and urge urinary incontinence N39.46    BMI 37.0-37.9, adult Z68.37    Stage 3 chronic kidney disease (HCC) N18.3    Iron deficiency anemia Q87.3    Lichen sclerosus et atrophicus of the vulva N90.4    Acute cystitis without hematuria N30.00    Hyperkalemia E87.5    Anemia due to chronic kidney disease N18.9, D63.1    Vulvar cancer ([facility]) C51.9    Constipation K59.00       Past Medical History:        Diagnosis Date    CAD (coronary artery disease)     Diabetes mellitus ([facility] [address]     Hyperlipidemia     Hypertension     Hypothyroidism     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    Bx proven by Dr Anna Drummond Osteoarthritis     Sleep apnea        Past Surgical History:        Procedure Laterality Date    ANGIOPLASTY  2001    stent x 1    EYE SURGERY      FOOT SURGERY Left 07/31/2009    3rd toe amputation    HYSTERECTOMY, TOTAL ABDOMINAL  1985    TOE AMPUTATION      left 3rd toe    TONSILLECTOMY  1949    VULVECTOMY  01/17/2020    Dr Rosi Lyman- vulvar SCC carcinoma       Social History:    Social History     Tobacco Use    Smoking status: Never Smoker    Smokeless tobacco: Never Used   Substance Use Topics    Alcohol use:  No                                Counseling given: Not Answered      Vital Signs (Current):   Vitals:    03/31/20 1134   BP: (!) 178/72   Pulse: 60   Resp: 16   Temp: 36.9 °C (98.4 °F)   TempSrc: Oral   SpO2: 98%                                              BP Readings from Last 3 Encounters:   03/31/20 (!) 178/72   03/31/20 120/60   03/23/20 118/74       NPO Status: Time of last liquid consumption: 2300                        Time of last solid consumption: 1800                        Date of last liquid consumption: 03/30/20                        Date of last solid food consumption: 03/30/20    BMI:   Wt Readings from Last 3 Encounters:   03/31/20 229 lb (103.9 kg)   03/30/20 231 lb 12.8 oz (105.1 kg)   03/23/20 227 lb (103 kg)     There is no height or weight on file to calculate BMI.    CBC:   Lab Results   Component Value Date    WBC 6.9 02/05/2020    WBC 8.0 12/26/2019    RBC 3.93 02/05/2020    HGB 11.3 02/05/2020    HCT 35.7 02/05/2020    MCV 90.8 02/05/2020    RDW 12.9 02/05/2020    .1 02/05/2020       CMP:   Lab Results   Component Value Date     02/05/2020     02/14/2017    K 4.8 02/05/2020    K 4.2 02/14/2017     02/05/2020     02/14/2017    CO2 26 02/05/2020    BUN 44 02/05/2020    CREATININE 2.0 02/05/2020    CREATININE 1.1 02/14/2017    GFRAA 27 07/23/2019    LABGLOM 25.7 02/05/2020    LABGLOM 22 07/23/2019    GLUCOSE 220 02/05/2020    PROT 6.7 07/23/2019    CALCIUM 9.3 02/05/2020    BILITOT 0.40 06/10/2019    ALKPHOS 66.0 06/10/2019    AST 26.0 06/10/2019    ALT 34.0 06/10/2019       POC Tests:   Recent Labs     03/31/20  1144   POCGLU 109*       Coags: No results found for: PROTIME, INR, APTT    HCG (If Applicable): No results found for: PREGTESTUR, PREGSERUM, HCG, HCGQUANT     ABGs: No results found for: PHART, PO2ART, LWZ0CDM, ZMH6FAD, BEART, C3CRRYYD     Type & Screen (If Applicable):  No results found for: [facility]    Anesthesia Evaluation  Patient summary reviewed and Nursing notes reviewed no history of anesthetic complications:   Airway: Mallampati: II  TM distance: >3 FB   Neck ROM: full  Mouth opening: > = 3 FB Dental:          Pulmonary: breath sounds clear to auscultation  (+) shortness of breath: chronic,  sleep apnea: on CPAP,                             Cardiovascular:  Exercise tolerance: good (>4 METS),   (+) hypertension:, valvular problems/murmurs: MR, CAD:, CABG/stent:, hyperlipidemia    (-)  angina    ECG reviewed  Rhythm: regular  Rate: normal           Beta Blocker:  Not on Beta Blocker         Neuro/Psych:               GI/Hepatic/Renal:   (+) morbid obesity          Endo/Other:    (+) DiabetesType I DM, , hypothyroidism: arthritis:., .                 Abdominal:   (+) obese,         Vascular:   + PVD, aortic or cerebral, . Anesthesia Plan      MAC     ASA 3       Induction: intravenous. Anesthetic plan and risks discussed with patient.       Plan discussed with surgical team.                  TESFAYE Hummel - TOMMIE   3/31/2020

## 2020-03-31 NOTE — ANESTHESIA POSTPROCEDURE EVALUATION
Department of Anesthesiology  Postprocedure Note    Patient: James Valdez  MRN: [id number]  YOB: 1940  Date of evaluation: 3/31/2020  Time:  1:10 PM     Procedure Summary     Date:  03/31/20 Room / Location:  [address] [place] [address]    Anesthesia Start:  1233 Anesthesia Stop:  1310    Procedure:  Left 2nd TOE AMPUTATION (Left Foot) Diagnosis:  (left 2nd toe Osteomyelitis & ulcer, necrosis  of bone)    Surgeon:  Lena Rodríguez DPM Responsible Provider:  TESFAYE Lam CRNA    Anesthesia Type:  MAC ASA Status:  3          Anesthesia Type: MAC    Richard Phase I: Richard Score: 10    Richard Phase II:      Last vitals: Reviewed and per EMR flowsheets.        Anesthesia Post Evaluation    Patient location during evaluation: PACU  Patient participation: complete - patient participated  Level of consciousness: awake and alert  Pain score: 0  Airway patency: patent  Nausea & Vomiting: no nausea and no vomiting  Complications: no  Cardiovascular status: blood pressure returned to baseline and hemodynamically stable  Respiratory status: acceptable, spontaneous ventilation and room air  Hydration status: euvolemic

## 2020-03-31 NOTE — PROGRESS NOTES
times daily for 10 days  Rene Brown DPM   triamcinolone (KENALOG) 0.025 % cream Apply topically 2 times daily. No more than 2 weeks max  Saji Gold MD   enoxaparin (LOVENOX) 30 MG/0.3ML injection INJECT 0.3 ML SUBCUTANEOUSLY ONCE DAILY  Historical Provider, MD   senna (SENOKOT) 8.6 MG tablet Take 1 tablet by mouth daily  Saji Gold MD   oxybutynin (DITROPAN-XL) 5 MG extended release tablet TAKE 1 TABLET BY MOUTH ONCE DAILY  Saji Gold MD   gabapentin (NEURONTIN) 300 MG capsule TAKE 1 CAPSULE BY MOUTH THREE TIMES DAILY  Ki Sandhu MD   amLODIPine (NORVASC) 2.5 MG tablet Take 1 tablet by mouth daily  Saji Gold MD   ferrous gluconate 324 (37.5 Fe) MG TABS Take 1 tablet by mouth daily  Saji Gold MD   EUTHYROX 150 MCG tablet TAKE 1 TABLET BY MOUTH ONCE DAILY  Saji Gold MD   losartan (COZAAR) 50 MG tablet Take 1 tablet by mouth daily  Patient taking differently: Take 50 mg by mouth 2 times daily   Saji Gold MD   simvastatin (ZOCOR) 20 MG tablet TAKE 1 TABLET BY MOUTH NIGHTLY  Ki Sandhu MD   betamethasone dipropionate (DIPROLENE) 0.05 % ointment Apply topically 2 times daily Apply topically 2 times daily. Historical Provider, MD   fluocinonide (LIDEX) 0.05 % cream Apply topically 2 times daily.   Saji Gold MD   B-D INS SYR ULTRAFINE 1CC/31G 31G X 5/16\" 1 ML MISC   Historical Provider, MD   fexofenadine (ALLEGRA) 180 MG tablet Take 180 mg by mouth daily  Historical Provider, MD   montelukast (SINGULAIR) 10 MG tablet TAKE ONE TABLET BY MOUTH NIGHTLY  Ki Sandhu MD   fluticasone propionate (FLOVENT DISKUS) 100 MCG/BLIST AEPB inhaler Inhale 1 puff into the lungs daily  Historical Provider, MD   insulin glargine (LANTUS) 100 UNIT/ML injection vial Inject 48 Units into the skin nightly   Historical Provider, MD   fluticasone (FLONASE) 50 MCG/ACT nasal spray 1 spray by Nasal route daily  Historical Provider, MD   isosorbide mononitrate (IMDUR) 60 MG extended release tablet Take 60 mg by mouth every morning  Historical Provider, MD   insulin aspart (NOVOLOG) 100 UNIT/ML injection vial Inject 4 Units into the skin 3 times daily (before meals) Indications: 10 units in AM, 12 units in afternoon, 4 units in PM with meals Plus sliding scale   Ki Sandhu MD   aspirin 81 MG tablet Take 81 mg by mouth daily  Historical Provider, MD   acetaminophen (TYLENOL) 500 MG tablet Take 500 mg by mouth nightly   Historical Provider, MD   vitamin B-12 (CYANOCOBALAMIN) 500 MCG tablet Take 500 mcg by mouth daily  Historical Provider, MD   CPAP Machine MISC by Does not apply route  Lyly Stewart MD   calcium carbonate (OSCAL) 500 MG TABS tablet Take 600 mg by mouth daily   Historical Provider, MD   Omega-3 Fatty Acids (FISH OIL PO) Take 1,040 mg by mouth 2 times daily   Historical Provider, MD   Multiple Vitamins-Minerals (MULTIVITAMIN ADULTS PO) Take by mouth daily   Historical Provider, MD     Allergies   Allergen Reactions    Lisinopril Other (See Comments)     Cough    Penicillins Swelling     Facial swelling    Ranolazine Rash       Health Maintenance   Topic Date Due    DTaP/Tdap/Td vaccine (1 - Tdap) 09/26/1961    Shingles Vaccine (2 of 3) 04/02/2021 (Originally 7/24/2015)    Lipid screen  06/10/2020    Annual Wellness Visit (AWV)  06/13/2020    TSH testing  11/20/2020    Potassium monitoring  02/05/2021    Creatinine monitoring  02/05/2021    DEXA (modify frequency per FRAX score)  Completed    Flu vaccine  Completed    Pneumococcal 65+ yrs at Risk Vaccine  Completed    Hepatitis A vaccine  Aged Out    Hib vaccine  Aged Out    Meningococcal (ACWY) vaccine  Aged Out       Subjective:      Review of Systems   Constitutional: Positive for fatigue. Negative for activity change and appetite change. Pt is having surgery today with Dr. Gregorio Juan at [facility] in [place] on left foot inner toe.   No prior reaction to anesthesia in patient of family  No bleeding tendencies   HENT: Negative for congestion and sinus pain. Eyes: Negative for visual disturbance. Respiratory: Negative for cough, chest tightness and shortness of breath. Cardiovascular: Positive for leg swelling (left leg). Negative for chest pain and palpitations. Gastrointestinal: Negative for abdominal pain, nausea and vomiting. Genitourinary: Negative for dysuria and frequency. Musculoskeletal: Negative for arthralgias and myalgias. Neurological: Negative for dizziness. Psychiatric/Behavioral: Negative for confusion and sleep disturbance. The patient is not nervous/anxious. Issues with nonhealing ulcer of toe ongoing for couple years. No SOB noted    Objective:     /60 (Site: Left Upper Arm, Position: Sitting, Cuff Size: Large Adult)   Pulse 56   Temp 97.5 °F (36.4 °C) (Tympanic)   Ht 5' 7\" (1.702 m)   Wt 229 lb (103.9 kg)   SpO2 98%   BMI 35.87 kg/m²     Physical Exam  Vitals signs reviewed. Constitutional:       General: She is not in acute distress. Appearance: She is well-developed. She is obese. She is not ill-appearing. HENT:      Head: Atraumatic. Right Ear: Tympanic membrane and ear canal normal.      Left Ear: Tympanic membrane and ear canal normal.      Mouth/Throat:      Mouth: Mucous membranes are moist.      Pharynx: No oropharyngeal exudate or posterior oropharyngeal erythema. Eyes:      Conjunctiva/sclera: Conjunctivae normal.   Neck:      Musculoskeletal: Neck supple. Thyroid: No thyromegaly. Vascular: No carotid bruit. Cardiovascular:      Rate and Rhythm: Normal rate and regular rhythm. Heart sounds: No murmur. Pulmonary:      Effort: Pulmonary effort is normal.      Breath sounds: Normal breath sounds. Abdominal:      General: Bowel sounds are normal.      Palpations: Abdomen is soft. Musculoskeletal:         General: Swelling (BLE 1 +) present. Lymphadenopathy:      Cervical: No cervical adenopathy.    Neurological:      Mental Status: She is alert and oriented to person, place, and time. Psychiatric:         Mood and Affect: Mood normal.        today sugar 109 per pt    Assessment:     1. Preoperative clearance    2. Type 2 diabetes mellitus with stage 3 chronic kidney disease, with long-term current use of insulin ([facility]    3. Iron deficiency anemia, unspecified iron deficiency anemia type    4. Essential hypertension    5. Acquired hypothyroidism    6. Stage 3 chronic kidney disease ([facility] [facility]      No results found for this visit on 03/31/20. Plan:   No orders of the defined types were placed in this encounter. Return as planned. Pt medically cleared for planned surgery  Pt without balance ability to use crutches, may use walker or wheelchair if needed for avoidance of weight bearing. There are no Patient Instructions on file for this visit. Discussed use, benefit, and side effects of prescribed medications. All patient questions answered. Pt voiced understanding. Instructed to continue current medications, diet and exercise. Patient agreed with treatment plan. Follow up as directed.      Electronically signed by Dina Doty MD on 3/31/2020

## 2020-04-02 LAB — SURGICAL PATHOLOGY REPORT: NORMAL

## 2020-04-04 LAB
CULTURE: NORMAL
DIRECT EXAM: NORMAL
DIRECT EXAM: NORMAL
Lab: NORMAL
SPECIMEN DESCRIPTION: NORMAL

## 2020-04-06 ENCOUNTER — OFFICE VISIT (OUTPATIENT)
Dept: PODIATRY | Age: 80
End: 2020-04-06
Payer: MEDICARE

## 2020-04-06 VITALS
WEIGHT: 225.2 LBS | HEART RATE: 64 BPM | BODY MASS INDEX: 35.35 KG/M2 | DIASTOLIC BLOOD PRESSURE: 84 MMHG | HEIGHT: 67 IN | TEMPERATURE: 97.2 F | SYSTOLIC BLOOD PRESSURE: 132 MMHG | RESPIRATION RATE: 20 BRPM

## 2020-04-06 PROCEDURE — 99024 POSTOP FOLLOW-UP VISIT: CPT | Performed by: PODIATRIST

## 2020-04-06 PROCEDURE — 99213 OFFICE O/P EST LOW 20 MIN: CPT

## 2020-04-07 ENCOUNTER — HOSPITAL ENCOUNTER (OUTPATIENT)
Age: 80
Setting detail: SPECIMEN
Discharge: HOME | End: 2020-04-07
Payer: MEDICARE

## 2020-04-07 ENCOUNTER — OFFICE VISIT (OUTPATIENT)
Dept: FAMILY MEDICINE CLINIC | Age: 80
End: 2020-04-07
Payer: MEDICARE

## 2020-04-07 VITALS
HEART RATE: 60 BPM | WEIGHT: 226.6 LBS | BODY MASS INDEX: 35.49 KG/M2 | OXYGEN SATURATION: 98 % | SYSTOLIC BLOOD PRESSURE: 148 MMHG | DIASTOLIC BLOOD PRESSURE: 70 MMHG | TEMPERATURE: 97.9 F

## 2020-04-07 LAB
-: ABNORMAL
AMORPHOUS: ABNORMAL
BACTERIA: ABNORMAL
BILIRUBIN URINE: ABNORMAL
BILIRUBIN, POC: ABNORMAL
BLOOD URINE, POC: ABNORMAL
CASTS UA: ABNORMAL /LPF (ref 0–2)
CLARITY, POC: ABNORMAL
COLOR, POC: ABNORMAL
COLOR: ABNORMAL
COMMENT UA: ABNORMAL
CRYSTALS, UA: ABNORMAL /HPF
EPITHELIAL CELLS UA: ABNORMAL /HPF (ref 0–5)
GLUCOSE URINE, POC: ABNORMAL
GLUCOSE URINE: NEGATIVE
KETONES, POC: ABNORMAL
KETONES, URINE: NEGATIVE
LEUKOCYTE EST, POC: ABNORMAL
LEUKOCYTE ESTERASE, URINE: ABNORMAL
MUCUS: ABNORMAL
NITRITE, POC: ABNORMAL
NITRITE, URINE: POSITIVE
OTHER OBSERVATIONS UA: ABNORMAL
PH UA: 5.5 (ref 5–6)
PH, POC: 5.5
PROTEIN UA: ABNORMAL
PROTEIN, POC: ABNORMAL
RBC UA: >100 /HPF (ref 0–4)
RENAL EPITHELIAL, UA: ABNORMAL /HPF
SPECIFIC GRAVITY UA: 1.02 (ref 1.01–1.02)
SPECIFIC GRAVITY, POC: >=1.03
TRICHOMONAS: ABNORMAL
TURBIDITY: ABNORMAL
URINE HGB: ABNORMAL
UROBILINOGEN, POC: 0.2
UROBILINOGEN, URINE: NORMAL
WBC UA: ABNORMAL /HPF (ref 0–4)
YEAST: ABNORMAL

## 2020-04-07 PROCEDURE — 99213 OFFICE O/P EST LOW 20 MIN: CPT | Performed by: FAMILY MEDICINE

## 2020-04-07 PROCEDURE — 87086 URINE CULTURE/COLONY COUNT: CPT

## 2020-04-07 PROCEDURE — 87186 SC STD MICRODIL/AGAR DIL: CPT

## 2020-04-07 PROCEDURE — 1036F TOBACCO NON-USER: CPT | Performed by: FAMILY MEDICINE

## 2020-04-07 PROCEDURE — G8417 CALC BMI ABV UP PARAM F/U: HCPCS | Performed by: FAMILY MEDICINE

## 2020-04-07 PROCEDURE — 81002 URINALYSIS NONAUTO W/O SCOPE: CPT | Performed by: FAMILY MEDICINE

## 2020-04-07 PROCEDURE — 4040F PNEUMOC VAC/ADMIN/RCVD: CPT | Performed by: FAMILY MEDICINE

## 2020-04-07 PROCEDURE — 1090F PRES/ABSN URINE INCON ASSESS: CPT | Performed by: FAMILY MEDICINE

## 2020-04-07 PROCEDURE — G8400 PT W/DXA NO RESULTS DOC: HCPCS | Performed by: FAMILY MEDICINE

## 2020-04-07 PROCEDURE — 81001 URINALYSIS AUTO W/SCOPE: CPT

## 2020-04-07 PROCEDURE — 1123F ACP DISCUSS/DSCN MKR DOCD: CPT | Performed by: FAMILY MEDICINE

## 2020-04-07 PROCEDURE — 87077 CULTURE AEROBIC IDENTIFY: CPT

## 2020-04-07 PROCEDURE — G8427 DOCREV CUR MEDS BY ELIG CLIN: HCPCS | Performed by: FAMILY MEDICINE

## 2020-04-07 PROCEDURE — 99211 OFF/OP EST MAY X REQ PHY/QHP: CPT | Performed by: FAMILY MEDICINE

## 2020-04-07 RX ORDER — CIPROFLOXACIN 250 MG/1
250 TABLET, FILM COATED ORAL 2 TIMES DAILY
Qty: 10 TABLET | Refills: 0 | Status: SHIPPED | OUTPATIENT
Start: 2020-04-07 | End: 2020-04-12

## 2020-04-07 ASSESSMENT — ENCOUNTER SYMPTOMS
BACK PAIN: 1
GASTROINTESTINAL NEGATIVE: 1

## 2020-04-09 LAB
CULTURE: ABNORMAL
Lab: ABNORMAL
SPECIMEN DESCRIPTION: ABNORMAL

## 2020-04-13 ENCOUNTER — OFFICE VISIT (OUTPATIENT)
Dept: PODIATRY | Age: 80
End: 2020-04-13
Payer: MEDICARE

## 2020-04-13 VITALS
HEIGHT: 67 IN | WEIGHT: 230 LBS | BODY MASS INDEX: 36.1 KG/M2 | HEART RATE: 80 BPM | RESPIRATION RATE: 20 BRPM | SYSTOLIC BLOOD PRESSURE: 132 MMHG | TEMPERATURE: 98.3 F | DIASTOLIC BLOOD PRESSURE: 80 MMHG

## 2020-04-13 PROCEDURE — 99024 POSTOP FOLLOW-UP VISIT: CPT | Performed by: PODIATRIST

## 2020-04-13 PROCEDURE — 99214 OFFICE O/P EST MOD 30 MIN: CPT

## 2020-04-13 NOTE — PROGRESS NOTES
Subjective:  Patient presents today status post 2 weeks from a Left 2 toe amp. Pain has been under control. Pain is described as none. Patient has been compliant with the off loading device. Patient has no fever or chills no nausea and no vomiting. Objective:  Vascular: DP and PT pulses palpable 2/4, bilateral.  CFT <3 seconds, bilateral.  Hair growth absent to the level of the digits, bilateral.  Edema present, bilateral.  Varicosities present, bilateral. Erythema absent, bilateral. Distal Rubor absent bilateral.  Temperature decreased bilateral. Hyperpigmentation present bilateral.   Neuro: intact  Musculoskeletal: Unremarkable. Derm: Dinorah-incision edema is  absent. Dinorah-incision erythema is  absent. Wound dehiscence is  absent. Assessment:  Status post 2  weeks from L 2 amp    Plan:  Patient had the dressing removed and sutures removed at this visit. Patient will use steri-strips over the incision line as advised. Patient will discontinue the off loading device. Continue ice and elevation. Patient will continue same pain control regimen. X-rays were reviewed. Patient will follow up in 2 weeks. Patient will call clinic immediately if any increase in pain or signs and symptoms of infection arise.

## 2020-04-30 ENCOUNTER — HOSPITAL ENCOUNTER (OUTPATIENT)
Age: 80
Setting detail: SPECIMEN
Discharge: HOME | End: 2020-04-30
Payer: MEDICARE

## 2020-04-30 ENCOUNTER — OFFICE VISIT (OUTPATIENT)
Dept: FAMILY MEDICINE CLINIC | Age: 80
End: 2020-04-30
Payer: MEDICARE

## 2020-04-30 VITALS
WEIGHT: 229 LBS | DIASTOLIC BLOOD PRESSURE: 88 MMHG | SYSTOLIC BLOOD PRESSURE: 126 MMHG | HEART RATE: 82 BPM | HEIGHT: 67 IN | BODY MASS INDEX: 35.94 KG/M2

## 2020-04-30 LAB
ANION GAP SERPL CALCULATED.3IONS-SCNC: 13 MMOL/L (ref 9–17)
CHLORIDE BLD-SCNC: 100 MMOL/L (ref 98–107)
CO2: 26 MMOL/L (ref 20–31)
CREAT SERPL-MCNC: 1.97 MG/DL (ref 0.5–0.9)
GFR AFRICAN AMERICAN: 30 ML/MIN
GFR NON-AFRICAN AMERICAN: 25 ML/MIN
GFR SERPL CREATININE-BSD FRML MDRD: ABNORMAL ML/MIN/{1.73_M2}
GFR SERPL CREATININE-BSD FRML MDRD: ABNORMAL ML/MIN/{1.73_M2}
POTASSIUM SERPL-SCNC: 4.5 MMOL/L (ref 3.7–5.3)
SODIUM BLD-SCNC: 139 MMOL/L (ref 135–144)

## 2020-04-30 PROCEDURE — 36415 COLL VENOUS BLD VENIPUNCTURE: CPT

## 2020-04-30 PROCEDURE — G8427 DOCREV CUR MEDS BY ELIG CLIN: HCPCS | Performed by: FAMILY MEDICINE

## 2020-04-30 PROCEDURE — 1123F ACP DISCUSS/DSCN MKR DOCD: CPT | Performed by: FAMILY MEDICINE

## 2020-04-30 PROCEDURE — 1036F TOBACCO NON-USER: CPT | Performed by: FAMILY MEDICINE

## 2020-04-30 PROCEDURE — G8400 PT W/DXA NO RESULTS DOC: HCPCS | Performed by: FAMILY MEDICINE

## 2020-04-30 PROCEDURE — 4040F PNEUMOC VAC/ADMIN/RCVD: CPT | Performed by: FAMILY MEDICINE

## 2020-04-30 PROCEDURE — G8417 CALC BMI ABV UP PARAM F/U: HCPCS | Performed by: FAMILY MEDICINE

## 2020-04-30 PROCEDURE — 80051 ELECTROLYTE PANEL: CPT

## 2020-04-30 PROCEDURE — 82565 ASSAY OF CREATININE: CPT

## 2020-04-30 PROCEDURE — 99214 OFFICE O/P EST MOD 30 MIN: CPT | Performed by: FAMILY MEDICINE

## 2020-04-30 PROCEDURE — 99211 OFF/OP EST MAY X REQ PHY/QHP: CPT | Performed by: FAMILY MEDICINE

## 2020-04-30 PROCEDURE — 87086 URINE CULTURE/COLONY COUNT: CPT

## 2020-04-30 PROCEDURE — 1090F PRES/ABSN URINE INCON ASSESS: CPT | Performed by: FAMILY MEDICINE

## 2020-04-30 ASSESSMENT — ENCOUNTER SYMPTOMS: SHORTNESS OF BREATH: 0

## 2020-04-30 NOTE — PROGRESS NOTES
afternoon, 4 units in PM with meals Plus sliding scale   Nena Dillard MD   aspirin 81 MG tablet Take 81 mg by mouth daily  Historical Provider, MD   acetaminophen (TYLENOL) 500 MG tablet Take 1,000 mg by mouth 3 times daily   Historical Provider, MD   vitamin B-12 (CYANOCOBALAMIN) 500 MCG tablet Take 500 mcg by mouth daily  Historical Provider, MD   CPAP Machine MISC by Does not apply route  Nena Dillard MD   calcium carbonate (OSCAL) 500 MG TABS tablet Take 600 mg by mouth daily   Historical Provider, MD   Omega-3 Fatty Acids (FISH OIL PO) Take 1,040 mg by mouth daily   Historical Provider, MD   Multiple Vitamins-Minerals (MULTIVITAMIN ADULTS PO) Take by mouth daily   Historical Provider, MD     Allergies   Allergen Reactions    Lisinopril Other (See Comments)     Cough    Penicillins Swelling     Facial swelling    Ranolazine Rash       Health Maintenance   Topic Date Due    DTaP/Tdap/Td vaccine (1 - Tdap) 09/26/1961    Shingles Vaccine (2 of 3) 04/02/2021 (Originally 7/24/2015)    Lipid screen  06/10/2020    Annual Wellness Visit (AWV)  06/13/2020    TSH testing  11/20/2020    Potassium monitoring  04/30/2021    Creatinine monitoring  04/30/2021    DEXA (modify frequency per FRAX score)  Completed    Flu vaccine  Completed    Pneumococcal 65+ yrs at Risk Vaccine  Completed    Hepatitis A vaccine  Aged Out    Hib vaccine  Aged Out    Meningococcal (ACWY) vaccine  Aged Out       Subjective:      Review of Systems   Constitutional: Positive for fatigue. Eyes: Negative for visual disturbance. Respiratory: Negative for shortness of breath. Cardiovascular: Negative for chest pain, palpitations and leg swelling. Genitourinary: Positive for difficulty urinating (burns when she urinates) and vaginal bleeding (has had some spotting of blood). Negative for frequency. Had a UTI a few weeks ago was treated. Neurological: Negative for dizziness.        Objective:     /88 (Site: Left Upper Arm, Position: Sitting, Cuff Size: Large Adult)   Pulse 82   Ht 5' 7.01\" (1.702 m)   Wt 229 lb (103.9 kg)   BMI 35.86 kg/m²     Physical Exam  Constitutional:       General: She is not in acute distress. Appearance: She is obese. She is not ill-appearing. Neck:      Musculoskeletal: No muscular tenderness. Cardiovascular:      Rate and Rhythm: Normal rate. Heart sounds: No murmur. Lymphadenopathy:      Cervical: No cervical adenopathy. Neurological:      Mental Status: She is alert. Lab Results   Component Value Date     04/30/2020    K 4.5 04/30/2020     04/30/2020    CO2 26 04/30/2020     Lab Results   Component Value Date    CREATININE 1.97 (H) 04/30/2020       Assessment:     1. Essential hypertension    2. Type 2 diabetes mellitus with stage 1 chronic kidney disease, with long-term current use of insulin ([facility] [address]    3. Acquired hypothyroidism    4. Dysuria    5. Stage 3 chronic kidney disease ([facility] [address]      No results found for this visit on 04/30/20. Plan:     Orders Placed This Encounter   Procedures    Culture, Urine     Standing Status:   Future     Number of Occurrences:   1     Standing Expiration Date:   4/30/2021     Order Specific Question:   Specify (ex-cath, midstream, cysto, etc)? Answer:   midstream    Electrolyte Panel     Standing Status:   Future     Number of Occurrences:   1     Standing Expiration Date:   4/30/2021    Creatinine, Serum     Standing Status:   Future     Number of Occurrences:   1     Standing Expiration Date:   4/30/2021        Return in about 6 months (around 10/30/2020) for HTN. There are no Patient Instructions on file for this visit. Discussed use, benefit, and side effects of prescribed medications. All patient questions answered. Pt voiced understanding. Instructed to continue current medications, diet and exercise. Patient agreed with treatment plan. Follow up as directed.      Electronically signed by Mandi Mckeon

## 2020-05-02 LAB
CULTURE: NO GROWTH
Lab: NORMAL
SPECIMEN DESCRIPTION: NORMAL

## 2020-05-06 ENCOUNTER — OFFICE VISIT (OUTPATIENT)
Dept: PODIATRY | Age: 80
End: 2020-05-06
Payer: MEDICARE

## 2020-05-06 VITALS
DIASTOLIC BLOOD PRESSURE: 74 MMHG | WEIGHT: 230.8 LBS | BODY MASS INDEX: 36.14 KG/M2 | SYSTOLIC BLOOD PRESSURE: 132 MMHG | RESPIRATION RATE: 20 BRPM | HEART RATE: 68 BPM

## 2020-05-06 PROCEDURE — G8417 CALC BMI ABV UP PARAM F/U: HCPCS | Performed by: PODIATRIST

## 2020-05-06 PROCEDURE — 1123F ACP DISCUSS/DSCN MKR DOCD: CPT | Performed by: PODIATRIST

## 2020-05-06 PROCEDURE — 99213 OFFICE O/P EST LOW 20 MIN: CPT

## 2020-05-06 PROCEDURE — 99213 OFFICE O/P EST LOW 20 MIN: CPT | Performed by: PODIATRIST

## 2020-05-06 PROCEDURE — 4040F PNEUMOC VAC/ADMIN/RCVD: CPT | Performed by: PODIATRIST

## 2020-05-06 PROCEDURE — 11721 DEBRIDE NAIL 6 OR MORE: CPT | Performed by: PODIATRIST

## 2020-05-06 PROCEDURE — 1090F PRES/ABSN URINE INCON ASSESS: CPT | Performed by: PODIATRIST

## 2020-05-06 PROCEDURE — G8427 DOCREV CUR MEDS BY ELIG CLIN: HCPCS | Performed by: PODIATRIST

## 2020-05-06 PROCEDURE — G8400 PT W/DXA NO RESULTS DOC: HCPCS | Performed by: PODIATRIST

## 2020-05-06 PROCEDURE — 1036F TOBACCO NON-USER: CPT | Performed by: PODIATRIST

## 2020-05-06 NOTE — PROGRESS NOTES
Foot Care Worksheet  PCP: Stephanie Hawley MD  Last visit: 04/30/2020        Nail description:  Thick , Yellow , Crumbly , Marked limitation of ambulation     Pain resulting from thickened and dystrophy of nail plate No    Nails involved  Right   1 2 3  4 5 (T5-T9)  Left     1, , 4, 5  (TA-T4)    Q7 1 Class A Finding - Non traumatic amputation of foot Yes
% ointment Apply topically 2 times daily Apply topically 2 times daily. Yes Historical Provider, MD   fluocinonide (LIDEX) 0.05 % cream Apply topically 2 times daily.  6/13/19  Yes Easton Toussaint MD   B-D INS SYR ULTRAFINE 1CC/31G 31G X 5/16\" 1 ML MISC  12/19/18  Yes Historical Provider, MD   fexofenadine (ALLEGRA) 180 MG tablet Take 180 mg by mouth daily   Yes Historical Provider, MD   montelukast (SINGULAIR) 10 MG tablet TAKE ONE TABLET BY MOUTH NIGHTLY 7/24/18  Yes Easton Toussaint MD   fluticasone propionate (FLOVENT DISKUS) 100 MCG/BLIST AEPB inhaler Inhale 1 puff into the lungs daily   Yes Historical Provider, MD   insulin glargine (LANTUS) 100 UNIT/ML injection vial Inject 48 Units into the skin nightly    Yes Historical Provider, MD   fluticasone (FLONASE) 50 MCG/ACT nasal spray 1 spray by Nasal route daily   Yes Historical Provider, MD   isosorbide mononitrate (IMDUR) 60 MG extended release tablet Take 60 mg by mouth every morning   Yes Historical Provider, MD   insulin aspart (NOVOLOG) 100 UNIT/ML injection vial Inject 4 Units into the skin 3 times daily (before meals) Indications: 10 units in AM, 12 units in afternoon, 4 units in PM with meals Plus sliding scale    Yes Easton Toussaint MD   aspirin 81 MG tablet Take 81 mg by mouth daily   Yes Historical Provider, MD   acetaminophen (TYLENOL) 500 MG tablet Take 1,000 mg by mouth 3 times daily    Yes Historical Provider, MD   vitamin B-12 (CYANOCOBALAMIN) 500 MCG tablet Take 500 mcg by mouth daily   Yes Historical Provider, MD   CPAP Machine MISC by Does not apply route   Yes Easton Toussaint MD   calcium carbonate (OSCAL) 500 MG TABS tablet Take 600 mg by mouth daily    Yes Historical Provider, MD   Omega-3 Fatty Acids (FISH OIL PO) Take 1,040 mg by mouth daily    Yes Historical Provider, MD   Multiple Vitamins-Minerals (MULTIVITAMIN ADULTS PO) Take by mouth daily    Yes Historical Provider, MD       Past Surgical History:   Procedure Laterality Date   

## 2020-06-09 LAB
ANION GAP SERPL CALCULATED.3IONS-SCNC: 7.7 MMOL/L
BUN BLDV-MCNC: 37 MG/DL (ref 7–17)
CALCIUM SERPL-MCNC: 8.9 MG/DL (ref 8.4–10.2)
CHLORIDE BLD-SCNC: 103 MMOL/L (ref 98–120)
CO2: 27 MMOL/L (ref 22–31)
CREAT SERPL-MCNC: 2.1 MG/DL (ref 0.5–1)
CREATININE, RANDOM URINE: 72.6 MG/DL (ref 20–370)
GFR CALCULATED: 24.3
GLUCOSE: 249 MG/DL (ref 65–105)
HCT VFR BLD CALC: 34.1 % (ref 37–47)
HEMOGLOBIN: 11 (ref 12–16)
MCH RBC QN AUTO: 29.6 PG (ref 28.5–32.5)
MCHC RBC AUTO-ENTMCNC: 32.4 G/DL (ref 32–37)
MCV RBC AUTO: 91.5 FL (ref 80–94)
PDW BLD-RTO: 12.3 % (ref 8.5–15.5)
PLATELET # BLD: 267.1 THOU/MM3 (ref 130–400)
POTASSIUM SERPL-SCNC: 4.8 MMOL/L (ref 3.6–5)
PROTEIN, URINE: 318 MG/DL (ref 0–12)
PROTEIN/CREAT RATIO URINE RAN: 4.4 (ref 0–2)
RBC: 3.73 M/UL (ref 4.2–5.4)
SODIUM BLD-SCNC: 137 MMOL/L (ref 135–145)
WBC: 7.7 THOU/ML3 (ref 4.8–10.8)

## 2020-08-21 LAB — TSH SERPL DL<=0.05 MIU/L-ACNC: 1.08 MIU/ML (ref 0.49–4.67)

## 2020-09-01 LAB
ANION GAP SERPL CALCULATED.3IONS-SCNC: 8.9 MMOL/L
BUN BLDV-MCNC: 47 MG/DL (ref 7–17)
CALCIUM SERPL-MCNC: 8.2 MG/DL (ref 8.4–10.2)
CHLORIDE BLD-SCNC: 102 MMOL/L (ref 98–120)
CO2: 25 MMOL/L (ref 22–31)
CREAT SERPL-MCNC: 2.6 MG/DL (ref 0.5–1)
CREATININE, RANDOM URINE: 83.4 MG/DL (ref 20–370)
GFR CALCULATED: 19
GLUCOSE: 342 MG/DL (ref 65–105)
HCT VFR BLD CALC: 36.5 % (ref 37–47)
HEMOGLOBIN: 11.7 (ref 12–16)
MCH RBC QN AUTO: 28.8 PG (ref 28.5–32.5)
MCHC RBC AUTO-ENTMCNC: 32.2 G/DL (ref 32–37)
MCV RBC AUTO: 89.5 FL (ref 80–94)
PDW BLD-RTO: 10.9 % (ref 8.5–15.5)
PLATELET # BLD: 313.1 THOU/MM3 (ref 130–400)
POTASSIUM SERPL-SCNC: 4.7 MMOL/L (ref 3.6–5)
PROTEIN, URINE: 225 MG/DL (ref 0–12)
PROTEIN/CREAT RATIO URINE RAN: 2.69 (ref 0–2)
RBC: 4.08 M/UL (ref 4.2–5.4)
SODIUM BLD-SCNC: 136 MMOL/L (ref 135–145)
WBC: 5.6 THOU/ML3 (ref 4.8–10.8)

## 2020-09-03 LAB
CALCIUM IONIZED: 1.15 MMOL/L (ref 1.13–1.33)
PTH INTACT: 41.27 PG/ML (ref 15–65)

## 2020-09-08 LAB
ANION GAP SERPL CALCULATED.3IONS-SCNC: 7.6 MMOL/L
BUN BLDV-MCNC: 35 MG/DL (ref 7–17)
CALCIUM SERPL-MCNC: 9 MG/DL (ref 8.4–10.2)
CHLORIDE BLD-SCNC: 101 MMOL/L (ref 98–120)
CO2: 27 MMOL/L (ref 22–31)
CREAT SERPL-MCNC: 2.3 MG/DL (ref 0.5–1)
GFR CALCULATED: 21.9
GLUCOSE: 223 MG/DL (ref 65–105)
POTASSIUM SERPL-SCNC: 4.9 MMOL/L (ref 3.6–5)
SODIUM BLD-SCNC: 135 MMOL/L (ref 135–145)

## 2020-10-05 ENCOUNTER — TELEPHONE (OUTPATIENT)
Dept: FAMILY MEDICINE CLINIC | Age: 80
End: 2020-10-05

## 2020-10-05 NOTE — TELEPHONE ENCOUNTER
Pt called stating as of over a year ago she is only taking taking one tab a day of the losartan and amlodipine, wants to have that changed for when we send in refills.

## 2020-10-06 RX ORDER — AMLODIPINE BESYLATE 2.5 MG/1
2.5 TABLET ORAL DAILY
Qty: 90 TABLET | Refills: 1 | Status: SHIPPED | OUTPATIENT
Start: 2020-10-06 | End: 2021-04-06

## 2020-10-27 ENCOUNTER — TELEPHONE (OUTPATIENT)
Dept: FAMILY MEDICINE CLINIC | Age: 80
End: 2020-10-27

## 2020-11-02 ENCOUNTER — OFFICE VISIT (OUTPATIENT)
Dept: FAMILY MEDICINE CLINIC | Age: 80
End: 2020-11-02
Payer: MEDICARE

## 2020-11-02 VITALS
WEIGHT: 236.6 LBS | SYSTOLIC BLOOD PRESSURE: 128 MMHG | DIASTOLIC BLOOD PRESSURE: 72 MMHG | BODY MASS INDEX: 37.13 KG/M2 | OXYGEN SATURATION: 98 % | HEART RATE: 68 BPM | HEIGHT: 67 IN

## 2020-11-02 PROBLEM — E66.01 MORBIDLY OBESE (HCC): Status: ACTIVE | Noted: 2020-11-02

## 2020-11-02 PROBLEM — N25.81 SECONDARY HYPERPARATHYROIDISM (HCC): Status: ACTIVE | Noted: 2020-11-02

## 2020-11-02 PROCEDURE — 1036F TOBACCO NON-USER: CPT | Performed by: FAMILY MEDICINE

## 2020-11-02 PROCEDURE — 99213 OFFICE O/P EST LOW 20 MIN: CPT | Performed by: FAMILY MEDICINE

## 2020-11-02 PROCEDURE — 1090F PRES/ABSN URINE INCON ASSESS: CPT | Performed by: FAMILY MEDICINE

## 2020-11-02 PROCEDURE — 1123F ACP DISCUSS/DSCN MKR DOCD: CPT | Performed by: FAMILY MEDICINE

## 2020-11-02 PROCEDURE — 0509F URINE INCON PLAN DOCD: CPT | Performed by: FAMILY MEDICINE

## 2020-11-02 PROCEDURE — G8427 DOCREV CUR MEDS BY ELIG CLIN: HCPCS | Performed by: FAMILY MEDICINE

## 2020-11-02 PROCEDURE — 4040F PNEUMOC VAC/ADMIN/RCVD: CPT | Performed by: FAMILY MEDICINE

## 2020-11-02 PROCEDURE — G8484 FLU IMMUNIZE NO ADMIN: HCPCS | Performed by: FAMILY MEDICINE

## 2020-11-02 PROCEDURE — 99212 OFFICE O/P EST SF 10 MIN: CPT

## 2020-11-02 PROCEDURE — G8400 PT W/DXA NO RESULTS DOC: HCPCS | Performed by: FAMILY MEDICINE

## 2020-11-02 PROCEDURE — G8417 CALC BMI ABV UP PARAM F/U: HCPCS | Performed by: FAMILY MEDICINE

## 2020-11-02 PROCEDURE — G0439 PPPS, SUBSEQ VISIT: HCPCS | Performed by: FAMILY MEDICINE

## 2020-11-02 RX ORDER — OXYBUTYNIN CHLORIDE 10 MG/1
10 TABLET, EXTENDED RELEASE ORAL DAILY
Qty: 90 TABLET | Refills: 1 | Status: SHIPPED | OUTPATIENT
Start: 2020-11-02 | End: 2021-05-03 | Stop reason: SDUPTHER

## 2020-11-02 RX ORDER — LOSARTAN POTASSIUM 50 MG/1
50 TABLET ORAL DAILY
Qty: 90 TABLET | Refills: 1 | Status: SHIPPED | OUTPATIENT
Start: 2020-11-02 | End: 2021-09-27

## 2020-11-02 ASSESSMENT — PATIENT HEALTH QUESTIONNAIRE - PHQ9
SUM OF ALL RESPONSES TO PHQ QUESTIONS 1-9: 1
1. LITTLE INTEREST OR PLEASURE IN DOING THINGS: 0
SUM OF ALL RESPONSES TO PHQ9 QUESTIONS 1 & 2: 1
2. FEELING DOWN, DEPRESSED OR HOPELESS: 1
SUM OF ALL RESPONSES TO PHQ QUESTIONS 1-9: 1
SUM OF ALL RESPONSES TO PHQ QUESTIONS 1-9: 1

## 2020-11-02 ASSESSMENT — ENCOUNTER SYMPTOMS
DIARRHEA: 0
WHEEZING: 0
CONSTIPATION: 1
COUGH: 0
ABDOMINAL PAIN: 0
SHORTNESS OF BREATH: 0

## 2020-11-02 ASSESSMENT — LIFESTYLE VARIABLES: HOW OFTEN DO YOU HAVE A DRINK CONTAINING ALCOHOL: 0

## 2020-11-02 NOTE — PATIENT INSTRUCTIONS
Encourage reattempt 10-15 # weight loss  Increase fluid intake at minimum 64 oz daily water, juice, milk, gatorade; not including coffee,tea, alcohol or pop to help with constipation. May use 2 tabs of 5 mg oxybutinin or the 1- 10 mg tab  Personalized Preventive Plan for Trishon Sa - 11/2/2020  Medicare offers a range of preventive health benefits. Some of the tests and screenings are paid in full while other may be subject to a deductible, co-insurance, and/or copay. Some of these benefits include a comprehensive review of your medical history including lifestyle, illnesses that may run in your family, and various assessments and screenings as appropriate. After reviewing your medical record and screening and assessments performed today your provider may have ordered immunizations, labs, imaging, and/or referrals for you. A list of these orders (if applicable) as well as your Preventive Care list are included within your After Visit Summary for your review. Other Preventive Recommendations:    · A preventive eye exam performed by an eye specialist is recommended every 1-2 years to screen for glaucoma; cataracts, macular degeneration, and other eye disorders. · A preventive dental visit is recommended every 6 months. · Try to get at least 150 minutes of exercise per week or 10,000 steps per day on a pedometer . · Order or download the FREE \"Exercise & Physical Activity: Your Everyday Guide\" from [employer]. Call 2-226.128.4405 or search [employer] online. · You need 7365-5523 mg of calcium and 3330-1693 IU of vitamin D per day. It is possible to meet your calcium requirement with diet alone, but a vitamin D supplement is usually necessary to meet this goal.  · When exposed to the sun, use a sunscreen that protects against both UVA and UVB radiation with an SPF of 30 or greater.  Reapply every 2 to 3 hours or after sweating, drying off with a towel, or swimming. · Always wear a seat belt when traveling in a car. Always wear a helmet when riding a bicycle or motorcycle.

## 2020-11-02 NOTE — PROGRESS NOTES
[place] [address] [address]  Dept: [phone]  Dept Fax: [phone]    Aaron Varma is a 68 y.o. female who presents today for her medical conditions/complaints as noted below. Aaron Varma is c/o of Medicare AWV and Hypertension      HPI:     HPI  Pt here for annual medicare wellness evaluation     HTN follow up doing well  Noted BP cuff not giving accurate results. Only on losartan and amlodipine daily not bid.   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    Bx proven by Dr Gustavo Woo Sleep apnea       Past Surgical History:   Procedure Laterality Date    ANGIOPLASTY  2001    stent x 1    EYE SURGERY      FOOT SURGERY Left 07/31/2009    3rd toe amputation    HYSTERECTOMY, TOTAL ABDOMINAL  1985    TOE AMPUTATION      left 3rd toe    TOE AMPUTATION Left 3/31/2020    Left 2nd TOE AMPUTATION performed by Bk Ochoa DPM at [address] [place] [address]  01/17/2020    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       Family History   Problem Relation Age of Onset    High Blood Pressure Mother     Coronary Art Dis Father        Social History     Tobacco Use    Smoking status: Never Smoker    Smokeless tobacco: Never Used   Substance Use Topics    Alcohol use: No      Current Outpatient Medications   Medication Sig Dispense Refill    simvastatin (ZOCOR) 20 MG tablet Take 1 tablet by mouth nightly 90 tablet 3    amLODIPine (NORVASC) 2.5 MG tablet Take 1 tablet by mouth daily 90 tablet 1    levothyroxine (SYNTHROID) 150 MCG tablet Take 1 tablet by mouth once daily 90 tablet 1    losartan (COZAAR) 50 MG tablet Take 1 tablet by mouth 2 times daily (Patient taking differently: Take 50 mg by mouth daily ) 180 tablet 1    gabapentin (NEURONTIN) 300 MG capsule TAKE 1 CAPSULE BY MOUTH THREE TIMES DAILY 270 capsule 1    oxybutynin (DITROPAN-XL) 5 MG extended release tablet Take 1 tablet by mouth once daily 90 tablet 1    ferrous gluconate 324 (37.5 Fe) MG TABS Take 1 tablet by mouth once daily 90 tablet 3    senna (SENOKOT) 8.6 MG tablet Take 1 tablet by mouth daily 90 tablet 3    B-D INS SYR ULTRAFINE 1CC/31G 31G X 5/16\" 1 ML MISC       fexofenadine (ALLEGRA) 180 MG tablet Take 180 mg by mouth daily      montelukast (SINGULAIR) 10 MG tablet TAKE ONE TABLET BY MOUTH NIGHTLY 90 tablet 1    fluticasone propionate (FLOVENT DISKUS) 100 MCG/BLIST AEPB inhaler Inhale 1 puff into the lungs daily      insulin glargine (LANTUS) 100 UNIT/ML injection vial Inject 50 Units into the skin nightly       fluticasone (FLONASE) 50 MCG/ACT nasal spray 1 spray by Nasal route daily      isosorbide mononitrate (IMDUR) 60 MG extended release tablet Take 60 mg by mouth every morning      insulin aspart (NOVOLOG) 100 UNIT/ML injection vial Inject 4 Units into the skin 3 times daily (before meals) Indications: 12 units in AM, 14 units in afternoon, 16 units in PM with meals Plus sliding scale       aspirin 81 MG tablet Take 81 mg by mouth daily      acetaminophen (TYLENOL) 500 MG tablet Take 1,000 mg by mouth 3 times daily       vitamin B-12 (CYANOCOBALAMIN) 500 MCG tablet Take 500 mcg by mouth daily      CPAP Machine MISC by Does not apply route      calcium carbonate (OSCAL) 500 MG TABS tablet Take 600 mg by mouth daily       Omega-3 Fatty Acids (FISH OIL PO) Take 1,040 mg by mouth daily       Multiple Vitamins-Minerals (MULTIVITAMIN ADULTS PO) Take by mouth daily       triamcinolone (KENALOG) 0.025 % cream Apply topically 2 times daily. No more than 2 weeks max (Patient not taking: Reported on 11/2/2020) 80 g 1    betamethasone dipropionate (DIPROLENE) 0.05 % ointment Apply topically 2 times daily Apply topically 2 times daily.  fluocinonide (LIDEX) 0.05 % cream Apply topically 2 times daily. (Patient not taking: Reported on 11/2/2020) 30 g 1     No current facility-administered medications for this visit. Allergies   Allergen Reactions    Lisinopril Other (See Comments)     Cough    Penicillins Swelling     Facial swelling    Ranolazine Rash       Health Maintenance   Topic Date Due    DTaP/Tdap/Td vaccine (1 - Tdap) 09/26/1961    Annual Wellness Visit (AWV)  05/29/2019    Lipid screen  06/10/2020    Shingles Vaccine (2 of 3) 04/02/2021 (Originally 7/24/2015)    TSH testing  08/21/2021    Potassium monitoring  09/08/2021    Creatinine monitoring  09/08/2021    DEXA (modify frequency per FRAX score)  Completed    Flu vaccine  Completed    Pneumococcal 65+ yrs at Risk Vaccine  Completed    Hepatitis A vaccine  Aged Out    Hib vaccine  Aged Out    Meningococcal (ACWY) vaccine  Aged Out       Subjective:      Review of Systems   Constitutional: Positive for fatigue ( all the time for me. ). Negative for fever. Is taking one tablet of Losartan daily. Script has always said two, please advise. Respiratory: Negative for cough, shortness of breath and wheezing. Cardiovascular: Negative for chest pain, palpitations and leg swelling. Gastrointestinal: Positive for constipation. Negative for abdominal pain and diarrhea. Genitourinary: Negative for frequency and urgency. Neurological: Positive for headaches. Negative for dizziness. Sometimes I do get headaches when my blood sugar goes up. MINI-MENTAL STATUS EXAM ([place])    What is the Year? Season? Date? Day?  Month?   (indicate # correct) 5    Where are we: State? County? Town? Place? Floor? (indicate # correct)        5    Name 3 objects and have pt repeat.                (indicate # correct)        3    Serial 7's or spell \"world\" backwards.                 (indicate # correct)        5    Recall 3 objects                (indicate # correct)        3    Patient names a pencil & a watch                (indicate # correct)        2    Read and obey \"Close your eyes\"                (indicate 1 if correct)     1    Copy intersecting pentagons                (indicate 1 if correct)     1    Write a sentence                (indicate 1 if correct)     1    Repeat \"no ifs, ands, or buts\"                (indicate 1 if correct)     1    Follow 3-stage command                (indicate # done correctly) 3                                            ------------  TOTAL SCORE   (max = 30)                  30      REFERENCE INFORMATION FOR THE CLINICIAN:    \"Low\" score    = 0-23  \"Normal\" score = 24-30        Objective:     /72   Pulse 68   Ht 5' 7\" (1.702 m)   Wt 236 lb 9.6 oz (107.3 kg)   SpO2 98%   BMI 37.06 kg/m²   Physical Exam    Weight back up 6 # after prior significant weight loss. Assessment:      No diagnosis found. Plan:     There are no Patient Instructions on file for this visit. No orders of the defined types were placed in this encounter. No orders of the defined types were placed in this encounter. No follow-ups on file. Discussed use, benefit, and side effects of prescribed medications. All patient questions answered. Pt voiced understanding. Reviewed health maintenance. Instructed to continue current medications, diet and exercise. Patient agreed with treatment plan. Follow up as directed. Electronically signedby Robi Rogers MD on 11/2/2020           Medicare Annual Wellness Visit  Name: Gibson Kraus Date: 11/2/2020   MRN: [id number] Sex: Female   Age: 68 y.o. Ethnicity: Non-/Non    : 1940 Race: Johanna Fraire is here for Medicare AWV and Hypertension    Screenings for behavioral, psychosocial and functional/safety risks, and cognitive dysfunction are all negative except as indicated below. These results, as well as other patient data from the [address] form, are documented in Flowsheets linked to this Encounter. Allergies   Allergen Reactions    Lisinopril Other (See Comments)     Cough    Penicillins Swelling     Facial swelling    Ranolazine Rash       Prior to Visit Medications    Medication Sig Taking?  Authorizing Provider   losartan (COZAAR) 50 MG tablet Take 1 tablet by mouth daily Yes Dalton Robison MD   simvastatin (ZOCOR) 20 MG tablet Take 1 tablet by mouth nightly Yes Dalton Robison MD   amLODIPine (NORVASC) 2.5 MG tablet Take 1 tablet by mouth daily Yes Dalton Robison MD   levothyroxine (SYNTHROID) 150 MCG tablet Take 1 tablet by mouth once daily Yes Dalton Robison MD   gabapentin (NEURONTIN) 300 MG capsule TAKE 1 CAPSULE BY MOUTH THREE TIMES DAILY Yes Dalton Robison MD   oxybutynin (DITROPAN-XL) 5 MG extended release tablet Take 1 tablet by mouth once daily Yes Dalton Robison MD   ferrous gluconate 324 (37.5 Fe) MG TABS Take 1 tablet by mouth once daily Yes Dalton Robison MD   senna (SENOKOT) 8.6 MG tablet Take 1 tablet by mouth daily Yes Dalton Robison MD   B-D INS SYR ULTRAFINE 1CC/31G 31G X /16\" 1 ML MISC  Yes Historical Provider, MD   fexofenadine (ALLEGRA) 180 MG tablet Take 180 mg by mouth daily Yes Historical Provider, MD   montelukast (SINGULAIR) 10 MG tablet TAKE ONE TABLET BY MOUTH NIGHTLY Yes Dalton Robison MD   fluticasone propionate (FLOVENT DISKUS) 100 MCG/BLIST AEPB inhaler Inhale 1 puff into the lungs daily Yes Historical Provider, MD   insulin glargine (LANTUS) 100 UNIT/ML injection vial Inject 50 Units into the skin nightly  Yes Historical Provider, MD   fluticasone (FLONASE) 50 MCG/ACT nasal spray 1 spray by Nasal route daily Yes Historical Provider, MD   isosorbide mononitrate (IMDUR) 60 MG extended release tablet Take 60 mg by mouth every morning Yes Historical Provider, MD   insulin aspart (NOVOLOG) 100 UNIT/ML injection vial Inject 4 Units into the skin 3 times daily (before meals) Indications: 12 units in AM, 14 units in afternoon, 16 units in PM with meals Plus sliding scale  Yes Bj Eduardo MD   aspirin 81 MG tablet Take 81 mg by mouth daily Yes Historical Provider, MD   acetaminophen (TYLENOL) 500 MG tablet Take 1,000 mg by mouth 3 times daily  Yes Historical Provider, MD   vitamin B-12 (CYANOCOBALAMIN) 500 MCG tablet Take 500 mcg by mouth daily Yes Historical Provider, MD   CPAP Machine MISC by Does not apply route Yes Bj Eduardo MD   calcium carbonate (OSCAL) 500 MG TABS tablet Take 600 mg by mouth daily  Yes Historical Provider, MD   Omega-3 Fatty Acids (FISH OIL PO) Take 1,040 mg by mouth daily  Yes Historical Provider, MD   Multiple Vitamins-Minerals (MULTIVITAMIN ADULTS PO) Take by mouth daily  Yes Historical Provider, MD   betamethasone dipropionate (DIPROLENE) 0.05 % ointment Apply topically 2 times daily Apply topically 2 times daily.   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  01/17/2020    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 Family History   Problem Relation Age of Onset    High Blood Pressure Mother     Coronary Art Dis Father        CareTeam (Including outside providers/suppliers regularly involved in providing care):   Patient Care Team:  Sofie Us MD as PCP - General (Family Medicine)  Sofie Us MD as PCP - [facility] Empaneled Provider  Wendy Murphy LPN as LPN    Wt Readings from Last 3 Encounters:   11/02/20 236 lb 9.6 oz (107.3 kg)   09/16/20 230 lb (104.3 kg)   06/17/20 229 lb 6.4 oz (104.1 kg)     Vitals:    11/02/20 1419   BP: 128/72   Pulse: 68   SpO2: 98%   Weight: 236 lb 9.6 oz (107.3 kg)   Height: 5' 7\" (1.702 m)     Body mass index is 37.06 kg/m². Based upon direct observation of the patient, evaluation of cognition reveals recent and remote memory intact. A&O x 3,   Heart RRR without murmur  Lungs CTAB  Abd good BS soft NT  Ext no edema  Neck supple, no thyromegaly, no adenopathy, no bruits      Patient's complete Health Risk Assessment and screening values have been reviewed and are found in Flowsheets. The following problems were reviewed today and where indicated follow up appointments were made and/or referrals ordered. Positive Risk Factor Screenings with Interventions:     Cognitive:  Clock Drawing Test (CDT) Score: Normal  Total Score Interpretation: Positive Mini-Cog  Did the patient refuse to take the cognition test?: No  Cognitive Impairment Interventions:  · Patient declines any further evaluation/treatment for cognitive impairment    General Health and ACP:  General  In general, how would you say your health is?: Good  In the past 7 days, have you experienced any of the following?  New or Increased Pain, New or Increased Fatigue, Loneliness, Social Isolation, Stress or Anger?: None of These  Do you get the social and emotional support that you need?: (!) No  Do you have a Living Will?: (!) No  Advance Directives     Power of  Living Will ACP-Advance Directive ACP-Power of     Not on File Not on File Filed [address] [place] Risk Interventions:  · Loneliness: patient declines any further intervention for this issue  · ongoing    Health Habits/Nutrition:  Health Habits/Nutrition  Do you exercise for at least 20 minutes 2-3 times per week?: (!) No  Have you lost any weight without trying in the past 3 months?: No  Do you eat fewer than 2 meals per day?: No  Have you seen a dentist within the past year?: (!) No  Body mass index: (!) 37.05  Health Habits/Nutrition Interventions:  · Inadequate physical activity:  patient is not ready to increase his/her physical activity level at this time    Hearing/Vision:  No exam data present  Hearing/Vision  Do you or your family notice any trouble with your hearing?: (!) Yes  Do you have difficulty driving, watching TV, or doing any of your daily activities because of your eyesight?: No  Have you had an eye exam within the past year?: Yes  Hearing/Vision Interventions:  · Vision concerns:  patient encouraged to make appointment with his/her eye specialist    Safety:  Safety  Do you have working smoke detectors?: Yes  Have all throw rugs been removed or fastened?: (!) No(has one in the bathroom by the tub.)  Do you have non-slip mats or surfaces in all bathtubs/showers?: (!) No  Do all of your stairways have a railing or banister?: Yes  Are your doorways, halls and stairs free of clutter?: Yes  Do you always fasten your seatbelt when you are in a car?: Yes  Safety Interventions:  · Home safety tips provided    ADL:  ADLs  In the past 7 days, did you need help from others to perform any of the following everyday activities? Eating, dressing, grooming, bathing, toileting, or walking/balance?: None  In the past 7 days, did you need help from others to take care of any of the following?  Laundry, housekeeping, banking/finances, shopping, telephone use, food preparation, transportation, or taking medications?: [employer]  ADL Interventions:  · Patient declines any further evaluation/treatment for this issue    Personalized Preventive Plan   Current Health Maintenance Status  Immunization History   Administered Date(s) Administered    Influenza Vaccine, unspecified formulation 09/12/2016, 10/13/2019    Influenza, High Dose (Fluzone 65 yrs and older) 10/22/2014, 10/19/2015, 09/12/2016, 09/12/2017, 09/19/2018, 10/13/2019    Influenza, High-dose, Quadv, 65 yrs +, IM (Fluzone) 10/11/2020    Pneumococcal Conjugate 13-valent (Iarnrwk22) 02/03/2016    Pneumococcal Polysaccharide ([id number]) 08/19/2009    Zoster Live (Zostavax) 05/29/2015        Health Maintenance   Topic Date Due    DTaP/Tdap/Td vaccine (1 - Tdap) 09/26/1961    Annual Wellness Visit (AWV)  05/29/2019    Lipid screen  06/10/2020    Shingles Vaccine (2 of 3) 04/02/2021 (Originally 7/24/2015)    TSH testing  08/21/2021    Potassium monitoring  09/08/2021    Creatinine monitoring  09/08/2021    DEXA (modify frequency per FRAX score)  Completed    Flu vaccine  Completed    Pneumococcal 65+ yrs at Risk Vaccine  Completed    Hepatitis A vaccine  Aged Out    Hib vaccine  Aged Out    Meningococcal (ACWY) vaccine  Aged Out     Recommendations for [facility] Due: see orders and patient instructions/AVS.  . Recommended screening schedule for the next 5-10 years is provided to the patient in written form: see Patient Angelica Prakash was seen today for medicare awv and hypertension. Diagnoses and all orders for this visit:    Essential hypertension  -     losartan (COZAAR) 50 MG tablet;  Take 1 tablet by mouth daily    Diabetic ulcer of toe of left foot associated with type 2 diabetes mellitus, limited to breakdown of skin (HCC)    Secondary hyperparathyroidism (HCC)    BMI 37.0-37.9, adult    Urinary incontinence, unspecified type

## 2020-11-02 NOTE — PROGRESS NOTES
Medicare Annual Wellness Visit  Name: Kvng Ferguson Date: 2020   MRN: [id number] Sex: Female   Age: 68 y.o. Ethnicity: Non-/Non    : 1940 Race: Patricia Duong is here for Medicare AWV and Hypertension    Screenings for behavioral, psychosocial and functional/safety risks, and cognitive dysfunction are all negative except as indicated below. These results, as well as other patient data from the [address] form, are documented in Flowsheets linked to this Encounter. Allergies   Allergen Reactions    Lisinopril Other (See Comments)     Cough    Penicillins Swelling     Facial swelling    Ranolazine Rash       Prior to Visit Medications    Medication Sig Taking?  Authorizing Provider   losartan (COZAAR) 50 MG tablet Take 1 tablet by mouth daily Yes Jason Tripp, MD   oxybutynin (DITROPAN-XL) 10 MG extended release tablet Take 1 tablet by mouth daily Yes Jason Crimes, MD   simvastatin (ZOCOR) 20 MG tablet Take 1 tablet by mouth nightly Yes Jason Crimes, MD   amLODIPine (NORVASC) 2.5 MG tablet Take 1 tablet by mouth daily Yes Jason Crimes, MD   levothyroxine (SYNTHROID) 150 MCG tablet Take 1 tablet by mouth once daily Yes Jason Crimes, MD   gabapentin (NEURONTIN) 300 MG capsule TAKE 1 CAPSULE BY MOUTH THREE TIMES DAILY Yes Jason Crimes, MD   ferrous gluconate 324 (37.5 Fe) MG TABS Take 1 tablet by mouth once daily Yes Jason Crimes, MD   senna (SENOKOT) 8.6 MG tablet Take 1 tablet by mouth daily Yes Jason Tripp, MD   B-D INS SYR ULTRAFINE 1CC/31G 31G X /16\" 1 ML MISC  Yes Historical Provider, MD   fexofenadine (ALLEGRA) 180 MG tablet Take 180 mg by mouth daily Yes Historical Provider, MD   montelukast (SINGULAIR) 10 MG tablet TAKE ONE TABLET BY MOUTH NIGHTLY Yes Jason Tripp, MD   fluticasone propionate (FLOVENT DISKUS) 100 MCG/BLIST AEPB inhaler Inhale 1 puff into the lungs daily Yes Historical Provider, MD   insulin glargine (LANTUS) 100 Darlene Burgos DPM at [facility]  01/17/2020    Dr Ken Jones- vulvar SCC carcinoma       Family History   Problem Relation Age of Onset    High Blood Pressure Mother     Coronary Art Dis Father        CareTeam (Including outside providers/suppliers regularly involved in providing care):   Patient Care Team:  Sofie Us MD as PCP - General (Family Medicine)  Sofie Us MD as PCP - [facility] Empaneled Provider  Wendy Murphy LPN as LPN    Wt Readings from Last 3 Encounters:   11/02/20 236 lb 9.6 oz (107.3 kg)   09/16/20 230 lb (104.3 kg)   06/17/20 229 lb 6.4 oz (104.1 kg)     Vitals:    11/02/20 1419   BP: 128/72   Pulse: 68   SpO2: 98%   Weight: 236 lb 9.6 oz (107.3 kg)   Height: 5' 7\" (1.702 m)     Body mass index is 37.06 kg/m². Based upon direct observation of the patient, evaluation of cognition reveals recent and remote memory intact. A&O x 3,   Heart RRR without murmur, obese  Lungs CTAB  Abd good BS soft NT  Ext no edema  Neck supple, no thyromegaly, no adenopathy, no bruits      Patient's complete Health Risk Assessment and screening values have been reviewed and are found in Flowsheets. The following problems were reviewed today and where indicated follow up appointments were made and/or referrals ordered. Positive Risk Factor Screenings with Interventions:     Cognitive:  Clock Drawing Test (CDT) Score: Normal  Total Score Interpretation: Positive Mini-Cog  Did the patient refuse to take the cognition test?: No  Cognitive Impairment Interventions:  · Patient declines any further evaluation/treatment for cognitive impairment    General Health and ACP:  General  In general, how would you say your health is?: Good  In the past 7 days, have you experienced any of the following?  New or Increased Pain, New or Increased Fatigue, Loneliness, Social Isolation, Stress or Anger?: None of These  Do you get the social and emotional support that you need?: (!) No  Do you have a Living Will?: (!) No  Advance Directives     Power of [address] Will ACP-Advance Directive ACP-Power of     Not on File Not on [address] Risk Interventions:  · encourage medical power of     Health Habits/Nutrition:  Health Habits/Nutrition  Do you exercise for at least 20 minutes 2-3 times per week?: (!) No  Have you lost any weight without trying in the past 3 months?: No  Do you eat fewer than 2 meals per day?: No  Have you seen a dentist within the past year?: (!) No  Body mass index: (!) 37.05  Health Habits/Nutrition Interventions:  · Inadequate physical activity:  patient is not ready to increase his/her physical activity level at this time  · Dental exam overdue:  patient encouraged to make appointment with his/her dentist    Hearing/Vision:  No exam data present  Hearing/Vision  Do you or your family notice any trouble with your hearing?: (!) Yes  Do you have difficulty driving, watching TV, or doing any of your daily activities because of your eyesight?: No  Have you had an eye exam within the past year?: Yes  Hearing/Vision Interventions:  · Hearing concerns:  patient declines any further evaluation/treatment for hearing issues    Safety:  Safety  Do you have working smoke detectors?: Yes  Have all throw rugs been removed or fastened?: (!) No(has one in the bathroom by the tub.)  Do you have non-slip mats or surfaces in all bathtubs/showers?: (!) No  Do all of your stairways have a railing or banister?: Yes  Are your doorways, halls and stairs free of clutter?: Yes  Do you always fasten your seatbelt when you are in a car?: Yes  Safety Interventions:  · Home safety tips provided    ADL:  ADLs  In the past 7 days, did you need help from others to perform any of the following everyday activities?  Eating, dressing, grooming, bathing, toileting, or walking/balance?: None  In the past 7 days, did you need help from others to take care of any of the adult    Urinary incontinence, unspecified type  -     oxybutynin (DITROPAN-XL) 10 MG extended release tablet;  Take 1 tablet by mouth daily

## 2020-11-05 ENCOUNTER — NURSE ONLY (OUTPATIENT)
Dept: FAMILY MEDICINE CLINIC | Age: 80
End: 2020-11-05
Payer: MEDICARE

## 2020-11-06 VITALS — DIASTOLIC BLOOD PRESSURE: 74 MMHG | SYSTOLIC BLOOD PRESSURE: 128 MMHG

## 2020-12-29 LAB
ANION GAP SERPL CALCULATED.3IONS-SCNC: 8.3 MMOL/L
BUN BLDV-MCNC: 47 MG/DL (ref 7–17)
CALCIUM IONIZED: 1.24 MMOL/L (ref 1.13–1.33)
CALCIUM SERPL-MCNC: 9.3 MG/DL (ref 8.4–10.2)
CHLORIDE BLD-SCNC: 101 MMOL/L (ref 98–120)
CO2: 29 MMOL/L (ref 22–31)
CREAT SERPL-MCNC: 2.7 MG/DL (ref 0.5–1)
CREATININE, RANDOM URINE: 125.4 MG/DL (ref 20–370)
GFR CALCULATED: 18.1
GLUCOSE: 126 MG/DL (ref 65–105)
HCT VFR BLD CALC: 37.6 % (ref 37–47)
HEMOGLOBIN: 12.1 (ref 12–16)
MCH RBC QN AUTO: 29.4 PG (ref 28.5–32.5)
MCHC RBC AUTO-ENTMCNC: 32.2 G/DL (ref 32–37)
MCV RBC AUTO: 91.1 FL (ref 80–94)
PDW BLD-RTO: 12.1 % (ref 8.5–15.5)
PLATELET # BLD: 276.4 THOU/MM3 (ref 130–400)
POTASSIUM SERPL-SCNC: 4.8 MMOL/L (ref 3.6–5)
PROTEIN, URINE: 409 MG/DL (ref 0–12)
PROTEIN/CREAT RATIO URINE RAN: 3.26 (ref 0–2)
PTH INTACT: 25.95 PG/ML (ref 15–65)
RBC: 4.13 M/UL (ref 4.2–5.4)
SODIUM BLD-SCNC: 138 MMOL/L (ref 135–145)
WBC: 7.6 THOU/ML3 (ref 4.8–10.8)

## 2021-01-07 DIAGNOSIS — K59.00 CONSTIPATION, UNSPECIFIED CONSTIPATION TYPE: ICD-10-CM

## 2021-01-07 RX ORDER — SENNA PLUS 8.6 MG/1
1 TABLET ORAL DAILY
Qty: 90 TABLET | Refills: 3 | Status: SHIPPED | OUTPATIENT
Start: 2021-01-07 | End: 2021-05-03 | Stop reason: SDUPTHER

## 2021-01-07 NOTE — TELEPHONE ENCOUNTER
Ladonna Beckford is requesting a refill on the following medication(s):  Requested Prescriptions     Pending Prescriptions Disp Refills    senna (SENOKOT) 8.6 MG tablet 90 tablet 3     Sig: Take 1 tablet by mouth daily       Last Visit Date (If Applicable):  22/0/1278    Next Visit Date:    5/3/2021

## 2021-01-26 ENCOUNTER — OFFICE VISIT (OUTPATIENT)
Dept: FAMILY MEDICINE CLINIC | Age: 81
End: 2021-01-26
Payer: MEDICARE

## 2021-01-26 VITALS
HEIGHT: 67 IN | SYSTOLIC BLOOD PRESSURE: 136 MMHG | BODY MASS INDEX: 37.07 KG/M2 | DIASTOLIC BLOOD PRESSURE: 82 MMHG | OXYGEN SATURATION: 97 % | HEART RATE: 61 BPM | WEIGHT: 236.2 LBS

## 2021-01-26 DIAGNOSIS — N18.4 STAGE 4 CHRONIC KIDNEY DISEASE (HCC): ICD-10-CM

## 2021-01-26 DIAGNOSIS — R07.89 CHEST PRESSURE: Primary | ICD-10-CM

## 2021-01-26 PROCEDURE — 99212 OFFICE O/P EST SF 10 MIN: CPT

## 2021-01-26 PROCEDURE — G8427 DOCREV CUR MEDS BY ELIG CLIN: HCPCS | Performed by: FAMILY MEDICINE

## 2021-01-26 PROCEDURE — 1036F TOBACCO NON-USER: CPT | Performed by: FAMILY MEDICINE

## 2021-01-26 PROCEDURE — 99211 OFF/OP EST MAY X REQ PHY/QHP: CPT

## 2021-01-26 PROCEDURE — G8400 PT W/DXA NO RESULTS DOC: HCPCS | Performed by: FAMILY MEDICINE

## 2021-01-26 PROCEDURE — G8417 CALC BMI ABV UP PARAM F/U: HCPCS | Performed by: FAMILY MEDICINE

## 2021-01-26 PROCEDURE — 1090F PRES/ABSN URINE INCON ASSESS: CPT | Performed by: FAMILY MEDICINE

## 2021-01-26 PROCEDURE — 99213 OFFICE O/P EST LOW 20 MIN: CPT | Performed by: FAMILY MEDICINE

## 2021-01-26 PROCEDURE — 4040F PNEUMOC VAC/ADMIN/RCVD: CPT | Performed by: FAMILY MEDICINE

## 2021-01-26 PROCEDURE — G8484 FLU IMMUNIZE NO ADMIN: HCPCS | Performed by: FAMILY MEDICINE

## 2021-01-26 PROCEDURE — 1123F ACP DISCUSS/DSCN MKR DOCD: CPT | Performed by: FAMILY MEDICINE

## 2021-01-26 RX ORDER — NITROGLYCERIN 0.4 MG/1
0.4 TABLET SUBLINGUAL EVERY 5 MIN PRN
COMMUNITY

## 2021-01-26 ASSESSMENT — ENCOUNTER SYMPTOMS
WHEEZING: 0
COUGH: 0
SHORTNESS OF BREATH: 1

## 2021-01-26 ASSESSMENT — PATIENT HEALTH QUESTIONNAIRE - PHQ9
SUM OF ALL RESPONSES TO PHQ QUESTIONS 1-9: 2
1. LITTLE INTEREST OR PLEASURE IN DOING THINGS: 1
SUM OF ALL RESPONSES TO PHQ QUESTIONS 1-9: 2

## 2021-01-26 NOTE — PROGRESS NOTES
[address] [facility]  [address] [place] [address]  Dept: [phone]  Dept Fax: [phone]    Mirna Davalos is a 68 y.o. female who presents today for her medical conditions/complaints as noted below. Mirna Davalos c/o of ED Follow-up Krystian Delacruz to [facility] ED on 1/18/21 for chest pain. had a heaviness in her chest. took to nitro and it didnt help so she went in. states they told me that my EKG was ok. followed up with cardiology already. will have a stress test on thursday and also getting a holter monitor. )      HPI:     HPI  Pt here for chest pressure follow up from ER evaluation, has seen cardiology since and has planned stress testing this week and Holter monitor after,    No recurrence since then though reporting \" not feeling up to par\"   Not been able to get to crafting products. Only flavored water, stopped diet caffeine free pepsi. Pt using face shield as not able to tolerate mask per pt. Completed cipro 250 mg yesterday for UTI  BP cuff brought in to office.       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    Bx proven by Dr Ne Valdivia Sleep apnea       Past Surgical History:   Procedure Laterality Date    ANGIOPLASTY  2001    stent x 1    EYE SURGERY      FOOT SURGERY Left 07/31/2009    3rd toe amputation    HYSTERECTOMY, TOTAL ABDOMINAL  1985    TOE AMPUTATION      left 3rd toe    TOE AMPUTATION Left 3/31/2020    Left 2nd TOE AMPUTATION performed by Olegario Kaur DPM at [address]  01/17/2020    Dr Susan Marte- vulvar SCC carcinoma       Family History   Problem Relation Age of Onset  High Blood Pressure Mother     Coronary Art Dis Father        Social History     Tobacco Use    Smoking status: Never Smoker    Smokeless tobacco: Never Used   Substance Use Topics    Alcohol use: No      Prior to Visit Medications    Medication Sig Taking? Authorizing Provider   nitroGLYCERIN (NITROSTAT) 0.4 MG SL tablet Place 0.4 mg under the tongue every 5 minutes as needed for Chest pain up to max of 3 total doses. If no relief after 1 dose, call 911. Yes Historical Provider, MD   senna (SENOKOT) 8.6 MG tablet Take 1 tablet by mouth daily Yes Robbi Bello MD   losartan (COZAAR) 50 MG tablet Take 1 tablet by mouth daily Yes Robbi Bello MD   oxybutynin (DITROPAN-XL) 10 MG extended release tablet Take 1 tablet by mouth daily Yes Robbi Bello MD   simvastatin (ZOCOR) 20 MG tablet Take 1 tablet by mouth nightly Yes Robbi Bello MD   amLODIPine (NORVASC) 2.5 MG tablet Take 1 tablet by mouth daily Yes Robbi Bello MD   levothyroxine (SYNTHROID) 150 MCG tablet Take 1 tablet by mouth once daily Yes Robbi Bello MD   gabapentin (NEURONTIN) 300 MG capsule TAKE 1 CAPSULE BY MOUTH THREE TIMES DAILY Yes Robbi Bello MD   ferrous gluconate 324 (37.5 Fe) MG TABS Take 1 tablet by mouth once daily Yes Robbi Bello MD   betamethasone dipropionate (DIPROLENE) 0.05 % ointment Apply topically 2 times daily Apply topically 2 times daily.  Yes Historical Provider, MD PAEZ INS SYR ULTRAFINE 1CC/31G 31G X 5/16\" 1 ML MISC  Yes Historical Provider, MD   fexofenadine (ALLEGRA) 180 MG tablet Take 180 mg by mouth daily Yes Historical Provider, MD   montelukast (SINGULAIR) 10 MG tablet TAKE ONE TABLET BY MOUTH NIGHTLY Yes Robbi Bello MD   fluticasone propionate (FLOVENT DISKUS) 100 MCG/BLIST AEPB inhaler Inhale 1 puff into the lungs daily Yes Historical Provider, MD   insulin glargine (LANTUS) 100 UNIT/ML injection vial Inject 50 Units into the skin nightly  Yes Historical Provider, MD fluticasone (FLONASE) 50 MCG/ACT nasal spray 1 spray by Nasal route daily Yes Historical Provider, MD   isosorbide mononitrate (IMDUR) 60 MG extended release tablet Take 60 mg by mouth every morning Yes Historical Provider, MD   insulin aspart (NOVOLOG) 100 UNIT/ML injection vial Inject 4 Units into the skin 3 times daily (before meals) Indications: 12 units in AM, 14 units in afternoon, 16 units in PM with meals Plus sliding scale  Yes Robbi Bello MD   aspirin 81 MG tablet Take 81 mg by mouth daily Yes Historical Provider, MD   acetaminophen (TYLENOL) 500 MG tablet Take 1,000 mg by mouth 3 times daily  Yes Historical Provider, MD   vitamin B-12 (CYANOCOBALAMIN) 500 MCG tablet Take 500 mcg by mouth daily Yes Historical Provider, MD   CPAP Machine MISC by Does not apply route Yes Robbi Bello MD   calcium carbonate (OSCAL) 500 MG TABS tablet Take 600 mg by mouth daily  Yes Historical Provider, MD   Omega-3 Fatty Acids (FISH OIL PO) Take 1,040 mg by mouth daily  Yes Historical Provider, MD   Multiple Vitamins-Minerals (MULTIVITAMIN ADULTS PO) Take by mouth daily  Yes Historical Provider, MD     Allergies   Allergen Reactions    Lisinopril Other (See Comments)     Cough    Penicillins Swelling     Facial swelling    Ranolazine Rash       Health Maintenance   Topic Date Due    Hepatitis C screen  1940    COVID-19 Vaccine (1 of 2) 09/26/1958    DTaP/Tdap/Td vaccine (1 - Tdap) 09/26/1961    Lipid screen  06/10/2020    Shingles Vaccine (2 of 3) 04/02/2021 (Originally 7/24/2015)    TSH testing  08/21/2021    Annual Wellness Visit (AWV)  11/03/2021    Potassium monitoring  01/15/2022    Creatinine monitoring  01/15/2022    DEXA (modify frequency per FRAX score)  Completed    Flu vaccine  Completed    Pneumococcal 65+ yrs at Risk Vaccine  Completed    Hepatitis A vaccine  Aged Out    Hib vaccine  Aged Out    Meningococcal (ACWY) vaccine  Aged Out       Subjective:      Review of Systems Electronically signed by Iglesia Escobedo MD on 1/26/2021

## 2021-01-26 NOTE — PATIENT INSTRUCTIONS
Encourage covid vaccine when able. May attempt mylanta 30 ml up to 4 times daily if needed for pressure.

## 2021-03-09 RX ORDER — LEVOTHYROXINE SODIUM 0.15 MG/1
TABLET ORAL
Qty: 90 TABLET | Refills: 0 | Status: SHIPPED | OUTPATIENT
Start: 2021-03-09 | End: 2021-06-10

## 2021-04-06 DIAGNOSIS — R00.1 BRADYCARDIA: ICD-10-CM

## 2021-04-06 DIAGNOSIS — I10 ESSENTIAL HYPERTENSION: ICD-10-CM

## 2021-04-06 RX ORDER — AMLODIPINE BESYLATE 2.5 MG/1
TABLET ORAL
Qty: 90 TABLET | Refills: 0 | Status: SHIPPED | OUTPATIENT
Start: 2021-04-06 | End: 2021-07-08

## 2021-04-06 NOTE — TELEPHONE ENCOUNTER
Carlo Adorno is requesting a refill on the following medication(s):  Requested Prescriptions     Pending Prescriptions Disp Refills    amLODIPine (NORVASC) 2.5 MG tablet [Pharmacy Med Name: amLODIPine Besylate 2.5 MG Oral Tablet] 90 tablet 0     Sig: Take 1 tablet by mouth once daily       Last Visit Date (If Applicable):  1/21/2709    Next Visit Date:    5/3/2021

## 2021-04-29 LAB
ANION GAP SERPL CALCULATED.3IONS-SCNC: 9.5 MMOL/L
CHLORIDE BLD-SCNC: 106 MMOL/L (ref 98–120)
CHOLESTEROL/HDL RATIO: 2.66 RATIO (ref 0–4.5)
CHOLESTEROL: 133 MG/DL (ref 50–200)
CO2: 26 MMOL/L (ref 22–31)
CREAT SERPL-MCNC: 3 MG/DL (ref 0.5–1)
FERRITIN: 41.3 NG/DL (ref 10–282)
GFR CALCULATED: 16
HCT VFR BLD CALC: 33.5 % (ref 37–47)
HDLC SERPL-MCNC: 50 MG/DL (ref 36–68)
HEMOGLOBIN: 10.8 (ref 12–16)
IRON SATURATION: 16 % (ref 15–38)
IRON: 45 MG/DL (ref 37–170)
LDL CHOLESTEROL CALCULATED: 64.4 MG/DL (ref 0–160)
MCH RBC QN AUTO: 28.6 PG (ref 28.5–32.5)
MCHC RBC AUTO-ENTMCNC: 32.3 G/DL (ref 32–37)
MCV RBC AUTO: 88.3 FL (ref 80–94)
PDW BLD-RTO: 12.3 % (ref 8.5–15.5)
PLATELET # BLD: 275.5 THOU/MM3 (ref 130–400)
POTASSIUM SERPL-SCNC: 4.7 MMOL/L (ref 3.6–5)
RBC: 3.8 M/UL (ref 4.2–5.4)
SODIUM BLD-SCNC: 141 MMOL/L (ref 135–145)
TOTAL IRON BINDING CAPACITY: 290 MG/DL (ref 261–497)
TRIGL SERPL-MCNC: 93 MG/DL (ref 10–250)
VLDLC SERPL CALC-MCNC: 19 MG/DL (ref 0–50)
WBC: 7.8 THOU/ML3 (ref 4.8–10.8)

## 2021-05-03 ENCOUNTER — OFFICE VISIT (OUTPATIENT)
Dept: FAMILY MEDICINE CLINIC | Age: 81
End: 2021-05-03
Payer: MEDICARE

## 2021-05-03 VITALS
HEIGHT: 67 IN | TEMPERATURE: 98.4 F | DIASTOLIC BLOOD PRESSURE: 68 MMHG | OXYGEN SATURATION: 98 % | HEART RATE: 64 BPM | SYSTOLIC BLOOD PRESSURE: 128 MMHG | WEIGHT: 239.4 LBS | BODY MASS INDEX: 37.57 KG/M2

## 2021-05-03 DIAGNOSIS — E11.22 TYPE 2 DIABETES MELLITUS WITH STAGE 4 CHRONIC KIDNEY DISEASE, WITH LONG-TERM CURRENT USE OF INSULIN (HCC): ICD-10-CM

## 2021-05-03 DIAGNOSIS — I73.9 PERIPHERAL VASCULAR DISEASE (HCC): ICD-10-CM

## 2021-05-03 DIAGNOSIS — N18.4 TYPE 2 DIABETES MELLITUS WITH STAGE 4 CHRONIC KIDNEY DISEASE, WITH LONG-TERM CURRENT USE OF INSULIN (HCC): ICD-10-CM

## 2021-05-03 DIAGNOSIS — D50.9 IRON DEFICIENCY ANEMIA, UNSPECIFIED IRON DEFICIENCY ANEMIA TYPE: ICD-10-CM

## 2021-05-03 DIAGNOSIS — C51.9 VULVAR CANCER (HCC): ICD-10-CM

## 2021-05-03 DIAGNOSIS — L97.521 DIABETIC ULCER OF TOE OF LEFT FOOT ASSOCIATED WITH TYPE 2 DIABETES MELLITUS, LIMITED TO BREAKDOWN OF SKIN (HCC): ICD-10-CM

## 2021-05-03 DIAGNOSIS — Z89.419 HISTORY OF AMPUTATION OF GREAT TOE (HCC): ICD-10-CM

## 2021-05-03 DIAGNOSIS — Z79.4 TYPE 2 DIABETES MELLITUS WITH STAGE 4 CHRONIC KIDNEY DISEASE, WITH LONG-TERM CURRENT USE OF INSULIN (HCC): ICD-10-CM

## 2021-05-03 DIAGNOSIS — K59.00 CONSTIPATION, UNSPECIFIED CONSTIPATION TYPE: ICD-10-CM

## 2021-05-03 DIAGNOSIS — E11.621 DIABETIC ULCER OF TOE OF LEFT FOOT ASSOCIATED WITH TYPE 2 DIABETES MELLITUS, LIMITED TO BREAKDOWN OF SKIN (HCC): ICD-10-CM

## 2021-05-03 DIAGNOSIS — N25.81 SECONDARY HYPERPARATHYROIDISM (HCC): ICD-10-CM

## 2021-05-03 DIAGNOSIS — E66.01 MORBIDLY OBESE (HCC): ICD-10-CM

## 2021-05-03 DIAGNOSIS — I10 ESSENTIAL HYPERTENSION: Primary | ICD-10-CM

## 2021-05-03 DIAGNOSIS — R32 URINARY INCONTINENCE, UNSPECIFIED TYPE: ICD-10-CM

## 2021-05-03 PROCEDURE — G8400 PT W/DXA NO RESULTS DOC: HCPCS | Performed by: FAMILY MEDICINE

## 2021-05-03 PROCEDURE — 1036F TOBACCO NON-USER: CPT | Performed by: FAMILY MEDICINE

## 2021-05-03 PROCEDURE — 0509F URINE INCON PLAN DOCD: CPT | Performed by: FAMILY MEDICINE

## 2021-05-03 PROCEDURE — 1090F PRES/ABSN URINE INCON ASSESS: CPT | Performed by: FAMILY MEDICINE

## 2021-05-03 PROCEDURE — G8417 CALC BMI ABV UP PARAM F/U: HCPCS | Performed by: FAMILY MEDICINE

## 2021-05-03 PROCEDURE — 4040F PNEUMOC VAC/ADMIN/RCVD: CPT | Performed by: FAMILY MEDICINE

## 2021-05-03 PROCEDURE — 99214 OFFICE O/P EST MOD 30 MIN: CPT | Performed by: FAMILY MEDICINE

## 2021-05-03 PROCEDURE — 1123F ACP DISCUSS/DSCN MKR DOCD: CPT | Performed by: FAMILY MEDICINE

## 2021-05-03 PROCEDURE — G8427 DOCREV CUR MEDS BY ELIG CLIN: HCPCS | Performed by: FAMILY MEDICINE

## 2021-05-03 PROCEDURE — 99212 OFFICE O/P EST SF 10 MIN: CPT | Performed by: FAMILY MEDICINE

## 2021-05-03 RX ORDER — FERROUS GLUCONATE 324(37.5)
TABLET ORAL
Qty: 90 TABLET | Refills: 3 | Status: SHIPPED | OUTPATIENT
Start: 2021-05-03 | End: 2021-08-19

## 2021-05-03 RX ORDER — SENNA PLUS 8.6 MG/1
1 TABLET ORAL DAILY
Qty: 90 TABLET | Refills: 3 | Status: SHIPPED | OUTPATIENT
Start: 2021-05-03

## 2021-05-03 RX ORDER — OXYBUTYNIN CHLORIDE 10 MG/1
10 TABLET, EXTENDED RELEASE ORAL DAILY
Qty: 90 TABLET | Refills: 3 | Status: SHIPPED | OUTPATIENT
Start: 2021-05-03 | End: 2022-05-23 | Stop reason: SDUPTHER

## 2021-05-03 ASSESSMENT — ENCOUNTER SYMPTOMS: SHORTNESS OF BREATH: 1

## 2021-05-03 NOTE — PROGRESS NOTES
[facility]  Dept: [phone]  Dept Fax:[phone]    Erma Torres is a 68 y.o. female who presents today for her medical conditions/complaints as noted below. Erma Torres is c/o of Hypertension    HPI:     HPI  Here for follow up of HTN, DM and Hyperlipidemia  Taking all medications regularly  No side effects noted    No other complaint currently  Using sensor for sugars per endocrinology Dr Jonell Bamberger has been vaccinated, questions answered. Spouse did not get vaccine. Pt reports would like to stop med for incontinence. Question if any relation to renal failure. Would prefer to continue. Having shoulder pains more this spring gardening, using tylenol up to 3 times daily. Using 650 mg 3 times daily.       BP Readings from Last 3 Encounters:   05/03/21 128/68   02/17/21 (!) 150/52   01/26/21 136/82          (goal 120/80)    Past Medical History:   Diagnosis Date    CAD (coronary artery disease)     Diabetes mellitus (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    Bx proven by Dr Emy Artis Sleep apnea       Past Surgical History:   Procedure Laterality Date    ANGIOPLASTY  2001    stent x 1    EYE SURGERY      FOOT SURGERY Left 07/31/2009    3rd toe amputation    HYSTERECTOMY, TOTAL ABDOMINAL  1985    TOE AMPUTATION      left 3rd toe    TOE AMPUTATION Left 3/31/2020    Left 2nd TOE AMPUTATION performed by Sisi Doty DPM at [address] [facility]  01/17/2020    Dr Jhonathan Lynn- vulvar SCC carcinoma       Family History   Problem Relation Age of Onset    High Blood Pressure Mother     Coronary Art Dis Father        Social History     Tobacco Use    Smoking status: Never Smoker    Smokeless tobacco: Never Used   Substance Use Topics    Alcohol use: No      Current Outpatient MG tablet Take 1,000 mg by mouth 3 times daily       vitamin B-12 (CYANOCOBALAMIN) 500 MCG tablet Take 500 mcg by mouth daily      CPAP Machine MISC by Does not apply route      calcium carbonate (OSCAL) 500 MG TABS tablet Take 600 mg by mouth daily       Omega-3 Fatty Acids (FISH OIL PO) Take 1,040 mg by mouth daily       Multiple Vitamins-Minerals (MULTIVITAMIN ADULTS PO) Take by mouth daily        No current facility-administered medications for this visit. Allergies   Allergen Reactions    Lisinopril Other (See Comments)     Cough    Penicillins Swelling     Facial swelling    Ranolazine Rash       Health Maintenance   Topic Date Due    Hepatitis C screen  Never done    DTaP/Tdap/Td vaccine (1 - Tdap) Never done    Shingles Vaccine (2 of 3) 07/24/2015    TSH testing  08/21/2021    Annual Wellness Visit (AWV)  11/03/2021    Lipid screen  04/29/2022    Potassium monitoring  04/29/2022    Creatinine monitoring  04/29/2022    DEXA (modify frequency per FRAX score)  Completed    Flu vaccine  Completed    Pneumococcal 65+ yrs at Risk Vaccine  Completed    COVID-19 Vaccine  Completed    Hepatitis A vaccine  Aged Out    Hib vaccine  Aged Out    Meningococcal (ACWY) vaccine  Aged Out       Subjective:      Review of Systems   Constitutional: Negative for fatigue. Respiratory: Positive for shortness of breath. Cardiovascular: Positive for leg swelling (sometimes later in the day this happens). Negative for chest pain and palpitations. Neurological: Negative for dizziness and headaches. Home BP Checks? yes  Medication Compliant? yes    Objective:     /68 (Site: Right Upper Arm, Position: Sitting, Cuff Size: Medium Adult)   Pulse 64   Temp 98.4 °F (36.9 °C) (Temporal)   Ht 5' 7\" (1.702 m)   Wt 239 lb 6.4 oz (108.6 kg)   SpO2 98%   BMI 37.50 kg/m²   Physical Exam  Constitutional:       Comments: Pt extensive history on medication questions and review of diabetes fluctuations. May review with endocrinology   HENT:      Head: Normocephalic. Eyes:      Conjunctiva/sclera: Conjunctivae normal.   Cardiovascular:      Rate and Rhythm: Normal rate and regular rhythm. Heart sounds: No murmur. Pulmonary:      Effort: Pulmonary effort is normal. No respiratory distress. Musculoskeletal:         General: No swelling. Neurological:      Mental Status: She is alert. Psychiatric:         Thought Content: Thought content normal.         Judgment: Judgment normal.         Had holter with mild arrhythmia in March. Having glucose issues dropping in evening    Assessment:      1. Essential hypertension    2. Urinary incontinence, unspecified type    3. History of amputation of great toe ([address]    4. Diabetic ulcer of toe of left foot associated with type 2 diabetes mellitus, limited to breakdown of skin ([address]    5. Vulvar cancer ([address]    6. Peripheral vascular disease ([address]    7. Morbidly obese ([address]    8. Secondary hyperparathyroidism ([address]    9. Type 2 diabetes mellitus with stage 4 chronic kidney disease, with long-term current use of insulin ([address]    10. Constipation, unspecified constipation type    11.  Iron deficiency anemia, unspecified iron deficiency anemia type                     Plan:     Patient Instructions   Contact endocrinology with glucose trends for adjustment    Orders Placed This Encounter   Procedures    Ferritin     Standing Status:   Future     Standing Expiration Date:   5/3/2022    Creatinine, Serum     Standing Status:   Future     Standing Expiration Date:   5/3/2022    Electrolyte Panel     Standing Status:   Future     Standing Expiration Date:   5/3/2022     Orders Placed This Encounter   Medications    ferrous gluconate 324 (37.5 Fe) MG TABS     Sig: Take 1 tablet by mouth once daily     Dispense:  90 tablet     Refill:  3    oxybutynin (DITROPAN-XL) 10 MG extended release tablet     Sig: Take 1 tablet by mouth daily     Dispense:  90 tablet     Refill: 3    senna (SENOKOT) 8.6 MG tablet     Sig: Take 1 tablet by mouth daily     Dispense:  90 tablet     Refill:  3        Return in about 4 months (around 9/3/2021) for HTN, incontinence, PVD. Discussed use, benefit, and side effects of prescribed medications. All patient questions answered. Pt voiced understanding. Reviewed health maintenance. Instructed to continue current medications, diet and exercise. Patient agreed with treatment plan. Follow up as directed.      Electronically signedby Phillip Hahn MD on 5/3/2021

## 2021-05-12 LAB
ALBUMIN: 3.9 G/DL (ref 3.5–5)
ANION GAP SERPL CALCULATED.3IONS-SCNC: 8.7 MMOL/L
BASOPHILS %: 1.12 (ref 0–3)
BASOPHILS ABSOLUTE: 0.09 (ref 0–0.3)
BUN BLDV-MCNC: 51 MG/DL (ref 7–17)
CALCIUM IONIZED: 1.26 MMOL/L (ref 1.13–1.33)
CALCIUM SERPL-MCNC: 9.4 MG/DL (ref 8.4–10.2)
CHLORIDE BLD-SCNC: 102 MMOL/L (ref 98–120)
CO2: 26 MMOL/L (ref 22–31)
CREAT SERPL-MCNC: 2.9 MG/DL (ref 0.5–1)
CREATININE URINE: 105.8 MG/DL (ref 20–370)
EOSINOPHILS %: 2.1 (ref 0–10)
EOSINOPHILS ABSOLUTE: 0.17 (ref 0–1.1)
GFR CALCULATED: 16.7
GLUCOSE: 199 MG/DL (ref 65–105)
HCT VFR BLD CALC: 32 % (ref 37–47)
HEMOGLOBIN: 10.8 (ref 12–16)
LYMPHOCYTE %: 18.46 (ref 20–51.1)
LYMPHOCYTES ABSOLUTE: 1.45 (ref 1–5.5)
MCH RBC QN AUTO: 29.1 PG (ref 28.5–32.5)
MCHC RBC AUTO-ENTMCNC: 33.6 G/DL (ref 32–37)
MCV RBC AUTO: 86.7 FL (ref 80–94)
MONOCYTES %: 8.9 (ref 1.7–9.3)
MONOCYTES ABSOLUTE: 0.7 (ref 0.1–1)
NEUTROPHILS %: 69.43 (ref 42.2–75.2)
NEUTROPHILS ABSOLUTE: 5.46 (ref 2–8.1)
PDW BLD-RTO: 12.1 % (ref 8.5–15.5)
PLATELET # BLD: 255.1 THOU/MM3 (ref 130–400)
POTASSIUM SERPL-SCNC: 4.6 MMOL/L (ref 3.6–5)
PROTEIN, URINE: 349 MG/DL (ref 0–12)
PTH INTACT: 23.53 PG/ML (ref 15–65)
RBC: 3.69 M/UL (ref 4.2–5.4)
SODIUM BLD-SCNC: 137 MMOL/L (ref 135–145)
VITAMIN D 25-HYDROXY: 28.1 NG/ML (ref 30–100)
WBC: 7.9 THOU/ML3 (ref 4.8–10.8)

## 2021-05-19 ENCOUNTER — TELEPHONE (OUTPATIENT)
Dept: FAMILY MEDICINE CLINIC | Age: 81
End: 2021-05-19

## 2021-06-11 DIAGNOSIS — E03.9 ACQUIRED HYPOTHYROIDISM: Primary | ICD-10-CM

## 2021-06-14 LAB — TSH SERPL DL<=0.05 MIU/L-ACNC: 3.29 MIU/ML (ref 0.49–4.67)

## 2021-06-22 ENCOUNTER — TELEPHONE (OUTPATIENT)
Dept: FAMILY MEDICINE CLINIC | Age: 81
End: 2021-06-22

## 2021-06-23 NOTE — TELEPHONE ENCOUNTER
Most recent iron level in past 2 months was normal. Hemoglobin has been low though does not appear to be related to iron. Would certainly continue on ferrous gluconate daily though no benefit likely from more. May also have referral to review low Hgb with hematologist to ensure no other issues noted.  ( This specialist is also the oncologist though no current signs for malignant issues regarding this)  Thanks for checking and have a great week

## 2021-08-04 LAB
ANION GAP SERPL CALCULATED.3IONS-SCNC: 11.6 MMOL/L
BASOPHILS %: 1.11 (ref 0–3)
BASOPHILS ABSOLUTE: 0.09 (ref 0–0.3)
BUN BLDV-MCNC: 56 MG/DL (ref 7–17)
CALCIUM IONIZED: 1.27 MMOL/L (ref 1.13–1.33)
CALCIUM SERPL-MCNC: 9.6 MG/DL (ref 8.4–10.2)
CHLORIDE BLD-SCNC: 105 MMOL/L (ref 98–120)
CO2: 22 MMOL/L (ref 22–31)
CREAT SERPL-MCNC: 2.9 MG/DL (ref 0.5–1)
CREATININE, RANDOM URINE: 47.1 MG/DL (ref 20–370)
EOSINOPHILS %: 1.37 (ref 0–10)
EOSINOPHILS ABSOLUTE: 0.12 (ref 0–1.1)
GFR CALCULATED: 16.7
GLUCOSE: 133 MG/DL (ref 65–105)
HCT VFR BLD CALC: 34.5 % (ref 37–47)
HEMOGLOBIN: 11.5 (ref 12–16)
LYMPHOCYTE %: 16.88 (ref 20–51.1)
LYMPHOCYTES ABSOLUTE: 1.43 (ref 1–5.5)
MCH RBC QN AUTO: 30.1 PG (ref 28.5–32.5)
MCHC RBC AUTO-ENTMCNC: 33.2 G/DL (ref 32–37)
MCV RBC AUTO: 90.7 FL (ref 80–94)
MONOCYTES %: 6.94 (ref 1.7–9.3)
MONOCYTES ABSOLUTE: 0.59 (ref 0.1–1)
NEUTROPHILS %: 73.7 (ref 42.2–75.2)
NEUTROPHILS ABSOLUTE: 6.23 (ref 2–8.1)
PDW BLD-RTO: 12.5 % (ref 8.5–15.5)
PLATELET # BLD: 260.4 THOU/MM3 (ref 130–400)
POTASSIUM SERPL-SCNC: 4.4 MMOL/L (ref 3.6–5)
PROTEIN, URINE: 154 MG/DL (ref 0–12)
PROTEIN/CREAT RATIO URINE RAN: 3.26 (ref 0–2)
PTH INTACT: 21.88 PG/ML (ref 15–65)
RBC: 3.81 M/UL (ref 4.2–5.4)
SODIUM BLD-SCNC: 138 MMOL/L (ref 135–145)
VITAMIN D 25-HYDROXY: 33.9 NG/ML (ref 30–100)
WBC: 8.5 THOU/ML3 (ref 4.8–10.8)

## 2021-08-16 ENCOUNTER — TELEPHONE (OUTPATIENT)
Dept: FAMILY MEDICINE CLINIC | Age: 81
End: 2021-08-16

## 2021-08-16 DIAGNOSIS — Z89.419 HISTORY OF AMPUTATION OF GREAT TOE (HCC): Primary | ICD-10-CM

## 2021-08-16 DIAGNOSIS — L97.521 DIABETIC ULCER OF TOE OF LEFT FOOT ASSOCIATED WITH TYPE 2 DIABETES MELLITUS, LIMITED TO BREAKDOWN OF SKIN (HCC): ICD-10-CM

## 2021-08-16 DIAGNOSIS — M86.9 OSTEOMYELITIS OF TOE OF LEFT FOOT (HCC): ICD-10-CM

## 2021-08-16 DIAGNOSIS — E11.621 DIABETIC ULCER OF TOE OF LEFT FOOT ASSOCIATED WITH TYPE 2 DIABETES MELLITUS, LIMITED TO BREAKDOWN OF SKIN (HCC): ICD-10-CM

## 2021-08-16 NOTE — TELEPHONE ENCOUNTER
Patient came into the office today and said she would like a alfred ordered says the one she has now is very old.

## 2021-08-31 NOTE — PROGRESS NOTES
[address] [address]  Dept: [phone]  Dept Fax: [phone]    Enrique Perry is a 68 y.o. female who presents today for her medical conditions/complaints as noted below. Enrique Perry is c/o of Hypertension (Hypothyroid- Last TSH 6/14/2021= 3.29, Hyperlipidemia- last lipids 4/29/2021, Diabetes mellitus- Last HGB A1C was 6/10/2019= 6.7. She wants to discuss Covid Booster. )      HPI:     HPI  Here for follow up of HTN, DM, Hyperlipidemia and Hypothyroid  Taking all medications regularly  No side effects noted    other complaint currently several time LLQ pain, better past several days. Questions need for covid booster shot at 8 months    Saw eye specialist in July, new issues past 3 weeks  Wearing sensor for sugars and notices if too low.       BP Readings from Last 3 Encounters:   09/02/21 (!) 140/56   05/19/21 (!) 140/62   05/03/21 128/68          (goal 120/80)    Past Medical History:   Diagnosis Date    CAD (coronary artery disease)     Diabetes mellitus ([address]     Hyperlipidemia     Hypertension     Hypothyroidism     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    Bx proven by Dr Benitez Narrow Sleep apnea       Past Surgical History:   Procedure Laterality Date    ANGIOPLASTY  2001    stent x 1    EYE SURGERY      FOOT SURGERY Left 07/31/2009    3rd toe amputation    HYSTERECTOMY, TOTAL ABDOMINAL  1985    TOE AMPUTATION      left 3rd toe    TOE AMPUTATION Left 3/31/2020    Left 2nd TOE AMPUTATION performed by Joaquin Delgado DPM at [address] [facility]  01/17/2020    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       Family History   Problem Relation Age of Onset    High Blood Pressure Mother     Coronary Art Dis Father        Social History     Tobacco Use    Smoking status: Never Smoker    Smokeless tobacco: Never Used   Substance Use Topics    Alcohol use: No      Current Outpatient Medications   Medication Sig Dispense Refill    gabapentin (NEURONTIN) 300 MG capsule TAKE 1 CAPSULE BY MOUTH THREE TIMES DAILY 270 capsule 1    ferrous gluconate 324 (37.5 Fe) MG TABS Take 1 tablet by mouth once daily 90 tablet 3    amLODIPine (NORVASC) 2.5 MG tablet Take 1 tablet by mouth once daily 90 tablet 1    EUTHYROX 150 MCG tablet Take 1 tablet by mouth once daily 90 tablet 1    oxybutynin (DITROPAN-XL) 10 MG extended release tablet Take 1 tablet by mouth daily 90 tablet 3    senna (SENOKOT) 8.6 MG tablet Take 1 tablet by mouth daily 90 tablet 3    nitroGLYCERIN (NITROSTAT) 0.4 MG SL tablet Place 0.4 mg under the tongue every 5 minutes as needed for Chest pain up to max of 3 total doses. If no relief after 1 dose, call 911.       losartan (COZAAR) 50 MG tablet Take 1 tablet by mouth daily 90 tablet 1    simvastatin (ZOCOR) 20 MG tablet Take 1 tablet by mouth nightly 90 tablet 3    fexofenadine (ALLEGRA) 180 MG tablet Take 180 mg by mouth daily      montelukast (SINGULAIR) 10 MG tablet TAKE ONE TABLET BY MOUTH NIGHTLY 90 tablet 1    fluticasone propionate (FLOVENT DISKUS) 100 MCG/BLIST AEPB inhaler Inhale 1 puff into the lungs daily      insulin glargine (LANTUS) 100 UNIT/ML injection vial Inject 30 Units into the skin nightly       fluticasone (FLONASE) 50 MCG/ACT nasal spray 1 spray by Nasal route daily      isosorbide mononitrate (IMDUR) 60 MG extended release tablet Take 60 mg by mouth every morning      insulin aspart (NOVOLOG) 100 UNIT/ML injection vial Inject 4 Units into the skin 3 times daily (before meals) Indications: 12 units in AM, 14 units in afternoon, 16 units in PM with meals Plus sliding scale       aspirin 81 MG tablet Take 81 mg by mouth daily      acetaminophen (TYLENOL) 500 MG tablet Take 1,000 mg by mouth 3 times daily       vitamin B-12 (CYANOCOBALAMIN) 500 MCG tablet Take 500 mcg by mouth daily      CPAP Machine MISC by Does not apply route      calcium carbonate (OSCAL) 500 MG TABS tablet Take 600 mg by mouth daily       Omega-3 Fatty Acids (FISH OIL PO) Take 1,040 mg by mouth daily       Multiple Vitamins-Minerals (MULTIVITAMIN ADULTS PO) Take by mouth daily       B-D INS SYR ULTRAFINE 1CC/31G 31G X 5/16\" 1 ML MISC        No current facility-administered medications for this visit. Allergies   Allergen Reactions    Lisinopril Other (See Comments)     Cough    Penicillins Swelling     Facial swelling    Ranolazine Rash       Health Maintenance   Topic Date Due    Hepatitis C screen  Never done    DTaP/Tdap/Td vaccine (1 - Tdap) Never done    Shingles Vaccine (2 of 3) 07/24/2015    Flu vaccine (1) 09/01/2021    Annual Wellness Visit (AWV)  11/03/2021    Lipid screen  04/29/2022    TSH testing  06/14/2022    Potassium monitoring  08/04/2022    Creatinine monitoring  08/04/2022    DEXA (modify frequency per FRAX score)  Completed    Pneumococcal 65+ yrs at Risk Vaccine  Completed    COVID-19 Vaccine  Completed    Hepatitis A vaccine  Aged Out    Hib vaccine  Aged Out    Meningococcal (ACWY) vaccine  Aged Out       Subjective:      Review of Systems   Eyes:        Right eyelid blister/ painful for 3 weeks   Respiratory: Positive for shortness of breath. Negative for cough. Gastrointestinal: Positive for abdominal pain and constipation. Genitourinary: Negative for difficulty urinating. Blood Sugar Checks? yes  Medication Compliant? yes  Blood Pressure Checks? yes    Objective:     BP (!) 140/56 (Site: Right Upper Arm, Position: Sitting, Cuff Size: Large Adult)   Pulse 58   Temp 97.3 °F (36.3 °C)   Resp 20   Ht 5' 5.5\" (1.664 m)   Wt 225 lb 9.6 oz (102.3 kg)   SpO2 98%   BMI 36.97 kg/m²   Physical Exam  Vitals reviewed. Constitutional:       General: She is not in acute distress. Appearance: She is well-developed. HENT:      Head: Atraumatic.    Eyes:      Conjunctiva/sclera: Conjunctivae normal.   Neck:      Thyroid: No thyromegaly. Vascular: No carotid bruit. Cardiovascular:      Rate and Rhythm: Normal rate and regular rhythm. Heart sounds: No murmur heard. Pulmonary:      Effort: Pulmonary effort is normal.      Breath sounds: Normal breath sounds. Abdominal:      General: Bowel sounds are normal.      Palpations: Abdomen is soft. Musculoskeletal:         General: No swelling (BLE). Cervical back: Neck supple. Neurological:      Mental Status: She is alert and oriented to person, place, and time. Psychiatric:         Thought Content: Thought content normal.         Judgment: Judgment normal.       Lab Results   Component Value Date    LABA1C 7.1 09/02/2021     Lab Results   Component Value Date     08/04/2021    K 4.4 08/04/2021     08/04/2021    CO2 22 08/04/2021     Lab Results   Component Value Date    CREATININE 2.9 (H) 08/04/2021     Lab Results   Component Value Date    WBC 8.5 08/04/2021    HGB 11.5 (L) 08/04/2021    HCT 34.5 (L) 08/04/2021    MCV 90.7 08/04/2021    .4 08/04/2021     Vit D 33.9- glucose 133  No results found for: EAG    Weight down 16 # since May near 5 year hunter. Assessment:      1. Type 2 diabetes mellitus with stage 4 chronic kidney disease, with long-term current use of insulin ([facility]    2. Essential hypertension    3. Acquired hypothyroidism    4. BMI 36.0-36.9,adult    5. Hordeolum externum of right upper eyelid    6. Iron deficiency anemia, unspecified iron deficiency anemia type    7. Diverticulosis    8. Need for influenza vaccination           Plan:     Patient Instructions     Warm compress or heat pad to eye right side 2-3 times daily for 20-30 minutes.   Monitor lower left side with diet changes especially popcorn, nuts, seeds etc  Tdap, shingrix at pharmacy when desired  Flu vaccine when desired office or pharmacyPatient Education        Diverticulosis: Care Instructions  Your Care Instructions  In diverticulosis, pouches called diverticula form in the wall of the large intestine (colon). The pouches do not cause any pain or other symptoms. Most people who have diverticulosis do not know they have it. But the pouches sometimes bleed, and if they become infected, they can cause pain and other symptoms. When this happens, it is called diverticulitis. Diverticula form when pressure pushes the wall of the colon outward at certain weak points. A diet that is too low in fiber can cause diverticula. Follow-up care is a key part of your treatment and safety. Be sure to make and go to all appointments, and call your doctor if you are having problems. It's also a good idea to know your test results and keep a list of the medicines you take. How can you care for yourself at home? · Include fruits, leafy green vegetables, beans, and whole grains in your diet each day. These foods are high in fiber. · Take a fiber supplement, such as Citrucel or Metamucil, every day if needed. Read and follow all instructions on the label. · Drink plenty of fluids. If you have kidney, heart, or liver disease and have to limit fluids, talk with your doctor before you increase the amount of fluids you drink. · Get at least 30 minutes of exercise on most days of the week. Walking is a good choice. You also may want to do other activities, such as running, swimming, cycling, or playing tennis or team sports. · Cut out foods that cause gas, pain, or other symptoms. When should you call for help?    Call your doctor now or seek immediate medical care if:    · You have belly pain.     · You pass maroon or very bloody stools.     · You have a fever.     · You have nausea and vomiting.     · You have unusual changes in your bowel movements or abdominal swelling.     · You have burning pain when you urinate.     · You have abnormal vaginal discharge.     · You have shoulder pain.     · You have cramping pain that does not get better when you have a bowel movement or pass gas.     · You pass gas or stool from your urethra while urinating. Watch closely for changes in your health, and be sure to contact your doctor if you have any problems. Where can you learn more? Go to https://Agilence.Style Jukebox. org and sign in to your Cogito account. Enter Y375 in the [facility] box to learn more about \"Diverticulosis: Care Instructions. \"     If you do not have an account, please click on the \"Sign Up Now\" link. Current as of: February 10, 2021               Content Version: 12.9  © 5382-1641 Healthwise, [employer]. Care instructions adapted under license by [facility] ([facility]). If you have questions about a medical condition or this instruction, always ask your healthcare professional. [place] [address] any warranty or liability for your use of this information. Orders Placed This Encounter   Procedures    POCT Hb A1C (glycosylated hemoglobin)     No orders of the defined types were placed in this encounter. Return in about 4 months (around 1/2/2022) for DM, HTN, hypothyroid. Discussed use, benefit, and side effects of prescribed medications. All patient questions answered. Pt voiced understanding. Reviewed health maintenance Tdap, shingrix and flu vaccines reviewed  Booster on covid discussed. Instructed to continue current medications, diet and exercise. Patient agreed with treatment/plan. Follow up as directed.      Electronicallysigned by Jamal Soriano MD on 9/2/2021

## 2021-09-02 ENCOUNTER — OFFICE VISIT (OUTPATIENT)
Dept: FAMILY MEDICINE CLINIC | Age: 81
End: 2021-09-02
Payer: MEDICARE

## 2021-09-02 VITALS
HEIGHT: 66 IN | OXYGEN SATURATION: 98 % | HEART RATE: 58 BPM | WEIGHT: 225.6 LBS | TEMPERATURE: 97.3 F | RESPIRATION RATE: 20 BRPM | SYSTOLIC BLOOD PRESSURE: 140 MMHG | DIASTOLIC BLOOD PRESSURE: 56 MMHG | BODY MASS INDEX: 36.26 KG/M2

## 2021-09-02 DIAGNOSIS — Z79.4 TYPE 2 DIABETES MELLITUS WITH STAGE 4 CHRONIC KIDNEY DISEASE, WITH LONG-TERM CURRENT USE OF INSULIN (HCC): Primary | ICD-10-CM

## 2021-09-02 DIAGNOSIS — Z23 NEED FOR INFLUENZA VACCINATION: ICD-10-CM

## 2021-09-02 DIAGNOSIS — H00.011 HORDEOLUM EXTERNUM OF RIGHT UPPER EYELID: ICD-10-CM

## 2021-09-02 DIAGNOSIS — K57.90 DIVERTICULOSIS: ICD-10-CM

## 2021-09-02 DIAGNOSIS — E11.22 TYPE 2 DIABETES MELLITUS WITH STAGE 4 CHRONIC KIDNEY DISEASE, WITH LONG-TERM CURRENT USE OF INSULIN (HCC): Primary | ICD-10-CM

## 2021-09-02 DIAGNOSIS — D50.9 IRON DEFICIENCY ANEMIA, UNSPECIFIED IRON DEFICIENCY ANEMIA TYPE: ICD-10-CM

## 2021-09-02 DIAGNOSIS — N18.4 TYPE 2 DIABETES MELLITUS WITH STAGE 4 CHRONIC KIDNEY DISEASE, WITH LONG-TERM CURRENT USE OF INSULIN (HCC): Primary | ICD-10-CM

## 2021-09-02 DIAGNOSIS — E03.9 ACQUIRED HYPOTHYROIDISM: ICD-10-CM

## 2021-09-02 DIAGNOSIS — I10 ESSENTIAL HYPERTENSION: ICD-10-CM

## 2021-09-02 LAB — HBA1C MFR BLD: 7.1 %

## 2021-09-02 PROCEDURE — G8400 PT W/DXA NO RESULTS DOC: HCPCS | Performed by: FAMILY MEDICINE

## 2021-09-02 PROCEDURE — 1036F TOBACCO NON-USER: CPT | Performed by: FAMILY MEDICINE

## 2021-09-02 PROCEDURE — 99214 OFFICE O/P EST MOD 30 MIN: CPT | Performed by: FAMILY MEDICINE

## 2021-09-02 PROCEDURE — G8427 DOCREV CUR MEDS BY ELIG CLIN: HCPCS | Performed by: FAMILY MEDICINE

## 2021-09-02 PROCEDURE — 83036 HEMOGLOBIN GLYCOSYLATED A1C: CPT | Performed by: FAMILY MEDICINE

## 2021-09-02 PROCEDURE — 1123F ACP DISCUSS/DSCN MKR DOCD: CPT | Performed by: FAMILY MEDICINE

## 2021-09-02 PROCEDURE — 3051F HG A1C>EQUAL 7.0%<8.0%: CPT | Performed by: FAMILY MEDICINE

## 2021-09-02 PROCEDURE — 99213 OFFICE O/P EST LOW 20 MIN: CPT | Performed by: FAMILY MEDICINE

## 2021-09-02 PROCEDURE — 1090F PRES/ABSN URINE INCON ASSESS: CPT | Performed by: FAMILY MEDICINE

## 2021-09-02 PROCEDURE — G8417 CALC BMI ABV UP PARAM F/U: HCPCS | Performed by: FAMILY MEDICINE

## 2021-09-02 PROCEDURE — 4040F PNEUMOC VAC/ADMIN/RCVD: CPT | Performed by: FAMILY MEDICINE

## 2021-09-02 SDOH — ECONOMIC STABILITY: FOOD INSECURITY: WITHIN THE PAST 12 MONTHS, THE FOOD YOU BOUGHT JUST DIDN'T LAST AND YOU DIDN'T HAVE MONEY TO GET MORE.: NEVER TRUE

## 2021-09-02 SDOH — ECONOMIC STABILITY: FOOD INSECURITY: WITHIN THE PAST 12 MONTHS, YOU WORRIED THAT YOUR FOOD WOULD RUN OUT BEFORE YOU GOT MONEY TO BUY MORE.: NEVER TRUE

## 2021-09-02 ASSESSMENT — PATIENT HEALTH QUESTIONNAIRE - PHQ9
1. LITTLE INTEREST OR PLEASURE IN DOING THINGS: 0
SUM OF ALL RESPONSES TO PHQ9 QUESTIONS 1 & 2: 0
SUM OF ALL RESPONSES TO PHQ QUESTIONS 1-9: 0
2. FEELING DOWN, DEPRESSED OR HOPELESS: 0

## 2021-09-02 ASSESSMENT — SOCIAL DETERMINANTS OF HEALTH (SDOH): HOW HARD IS IT FOR YOU TO PAY FOR THE VERY BASICS LIKE FOOD, HOUSING, MEDICAL CARE, AND HEATING?: NOT HARD AT ALL

## 2021-09-02 ASSESSMENT — ENCOUNTER SYMPTOMS
ABDOMINAL PAIN: 1
SHORTNESS OF BREATH: 1
CONSTIPATION: 1
COUGH: 0

## 2021-09-02 NOTE — PATIENT INSTRUCTIONS
Warm compress or heat pad to eye right side 2-3 times daily for 20-30 minutes. Monitor lower left side with diet changes especially popcorn, nuts, seeds etc  Tdap, shingrix at pharmacy when desired  Flu vaccine when desired office or pharmacyPatient Education        Diverticulosis: Care Instructions  Your Care Instructions  In diverticulosis, pouches called diverticula form in the wall of the large intestine (colon). The pouches do not cause any pain or other symptoms. Most people who have diverticulosis do not know they have it. But the pouches sometimes bleed, and if they become infected, they can cause pain and other symptoms. When this happens, it is called diverticulitis. Diverticula form when pressure pushes the wall of the colon outward at certain weak points. A diet that is too low in fiber can cause diverticula. Follow-up care is a key part of your treatment and safety. Be sure to make and go to all appointments, and call your doctor if you are having problems. It's also a good idea to know your test results and keep a list of the medicines you take. How can you care for yourself at home? · Include fruits, leafy green vegetables, beans, and whole grains in your diet each day. These foods are high in fiber. · Take a fiber supplement, such as Citrucel or Metamucil, every day if needed. Read and follow all instructions on the label. · Drink plenty of fluids. If you have kidney, heart, or liver disease and have to limit fluids, talk with your doctor before you increase the amount of fluids you drink. · Get at least 30 minutes of exercise on most days of the week. Walking is a good choice. You also may want to do other activities, such as running, swimming, cycling, or playing tennis or team sports. · Cut out foods that cause gas, pain, or other symptoms. When should you call for help?    Call your doctor now or seek immediate medical care if:    · You have belly pain.     · You pass maroon or very bloody stools.     · You have a fever.     · You have nausea and vomiting.     · You have unusual changes in your bowel movements or abdominal swelling.     · You have burning pain when you urinate.     · You have abnormal vaginal discharge.     · You have shoulder pain.     · You have cramping pain that does not get better when you have a bowel movement or pass gas.     · You pass gas or stool from your urethra while urinating. Watch closely for changes in your health, and be sure to contact your doctor if you have any problems. Where can you learn more? Go to https://Barriga FoodspeGroove Clubeb.[employer]. org and sign in to your Food Reporter account. Enter F132 in the [employer] box to learn more about \"Diverticulosis: Care Instructions. \"     If you do not have an account, please click on the \"Sign Up Now\" link. Current as of: February 10, 2021               Content Version: 12.9  © 7027-7665 Healthwise, Bot[employer]. Care instructions adapted under license by [facility] ([facility]). If you have questions about a medical condition or this instruction, always ask your healthcare professional. [place] [address] any warranty or liability for your use of this information.

## 2021-09-28 DIAGNOSIS — I10 ESSENTIAL HYPERTENSION: ICD-10-CM

## 2021-09-28 RX ORDER — LOSARTAN POTASSIUM 50 MG/1
TABLET ORAL
Qty: 180 TABLET | Refills: 1 | Status: SHIPPED | OUTPATIENT
Start: 2021-09-28 | End: 2022-02-16 | Stop reason: SDUPTHER

## 2021-10-18 ENCOUNTER — OFFICE VISIT (OUTPATIENT)
Dept: FAMILY MEDICINE CLINIC | Age: 81
End: 2021-10-18
Payer: MEDICARE

## 2021-10-18 VITALS
RESPIRATION RATE: 24 BRPM | BODY MASS INDEX: 36.38 KG/M2 | TEMPERATURE: 98.1 F | HEART RATE: 50 BPM | DIASTOLIC BLOOD PRESSURE: 72 MMHG | SYSTOLIC BLOOD PRESSURE: 148 MMHG | OXYGEN SATURATION: 99 % | HEIGHT: 66 IN | WEIGHT: 226.4 LBS

## 2021-10-18 DIAGNOSIS — H61.23 BILATERAL IMPACTED CERUMEN: Primary | ICD-10-CM

## 2021-10-18 DIAGNOSIS — H91.93 BILATERAL HEARING LOSS, UNSPECIFIED HEARING LOSS TYPE: ICD-10-CM

## 2021-10-18 PROCEDURE — 1036F TOBACCO NON-USER: CPT | Performed by: NURSE PRACTITIONER

## 2021-10-18 PROCEDURE — 99213 OFFICE O/P EST LOW 20 MIN: CPT | Performed by: NURSE PRACTITIONER

## 2021-10-18 PROCEDURE — 1090F PRES/ABSN URINE INCON ASSESS: CPT | Performed by: NURSE PRACTITIONER

## 2021-10-18 PROCEDURE — G8482 FLU IMMUNIZE ORDER/ADMIN: HCPCS | Performed by: NURSE PRACTITIONER

## 2021-10-18 PROCEDURE — G8417 CALC BMI ABV UP PARAM F/U: HCPCS | Performed by: NURSE PRACTITIONER

## 2021-10-18 PROCEDURE — G8427 DOCREV CUR MEDS BY ELIG CLIN: HCPCS | Performed by: NURSE PRACTITIONER

## 2021-10-18 PROCEDURE — 4040F PNEUMOC VAC/ADMIN/RCVD: CPT | Performed by: NURSE PRACTITIONER

## 2021-10-18 PROCEDURE — 1123F ACP DISCUSS/DSCN MKR DOCD: CPT | Performed by: NURSE PRACTITIONER

## 2021-10-18 PROCEDURE — G8400 PT W/DXA NO RESULTS DOC: HCPCS | Performed by: NURSE PRACTITIONER

## 2021-10-18 RX ORDER — ALBUTEROL SULFATE 90 UG/1
2 AEROSOL, METERED RESPIRATORY (INHALATION) EVERY 6 HOURS PRN
COMMUNITY

## 2021-10-18 NOTE — PROGRESS NOTES
[address] [address] [place] [address]  Dept: [phone]  Dept Fax: [phone]  Loc: [phone]     Visit Date:  10/18/2021    Patient:  Tang Newby  YOB: 1940    HPI:     Chief Complaint   Patient presents with    Hearing Loss     She has appt with Otiologist 10/25/2021. Ears plugged,. [place]  HPI  Worse over 3 months, wax plugged, no pain or discharge, difficult to hear in crowds and no tinnitus noted, no ST, no neck stiffness,  No fever  No known relief or aggravating factors, and no OTC therapy   Medications  Prior to Visit Medications    Medication Sig Taking? Authorizing Provider   albuterol sulfate HFA (VENTOLIN HFA) 108 (90 Base) MCG/ACT inhaler Inhale 2 puffs into the lungs every 6 hours as needed for Wheezing Yes Historical Provider, MD   simvastatin (ZOCOR) 20 MG tablet Take 1 tablet by mouth nightly Yes Caroline Rodrigez MD   losartan (COZAAR) 50 MG tablet Take 1 tablet by mouth twice daily Yes Caroline Rodrigez MD   gabapentin (NEURONTIN) 300 MG capsule TAKE 1 CAPSULE BY MOUTH THREE TIMES DAILY Yes Caroline Rodrigez MD   ferrous gluconate 324 (37.5 Fe) MG TABS Take 1 tablet by mouth once daily Yes Caroline Rodrigez MD   amLODIPine (NORVASC) 2.5 MG tablet Take 1 tablet by mouth once daily Yes Caroline Rodrigez MD   EUTHYROX 150 MCG tablet Take 1 tablet by mouth once daily Yes Caroline Rodrigez MD   oxybutynin (DITROPAN-XL) 10 MG extended release tablet Take 1 tablet by mouth daily Yes Caroline Rodrigez MD   senna (SENOKOT) 8.6 MG tablet Take 1 tablet by mouth daily Yes Caroline Rodrigez MD   nitroGLYCERIN (NITROSTAT) 0.4 MG SL tablet Place 0.4 mg under the tongue every 5 minutes as needed for Chest pain up to max of 3 total doses. If no relief after 1 dose, call 911.  Yes Historical Provider, MD   B-D INS SYR ULTRAFINE 1CC/31G 31G X 5/16\" 1 ML MISC  Yes Historical Provider, MD   fexofenadine (ALLEGRA) 180 MG tablet Take 180 mg by mouth daily Yes Historical Provider, MD   montelukast (SINGULAIR) 10 MG tablet TAKE ONE TABLET BY MOUTH NIGHTLY Yes Coco Martinez MD   fluticasone propionate (FLOVENT DISKUS) 100 MCG/BLIST AEPB inhaler Inhale 1 puff into the lungs daily Yes Historical Provider, MD   insulin glargine (LANTUS) 100 UNIT/ML injection vial Inject 30 Units into the skin nightly  Yes Historical Provider, MD   fluticasone (FLONASE) 50 MCG/ACT nasal spray 1 spray by Nasal route daily Yes Historical Provider, MD   isosorbide mononitrate (IMDUR) 60 MG extended release tablet Take 60 mg by mouth every morning Yes Historical Provider, MD   insulin aspart (NOVOLOG) 100 UNIT/ML injection vial Inject 4 Units into the skin 3 times daily (before meals) Indications: 12 units in AM, 14 units in afternoon, 16 units in PM with meals Plus sliding scale  Yes Coco Martinez MD   aspirin 81 MG tablet Take 81 mg by mouth daily Yes Historical Provider, MD   acetaminophen (TYLENOL) 500 MG tablet Take 1,000 mg by mouth 3 times daily  Yes Historical Provider, MD   vitamin B-12 (CYANOCOBALAMIN) 500 MCG tablet Take 500 mcg by mouth daily Yes Historical Provider, MD   CPAP Machine MISC by Does not apply route Yes Coco Martinez MD   calcium carbonate (OSCAL) 500 MG TABS tablet Take 600 mg by mouth daily  Yes Historical Provider, MD   Omega-3 Fatty Acids (FISH OIL PO) Take 1,040 mg by mouth daily  Yes Historical Provider, MD   Multiple Vitamins-Minerals (MULTIVITAMIN ADULTS PO) Take by mouth daily  Yes Historical Provider, MD        The patient is allergic to lisinopril, penicillins, and ranolazine. Past Medical History  Lisa Caban  has a past medical history of CAD (coronary artery disease), Diabetes mellitus ([address] Hyperlipidemia, Hypertension, Hypothyroidism, Lichen sclerosus et atrophicus of the vulva, Osteoarthritis, and Sleep apnea.     Past Surgical History  The patient  has a past surgical history that includes Tonsillectomy (1949); angioplasty (2001); Foot surgery (Left, 07/31/2009); eye surgery; Toe amputation; Vulvectomy (01/17/2020); Hysterectomy, total abdominal (1985); and Toe amputation (Left, 3/31/2020). Family History  This patient's family history includes Coronary Art Dis in her father; High Blood Pressure in her mother. Social History  Taya Leon  reports that she has never smoked. She has never used smokeless tobacco. She reports that she does not drink alcohol and does not use drugs. Health Maintenance:    Health Maintenance   Topic Date Due    Hepatitis C screen  Never done    Annual Wellness Visit (AWV)  11/03/2021    Shingles Vaccine (3 of 3) 11/08/2021    Lipid screen  04/29/2022    TSH testing  06/14/2022    Potassium monitoring  08/04/2022    Creatinine monitoring  08/04/2022    DTaP/Tdap/Td vaccine (2 - Td or Tdap) 09/13/2031    DEXA (modify frequency per FRAX score)  Completed    Flu vaccine  Completed    Pneumococcal 65+ yrs at Risk Vaccine  Completed    COVID-19 Vaccine  Completed    Hepatitis A vaccine  Aged Out    Hib vaccine  Aged Out    Meningococcal (ACWY) vaccine  Aged Out       Subjective:      Review of Systems   Constitutional: Negative for chills, fatigue and fever. HENT: Negative for congestion, ear discharge, ear pain, postnasal drip, rhinorrhea, sinus pressure, sinus pain and sore throat. Respiratory: Negative for cough, chest tightness, shortness of breath and wheezing. Cardiovascular: Negative for chest pain, palpitations and leg swelling. Gastrointestinal: Negative for abdominal pain. Neurological: Negative for dizziness. Objective:     BP (!) 148/72 (Site: Left Upper Arm, Position: Sitting, Cuff Size: Large Adult)   Pulse 50   Temp 98.1 °F (36.7 °C)   Resp 24   Ht 5' 6\" (1.676 m)   Wt 226 lb 6.4 oz (102.7 kg)   SpO2 99%   BMI 36.54 kg/m²     Physical Exam  Vitals and nursing note reviewed.    Constitutional:       Appearance: Normal appearance. HENT:      Head: Normocephalic. Right Ear: Tympanic membrane, ear canal and external ear normal. There is impacted cerumen. Left Ear: Tympanic membrane, ear canal and external ear normal. There is impacted cerumen. Nose: Nose normal.   Eyes:      Conjunctiva/sclera: Conjunctivae normal.   Cardiovascular:      Rate and Rhythm: Normal rate and regular rhythm. Heart sounds: Normal heart sounds, S1 normal and S2 normal. No murmur heard. Pulmonary:      Effort: Pulmonary effort is normal. No respiratory distress. Breath sounds: Normal breath sounds. No wheezing. Abdominal:      General: Bowel sounds are normal.      Palpations: Abdomen is soft. Musculoskeletal:      Right lower leg: No edema. Left lower leg: No edema. Skin:     General: Skin is warm. Neurological:      Mental Status: She is alert. Psychiatric:         Attention and Perception: Attention normal.         Mood and Affect: Mood normal.         Behavior: Behavior normal. Behavior is cooperative. Thought Content: Thought content normal.         Judgment: Judgment normal.         Labs Reviewed 10/18/2021:    Lab Results   Component Value Date    WBC 8.5 08/04/2021    HGB 11.5 (L) 08/04/2021    HCT 34.5 (L) 08/04/2021    .4 08/04/2021    CHOL 133 04/29/2021    TRIG 93 04/29/2021    HDL 50 04/29/2021    ALT 17 01/15/2021    AST 28 01/15/2021     08/04/2021    K 4.4 08/04/2021     08/04/2021    CREATININE 2.9 (H) 08/04/2021    BUN 56 (H) 08/04/2021    CO2 22 08/04/2021    TSH 3.29 06/14/2021    LABA1C 7.1 09/02/2021    LABMICR 1140 06/29/2018       Assessment/Plan      1. Bilateral hearing loss, unspecified hearing loss type  See audiology as scheduled    2. Bilateral impacted cerumen  Lavage of wax today and ears cleaned with curette       Return in about 4 days (around 10/22/2021). Patient given educational materials - see patient instructions.   Discussed use, benefit, and side effects of prescribed medications. All patient questions answered. Pt voiced understanding. Reviewed health maintenance.        Electronically signed TESFAYE Zepeda CNP on 10/18/2021 at 10:42 AM

## 2021-10-19 PROBLEM — H91.93 BILATERAL HEARING LOSS: Status: ACTIVE | Noted: 2021-10-19

## 2021-10-19 PROBLEM — H61.23 BILATERAL IMPACTED CERUMEN: Status: ACTIVE | Noted: 2021-10-19

## 2021-10-19 ASSESSMENT — ENCOUNTER SYMPTOMS
SORE THROAT: 0
SHORTNESS OF BREATH: 0
WHEEZING: 0
CHEST TIGHTNESS: 0
SINUS PAIN: 0
RHINORRHEA: 0
COUGH: 0
SINUS PRESSURE: 0
ABDOMINAL PAIN: 0

## 2021-12-01 LAB
ALBUMIN: 3.8 G/DL (ref 3.5–5)
ANION GAP SERPL CALCULATED.3IONS-SCNC: 13.8 MMOL/L
BASOPHILS %: 1.15 (ref 0–3)
BASOPHILS ABSOLUTE: 0.08 (ref 0–0.3)
BUN BLDV-MCNC: 52 MG/DL (ref 7–17)
CALCIUM SERPL-MCNC: 10 MG/DL (ref 8.4–10.2)
CHLORIDE BLD-SCNC: 106 MMOL/L (ref 98–120)
CO2: 21 MMOL/L (ref 22–31)
CREAT SERPL-MCNC: 2.8 MG/DL (ref 0.5–1)
CREATININE, RANDOM URINE: 143.9 MG/DL (ref 20–370)
EOSINOPHILS %: 2.27 (ref 0–10)
EOSINOPHILS ABSOLUTE: 0.16 (ref 0–1.1)
GFR CALCULATED: 17.3
GLUCOSE: 185 MG/DL (ref 65–105)
HCT VFR BLD CALC: 29.3 % (ref 37–47)
HEMOGLOBIN: 10.9 (ref 12–16)
LYMPHOCYTE %: 22.29 (ref 20–51.1)
LYMPHOCYTES ABSOLUTE: 1.59 (ref 1–5.5)
MAGNESIUM: 1.8 MG/DL (ref 1.6–2.3)
MCH RBC QN AUTO: 30.7 PG (ref 28.5–32.5)
MCHC RBC AUTO-ENTMCNC: 37.2 G/DL (ref 32–37)
MCV RBC AUTO: 82.3 FL (ref 80–94)
MICROALBUMIN UR-MCNC: > 114 MG/DL (ref 0–1.7)
MONOCYTES %: 9.3 (ref 1.7–9.3)
MONOCYTES ABSOLUTE: 0.66 (ref 0.1–1)
NEUTROPHILS %: 64.98 (ref 42.2–75.2)
NEUTROPHILS ABSOLUTE: 4.62 (ref 2–8.1)
PDW BLD-RTO: 11.3 % (ref 8.5–15.5)
PHOSPHORUS: 5.3 MG/DL (ref 2.5–4.5)
PLATELET # BLD: 276.7 THOU/MM3 (ref 130–400)
POTASSIUM SERPL-SCNC: 4.8 MMOL/L (ref 3.6–5)
PROTEIN, URINE: 474 MG/DL (ref 0–12)
RBC: 3.56 M/UL (ref 4.2–5.4)
SODIUM BLD-SCNC: 136 MMOL/L (ref 135–145)
VITAMIN D 25-HYDROXY: 35.1 NG/ML (ref 30–100)
WBC: 7.1 THOU/ML3 (ref 4.8–10.8)

## 2021-12-02 LAB
CALCIUM IONIZED: 1.35 MMOL/L (ref 1.13–1.33)
PTH INTACT: 14.61 PG/ML (ref 15–65)

## 2021-12-06 NOTE — PROGRESS NOTES
[facility]  [address] [facility]  Dept: [phone]  Dept Fax: [phone]    Yesica Ricks is a 78 y.o. female who presents today for her medical conditions/complaints as noted below. Yesica Ricks is c/o of 3 Month Follow-Up; Diabetes; and Hypertension            HPI:     HPI  Pt here for HTN and noted seeing endocrinology for DM. Here also to have toe evaluation.     Home BP's 165- 195 /60 - 94 with new cuff checked per pt      BP Readings from Last 3 Encounters:   01/18/18 138/80   01/02/18 (!) 152/78   12/20/17 126/68          (goal 120/80)    Past Medical History:   Diagnosis Date    CAD (coronary artery disease)     Diabetes mellitus ([facility]     Hyperlipidemia     Hypertension     Hypothyroidism     Osteoarthritis     Sleep apnea       Past Surgical History:   Procedure Laterality Date    ANGIOPLASTY      EYE SURGERY      FOOT SURGERY Left 07/31/2009    3rd toe amputation    HYSTERECTOMY      TONSILLECTOMY         Family History   Problem Relation Age of Onset    High Blood Pressure Mother     Coronary Art Dis Father        Social History   Substance Use Topics    Smoking status: Never Smoker    Smokeless tobacco: Never Used    Alcohol use No      Current Outpatient Prescriptions   Medication Sig Dispense Refill    irbesartan (AVAPRO) 150 MG tablet Take 1 tablet by mouth daily 90 tablet 1    simvastatin (ZOCOR) 20 MG tablet TAKE ONE TABLET BY MOUTH NIGHTLY 30 tablet 11    montelukast (SINGULAIR) 10 MG tablet TAKE ONE TABLET BY MOUTH NIGHTLY 30 tablet 0    levothyroxine (SYNTHROID) 150 MCG tablet TAKE ONE TABLET BY MOUTH ONCE DAILY 30 tablet 0    metFORMIN (GLUCOPHAGE) 1000 MG tablet Take 1 tablet by mouth 2 times daily 180 tablet 1    gabapentin (NEURONTIN) 300 MG capsule Take 1 capsule by mouth 3 times daily 90 capsule 5    fluticasone propionate (FLOVENT DISKUS) 100 MCG/BLIST AEPB inhaler Inhale 1 puff into the Completed    Pneumococcal low/med risk  Completed       Subjective:      Review of Systems   Constitutional: Negative for chills and unexpected weight change. Has a form that needs completed today as well. Eyes: Negative for visual disturbance. Respiratory: Negative for shortness of breath. Cardiovascular: Negative for chest pain, palpitations and leg swelling. Endocrine:        Dr. Nancy Xiao for DM   Genitourinary: Negative for frequency. Skin:        Left great toe wound continues   Neurological: Negative for dizziness. Blood Sugar Checks? Yes, running over 200 mostly  Medication Compliant? yes  Blood Pressure Checks? Yes, running a little high at home, 150-170/70-80    Objective:     /80   Pulse 80   Wt 247 lb (112 kg)   BMI 38.98 kg/m²   Physical Exam   Constitutional: She is oriented to person, place, and time. She appears well-developed and well-nourished. No distress. HENT:   Head: Atraumatic. Neck: Neck supple. Carotid bruit is not present. No thyromegaly present. Cardiovascular: Normal rate and regular rhythm. No murmur heard. Pulmonary/Chest: Effort normal and breath sounds normal.   Neurological: She is alert and oriented to person, place, and time. Skin:   Ulceration on toe 5 x 6 mm though proximal and distal regions with very minimal depth central 1.5 mm deeper and debrided white debris     Lab Results   Component Value Date    LABA1C 8.2 01/18/2018     No results found for: EAG    Form completed for change of insurance through auto as requested. Assessment:      1. Essential hypertension    2. 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   Orders Placed This Encounter   Procedures    POCT glycosylated hemoglobin (Hb A1C)     Orders Placed This Encounter   Medications    irbesartan (AVAPRO) 150 MG tablet Sig: Take 1 tablet by mouth daily     Dispense:  90 tablet     Refill:  1     New dose        Return in about 2 weeks (around 2/1/2018) for toe; 3 month HTN f/u. Discussed use, benefit, and side effects of prescribed medications. All patient questions answered. Pt voiced understanding. Reviewed health maintenance. Instructed to continue current medications, diet and exercise. Patient agreed with treatment plan. Follow up as directed.      Electronically signed by Dewayne Anaya MD on 1/18/2018 No

## 2022-01-06 ENCOUNTER — TELEPHONE (OUTPATIENT)
Dept: FAMILY MEDICINE CLINIC | Age: 82
End: 2022-01-06

## 2022-01-06 RX ORDER — LEVOTHYROXINE SODIUM 0.15 MG/1
150 TABLET ORAL DAILY
Qty: 30 TABLET | Refills: 0 | Status: SHIPPED | OUTPATIENT
Start: 2022-01-06 | End: 2022-02-07 | Stop reason: SDUPTHER

## 2022-01-06 NOTE — TELEPHONE ENCOUNTER
PT called stating she sent this request in on Monday to have it refilled. PT was suppose to see you on Monday Jan 3rd but we called and rescheduled it for Jan. 13th. PT is very upset and needs this medication before the 13th. Are you able to fill this prescription for her until she can see you? PT wants a call back.        Soco Estevez is requesting a refill on the following medication(s):  Requested Prescriptions     Pending Prescriptions Disp Refills    levothyroxine (EUTHYROX) 150 MCG tablet 90 tablet 1       Last Visit Date (If Applicable):  16/69/3577    Next Visit Date:    1/13/2022

## 2022-01-13 ENCOUNTER — OFFICE VISIT (OUTPATIENT)
Dept: FAMILY MEDICINE CLINIC | Age: 82
End: 2022-01-13
Payer: MEDICARE

## 2022-01-13 VITALS
TEMPERATURE: 97.9 F | SYSTOLIC BLOOD PRESSURE: 146 MMHG | RESPIRATION RATE: 16 BRPM | HEART RATE: 63 BPM | DIASTOLIC BLOOD PRESSURE: 82 MMHG | OXYGEN SATURATION: 100 % | BODY MASS INDEX: 34.7 KG/M2 | WEIGHT: 215 LBS

## 2022-01-13 DIAGNOSIS — Z91.81 AT HIGH RISK FOR FALLS: ICD-10-CM

## 2022-01-13 DIAGNOSIS — M65.352 TRIGGER LITTLE FINGER OF LEFT HAND: ICD-10-CM

## 2022-01-13 DIAGNOSIS — N18.31 ANEMIA DUE TO STAGE 3A CHRONIC KIDNEY DISEASE (HCC): ICD-10-CM

## 2022-01-13 DIAGNOSIS — L98.9 SKIN LESION: ICD-10-CM

## 2022-01-13 DIAGNOSIS — E11.22 TYPE 2 DIABETES MELLITUS WITH STAGE 4 CHRONIC KIDNEY DISEASE, WITH LONG-TERM CURRENT USE OF INSULIN (HCC): ICD-10-CM

## 2022-01-13 DIAGNOSIS — N18.4 TYPE 2 DIABETES MELLITUS WITH STAGE 4 CHRONIC KIDNEY DISEASE, WITH LONG-TERM CURRENT USE OF INSULIN (HCC): ICD-10-CM

## 2022-01-13 DIAGNOSIS — R00.1 BRADYCARDIA: ICD-10-CM

## 2022-01-13 DIAGNOSIS — I10 ESSENTIAL HYPERTENSION: Primary | ICD-10-CM

## 2022-01-13 DIAGNOSIS — E03.9 ACQUIRED HYPOTHYROIDISM: ICD-10-CM

## 2022-01-13 DIAGNOSIS — N18.31 STAGE 3A CHRONIC KIDNEY DISEASE (HCC): ICD-10-CM

## 2022-01-13 DIAGNOSIS — Z79.4 TYPE 2 DIABETES MELLITUS WITH STAGE 4 CHRONIC KIDNEY DISEASE, WITH LONG-TERM CURRENT USE OF INSULIN (HCC): ICD-10-CM

## 2022-01-13 DIAGNOSIS — D50.9 IRON DEFICIENCY ANEMIA, UNSPECIFIED IRON DEFICIENCY ANEMIA TYPE: ICD-10-CM

## 2022-01-13 DIAGNOSIS — D63.1 ANEMIA DUE TO STAGE 3A CHRONIC KIDNEY DISEASE (HCC): ICD-10-CM

## 2022-01-13 DIAGNOSIS — Z00.00 ANNUAL PHYSICAL EXAM: ICD-10-CM

## 2022-01-13 DIAGNOSIS — N25.81 SECONDARY HYPERPARATHYROIDISM (HCC): ICD-10-CM

## 2022-01-13 DIAGNOSIS — E78.49 FAMILIAL COMBINED HYPERLIPIDEMIA: ICD-10-CM

## 2022-01-13 PROCEDURE — 99213 OFFICE O/P EST LOW 20 MIN: CPT

## 2022-01-13 PROCEDURE — 99214 OFFICE O/P EST MOD 30 MIN: CPT | Performed by: FAMILY MEDICINE

## 2022-01-13 RX ORDER — FLASH GLUCOSE SENSOR
KIT MISCELLANEOUS
COMMUNITY

## 2022-01-13 RX ORDER — AMLODIPINE BESYLATE 2.5 MG/1
2.5 TABLET ORAL DAILY
Qty: 90 TABLET | Refills: 0 | Status: SHIPPED | OUTPATIENT
Start: 2022-01-13 | End: 2022-04-01

## 2022-01-13 ASSESSMENT — ENCOUNTER SYMPTOMS
ABDOMINAL PAIN: 0
SHORTNESS OF BREATH: 0

## 2022-01-13 NOTE — PROGRESS NOTES
On the basis of positive falls risk screening, assessment and plan is as follows: home safety tips provided.  Patient uses a walker

## 2022-01-13 NOTE — PATIENT INSTRUCTIONS
DM:   NO sugar, decrease fruit intake, No juice, NO veggies with color other than green, NO sauteed onions or anything that caramelizes, you may eat raw onions. NO alcohol, try not to eat diabetic candies as they may cause diarrhea. Minimize your carbohydrate intake.     Elevated Blood Pressure:   No caffeine   No fast foods   Decrease salt in diet (consume nothing in a can, nothing in a box as these things contain high sodium)   No energy drinks   Buy a BP cuff and take blood pressure 3 times a day and write down blood pressure and pulse and bring in to me when you RTO   Lose weight and increase exercise   Start only walking then may advance to brisk walking and lift low poundage free weights

## 2022-01-13 NOTE — PROGRESS NOTES
Name: Vanessa Griffiths  DOS: 1/13/2022  MRN: [id number]      Subjective: Vanessa Griffiths is a 80 y.o. female being seen for   Chief Complaint   Patient presents with   [facility] Established New Doctor     patient is here today to establish and to follow up on diabetes and hypertension.     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    Bx proven by Dr Yuli Cuellar Osteoarthritis     Sleep apnea      Past Surgical History:   Procedure Laterality Date    ANGIOPLASTY  2001    stent x 1    EYE SURGERY      FOOT SURGERY Left 07/31/2009    3rd toe amputation    HYSTERECTOMY, TOTAL ABDOMINAL  1985    TOE AMPUTATION      left 3rd toe    TOE AMPUTATION Left 3/31/2020    Left 2nd TOE AMPUTATION performed by Tash Clemente DPM at [address] [place] [address]  01/17/2020    Dr Tha Walls- vulvar SCC carcinoma     Social History     Socioeconomic History    Marital status:      Spouse name: Not on file    Number of children: Not on file    Years of education: Not on file    Highest education level: Not on file   Occupational History    Not on file   Tobacco Use    Smoking status: Never Smoker    Smokeless tobacco: Never Used   Vaping Use    Vaping Use: Never used   Substance and Sexual Activity    Alcohol use: No    Drug use: No    Sexual activity: Not on file   Other Topics Concern    Not on file   Social History Narrative    Not on file     Social Determinants of Health     Financial Resource Strain: Low Risk     Difficulty of Paying Living Expenses: Not hard at all   Food Insecurity: No Food Insecurity    Worried About [address] [place] [address] in the Last Year: Never true    Ran Out of Food in the Last Year: Never true   Transportation Needs:     Lack of Transportation (Medical): Not on file    Lack of Transportation (Non-Medical):  Not on file   Physical Activity:     Days of Exercise per Week: Not on file    Minutes of Exercise per Session: Not on file   Stress:     Feeling of Stress : Not on file   Social Connections:     Frequency of Communication with Friends and Family: Not on file    Frequency of Social Gatherings with Friends and Family: Not on file    Attends Sikhism Services: Not on file    Active Member of [address] [place] [address] or Organizations: Not on file    Attends Club or Organization Meetings: Not on file    Marital Status: Not on file   Intimate Partner Violence:     Fear of Current or Ex-Partner: Not on file    Emotionally Abused: Not on file    Physically Abused: Not on file    Sexually Abused: Not on file   Housing Stability:     Unable to Pay for Housing in the Last Year: Not on file    Number of [place] in the Last Year: Not on file    Unstable Housing in the Last Year: Not on file       Current Outpatient Medications   Medication Sig Dispense Refill    Continuous Blood Gluc Sensor ([address] [facility]) [address] [facility] by Does not apply route      amLODIPine (NORVASC) 2.5 MG tablet Take 1 tablet by mouth daily 90 tablet 0    levothyroxine (EUTHYROX) 150 MCG tablet Take 1 tablet by mouth Daily 30 tablet 0    albuterol sulfate HFA (VENTOLIN HFA) 108 (90 Base) MCG/ACT inhaler Inhale 2 puffs into the lungs every 6 hours as needed for Wheezing      simvastatin (ZOCOR) 20 MG tablet Take 1 tablet by mouth nightly 90 tablet 3    losartan (COZAAR) 50 MG tablet Take 1 tablet by mouth twice daily 180 tablet 1    gabapentin (NEURONTIN) 300 MG capsule TAKE 1 CAPSULE BY MOUTH THREE TIMES DAILY 270 capsule 1    ferrous gluconate 324 (37.5 Fe) MG TABS Take 1 tablet by mouth once daily 90 tablet 3    oxybutynin (DITROPAN-XL) 10 MG extended release tablet Take 1 tablet by mouth daily 90 tablet 3    senna (SENOKOT) 8.6 MG tablet Take 1 tablet by mouth daily 90 tablet 3    nitroGLYCERIN (NITROSTAT) 0.4 MG SL tablet Place 0.4 mg under the tongue every 5 minutes as needed for Chest pain up to max of 3 total doses. If no relief after 1 dose, call 911.  B-D INS SYR ULTRAFINE 1CC/31G 31G X 5/16\" 1 ML MISC       fexofenadine (ALLEGRA) 180 MG tablet Take 180 mg by mouth daily      montelukast (SINGULAIR) 10 MG tablet TAKE ONE TABLET BY MOUTH NIGHTLY 90 tablet 1    fluticasone propionate (FLOVENT DISKUS) 100 MCG/BLIST AEPB inhaler Inhale 1 puff into the lungs daily      insulin glargine (LANTUS) 100 UNIT/ML injection vial Inject 30 Units into the skin nightly       fluticasone (FLONASE) 50 MCG/ACT nasal spray 1 spray by Nasal route daily      isosorbide mononitrate (IMDUR) 60 MG extended release tablet Take 60 mg by mouth every morning      insulin aspart (NOVOLOG) 100 UNIT/ML injection vial Inject 4 Units into the skin 3 times daily (before meals) Indications: 12 units in AM, 14 units in afternoon, 16 units in PM with meals Plus sliding scale       aspirin 81 MG tablet Take 81 mg by mouth daily      acetaminophen (TYLENOL) 500 MG tablet Take 1,000 mg by mouth 3 times daily       vitamin B-12 (CYANOCOBALAMIN) 500 MCG tablet Take 500 mcg by mouth daily      CPAP Machine MISC by Does not apply route      calcium carbonate (OSCAL) 500 MG TABS tablet Take 600 mg by mouth daily       Omega-3 Fatty Acids (FISH OIL PO) Take 1,040 mg by mouth daily       Multiple Vitamins-Minerals (MULTIVITAMIN ADULTS PO) Take by mouth daily        No current facility-administered medications for this visit.         Objective:  Patient is here to establish with me, she says she is under care of Dr. Chaparrita Falcon a nephrologist who has ordered the recent blood work. Patient states she always feels fatigued. She has a lot of chronic issues. Dr. Dariana Ch takes care of her diabetes. Pt. Sees her cardiologist Dr. Darrell Rucker. Pt. Has had left 5th digit trigger finger for 3-4 weeks. Pt. Also states her right shoulder hurts for about November she jarad elevate it but can not hold it up, denies trauma. . Patient has two toes amputated she sees Dr. Jonas Hammer in [place]. Diabetes  Pertinent negatives for hypoglycemia include no dizziness. Associated symptoms include fatigue (always feels fatigued). Pertinent negatives for diabetes include no chest pain and no weakness. Hypertension  Pertinent negatives include no chest pain, palpitations or shortness of breath. Review of Systems   Constitutional: Positive for fatigue (always feels fatigued). Negative for unexpected weight change. Eyes: Negative for visual disturbance. Respiratory: Negative for shortness of breath. Cardiovascular: Negative for chest pain, palpitations and leg swelling. Gastrointestinal: Negative for abdominal pain. Genitourinary: Negative for difficulty urinating. Musculoskeletal: Negative for arthralgias and myalgias. Skin: Negative for rash and wound. Neurological: Negative for dizziness and weakness. Physical Exam  Constitutional:       Appearance: Normal appearance. She is obese. Comments: Patient is using a walker to assist her   HENT:      Head: Normocephalic and atraumatic. Right Ear: Tympanic membrane and external ear normal.      Left Ear: Tympanic membrane and external ear normal.      Nose: Nose normal.      Mouth/Throat:      Mouth: Mucous membranes are moist.      Pharynx: Oropharynx is clear. Eyes:      Extraocular Movements: Extraocular movements intact. Conjunctiva/sclera: Conjunctivae normal.      Pupils: Pupils are equal, round, and reactive to light. Cardiovascular:      Rate and Rhythm: Normal rate and regular rhythm. Pulses: Normal pulses. Heart sounds: Normal heart sounds. No murmur heard. Pulmonary:      Effort: Pulmonary effort is normal.      Breath sounds: Normal breath sounds. Abdominal:      General: Abdomen is flat. Bowel sounds are normal. There is no distension. Palpations: Abdomen is soft. There is no mass. Tenderness: There is no abdominal tenderness. There is no guarding. Musculoskeletal:         General: Normal range of motion. Cervical back: Normal range of motion and neck supple. Left foot: Deformity: amputated 2nd and 3rd toe, no woundsa. Comments: Patient's fingers are normal no trigger right now   Lymphadenopathy:      Cervical: No cervical adenopathy. Skin:     General: Skin is warm. Capillary Refill: Capillary refill takes less than 2 seconds. Findings: Lesion (multiple light erythematous skin lesions on upper back) present. Neurological:      General: No focal deficit present. Mental Status: She is alert and oriented to person, place, and time. Psychiatric:         Mood and Affect: Mood normal.         Behavior: Behavior normal.         Thought Content: Thought content normal.          Assessment:   Diagnosis Orders   1. Essential hypertension  Comprehensive Metabolic Panel    amLODIPine (NORVASC) 2.5 MG tablet   2. Acquired hypothyroidism  TSH without Reflex   3. Type 2 diabetes mellitus with stage 4 chronic kidney disease, with long-term current use of insulin (HCC)  Hemoglobin A1C    Microalbumin, Ur    Urinalysis Reflex to Culture    Iron and TIBC   4. Secondary hyperparathyroidism ([address]     5. Stage 3a chronic kidney disease ([address]     6. Familial combined hyperlipidemia  Lipid Panel   7. Anemia due to stage 3a chronic kidney disease (HCC)  CBC Auto Differential   8. Iron deficiency anemia, unspecified iron deficiency anemia type  CBC Auto Differential   9. Annual physical exam  Urinalysis Reflex to Culture    Hepatitis C Antibody   10. At high risk for falls     11. MD Jason     Requested Specialty:   Orthopedic Surgery     Number of Visits Requested:   1         Patient Instructions   DM:   NO sugar, decrease fruit intake, No juice, NO veggies with color other than green, NO sauteed onions or anything that caramelizes, you may eat raw onions. NO alcohol, try not to eat diabetic candies as they may cause diarrhea. Minimize your carbohydrate intake. Elevated Blood Pressure:   No caffeine   No fast foods   Decrease salt in diet (consume nothing in a can, nothing in a box as these things contain high sodium)   No energy drinks   Buy a BP cuff and take blood pressure 3 times a day and write down blood pressure and pulse and bring in to me when you RTO   Lose weight and increase exercise   Start only walking then may advance to brisk walking and lift low poundage free weights         Return in about 2 weeks (around 1/27/2022).      Jenn Escobedo, DO

## 2022-01-18 DIAGNOSIS — E03.9 ACQUIRED HYPOTHYROIDISM: ICD-10-CM

## 2022-01-18 DIAGNOSIS — E78.49 FAMILIAL COMBINED HYPERLIPIDEMIA: ICD-10-CM

## 2022-01-18 DIAGNOSIS — D50.9 IRON DEFICIENCY ANEMIA, UNSPECIFIED IRON DEFICIENCY ANEMIA TYPE: ICD-10-CM

## 2022-01-18 DIAGNOSIS — Z79.4 TYPE 2 DIABETES MELLITUS WITH STAGE 4 CHRONIC KIDNEY DISEASE, WITH LONG-TERM CURRENT USE OF INSULIN (HCC): ICD-10-CM

## 2022-01-18 DIAGNOSIS — N18.31 ANEMIA DUE TO STAGE 3A CHRONIC KIDNEY DISEASE (HCC): ICD-10-CM

## 2022-01-18 DIAGNOSIS — D63.1 ANEMIA DUE TO STAGE 3A CHRONIC KIDNEY DISEASE (HCC): ICD-10-CM

## 2022-01-18 DIAGNOSIS — I10 ESSENTIAL HYPERTENSION: ICD-10-CM

## 2022-01-18 DIAGNOSIS — E11.22 TYPE 2 DIABETES MELLITUS WITH STAGE 4 CHRONIC KIDNEY DISEASE, WITH LONG-TERM CURRENT USE OF INSULIN (HCC): ICD-10-CM

## 2022-01-18 DIAGNOSIS — Z00.00 ANNUAL PHYSICAL EXAM: ICD-10-CM

## 2022-01-18 DIAGNOSIS — N18.4 TYPE 2 DIABETES MELLITUS WITH STAGE 4 CHRONIC KIDNEY DISEASE, WITH LONG-TERM CURRENT USE OF INSULIN (HCC): ICD-10-CM

## 2022-01-27 ENCOUNTER — OFFICE VISIT (OUTPATIENT)
Dept: FAMILY MEDICINE CLINIC | Age: 82
End: 2022-01-27
Payer: MEDICARE

## 2022-01-27 VITALS
DIASTOLIC BLOOD PRESSURE: 82 MMHG | WEIGHT: 215 LBS | OXYGEN SATURATION: 99 % | BODY MASS INDEX: 34.7 KG/M2 | HEART RATE: 63 BPM | RESPIRATION RATE: 16 BRPM | TEMPERATURE: 98 F | SYSTOLIC BLOOD PRESSURE: 140 MMHG

## 2022-01-27 DIAGNOSIS — I10 ESSENTIAL HYPERTENSION: Primary | ICD-10-CM

## 2022-01-27 DIAGNOSIS — N18.4 TYPE 2 DIABETES MELLITUS WITH STAGE 4 CHRONIC KIDNEY DISEASE, WITH LONG-TERM CURRENT USE OF INSULIN (HCC): ICD-10-CM

## 2022-01-27 DIAGNOSIS — E11.22 TYPE 2 DIABETES MELLITUS WITH STAGE 4 CHRONIC KIDNEY DISEASE, WITH LONG-TERM CURRENT USE OF INSULIN (HCC): ICD-10-CM

## 2022-01-27 DIAGNOSIS — E78.2 MIXED HYPERLIPIDEMIA: ICD-10-CM

## 2022-01-27 DIAGNOSIS — Z79.4 TYPE 2 DIABETES MELLITUS WITH STAGE 4 CHRONIC KIDNEY DISEASE, WITH LONG-TERM CURRENT USE OF INSULIN (HCC): ICD-10-CM

## 2022-01-27 PROBLEM — I49.1 PAC (PREMATURE ATRIAL CONTRACTION): Status: ACTIVE | Noted: 2020-11-09

## 2022-01-27 PROBLEM — C51.9: Status: ACTIVE | Noted: 2020-01-17

## 2022-01-27 PROBLEM — E11.3393 MODERATE NONPROLIFERATIVE DIABETIC RETINOPATHY OF BOTH EYES WITHOUT MACULAR EDEMA ASSOCIATED WITH TYPE 2 DIABETES MELLITUS (HCC): Status: ACTIVE | Noted: 2020-05-28

## 2022-01-27 PROBLEM — I49.3 PVC (PREMATURE VENTRICULAR CONTRACTION): Status: ACTIVE | Noted: 2021-08-30

## 2022-01-27 PROCEDURE — 99212 OFFICE O/P EST SF 10 MIN: CPT

## 2022-01-27 PROCEDURE — 4040F PNEUMOC VAC/ADMIN/RCVD: CPT | Performed by: FAMILY MEDICINE

## 2022-01-27 PROCEDURE — 1090F PRES/ABSN URINE INCON ASSESS: CPT | Performed by: FAMILY MEDICINE

## 2022-01-27 PROCEDURE — 1123F ACP DISCUSS/DSCN MKR DOCD: CPT | Performed by: FAMILY MEDICINE

## 2022-01-27 PROCEDURE — G8427 DOCREV CUR MEDS BY ELIG CLIN: HCPCS | Performed by: FAMILY MEDICINE

## 2022-01-27 PROCEDURE — G8417 CALC BMI ABV UP PARAM F/U: HCPCS | Performed by: FAMILY MEDICINE

## 2022-01-27 PROCEDURE — G8400 PT W/DXA NO RESULTS DOC: HCPCS | Performed by: FAMILY MEDICINE

## 2022-01-27 PROCEDURE — G8482 FLU IMMUNIZE ORDER/ADMIN: HCPCS | Performed by: FAMILY MEDICINE

## 2022-01-27 PROCEDURE — 1036F TOBACCO NON-USER: CPT | Performed by: FAMILY MEDICINE

## 2022-01-27 PROCEDURE — 99213 OFFICE O/P EST LOW 20 MIN: CPT | Performed by: FAMILY MEDICINE

## 2022-01-27 ASSESSMENT — ENCOUNTER SYMPTOMS
SHORTNESS OF BREATH: 0
ABDOMINAL PAIN: 0

## 2022-01-27 NOTE — PATIENT INSTRUCTIONS
Nutrition Health Education:    Keep hydrated, walk 30 minutes minimum 3 times weekly as tolerated. Diet should consist of low fat, low sodium and high fiber. Nutritious foods such as fruits (if you're not diabetic), vegetables, lean meats, lean dairy, whole grains such as brown rice, quinoa, and dry beans. [place] with small amounts of heart healthy extra virgin olive oil. Be watchful of any extra salt/sugar/seasoning to your food. You should eat no more than 2 grams or 2,000 mg of salt daily. Salt will raise your BP. Avoid regular/diet sodas, caffeine, energy drinks as these are full of artificial ingredients/sweeteners, sugar, salt and chemicals that spike insulin and are harmful to your health. Sugar intake increases metabolic disfunction, type 2 diabetes, insulin resistance, addictive food behavior and obesity. Avoid all processed foods, foods from boxes, cans, microwave meals as these contain high salt, sugar or fat content and not much nutrition. Get at least 8 hrs of sleep every night and turn off all electronics at least 1 hour before bedtime as these decreases melatonin production and increases wakefulness. If your cholesterol is high, no greasy, fried, fast or fatty foods. Decrease red meat intake. No butter, [place], lard or creams, no milk as these things clog your arteries and leads to heart attacks and death. If you smoke, smoking increases risk of lung disease, cancers, high BP, heart attack, stroke and death. Take your daily medications as prescribed and inform your family doctor if you are having any side effects or issues taking medications. DM:   NO sugar, decrease fruit intake, No juice, NO veggies with color other than green, NO sauteed onions or anything that caramelizes, you may eat raw onions. NO alcohol, try not to eat diabetic candies as they may cause diarrhea. Minimize your carbohydrate intake.

## 2022-01-27 NOTE — PROGRESS NOTES
Name: Vanessa Griffiths  DOS: 1/27/2022  MRN: [id number]      Subjective:   Vanessa Griffiths is a 80 y.o. 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    Bx proven by Dr Williamson Lower Sleep apnea      Past Surgical History:   Procedure Laterality Date    ANGIOPLASTY  2001    stent x 1    EYE SURGERY      FOOT SURGERY Left 07/31/2009    3rd toe amputation    HYSTERECTOMY, TOTAL ABDOMINAL  1985    TOE AMPUTATION      left 3rd toe    TOE AMPUTATION Left 3/31/2020    Left 2nd TOE AMPUTATION performed by Tash Clemente DPM at [address] [place] [address]  01/17/2020    Dr Tha Walls- vulvar SCC carcinoma     Social History     Socioeconomic History    Marital status:      Spouse name: None    Number of children: None    Years of education: None    Highest education level: None   Occupational History    None   Tobacco Use    Smoking status: Never Smoker    Smokeless tobacco: Never Used   Vaping Use    Vaping Use: Never used   Substance and Sexual Activity    Alcohol use: No    Drug use: No    Sexual activity: None   Other Topics Concern    None   Social History Narrative    None     Social Determinants of Health     Financial Resource Strain: Low Risk     Difficulty of Paying Living Expenses: Not hard at all   Food Insecurity: No Food Insecurity    Worried About Running Out of Food in the Last Year: Never true    Ezequiel of Food in the Last Year: Never true   Transportation Needs:     Lack of Transportation (Medical): Not on file    Lack of Transportation (Non-Medical):  Not on file   Physical Activity:     Days of Exercise per Week: Not on file    Minutes of Exercise per Session: Not on file   Stress:     Feeling of Stress : Not on file   Social Connections:     Frequency of Communication with Friends and Family: Not on file    Frequency of Social Gatherings with Friends and Family: Not on file    Attends Sikhism Services: Not on file    Active Member of [address] [place] [address] or Organizations: Not on file    Attends Club or Organization Meetings: Not on file    Marital Status: Not on file   Intimate Partner Violence:     Fear of Current or Ex-Partner: Not on file    Emotionally Abused: Not on file    Physically Abused: Not on file    Sexually Abused: Not on file   Housing Stability:     Unable to Pay for Housing in the Last Year: Not on file    Number of [place] in the Last Year: Not on file    Unstable Housing in the Last Year: Not on file       Current Outpatient Medications   Medication Sig Dispense Refill    Continuous Blood Gluc Sensor ([address] [place] [address]) [facility] by Does not apply route      amLODIPine (NORVASC) 2.5 MG tablet Take 1 tablet by mouth daily 90 tablet 0    levothyroxine (EUTHYROX) 150 MCG tablet Take 1 tablet by mouth Daily 30 tablet 0    albuterol sulfate HFA (VENTOLIN HFA) 108 (90 Base) MCG/ACT inhaler Inhale 2 puffs into the lungs every 6 hours as needed for Wheezing      simvastatin (ZOCOR) 20 MG tablet Take 1 tablet by mouth nightly 90 tablet 3    losartan (COZAAR) 50 MG tablet Take 1 tablet by mouth twice daily 180 tablet 1    gabapentin (NEURONTIN) 300 MG capsule TAKE 1 CAPSULE BY MOUTH THREE TIMES DAILY 270 capsule 1    ferrous gluconate 324 (37.5 Fe) MG TABS Take 1 tablet by mouth once daily 90 tablet 3    oxybutynin (DITROPAN-XL) 10 MG extended release tablet Take 1 tablet by mouth daily 90 tablet 3    senna (SENOKOT) 8.6 MG tablet Take 1 tablet by mouth daily 90 tablet 3    nitroGLYCERIN (NITROSTAT) 0.4 MG SL tablet Place 0.4 mg under the tongue every 5 minutes as needed for Chest pain up to max of 3 total doses. If no relief after 1 dose, call 911.  B-D INS SYR ULTRAFINE 1CC/31G 31G X 5/16\" 1 ML MISC       fexofenadine (ALLEGRA) 180 MG tablet Take 180 mg by mouth daily      montelukast (SINGULAIR) 10 MG tablet TAKE ONE TABLET BY MOUTH NIGHTLY 90 tablet 1    fluticasone propionate (FLOVENT DISKUS) 100 MCG/BLIST AEPB inhaler Inhale 1 puff into the lungs daily      insulin glargine (LANTUS) 100 UNIT/ML injection vial Inject 30 Units into the skin nightly       fluticasone (FLONASE) 50 MCG/ACT nasal spray 1 spray by Nasal route daily      isosorbide mononitrate (IMDUR) 60 MG extended release tablet Take 60 mg by mouth every morning      insulin aspart (NOVOLOG) 100 UNIT/ML injection vial Inject 4 Units into the skin 3 times daily (before meals) Indications: 12 units in AM, 14 units in afternoon, 16 units in PM with meals Plus sliding scale       aspirin 81 MG tablet Take 81 mg by mouth daily      acetaminophen (TYLENOL) 500 MG tablet Take 1,000 mg by mouth 3 times daily       vitamin B-12 (CYANOCOBALAMIN) 500 MCG tablet Take 500 mcg by mouth daily      CPAP Machine MISC by Does not apply route      calcium carbonate (OSCAL) 500 MG TABS tablet Take 600 mg by mouth daily       Omega-3 Fatty Acids (FISH OIL PO) Take 1,040 mg by mouth daily       Multiple Vitamins-Minerals (MULTIVITAMIN ADULTS PO) Take by mouth daily        No current facility-administered medications for this visit. Objective:  Pt is here to review labs. Pt has an appt with nephrology Dr. Jhoan Balderas 03/16/2022. Feels fine today except for her right shoulder who diagnosed he with frozen joint syndrome. (started in December) she is going to PT for four weeks.  She was given diclofenac gel for her left 5th digit for her trigger finger. Patient admits to getting edgy at times. Review of Systems   Constitutional: Positive for fatigue (fatiuged but she gets things done). Negative for unexpected weight change. Eyes: Negative for visual disturbance. Respiratory: Negative for shortness of breath. Cardiovascular: Negative for chest pain, palpitations and leg swelling. Gastrointestinal: Negative for abdominal pain. Genitourinary: Negative for difficulty urinating. Musculoskeletal: Positive for arthralgias (right shoulder pain due to frozen joint syndrome). Negative for myalgias. Skin: Negative for rash and wound. Neurological: Negative for dizziness, weakness and headaches. Physical Exam  Constitutional:       Appearance: Normal appearance. HENT:      Head: Normocephalic and atraumatic. Right Ear: Tympanic membrane and external ear normal.      Left Ear: Tympanic membrane and external ear normal.      Nose: Nose normal.      Mouth/Throat:      Mouth: Mucous membranes are moist.      Pharynx: Oropharynx is clear. Eyes:      Extraocular Movements: Extraocular movements intact. Conjunctiva/sclera: Conjunctivae normal.      Pupils: Pupils are equal, round, and reactive to light. Cardiovascular:      Rate and Rhythm: Normal rate and regular rhythm. Pulses: Normal pulses. Heart sounds: Normal heart sounds. No murmur heard. Pulmonary:      Effort: Pulmonary effort is normal.      Breath sounds: Normal breath sounds. Abdominal:      General: Abdomen is flat. Bowel sounds are normal. There is no distension. Palpations: Abdomen is soft. There is no mass. Tenderness: There is no abdominal tenderness. There is no guarding. Musculoskeletal:      Right shoulder: Tenderness present. Decreased range of motion. Decreased strength. Cervical back: Normal range of motion and neck supple. Lymphadenopathy:      Cervical: No cervical adenopathy. Skin:     General: Skin is warm. to your health. Sugar intake increases metabolic disfunction, type 2 diabetes, insulin resistance, addictive food behavior and obesity. Avoid all processed foods, foods from boxes, cans, microwave meals as these contain high salt, sugar or fat content and not much nutrition. Get at least 8 hrs of sleep every night and turn off all electronics at least 1 hour before bedtime as these decreases melatonin production and increases wakefulness. If your cholesterol is high, no greasy, fried, fast or fatty foods. Decrease red meat intake. No butter, [place], lard or creams, no milk as these things clog your arteries and leads to heart attacks and death. If you smoke, smoking increases risk of lung disease, cancers, high BP, heart attack, stroke and death. Take your daily medications as prescribed and inform your family doctor if you are having any side effects or issues taking medications. DM:   NO sugar, decrease fruit intake, No juice, NO veggies with color other than green, NO sauteed onions or anything that caramelizes, you may eat raw onions. NO alcohol, try not to eat diabetic candies as they may cause diarrhea. Minimize your carbohydrate intake. No follow-ups on file.      Charlette Ramirez, DO

## 2022-02-07 ENCOUNTER — OFFICE VISIT (OUTPATIENT)
Dept: FAMILY MEDICINE CLINIC | Age: 82
End: 2022-02-07
Payer: MEDICARE

## 2022-02-07 VITALS
HEIGHT: 66 IN | DIASTOLIC BLOOD PRESSURE: 80 MMHG | OXYGEN SATURATION: 99 % | HEART RATE: 66 BPM | WEIGHT: 215 LBS | BODY MASS INDEX: 34.55 KG/M2 | SYSTOLIC BLOOD PRESSURE: 172 MMHG | RESPIRATION RATE: 18 BRPM | TEMPERATURE: 98.4 F

## 2022-02-07 DIAGNOSIS — I10 ESSENTIAL HYPERTENSION: Primary | ICD-10-CM

## 2022-02-07 PROCEDURE — 99212 OFFICE O/P EST SF 10 MIN: CPT

## 2022-02-07 PROCEDURE — G8417 CALC BMI ABV UP PARAM F/U: HCPCS | Performed by: FAMILY MEDICINE

## 2022-02-07 PROCEDURE — 1036F TOBACCO NON-USER: CPT | Performed by: FAMILY MEDICINE

## 2022-02-07 PROCEDURE — G8400 PT W/DXA NO RESULTS DOC: HCPCS | Performed by: FAMILY MEDICINE

## 2022-02-07 PROCEDURE — 1123F ACP DISCUSS/DSCN MKR DOCD: CPT | Performed by: FAMILY MEDICINE

## 2022-02-07 PROCEDURE — 4040F PNEUMOC VAC/ADMIN/RCVD: CPT | Performed by: FAMILY MEDICINE

## 2022-02-07 PROCEDURE — G8427 DOCREV CUR MEDS BY ELIG CLIN: HCPCS | Performed by: FAMILY MEDICINE

## 2022-02-07 PROCEDURE — G8482 FLU IMMUNIZE ORDER/ADMIN: HCPCS | Performed by: FAMILY MEDICINE

## 2022-02-07 PROCEDURE — 1090F PRES/ABSN URINE INCON ASSESS: CPT | Performed by: FAMILY MEDICINE

## 2022-02-07 PROCEDURE — 99213 OFFICE O/P EST LOW 20 MIN: CPT | Performed by: FAMILY MEDICINE

## 2022-02-07 RX ORDER — LEVOTHYROXINE SODIUM 0.15 MG/1
150 TABLET ORAL DAILY
Qty: 90 TABLET | Refills: 0 | Status: SHIPPED | OUTPATIENT
Start: 2022-02-07 | End: 2022-05-09

## 2022-02-07 ASSESSMENT — ENCOUNTER SYMPTOMS
ABDOMINAL PAIN: 0
SHORTNESS OF BREATH: 0

## 2022-02-07 NOTE — PATIENT INSTRUCTIONS
Elevated Blood Pressure:   No caffeine   No fast foods   Decrease salt in diet (consume nothing in a can, nothing in a box as these things contain high sodium)   No energy drinks   Buy a BP cuff and take blood pressure 3 times a day and write down blood pressure and pulse and bring in to me when you RTO   Lose weight and increase exercise   Start only walking then may advance to brisk walking and lift low poundage free weights    INCREASE NORVASC TO 5MG A DAY  RTO in 1 week  If you get HA, chest pain, or BP is at or over 190/90 go to the hospital or RTO that day

## 2022-02-07 NOTE — PROGRESS NOTES
Name: Yevgeniy Peralta  DOS: 2/7/2022  MRN: [id number]      Subjective: Yevgeniy Peralta is a 80 y.o. female being seen for   Chief Complaint   Patient presents with    Hypertension     patient reports having high blood pressures the past few weeks.  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    Bx proven by Dr Shaheen Sneed Osteoarthritis     Sleep apnea      Past Surgical History:   Procedure Laterality Date    ANGIOPLASTY  2001    stent x 1    EYE SURGERY      FOOT SURGERY Left 07/31/2009    3rd toe amputation    HYSTERECTOMY, TOTAL ABDOMINAL  1985    TOE AMPUTATION      left 3rd toe    TOE AMPUTATION Left 3/31/2020    Left 2nd TOE AMPUTATION performed by En Colby DPM at [address] [place] [address]  01/17/2020    Dr Amy Persaud- vulvar SCC carcinoma     Social History     Socioeconomic History    Marital status:      Spouse name: Not on file    Number of children: Not on file    Years of education: Not on file    Highest education level: Not on file   Occupational History    Not on file   Tobacco Use    Smoking status: Never Smoker    Smokeless tobacco: Never Used   Vaping Use    Vaping Use: Never used   Substance and Sexual Activity    Alcohol use: No    Drug use: No    Sexual activity: Not on file   Other Topics Concern    Not on file   Social History Narrative    Not on file     Social Determinants of Health     Financial Resource Strain: Low Risk     Difficulty of Paying Living Expenses: Not hard at all   Food Insecurity: No Food Insecurity    Worried About [address] [place] [address] in the Last Year: Never true    Ezequiel of Food in the Last Year: Never true   Transportation Needs:     Lack of Transportation (Medical): Not on file    Lack of Transportation (Non-Medical):  Not on file   Physical Activity:     Days of Exercise per Week: Not on file    Minutes of Exercise per Session: Not on file   Stress:     Feeling of Stress : Not on file   Social Connections:     Frequency of Communication with Friends and Family: Not on file    Frequency of Social Gatherings with Friends and Family: Not on file    Attends Jainism Services: Not on file    Active Member of [address] [place] [address] or Organizations: Not on file    Attends Club or Organization Meetings: Not on file    Marital Status: Not on file   Intimate Partner Violence:     Fear of Current or Ex-Partner: Not on file    Emotionally Abused: Not on file    Physically Abused: Not on file    Sexually Abused: Not on file   Housing Stability:     Unable to Pay for Housing in the Last Year: Not on file    Number of [place] in the Last Year: Not on file    Unstable Housing in the Last Year: Not on file       Current Outpatient Medications   Medication Sig Dispense Refill    levothyroxine (EUTHYROX) 150 MCG tablet Take 1 tablet by mouth Daily 90 tablet 0    Continuous Blood Gluc Sensor ([facility]) MISC by Does not apply route      amLODIPine (NORVASC) 2.5 MG tablet Take 1 tablet by mouth daily 90 tablet 0    albuterol sulfate HFA (VENTOLIN HFA) 108 (90 Base) MCG/ACT inhaler Inhale 2 puffs into the lungs every 6 hours as needed for Wheezing      simvastatin (ZOCOR) 20 MG tablet Take 1 tablet by mouth nightly 90 tablet 3    losartan (COZAAR) 50 MG tablet Take 1 tablet by mouth twice daily 180 tablet 1    gabapentin (NEURONTIN) 300 MG capsule TAKE 1 CAPSULE BY MOUTH THREE TIMES DAILY 270 capsule 1    ferrous gluconate 324 (37.5 Fe) MG TABS Take 1 tablet by mouth once daily 90 tablet 3    oxybutynin (DITROPAN-XL) 10 MG extended release tablet Take 1 tablet by mouth daily 90 tablet 3    senna (SENOKOT) 8.6 MG tablet Take 1 tablet by mouth daily 90 tablet 3    nitroGLYCERIN (NITROSTAT) 0.4 MG SL tablet Place 0.4 mg under the tongue every 5 minutes as needed for Chest pain up to max of 3 total doses. If no relief after 1 dose, call 911.  B-D INS SYR ULTRAFINE 1CC/31G 31G X 5/16\" 1 ML MISC       fexofenadine (ALLEGRA) 180 MG tablet Take 180 mg by mouth daily      montelukast (SINGULAIR) 10 MG tablet TAKE ONE TABLET BY MOUTH NIGHTLY 90 tablet 1    fluticasone propionate (FLOVENT DISKUS) 100 MCG/BLIST AEPB inhaler Inhale 1 puff into the lungs daily      insulin glargine (LANTUS) 100 UNIT/ML injection vial Inject 30 Units into the skin nightly       fluticasone (FLONASE) 50 MCG/ACT nasal spray 1 spray by Nasal route daily      isosorbide mononitrate (IMDUR) 60 MG extended release tablet Take 60 mg by mouth every morning      insulin aspart (NOVOLOG) 100 UNIT/ML injection vial Inject 4 Units into the skin 3 times daily (before meals) Indications: 12 units in AM, 14 units in afternoon, 16 units in PM with meals Plus sliding scale       aspirin 81 MG tablet Take 81 mg by mouth daily      acetaminophen (TYLENOL) 500 MG tablet Take 1,000 mg by mouth 3 times daily       vitamin B-12 (CYANOCOBALAMIN) 500 MCG tablet Take 500 mcg by mouth daily      CPAP Machine MISC by Does not apply route      calcium carbonate (OSCAL) 500 MG TABS tablet Take 600 mg by mouth daily       Omega-3 Fatty Acids (FISH OIL PO) Take 1,040 mg by mouth daily       Multiple Vitamins-Minerals (MULTIVITAMIN ADULTS PO) Take by mouth daily        No current facility-administered medications for this visit.         Objective:  Patient does not have an appt with Dr. Matt Cuenca until august so she came here because her Bp has been high and she has been taking her BP and pulse twice a day we will  Scan her readings in to the charts. Pt does not have a HA, NO chest pain. Review of Systems   Constitutional: Positive for fatigue (chronic). Negative for unexpected weight change. Eyes: Negative for visual disturbance. Respiratory: Negative for shortness of breath. Cardiovascular: Negative for chest pain, palpitations and leg swelling. Gastrointestinal: Negative for abdominal pain. Genitourinary: Negative for difficulty urinating. Musculoskeletal: Negative for arthralgias and myalgias. Skin: Negative for rash and wound. Neurological: Negative for dizziness and weakness. Psychiatric/Behavioral: The patient is nervous/anxious (she is edgy at home due to her ). Physical Exam  Constitutional:       Appearance: Normal appearance. She is overweight. HENT:      Head: Normocephalic and atraumatic. Right Ear: External ear normal.      Left Ear: External ear normal.      Nose: Nose normal.      Mouth/Throat:      Mouth: Mucous membranes are moist.      Pharynx: Oropharynx is clear. Eyes:      Extraocular Movements: Extraocular movements intact. Conjunctiva/sclera: Conjunctivae normal.      Pupils: Pupils are equal, round, and reactive to light. Cardiovascular:      Rate and Rhythm: Normal rate and regular rhythm. Pulses: Normal pulses. Heart sounds: Normal heart sounds. No murmur heard. Pulmonary:      Effort: Pulmonary effort is normal.      Breath sounds: Normal breath sounds. Abdominal:      General: Abdomen is flat. Bowel sounds are normal. There is no distension. Palpations: Abdomen is soft. There is no mass. Tenderness: There is no abdominal tenderness. There is no guarding. Musculoskeletal:         General: Normal range of motion. Cervical back: Normal range of motion and neck supple. Comments: Uses a cane and a walker for weakness ion her legs chronic    Lymphadenopathy:      Cervical: No cervical adenopathy. Skin:     General: Skin is warm. Capillary Refill: Capillary refill takes less than 2 seconds. Neurological:      General: No focal deficit present. Mental Status: She is alert and oriented to person, place, and time. Psychiatric:         Mood and Affect: Mood normal.         Behavior: Behavior normal.         Thought Content: Thought content normal.          Assessment:   Diagnosis Orders   1. Essential hypertension           Plan:  No orders of the defined types were placed in this encounter. Patient Instructions   Elevated Blood Pressure:   No caffeine   No fast foods   Decrease salt in diet (consume nothing in a can, nothing in a box as these things contain high sodium)   No energy drinks   Buy a BP cuff and take blood pressure 3 times a day and write down blood pressure and pulse and bring in to me when you RTO   Lose weight and increase exercise   Start only walking then may advance to brisk walking and lift low poundage free weights    INCREASE NORVASC TO 5MG A DAY  RTO in 1 week  If you get HA, chest pain, or BP is at or over 190/90 go to the hospital or RTO that day       Return in about 1 week (around 2/14/2022), or if symptoms worsen or fail to improve.      Sujatha Briceño, DO

## 2022-02-16 ENCOUNTER — OFFICE VISIT (OUTPATIENT)
Dept: FAMILY MEDICINE CLINIC | Age: 82
End: 2022-02-16
Payer: MEDICARE

## 2022-02-16 VITALS
HEART RATE: 74 BPM | RESPIRATION RATE: 19 BRPM | TEMPERATURE: 98 F | WEIGHT: 217 LBS | BODY MASS INDEX: 35.02 KG/M2 | DIASTOLIC BLOOD PRESSURE: 68 MMHG | SYSTOLIC BLOOD PRESSURE: 134 MMHG

## 2022-02-16 DIAGNOSIS — L97.521 DIABETIC ULCER OF TOE OF LEFT FOOT ASSOCIATED WITH TYPE 2 DIABETES MELLITUS, LIMITED TO BREAKDOWN OF SKIN (HCC): ICD-10-CM

## 2022-02-16 DIAGNOSIS — I73.9 PERIPHERAL VASCULAR DISEASE (HCC): ICD-10-CM

## 2022-02-16 DIAGNOSIS — Z89.419 HISTORY OF AMPUTATION OF GREAT TOE (HCC): ICD-10-CM

## 2022-02-16 DIAGNOSIS — I10 ESSENTIAL HYPERTENSION: ICD-10-CM

## 2022-02-16 DIAGNOSIS — E66.01 SEVERE OBESITY (BMI 35.0-39.9) WITH COMORBIDITY (HCC): ICD-10-CM

## 2022-02-16 DIAGNOSIS — E11.621 DIABETIC ULCER OF TOE OF LEFT FOOT ASSOCIATED WITH TYPE 2 DIABETES MELLITUS, LIMITED TO BREAKDOWN OF SKIN (HCC): ICD-10-CM

## 2022-02-16 DIAGNOSIS — I10 UNCONTROLLED HYPERTENSION: Primary | ICD-10-CM

## 2022-02-16 DIAGNOSIS — R51.9 PRESSURE IN HEAD: ICD-10-CM

## 2022-02-16 DIAGNOSIS — R51.9 NONINTRACTABLE HEADACHE, UNSPECIFIED CHRONICITY PATTERN, UNSPECIFIED HEADACHE TYPE: ICD-10-CM

## 2022-02-16 PROCEDURE — G8400 PT W/DXA NO RESULTS DOC: HCPCS | Performed by: FAMILY MEDICINE

## 2022-02-16 PROCEDURE — 1036F TOBACCO NON-USER: CPT | Performed by: FAMILY MEDICINE

## 2022-02-16 PROCEDURE — G8482 FLU IMMUNIZE ORDER/ADMIN: HCPCS | Performed by: FAMILY MEDICINE

## 2022-02-16 PROCEDURE — 99213 OFFICE O/P EST LOW 20 MIN: CPT | Performed by: FAMILY MEDICINE

## 2022-02-16 PROCEDURE — G8417 CALC BMI ABV UP PARAM F/U: HCPCS | Performed by: FAMILY MEDICINE

## 2022-02-16 PROCEDURE — 1090F PRES/ABSN URINE INCON ASSESS: CPT | Performed by: FAMILY MEDICINE

## 2022-02-16 PROCEDURE — 99212 OFFICE O/P EST SF 10 MIN: CPT

## 2022-02-16 PROCEDURE — 1123F ACP DISCUSS/DSCN MKR DOCD: CPT | Performed by: FAMILY MEDICINE

## 2022-02-16 PROCEDURE — G8427 DOCREV CUR MEDS BY ELIG CLIN: HCPCS | Performed by: FAMILY MEDICINE

## 2022-02-16 PROCEDURE — 4040F PNEUMOC VAC/ADMIN/RCVD: CPT | Performed by: FAMILY MEDICINE

## 2022-02-16 RX ORDER — LOSARTAN POTASSIUM 50 MG/1
TABLET ORAL
Qty: 60 TABLET | Refills: 0 | Status: SHIPPED | OUTPATIENT
Start: 2022-02-16 | End: 2022-02-23 | Stop reason: ALTCHOICE

## 2022-02-16 RX ORDER — AMLODIPINE BESYLATE 5 MG/1
5 TABLET ORAL DAILY
Qty: 30 TABLET | Refills: 0 | Status: SHIPPED | OUTPATIENT
Start: 2022-02-16 | End: 2022-04-14 | Stop reason: DRUGHIGH

## 2022-02-16 ASSESSMENT — ENCOUNTER SYMPTOMS
SHORTNESS OF BREATH: 0
ABDOMINAL PAIN: 0

## 2022-02-16 NOTE — PATIENT INSTRUCTIONS
Elevated Blood Pressure:   No caffeine   No fast foods   Decrease salt in diet (consume nothing in a can, nothing in a box as these things contain high sodium)   No energy drinks   Buy a BP cuff and take blood pressure 3 times a day and write down blood pressure and pulse and bring in to me when you RTO   Lose weight and increase exercise   Start only walking then may advance to brisk walking and lift low poundage free weights    Continue Norvasc 5 mg a day   Increase the losartan to twice a day as the patient says that when she took 100 mg at the same time it was to low

## 2022-02-16 NOTE — PROGRESS NOTES
Name: Zheng Reeves  DOS: 2/16/2022  MRN: [id number]      Subjective: Zheng Reeves is a 80 y.o. female being seen for   Chief Complaint   Patient presents with    Hypertension     patient is here today for follow up on her blood pressure.         Vitals:    02/16/22 1514   BP: 134/68   Pulse: 74   Resp: 19   Temp: 98 °F (36.7 °C)     Allergies   Allergen Reactions    Lisinopril Other (See Comments)     Cough    Penicillins Swelling     Facial swelling    Ranolazine Rash     Past Medical History:   Diagnosis Date    CAD (coronary artery disease)     Diabetes mellitus (HCC)     Hyperlipidemia     Hypertension     Hypothyroidism     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    Bx proven by Dr Alison Rodriguez Osteoarthritis     Sleep apnea      Past Surgical History:   Procedure Laterality Date    ANGIOPLASTY  2001    stent x 1    EYE SURGERY      FOOT SURGERY Left 07/31/2009    3rd toe amputation    HYSTERECTOMY, TOTAL ABDOMINAL  1985    TOE AMPUTATION      left 3rd toe    TOE AMPUTATION Left 3/31/2020    Left 2nd TOE AMPUTATION performed by Pacheco Barry DPM at [address] [place] [address]  01/17/2020    Dr Zayda Umanzor- vulvar SCC carcinoma     Social History     Socioeconomic History    Marital status:      Spouse name: Not on file    Number of children: Not on file    Years of education: Not on file    Highest education level: Not on file   Occupational History    Not on file   Tobacco Use    Smoking status: Never Smoker    Smokeless tobacco: Never Used   Vaping Use    Vaping Use: Never used   Substance and Sexual Activity    Alcohol use: No    Drug use: No    Sexual activity: Not on file   Other Topics Concern    Not on file   Social History Narrative    Not on file     Social Determinants of Health     Financial Resource Strain: Low Risk     Difficulty of Paying Living Expenses: Not hard at all   Food Insecurity: No Food Insecurity    Worried About Running Out of Food in the Last Year: Never true    Ran Out of Food in the Last Year: Never true   Transportation Needs:     Lack of Transportation (Medical): Not on file    Lack of Transportation (Non-Medical):  Not on file   Physical Activity:     Days of Exercise per Week: Not on file    Minutes of Exercise per Session: Not on file   Stress:     Feeling of Stress : Not on file   Social Connections:     Frequency of Communication with Friends and Family: Not on file    Frequency of Social Gatherings with Friends and Family: Not on file    Attends Cheondoism Services: Not on file    Active Member of [address] [place] [address] [employer] or Organizations: Not on file    Attends Club or Organization Meetings: Not on file    Marital Status: Not on file   Intimate Partner Violence:     Fear of Current or Ex-Partner: Not on file    Emotionally Abused: Not on file    Physically Abused: Not on file    Sexually Abused: Not on file   Housing Stability:     Unable to Pay for Housing in the Last Year: Not on file    Number of [place] in the Last Year: Not on file    Unstable Housing in the Last Year: Not on file       Current Outpatient Medications   Medication Sig Dispense Refill    losartan (COZAAR) 50 MG tablet Take 1 tablet by mouth twice daily 60 tablet 0    amLODIPine (NORVASC) 5 MG tablet Take 1 tablet by mouth daily 30 tablet 0    levothyroxine (EUTHYROX) 150 MCG tablet Take 1 tablet by mouth Daily 90 tablet 0    Continuous Blood Gluc Sensor ([address] [place] [address]) MISC by Does not apply route      amLODIPine (NORVASC) 2.5 MG tablet Take 1 tablet by mouth daily 90 tablet 0    albuterol sulfate HFA (VENTOLIN HFA) 108 (90 Base) MCG/ACT inhaler Inhale 2 puffs into the lungs every 6 hours as needed for Wheezing      simvastatin (ZOCOR) 20 MG tablet Take 1 tablet by mouth nightly 90 tablet 3    gabapentin (NEURONTIN) 300 MG capsule TAKE 1 CAPSULE BY MOUTH THREE TIMES DAILY 270 capsule 1    ferrous gluconate 324 (37.5 Fe) MG TABS Take 1 tablet by mouth once daily 90 tablet 3    oxybutynin (DITROPAN-XL) 10 MG extended release tablet Take 1 tablet by mouth daily 90 tablet 3    senna (SENOKOT) 8.6 MG tablet Take 1 tablet by mouth daily 90 tablet 3    nitroGLYCERIN (NITROSTAT) 0.4 MG SL tablet Place 0.4 mg under the tongue every 5 minutes as needed for Chest pain up to max of 3 total doses. If no relief after 1 dose, call 911.  B-D INS SYR ULTRAFINE 1CC/31G 31G X 5/16\" 1 ML MISC       fexofenadine (ALLEGRA) 180 MG tablet Take 180 mg by mouth daily      montelukast (SINGULAIR) 10 MG tablet TAKE ONE TABLET BY MOUTH NIGHTLY 90 tablet 1    fluticasone propionate (FLOVENT DISKUS) 100 MCG/BLIST AEPB inhaler Inhale 1 puff into the lungs daily      insulin glargine (LANTUS) 100 UNIT/ML injection vial Inject 30 Units into the skin nightly       fluticasone (FLONASE) 50 MCG/ACT nasal spray 1 spray by Nasal route daily      isosorbide mononitrate (IMDUR) 60 MG extended release tablet Take 60 mg by mouth every morning      insulin aspart (NOVOLOG) 100 UNIT/ML injection vial Inject 4 Units into the skin 3 times daily (before meals) Indications: 12 units in AM, 14 units in afternoon, 16 units in PM with meals Plus sliding scale       aspirin 81 MG tablet Take 81 mg by mouth daily      acetaminophen (TYLENOL) 500 MG tablet Take 1,000 mg by mouth 3 times daily       vitamin B-12 (CYANOCOBALAMIN) 500 MCG tablet Take 500 mcg by mouth daily      CPAP Machine MISC by Does not apply route      calcium carbonate (OSCAL) 500 MG TABS tablet Take 600 mg by mouth daily       Omega-3 Fatty Acids (FISH OIL PO) Take 1,040 mg by mouth daily       Multiple Vitamins-Minerals (MULTIVITAMIN ADULTS PO) Take by mouth daily        No current facility-administered medications for this visit. Objective:  Here to go over BP readings. She says she never feels perky and always has pressure behind her eyes.      Review of Systems Constitutional: Negative for fatigue and unexpected weight change. Eyes: Negative for visual disturbance. Respiratory: Negative for shortness of breath. Cardiovascular: Negative for chest pain, palpitations and leg swelling. Gastrointestinal: Negative for abdominal pain. Genitourinary: Negative for difficulty urinating. Musculoskeletal: Negative for arthralgias and myalgias. Skin: Negative for rash and wound. Neurological: Negative for dizziness and weakness. Physical Exam  Constitutional:       Appearance: Normal appearance. HENT:      Head: Normocephalic and atraumatic. Right Ear: Tympanic membrane and external ear normal.      Left Ear: Tympanic membrane and external ear normal.      Nose: Nose normal.      Mouth/Throat:      Mouth: Mucous membranes are moist.      Pharynx: Oropharynx is clear. Eyes:      Extraocular Movements: Extraocular movements intact. Conjunctiva/sclera: Conjunctivae normal.      Pupils: Pupils are equal, round, and reactive to light. Cardiovascular:      Rate and Rhythm: Normal rate and regular rhythm. Pulses: Normal pulses. Heart sounds: Normal heart sounds. No murmur heard. Pulmonary:      Effort: Pulmonary effort is normal.      Breath sounds: Normal breath sounds. Abdominal:      General: Abdomen is flat. Bowel sounds are normal. There is no distension. Palpations: Abdomen is soft. There is no mass. Tenderness: There is no abdominal tenderness. There is no guarding. Musculoskeletal:      Cervical back: Normal range of motion and neck supple. Comments: Patient uses a walking chair for balance   Lymphadenopathy:      Cervical: No cervical adenopathy. Skin:     General: Skin is warm. Capillary Refill: Capillary refill takes less than 2 seconds. Neurological:      General: No focal deficit present. Mental Status: She is alert and oriented to person, place, and time.    Psychiatric:         Mood and Affect: Mood normal.         Behavior: Behavior normal.         Thought Content: Thought content normal.      Comments: Patient is sad today. Started to cry. She is upset about her . Assessment:   Diagnosis Orders   1. Uncontrolled hypertension  External Referral To Cardiology   2. Pressure in head  MRI BRAIN WO CONTRAST    External Referral To Cardiology   3. History of amputation of great toe ([facility] [address]     4. Diabetic ulcer of toe of left foot associated with type 2 diabetes mellitus, limited to breakdown of skin ([address]     5. Peripheral vascular disease ([facility] [address]     6. Nonintractable headache, unspecified chronicity pattern, unspecified headache type  MRI BRAIN WO CONTRAST   7. Severe obesity (BMI 35.0-39. 9) with comorbidity ([facility] [address]     8. Essential hypertension  losartan (COZAAR) 50 MG tablet         Plan:  Orders Placed This Encounter   Procedures    MRI BRAIN WO CONTRAST     Standing Status:   Future     Standing Expiration Date:   2/16/2023    External Referral To Cardiology     Referral Priority:   Urgent     Referral Type:   Eval and Treat     Referral Reason:   Specialty Services Required     Referred to Provider:   Rich Flores MD     Requested Specialty:   Internal Medicine Cardiovascular Disease     Number of Visits Requested:   1         Patient Instructions   Elevated Blood Pressure:   No caffeine   No fast foods   Decrease salt in diet (consume nothing in a can, nothing in a box as these things contain high sodium)   No energy drinks   Buy a BP cuff and take blood pressure 3 times a day and write down blood pressure and pulse and bring in to me when you RTO   Lose weight and increase exercise   Start only walking then may advance to brisk walking and lift low poundage free weights    Continue Norvasc 5 mg a day   Increase the losartan to twice a day as the patient says that when she took 100 mg at the same time it was to low         Return in about 1 month (around 3/16/2022).

## 2022-02-17 ENCOUNTER — TELEPHONE (OUTPATIENT)
Dept: FAMILY MEDICINE CLINIC | Age: 82
End: 2022-02-17

## 2022-02-17 NOTE — TELEPHONE ENCOUNTER
Patient called levar is confused about the instructions on her amlodipine. She is unsure if she is suppose to take the 2.5 mg tablet and the 5mg tablet together. Or if she is suppose to take just one of them. Please advise. Okay to leave a detailed message with instructions.

## 2022-03-14 RX ORDER — GABAPENTIN 300 MG/1
300 CAPSULE ORAL 3 TIMES DAILY
Qty: 270 CAPSULE | Refills: 0 | Status: SHIPPED | OUTPATIENT
Start: 2022-03-14 | End: 2022-07-01 | Stop reason: SDUPTHER

## 2022-03-14 NOTE — TELEPHONE ENCOUNTER
Paulette Day is requesting a refill on the following medication(s):  Requested Prescriptions     Pending Prescriptions Disp Refills    gabapentin (NEURONTIN) 300 MG capsule 270 capsule 1       Last Visit Date (If Applicable):  Visit date not found    Next Visit Date:    Visit date not found

## 2022-03-15 ENCOUNTER — OFFICE VISIT (OUTPATIENT)
Dept: FAMILY MEDICINE CLINIC | Age: 82
End: 2022-03-15
Payer: MEDICARE

## 2022-03-15 VITALS
DIASTOLIC BLOOD PRESSURE: 64 MMHG | BODY MASS INDEX: 34.12 KG/M2 | OXYGEN SATURATION: 97 % | TEMPERATURE: 97.5 F | HEIGHT: 67 IN | RESPIRATION RATE: 16 BRPM | HEART RATE: 60 BPM | SYSTOLIC BLOOD PRESSURE: 144 MMHG | WEIGHT: 217.4 LBS

## 2022-03-15 DIAGNOSIS — N18.5 CHRONIC KIDNEY DISEASE, STAGE 5 (HCC): ICD-10-CM

## 2022-03-15 DIAGNOSIS — I10 BENIGN ESSENTIAL HTN: Primary | ICD-10-CM

## 2022-03-15 DIAGNOSIS — E03.9 ACQUIRED HYPOTHYROIDISM: ICD-10-CM

## 2022-03-15 DIAGNOSIS — E78.2 MIXED HYPERLIPIDEMIA: ICD-10-CM

## 2022-03-15 PROCEDURE — 99212 OFFICE O/P EST SF 10 MIN: CPT

## 2022-03-15 PROCEDURE — G8417 CALC BMI ABV UP PARAM F/U: HCPCS | Performed by: FAMILY MEDICINE

## 2022-03-15 PROCEDURE — G8482 FLU IMMUNIZE ORDER/ADMIN: HCPCS | Performed by: FAMILY MEDICINE

## 2022-03-15 PROCEDURE — 4040F PNEUMOC VAC/ADMIN/RCVD: CPT | Performed by: FAMILY MEDICINE

## 2022-03-15 PROCEDURE — 1090F PRES/ABSN URINE INCON ASSESS: CPT | Performed by: FAMILY MEDICINE

## 2022-03-15 PROCEDURE — 1036F TOBACCO NON-USER: CPT | Performed by: FAMILY MEDICINE

## 2022-03-15 PROCEDURE — G8427 DOCREV CUR MEDS BY ELIG CLIN: HCPCS | Performed by: FAMILY MEDICINE

## 2022-03-15 PROCEDURE — 99213 OFFICE O/P EST LOW 20 MIN: CPT | Performed by: FAMILY MEDICINE

## 2022-03-15 PROCEDURE — G8400 PT W/DXA NO RESULTS DOC: HCPCS | Performed by: FAMILY MEDICINE

## 2022-03-15 PROCEDURE — 1123F ACP DISCUSS/DSCN MKR DOCD: CPT | Performed by: FAMILY MEDICINE

## 2022-03-15 RX ORDER — CARVEDILOL 6.25 MG/1
6.25 TABLET ORAL 2 TIMES DAILY
COMMUNITY
Start: 2022-03-01 | End: 2022-05-02

## 2022-03-15 ASSESSMENT — ENCOUNTER SYMPTOMS
SHORTNESS OF BREATH: 0
ABDOMINAL PAIN: 0
PHOTOPHOBIA: 1

## 2022-03-15 NOTE — PATIENT INSTRUCTIONS
Nutrition Health Education:    Keep hydrated, walk 30 minutes minimum 3 times weekly as tolerated. Diet should consist of low fat, low sodium and high fiber. Nutritious foods such as fruits (if you're not diabetic), vegetables, lean meats, lean dairy, whole grains such as brown rice, quinoa, and dry beans. Elspeth Pata with small amounts of heart healthy extra virgin olive oil. Be watchful of any extra salt/sugar/seasoning to your food. You should eat no more than 2 grams or 2,000 mg of salt daily. Salt will raise your BP. Avoid regular/diet sodas, caffeine, energy drinks as these are full of artificial ingredients/sweeteners, sugar, salt and chemicals that spike insulin and are harmful to your health. Sugar intake increases metabolic disfunction, type 2 diabetes, insulin resistance, addictive food behavior and obesity. Avoid all processed foods, foods from boxes, cans, microwave meals as these contain high salt, sugar or fat content and not much nutrition. Get at least 8 hrs of sleep every night and turn off all electronics at least 1 hour before bedtime as these decreases melatonin production and increases wakefulness. If your cholesterol is high, no greasy, fried, fast or fatty foods. Decrease red meat intake. No butter, hutchinson, lard or creams, no milk as these things clog your arteries and leads to heart attacks and death. If you smoke, smoking increases risk of lung disease, cancers, high BP, heart attack, stroke and death. Take your daily medications as prescribed and inform your family doctor if you are having any side effects or issues taking medications.      Pt has appt with Dr. Mike Sweeney tomorrow      Return in 3 month Dr. Niesha Urena will do all the blood work

## 2022-03-15 NOTE — PROGRESS NOTES
 Ran Out of Food in the Last Year: Never true   Transportation Needs:     Lack of Transportation (Medical): Not on file    Lack of Transportation (Non-Medical): Not on file   Physical Activity:     Days of Exercise per Week: Not on file    Minutes of Exercise per Session: Not on file   Stress:     Feeling of Stress : Not on file   Social Connections:     Frequency of Communication with Friends and Family: Not on file    Frequency of Social Gatherings with Friends and Family: Not on file    Attends Mosque Services: Not on file    Active Member of [address] [place] [address] or Organizations: Not on file    Attends Club or Organization Meetings: Not on file    Marital Status: Not on file   Intimate Partner Violence:     Fear of Current or Ex-Partner: Not on file    Emotionally Abused: Not on file    Physically Abused: Not on file    Sexually Abused: Not on file   Housing Stability:     Unable to Pay for Housing in the Last Year: Not on file    Number of [place] in the Last Year: Not on file    Unstable Housing in the Last Year: Not on file       Current Outpatient Medications   Medication Sig Dispense Refill    carvedilol (COREG) 6.25 MG tablet daily       gabapentin (NEURONTIN) 300 MG capsule Take 1 capsule by mouth 3 times daily for 90 days.  270 capsule 0    amLODIPine (NORVASC) 5 MG tablet Take 1 tablet by mouth daily (Patient taking differently: Take 5 mg by mouth 2 times daily ) 30 tablet 0    levothyroxine (EUTHYROX) 150 MCG tablet Take 1 tablet by mouth Daily 90 tablet 0    Continuous Blood Gluc Sensor ([address] [place] [address]) MISC by Does not apply route      simvastatin (ZOCOR) 20 MG tablet Take 1 tablet by mouth nightly 90 tablet 3    ferrous gluconate 324 (37.5 Fe) MG TABS Take 1 tablet by mouth once daily 90 tablet 3    oxybutynin (DITROPAN-XL) 10 MG extended release tablet Take 1 tablet by mouth daily 90 tablet 3    senna (SENOKOT) 8.6 MG tablet Take 1 tablet by mouth daily 90 tablet 3    nitroGLYCERIN (NITROSTAT) 0.4 MG SL tablet Place 0.4 mg under the tongue every 5 minutes as needed for Chest pain up to max of 3 total doses. If no relief after 1 dose, call 911.       B-D INS SYR ULTRAFINE 1CC/31G 31G X 5/16\" 1 ML MISC       fexofenadine (ALLEGRA) 180 MG tablet Take 180 mg by mouth daily      montelukast (SINGULAIR) 10 MG tablet TAKE ONE TABLET BY MOUTH NIGHTLY 90 tablet 1    fluticasone propionate (FLOVENT DISKUS) 100 MCG/BLIST AEPB inhaler Inhale 1 puff into the lungs daily      insulin glargine (LANTUS) 100 UNIT/ML injection vial Inject 50 Units into the skin nightly       fluticasone (FLONASE) 50 MCG/ACT nasal spray 1 spray by Nasal route daily      isosorbide mononitrate (IMDUR) 60 MG extended release tablet Take 60 mg by mouth every morning      insulin aspart (NOVOLOG) 100 UNIT/ML injection vial Inject 4 Units into the skin 3 times daily (before meals) Indications: 12 units in AM, 14 units in afternoon, 16 units in PM with meals Plus sliding scale       aspirin 81 MG tablet Take 81 mg by mouth daily      acetaminophen (TYLENOL) 500 MG tablet Take 1,000 mg by mouth 3 times daily       vitamin B-12 (CYANOCOBALAMIN) 500 MCG tablet Take 500 mcg by mouth daily      CPAP Machine MISC by Does not apply route      calcium carbonate (OSCAL) 500 MG TABS tablet Take 600 mg by mouth daily       Omega-3 Fatty Acids (FISH OIL PO) Take 1,040 mg by mouth daily       Multiple Vitamins-Minerals (MULTIVITAMIN ADULTS PO) Take by mouth daily       metoprolol tartrate (LOPRESSOR) 25 MG tablet Take 50 mg by mouth daily       amLODIPine (NORVASC) 2.5 MG tablet Take 1 tablet by mouth daily (Patient not taking: Reported on 3/15/2022) 90 tablet 0    albuterol sulfate HFA (VENTOLIN HFA) 108 (90 Base) MCG/ACT inhaler Inhale 2 puffs into the lungs every 6 hours as needed for Wheezing (Patient not taking: Reported on 3/15/2022)       No current facility-administered medications for this visit. Objective:  Pt has appt Dr. Jesus Cardenas tomorrow. She will talk to him about her blood pressure. Pt will go over her medications with Dr. Jesus Cardenas as she is concerned about her blood pressure. Patient has appt with optometrist next week. Pt did not bring in her BP reading form home this time. Review of Systems   Constitutional: Positive for fatigue (tired today somedays she is not tired. ). Negative for unexpected weight change. Eyes: Positive for photophobia (light bothers her eyes today). Negative for visual disturbance. Respiratory: Negative for shortness of breath. Cardiovascular: Negative for chest pain, palpitations and leg swelling. Gastrointestinal: Negative for abdominal pain. Genitourinary: Negative for difficulty urinating. Musculoskeletal: Negative for arthralgias and myalgias. Skin: Negative for rash and wound. Neurological: Negative for dizziness, tremors, weakness, light-headedness and headaches. Physical Exam  Constitutional:       Appearance: Normal appearance. She is obese. HENT:      Head: Normocephalic and atraumatic. Right Ear: Tympanic membrane and external ear normal.      Left Ear: Tympanic membrane and external ear normal.      Nose: Nose normal.      Mouth/Throat:      Mouth: Mucous membranes are moist.      Pharynx: Oropharynx is clear. Eyes:      Extraocular Movements: Extraocular movements intact. Conjunctiva/sclera: Conjunctivae normal.      Pupils: Pupils are equal, round, and reactive to light. Cardiovascular:      Rate and Rhythm: Normal rate and regular rhythm. Pulses: Normal pulses. Heart sounds: Normal heart sounds. No murmur heard. Pulmonary:      Effort: Pulmonary effort is normal.      Breath sounds: Normal breath sounds. Abdominal:      General: Abdomen is flat. Bowel sounds are normal. There is no distension. Palpations: Abdomen is soft. There is no mass. Tenderness: There is no abdominal tenderness. There is no guarding. Musculoskeletal:         General: Normal range of motion. Cervical back: Normal range of motion and neck supple. Right lower leg: No edema. Left lower leg: No edema. Lymphadenopathy:      Cervical: No cervical adenopathy. Skin:     General: Skin is warm. Capillary Refill: Capillary refill takes less than 2 seconds. Neurological:      General: No focal deficit present. Mental Status: She is alert and oriented to person, place, and time. Psychiatric:         Mood and Affect: Mood normal.         Behavior: Behavior normal.         Thought Content: Thought content normal.          Assessment:   Diagnosis Orders   1. Benign essential HTN     2. Chronic kidney disease, stage 5 ([facility] [address]     3. Acquired hypothyroidism     4. Mixed hyperlipidemia           Plan:  No orders of the defined types were placed in this encounter. Patient Instructions                       Nutrition Health Education:    Keep hydrated, walk 30 minutes minimum 3 times weekly as tolerated. Diet should consist of low fat, low sodium and high fiber. Nutritious foods such as fruits (if you're not diabetic), vegetables, lean meats, lean dairy, whole grains such as brown rice, quinoa, and dry beans. Pedro Poplin with small amounts of heart healthy extra virgin olive oil. Be watchful of any extra salt/sugar/seasoning to your food. You should eat no more than 2 grams or 2,000 mg of salt daily. Salt will raise your BP. Avoid regular/diet sodas, caffeine, energy drinks as these are full of artificial ingredients/sweeteners, sugar, salt and chemicals that spike insulin and are harmful to your health. Sugar intake increases metabolic disfunction, type 2 diabetes, insulin resistance, addictive food behavior and obesity. Avoid all processed foods, foods from boxes, cans, microwave meals as these contain high salt, sugar or fat content and not much nutrition.  Get at least 8 hrs of sleep every night and turn off all electronics at least 1 hour before bedtime as these decreases melatonin production and increases wakefulness. If your cholesterol is high, no greasy, fried, fast or fatty foods. Decrease red meat intake. No butter, hutchinson, lard or creams, no milk as these things clog your arteries and leads to heart attacks and death. If you smoke, smoking increases risk of lung disease, cancers, high BP, heart attack, stroke and death. Take your daily medications as prescribed and inform your family doctor if you are having any side effects or issues taking medications. Pt has appt with Dr. Emelia Arroyo tomorrow      Return in 3 month Dr. Gosia Huizar will do all the blood work       Return in about 3 months (around 6/15/2022), or Esther Morse.      Alfa Quezada, DO

## 2022-04-01 ENCOUNTER — OFFICE VISIT (OUTPATIENT)
Dept: FAMILY MEDICINE CLINIC | Age: 82
End: 2022-04-01
Payer: MEDICARE

## 2022-04-01 ENCOUNTER — TELEPHONE (OUTPATIENT)
Dept: FAMILY MEDICINE CLINIC | Age: 82
End: 2022-04-01

## 2022-04-01 VITALS
OXYGEN SATURATION: 99 % | DIASTOLIC BLOOD PRESSURE: 78 MMHG | SYSTOLIC BLOOD PRESSURE: 122 MMHG | HEART RATE: 66 BPM | WEIGHT: 211 LBS | RESPIRATION RATE: 20 BRPM | TEMPERATURE: 97.1 F | BODY MASS INDEX: 33.05 KG/M2

## 2022-04-01 DIAGNOSIS — I10 ESSENTIAL HYPERTENSION: ICD-10-CM

## 2022-04-01 DIAGNOSIS — E11.22 TYPE 2 DIABETES MELLITUS WITH STAGE 4 CHRONIC KIDNEY DISEASE, WITH LONG-TERM CURRENT USE OF INSULIN (HCC): ICD-10-CM

## 2022-04-01 DIAGNOSIS — E11.649 UNCONTROLLED TYPE 2 DIABETES MELLITUS WITH HYPOGLYCEMIA WITHOUT COMA (HCC): Primary | ICD-10-CM

## 2022-04-01 DIAGNOSIS — Z79.4 TYPE 2 DIABETES MELLITUS WITH STAGE 4 CHRONIC KIDNEY DISEASE, WITH LONG-TERM CURRENT USE OF INSULIN (HCC): ICD-10-CM

## 2022-04-01 DIAGNOSIS — N18.4 TYPE 2 DIABETES MELLITUS WITH STAGE 4 CHRONIC KIDNEY DISEASE, WITH LONG-TERM CURRENT USE OF INSULIN (HCC): ICD-10-CM

## 2022-04-01 PROCEDURE — 4040F PNEUMOC VAC/ADMIN/RCVD: CPT | Performed by: FAMILY MEDICINE

## 2022-04-01 PROCEDURE — G8427 DOCREV CUR MEDS BY ELIG CLIN: HCPCS | Performed by: FAMILY MEDICINE

## 2022-04-01 PROCEDURE — 99214 OFFICE O/P EST MOD 30 MIN: CPT | Performed by: FAMILY MEDICINE

## 2022-04-01 PROCEDURE — G8417 CALC BMI ABV UP PARAM F/U: HCPCS | Performed by: FAMILY MEDICINE

## 2022-04-01 PROCEDURE — 99215 OFFICE O/P EST HI 40 MIN: CPT | Performed by: FAMILY MEDICINE

## 2022-04-01 PROCEDURE — 1090F PRES/ABSN URINE INCON ASSESS: CPT | Performed by: FAMILY MEDICINE

## 2022-04-01 PROCEDURE — G8400 PT W/DXA NO RESULTS DOC: HCPCS | Performed by: FAMILY MEDICINE

## 2022-04-01 PROCEDURE — 1036F TOBACCO NON-USER: CPT | Performed by: FAMILY MEDICINE

## 2022-04-01 PROCEDURE — 1123F ACP DISCUSS/DSCN MKR DOCD: CPT | Performed by: FAMILY MEDICINE

## 2022-04-01 ASSESSMENT — ENCOUNTER SYMPTOMS
ABDOMINAL PAIN: 0
WHEEZING: 0
SHORTNESS OF BREATH: 1
CHEST TIGHTNESS: 0
PHOTOPHOBIA: 0

## 2022-04-01 NOTE — TELEPHONE ENCOUNTER
[facility] calling patient was seen last night for low blood sugar. Recommending she get a follow up with Dr Keesha Fuller and to schedule with Diabetic/Dietician.      When would you like to have her come in?

## 2022-04-01 NOTE — PATIENT INSTRUCTIONS
Discussed with pt that I am referring her to aura ruiz who is NP who specializes in diabetics she will help you figure out what to eat     When you feel bad and you know your sugar is low drink apple juice    Follow up with Dr. Maryann Dan  Has an appt with nephrologist riley 04/20/2022  BP was good today and normal at home    Keep out June appt with me

## 2022-04-01 NOTE — PROGRESS NOTES
Name: Selina Vázquez  DOS: 4/1/2022  MRN: [id number]      Subjective:   Selina Vázquez is a 80 y.o. female being seen for   Chief Complaint   Patient presents with    Follow-up       Vitals:    04/01/22 1626   BP: 122/78   Pulse: 66   Resp: 20   Temp: 97.1 °F (36.2 °C)   SpO2: 99%     Allergies   Allergen Reactions    Lisinopril Other (See Comments)     Cough    Penicillins Swelling     Facial swelling    Ranolazine Rash     Past Medical History:   Diagnosis Date    CAD (coronary artery disease)     Diabetes mellitus ([facility] [address]     Hyperlipidemia     Hypertension     Hypothyroidism     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    Bx proven by Dr Africa Napoles Osteoarthritis     Sleep apnea      Past Surgical History:   Procedure Laterality Date    ANGIOPLASTY  2001    stent x 1    EYE SURGERY      FOOT SURGERY Left 07/31/2009    3rd toe amputation    HYSTERECTOMY, TOTAL ABDOMINAL  1985    TOE AMPUTATION      left 3rd toe    TOE AMPUTATION Left 3/31/2020    Left 2nd TOE AMPUTATION performed by Angie Mckeon DPM at [address] [place] [address]  01/17/2020    Dr Eileen Esquivel- vulvar SCC carcinoma     Social History     Socioeconomic History    Marital status:      Spouse name: None    Number of children: None    Years of education: None    Highest education level: None   Occupational History    None   Tobacco Use    Smoking status: Never Smoker    Smokeless tobacco: Never Used   Vaping Use    Vaping Use: Never used   Substance and Sexual Activity    Alcohol use: No    Drug use: No    Sexual activity: None   Other Topics Concern    None   Social History Narrative    None     Social Determinants of Health     Financial Resource Strain: Low Risk     Difficulty of Paying Living Expenses: Not hard at all   Food Insecurity: No Food Insecurity    Worried About Running Out of Food in the Last Year: Never true    Ezequiel of Food in the Last Year: Never true Transportation Needs:     Lack of Transportation (Medical): Not on file    Lack of Transportation (Non-Medical): Not on file   Physical Activity:     Days of Exercise per Week: Not on file    Minutes of Exercise per Session: Not on file   Stress:     Feeling of Stress : Not on file   Social Connections:     Frequency of Communication with Friends and Family: Not on file    Frequency of Social Gatherings with Friends and Family: Not on file    Attends Yarsani Services: Not on file    Active Member of [address] [place] [address] or Organizations: Not on file    Attends Club or Organization Meetings: Not on file    Marital Status: Not on file   Intimate Partner Violence:     Fear of Current or Ex-Partner: Not on file    Emotionally Abused: Not on file    Physically Abused: Not on file    Sexually Abused: Not on file   Housing Stability:     Unable to Pay for Housing in the Last Year: Not on file    Number of [place] in the Last Year: Not on file    Unstable Housing in the Last Year: Not on file       Current Outpatient Medications   Medication Sig Dispense Refill    furosemide (LASIX) 20 MG tablet Take 1 tablet by mouth daily 60 tablet 3    carvedilol (COREG) 6.25 MG tablet 6.25 mg every evening       gabapentin (NEURONTIN) 300 MG capsule Take 1 capsule by mouth 3 times daily for 90 days.  270 capsule 0    Continuous Blood Gluc Sensor ([facility]) MISC by Does not apply route      albuterol sulfate HFA (VENTOLIN HFA) 108 (90 Base) MCG/ACT inhaler Inhale 2 puffs into the lungs every 6 hours as needed for Wheezing       simvastatin (ZOCOR) 20 MG tablet Take 1 tablet by mouth nightly 90 tablet 3    ferrous gluconate 324 (37.5 Fe) MG TABS Take 1 tablet by mouth once daily 90 tablet 3    oxybutynin (DITROPAN-XL) 10 MG extended release tablet Take 1 tablet by mouth daily 90 tablet 3    senna (SENOKOT) 8.6 MG tablet Take 1 tablet by mouth daily 90 tablet 3    nitroGLYCERIN (NITROSTAT) 0.4 MG SL tablet Place 0.4 mg under the tongue every 5 minutes as needed for Chest pain up to max of 3 total doses. If no relief after 1 dose, call 911.  B-D INS SYR ULTRAFINE 1CC/31G 31G X 5/16\" 1 ML MISC       fexofenadine (ALLEGRA) 180 MG tablet Take 180 mg by mouth daily      montelukast (SINGULAIR) 10 MG tablet TAKE ONE TABLET BY MOUTH NIGHTLY 90 tablet 1    fluticasone propionate (FLOVENT DISKUS) 100 MCG/BLIST AEPB inhaler Inhale 1 puff into the lungs daily      insulin glargine (LANTUS) 100 UNIT/ML injection vial Inject 50 Units into the skin nightly       fluticasone (FLONASE) 50 MCG/ACT nasal spray 1 spray by Nasal route daily      isosorbide mononitrate (IMDUR) 60 MG extended release tablet Take 60 mg by mouth every morning      insulin aspart (NOVOLOG) 100 UNIT/ML injection vial Inject 4 Units into the skin 3 times daily (before meals) Indications: 12 units in AM, 14 units in afternoon, 16 units in PM with meals Plus sliding scale       aspirin 81 MG tablet Take 81 mg by mouth daily      acetaminophen (TYLENOL) 500 MG tablet Take 1,000 mg by mouth 3 times daily       vitamin B-12 (CYANOCOBALAMIN) 500 MCG tablet Take 500 mcg by mouth daily      CPAP Machine MISC by Does not apply route      calcium carbonate (OSCAL) 500 MG TABS tablet Take 600 mg by mouth daily       Omega-3 Fatty Acids (FISH OIL PO) Take 1,040 mg by mouth daily       Multiple Vitamins-Minerals (MULTIVITAMIN ADULTS PO) Take by mouth daily       amLODIPine (NORVASC) 5 MG tablet Take 1 tablet by mouth daily (Patient taking differently: Take 5 mg by mouth 2 times daily ) 30 tablet 0    levothyroxine (EUTHYROX) 150 MCG tablet Take 1 tablet by mouth Daily 90 tablet 0     No current facility-administered medications for this visit. Objective: This is a follow up from yesterday form hospital visit.  Yesterday morning doesn't remember what time it was she thinks it was around 12:00pm,  her BS was in the 200's she fixed her scrambled eggs and english toast apple. Was eating and giving herself insulin at the same time. She went to [employer] came home at 4:00pm When she got home she made salads never checked her blood sugar wasn't feeling good check her BS was 51. She told her  she has to eat something she went to get the dish to make her fish. She went to the living room sat in the recliner and her  left got the fish and salad gave it to her she had one bite and that is all she remembers. Pt is under care of Dr. Natasha Saunders for her diabetes. Pt has a kidney diet and a diabetic diet. Pt gets confused on what she can eat. Pt states she was worn out because today is wash day , she did to much. She doesn't have the energy. 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 Pt states she should have drank apple juice when she felt bad. Review of Systems   Constitutional: Positive for fatigue. Negative for unexpected weight change. Eyes: Negative for photophobia and visual disturbance. Respiratory: Positive for shortness of breath (uses a cpap). Negative for chest tightness and wheezing. Cardiovascular: Negative for chest pain, palpitations and leg swelling. Gastrointestinal: Negative for abdominal pain. Genitourinary: Negative for difficulty urinating. Musculoskeletal: Positive for arthralgias. Negative for myalgias. Skin: Negative for rash and wound. Neurological: Positive for tremors (hands ) and light-headedness (yesterday but not today). Negative for dizziness, weakness and headaches. Hematological: Negative for adenopathy. Psychiatric/Behavioral: Positive for confusion (was confused yesterday but not today). Negative for agitation and suicidal ideas. Physical Exam  Constitutional:       Appearance: Normal appearance. HENT:      Head: Normocephalic and atraumatic.       Right Ear: External ear normal.      Left Ear: External ear normal.      Nose: Nose normal. Mouth/Throat:      Mouth: Mucous membranes are moist.      Pharynx: Oropharynx is clear. Eyes:      Extraocular Movements: Extraocular movements intact. Conjunctiva/sclera: Conjunctivae normal.      Pupils: Pupils are equal, round, and reactive to light. Cardiovascular:      Rate and Rhythm: Normal rate and regular rhythm. Pulses: Normal pulses. Heart sounds: Normal heart sounds. No murmur heard. Pulmonary:      Effort: Pulmonary effort is normal.      Breath sounds: Normal breath sounds. No wheezing, rhonchi or rales. Abdominal:      General: Abdomen is flat. Bowel sounds are normal. There is no distension. Palpations: Abdomen is soft. There is no mass. Tenderness: There is no abdominal tenderness. There is no guarding. Musculoskeletal:         General: Normal range of motion. Cervical back: Normal range of motion and neck supple. Right lower leg: No edema. Left lower leg: No edema. Lymphadenopathy:      Cervical: No cervical adenopathy. Skin:     General: Skin is warm. Capillary Refill: Capillary refill takes less than 2 seconds. Neurological:      General: No focal deficit present. Mental Status: She is alert and oriented to person, place, and time. Gait: Gait abnormal (chronic). Comments: Hands tremor when she is showing me her glucose readings from yesterday   Psychiatric:         Mood and Affect: Mood normal.         Behavior: Behavior normal.         Thought Content: Thought content normal.          Assessment:   Diagnosis Orders   1. Uncontrolled type 2 diabetes mellitus with hypoglycemia without coma ([address]     2. Type 2 diabetes mellitus with stage 4 chronic kidney disease, with long-term current use of insulin ([address]  Renny Cruz NP, Diabetic Education, [place]   3.  Essential hypertension           Plan:  Orders Placed This Encounter   Procedures   [facility], [address] Referral Priority:   Routine     Referral Type:   Eval and Treat     Referral Reason:   Specialty Services Required     Referred to Provider:   TESFAYE Kraus - CNP     Requested Specialty:   Nurse Practitioner     Number of Visits Requested:   1         Patient Instructions   Discussed with pt that I am referring her to [facility] who is NP who specializes in diabetics she will help you figure out what to eat     When you feel bad and you know your sugar is low drink apple juice    Follow up with Dr. Bahman Durham  Has an appt with nephrologist riley 04/20/2022  BP was good today and normal at home    Keep out June appt with me          No follow-ups on file.      Cecilia Quiles, DO

## 2022-04-13 LAB
ALBUMIN/GLOBULIN RATIO: 1.4 G/DL
ALBUMIN: 3.7 G/DL (ref 3.5–5)
ALP BLD-CCNC: 76 UNITS/L (ref 38–126)
ALT SERPL-CCNC: 18 UNITS/L (ref 4–35)
ANION GAP SERPL CALCULATED.3IONS-SCNC: 7.1 MMOL/L
AST SERPL-CCNC: 27 UNITS/L (ref 14–36)
BILIRUB SERPL-MCNC: 0.5 MG/DL (ref 0.2–1.3)
BUN BLDV-MCNC: 61 MG/DL (ref 7–17)
CALCIUM SERPL-MCNC: 9.2 MG/DL (ref 8.4–10.2)
CHLORIDE BLD-SCNC: 102 MMOL/L (ref 98–120)
CHOLESTEROL/HDL RATIO: 3.61 RATIO (ref 0–4.5)
CHOLESTEROL: 130 MG/DL (ref 50–200)
CO2: 27 MMOL/L (ref 22–31)
CREAT SERPL-MCNC: 3.1 MG/DL (ref 0.5–1)
CREATININE, RANDOM URINE: 91 MG/DL (ref 20–370)
GFR CALCULATED: 15.4
GLOBULIN: 2.7 G/DL
GLUCOSE: 194 MG/DL (ref 65–105)
HBA1C MFR BLD: 7.2 % (ref 4.4–6.4)
HDLC SERPL-MCNC: 36 MG/DL (ref 36–68)
LDL CHOLESTEROL CALCULATED: 60.2 MG/DL (ref 0–160)
MICROALBUMIN UR-MCNC: 65.6 MG/DL (ref 0–1.7)
MICROALBUMIN/CREAT UR-RTO: 720.87
POTASSIUM SERPL-SCNC: 4.9 MMOL/L (ref 3.6–5)
SODIUM BLD-SCNC: 136 MMOL/L (ref 135–145)
TOTAL PROTEIN, SERUM: 6.3 G/DL (ref 6.3–8.2)
TRIGL SERPL-MCNC: 169 MG/DL (ref 10–250)
VLDLC SERPL CALC-MCNC: 34 MG/DL (ref 0–50)

## 2022-04-14 ENCOUNTER — OFFICE VISIT (OUTPATIENT)
Dept: DIABETES SERVICES | Age: 82
End: 2022-04-14
Payer: MEDICARE

## 2022-04-14 ENCOUNTER — TELEPHONE (OUTPATIENT)
Dept: INTERNAL MEDICINE | Age: 82
End: 2022-04-14

## 2022-04-14 VITALS
SYSTOLIC BLOOD PRESSURE: 110 MMHG | WEIGHT: 213 LBS | BODY MASS INDEX: 33.43 KG/M2 | HEIGHT: 67 IN | RESPIRATION RATE: 20 BRPM | HEART RATE: 48 BPM | DIASTOLIC BLOOD PRESSURE: 62 MMHG

## 2022-04-14 DIAGNOSIS — N18.4 TYPE 2 DIABETES MELLITUS WITH STAGE 4 CHRONIC KIDNEY DISEASE, WITH LONG-TERM CURRENT USE OF INSULIN (HCC): Primary | ICD-10-CM

## 2022-04-14 DIAGNOSIS — Z79.4 TYPE 2 DIABETES MELLITUS WITH STAGE 4 CHRONIC KIDNEY DISEASE, WITH LONG-TERM CURRENT USE OF INSULIN (HCC): Primary | ICD-10-CM

## 2022-04-14 DIAGNOSIS — I10 ESSENTIAL HYPERTENSION: ICD-10-CM

## 2022-04-14 DIAGNOSIS — E78.2 MIXED HYPERLIPIDEMIA: ICD-10-CM

## 2022-04-14 DIAGNOSIS — E11.22 TYPE 2 DIABETES MELLITUS WITH STAGE 4 CHRONIC KIDNEY DISEASE, WITH LONG-TERM CURRENT USE OF INSULIN (HCC): Primary | ICD-10-CM

## 2022-04-14 PROCEDURE — 99205 OFFICE O/P NEW HI 60 MIN: CPT | Performed by: NURSE PRACTITIONER

## 2022-04-14 PROCEDURE — 1123F ACP DISCUSS/DSCN MKR DOCD: CPT | Performed by: NURSE PRACTITIONER

## 2022-04-14 PROCEDURE — G8427 DOCREV CUR MEDS BY ELIG CLIN: HCPCS | Performed by: NURSE PRACTITIONER

## 2022-04-14 PROCEDURE — 3051F HG A1C>EQUAL 7.0%<8.0%: CPT | Performed by: NURSE PRACTITIONER

## 2022-04-14 PROCEDURE — G8417 CALC BMI ABV UP PARAM F/U: HCPCS | Performed by: NURSE PRACTITIONER

## 2022-04-14 PROCEDURE — 1036F TOBACCO NON-USER: CPT | Performed by: NURSE PRACTITIONER

## 2022-04-14 PROCEDURE — 99214 OFFICE O/P EST MOD 30 MIN: CPT | Performed by: NURSE PRACTITIONER

## 2022-04-14 PROCEDURE — 1090F PRES/ABSN URINE INCON ASSESS: CPT | Performed by: NURSE PRACTITIONER

## 2022-04-14 PROCEDURE — G8400 PT W/DXA NO RESULTS DOC: HCPCS | Performed by: NURSE PRACTITIONER

## 2022-04-14 PROCEDURE — 4040F PNEUMOC VAC/ADMIN/RCVD: CPT | Performed by: NURSE PRACTITIONER

## 2022-04-14 RX ORDER — AMLODIPINE BESYLATE 5 MG/1
5 TABLET ORAL 2 TIMES DAILY
COMMUNITY

## 2022-04-14 ASSESSMENT — ENCOUNTER SYMPTOMS
SHORTNESS OF BREATH: 0
DIARRHEA: 0
ABDOMINAL PAIN: 0
RESPIRATORY NEGATIVE: 1

## 2022-04-14 NOTE — TELEPHONE ENCOUNTER
Faxed referral along with 4/14/2022 ov note, insurance, and demographics to the [facility] nutrition services fax # [phone]

## 2022-04-14 NOTE — PROGRESS NOTES
[facility] [address]  [address] [facility], [address]  [facility] [address] [phone]        HISTORY:    Abel Garrison presents today for evaluation and management of:  Chief Complaint   Patient presents with    New Patient     type 2 diabetes. dx in 1985. started insulin in 1995       HPI    Interval History:    Past DM Medications   Metformin-ckd  Glimepiride-therapy completed    Current Diabetic Medications  Lantus 50 units at at bedtime and NovoLog 12 units in the a.m. 16 units with lunch and 16 with dinner sliding scale    DKA episodes:       04/14/22   Abel Garrison is a 80 y.o. female patient who presents to clinic today for her diabetes. she has a history of hypertension, CAD, ALDAIR, hypothyroidism, CKD, hyperlipidemia and obesity which contributes to her diabetes. Is here for initial diabetes management. . she denies any current signs or symptoms of hyper/hypoglycemia. she states they are taking their medications as prescribed without any adverse effects. She needs assistance with creating a meal plan for her diabetes and CKD, she also would like to establish somewhere closer for her diabetes management. Diet: regular  Exercise: none  BS testing: uses cgm daily with success and is adherent to cgm therapy  Issues: gets Doreen Chapman [facility]  Diabetic foot exam up-to-date: Yes  Diabetic retinal exam up-to-date: Yes  Hypoglycemia as needed treatment: juice, glucose tabs    High cholesterol-  Takes Zocor and fish oil and denies any adverse effects with its use. Watches diet and exercise. Hypertension-  Takes Norvasc, Lasix, Coreg and denies any adverse effects with their use. Watches diet and exercise. Denies any chest pain, dizziness or edema. Obesity- Working on weight loss. CKD- she follow with nephrology and considering dialysis. She needs help with renal diet.        Past Medical History:   Diagnosis Date    CAD (coronary artery disease)     Diabetes mellitus ([facility] [address]     Hyperlipidemia     Hypertension     Hypothyroidism     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    Bx proven by Dr Kira Esquivel Osteoarthritis     Sleep apnea      Family History   Problem Relation Age of Onset    High Blood Pressure Mother     Coronary Art Dis Father      Social History     Tobacco Use    Smoking status: Never Smoker    Smokeless tobacco: Never Used   Vaping Use    Vaping Use: Never used   Substance Use Topics    Alcohol use: No    Drug use: No     Allergies   Allergen Reactions    Lisinopril Other (See Comments)     Cough    Penicillins Swelling     Facial swelling    Ranolazine Rash       MEDICATIONS:  Current Outpatient Medications   Medication Sig Dispense Refill    amLODIPine (NORVASC) 5 MG tablet Take 5 mg by mouth in the morning and at bedtime      guaiFENesin (DIABETIC TUSSIN EX) 100 MG/5ML liquid Take 200 mg by mouth 3 times daily as needed for Cough      furosemide (LASIX) 20 MG tablet Take 1 tablet by mouth daily 60 tablet 3    carvedilol (COREG) 6.25 MG tablet Take 6.25 mg by mouth 2 times daily       gabapentin (NEURONTIN) 300 MG capsule Take 1 capsule by mouth 3 times daily for 90 days.  270 capsule 0    levothyroxine (EUTHYROX) 150 MCG tablet Take 1 tablet by mouth Daily 90 tablet 0    albuterol sulfate HFA (VENTOLIN HFA) 108 (90 Base) MCG/ACT inhaler Inhale 2 puffs into the lungs every 6 hours as needed for Wheezing       simvastatin (ZOCOR) 20 MG tablet Take 1 tablet by mouth nightly 90 tablet 3    ferrous gluconate 324 (37.5 Fe) MG TABS Take 1 tablet by mouth once daily 90 tablet 3    oxybutynin (DITROPAN-XL) 10 MG extended release tablet Take 1 tablet by mouth daily 90 tablet 3    senna (SENOKOT) 8.6 MG tablet Take 1 tablet by mouth daily 90 tablet 3    fexofenadine (ALLEGRA) 180 MG tablet Take 180 mg by mouth daily      montelukast (SINGULAIR) 10 MG tablet TAKE ONE TABLET BY MOUTH NIGHTLY 90 tablet 1    fluticasone propionate (FLOVENT DISKUS) 100 MCG/BLIST AEPB inhaler Inhale 1 puff into the lungs daily      insulin glargine (LANTUS) 100 UNIT/ML injection vial Inject 50 Units into the skin nightly       fluticasone (FLONASE) 50 MCG/ACT nasal spray 1 spray by Nasal route daily      isosorbide mononitrate (IMDUR) 60 MG extended release tablet Take 60 mg by mouth every morning      insulin aspart (NOVOLOG) 100 UNIT/ML injection vial Indications: 12 units in AM, 16 units in afternoon, 16 units in PM with meals Plus sliding scale       aspirin 81 MG tablet Take 81 mg by mouth daily      acetaminophen (TYLENOL) 500 MG tablet Take 1,000 mg by mouth 3 times daily       vitamin B-12 (CYANOCOBALAMIN) 500 MCG tablet Take 500 mcg by mouth daily      calcium carbonate (OSCAL) 500 MG TABS tablet Take 600 mg by mouth daily       Omega-3 Fatty Acids (FISH OIL PO) Take 1,040 mg by mouth daily       Multiple Vitamins-Minerals (MULTIVITAMIN ADULTS PO) Take by mouth daily       Continuous Blood Gluc Sensor ([employer] JADE SENSOR SYSTEM) MISC by Does not apply route      nitroGLYCERIN (NITROSTAT) 0.4 MG SL tablet Place 0.4 mg under the tongue every 5 minutes as needed for Chest pain up to max of 3 total doses. If no relief after 1 dose, call 911. (Patient not taking: Reported on 4/14/2022)      B-D INS SYR ULTRAFINE 1CC/31G 31G X 5/16\" 1 ML MISC       CPAP Machine MISC by Does not apply route       No current facility-administered medications for this visit. Review ofSymptoms:  Review of Systems   Constitutional: Positive for fatigue. Negative for unexpected weight change. Eyes: Negative for visual disturbance. Respiratory: Negative. Negative for shortness of breath. Cardiovascular: Negative for chest pain and leg swelling. Gastrointestinal: Negative for abdominal pain and diarrhea. Endocrine: Negative for polydipsia, polyphagia and polyuria. Genitourinary: Negative. Musculoskeletal: Negative. Skin: Negative for rash and wound. Neurological: Negative for dizziness, tremors, seizures and headaches. Psychiatric/Behavioral: Negative. Negative for confusion and decreased concentration. Theremainder of a complete 14-point review of systems is negative. Vital Signs: /62 (Site: Right Upper Arm, Position: Sitting, Cuff Size: Large Adult)   Pulse (!) 48   Resp 20   Ht 5' 7\" (1.702 m)   Wt 213 lb (96.6 kg)   BMI 33.36 kg/m²      Wt Readings from Last 3 Encounters:   04/14/22 213 lb (96.6 kg)   04/01/22 211 lb (95.7 kg)   03/16/22 218 lb (98.9 kg)     Body mass index is 33.36 kg/m².   LABS:  Hemoglobin A1C   Date Value Ref Range Status   04/13/2022 7.2 (H) 4.4 - 6.4 % Final   09/02/2021 7.1 % Final     Lab Results   Component Value Date    LABMICR 65.6 (H) 04/13/2022     Lab Results   Component Value Date     04/13/2022    K 4.9 04/13/2022     04/13/2022    CO2 27 04/13/2022    BUN 61 (H) 04/13/2022    CREATININE 3.1 (H) 04/13/2022    GLUCOSE 194 (H) 04/13/2022    CALCIUM 9.2 04/13/2022    PROT 6.1 (A) 02/09/2021    LABALBU 3.7 04/13/2022    BILITOT 0.5 04/13/2022    ALKPHOS 76 04/13/2022    AST 27 04/13/2022    ALT 18 04/13/2022    LABGLOM 15.4 04/13/2022    GFRAA 30 (L) 04/30/2020    GLOB 2.7 04/13/2022     Lab Results   Component Value Date    CHOL 130 04/13/2022    CHOL 133 04/29/2021    CHOL 124.0 06/10/2019     Lab Results   Component Value Date    TRIG 169 04/13/2022    TRIG 93 04/29/2021    TRIG 150.0 06/10/2019     Lab Results   Component Value Date    HDL 36 04/13/2022    HDL 50 04/29/2021    HDL 49 06/10/2019     Lab Results   Component Value Date    LDLCALC 60.2 04/13/2022    LDLCALC 64.4 04/29/2021    LDLCALC 45.0 06/10/2019     Lab Results   Component Value Date    VLDL 34 04/13/2022    VLDL 19 04/29/2021    VLDL 30.0 06/10/2019     Lab Results   Component Value Date    CHOLHDLRATIO 3.61 04/13/2022    CHOLHDLRATIO 2.66 04/29/2021 BRITNIHDLRTETE 2.5 06/10/2019           Physical Exam  Constitutional:       Appearance: She is well-developed. Eyes:      Pupils: Pupils are equal, round, and reactive to light. Neck:      Thyroid: No thyroid mass, thyromegaly or thyroid tenderness. Cardiovascular:      Rate and Rhythm: Normal rate and regular rhythm. Heart sounds: Normal heart sounds. Pulmonary:      Effort: Pulmonary effort is normal.      Breath sounds: Normal breath sounds. Abdominal:      General: Bowel sounds are normal.      Palpations: Abdomen is soft. Skin:     General: Skin is warm and dry. Comments: Negative for open/nonhealing wounds. Negative for lipohypertrophy. Neurological:      Mental Status: She is alert and oriented to person, place, and time. ASSESSMENT/PLAN:     Diagnosis Orders   1. Type 2 diabetes mellitus with stage 4 chronic kidney disease, with long-term current use of insulin [facility])  External Referral To Nutrition Services   2. Essential hypertension     3. Mixed hyperlipidemia     4. BMI 33.0-33.9,adult       Orders Placed This Encounter   Procedures    External Referral To Nutrition Services     No orders of the defined types were placed in this encounter. Requested Prescriptions      No prescriptions requested or ordered in this encounter       1. Type 2 diabetes mellitus with stage 4 chronic kidney disease, with long-term current use of insulin (HCC)  - Unstable  HbA1C goal is less than 7%. - Fasting blood glucose goal is 70-120mg/dl and postprandial blood sugar goal is less than 180 mg/dl. - Labs reviewed including most recent A1c, microalbumin and kidney function. Repeat labs due in 3 months.    -We discussed in great detail dietary modifications they can make to better improve their blood sugars. --Initial diabetic education completed.  Discussed diabetes as a disease and how we can manage it to prevent complications associated with it.   -will continue to follow pt for diabetes management. Unable to download orlin cgm today. Will request last office note from current endo. Will get her set up with a diatician for meal diabetic/renal planing. Discussed signs and symptoms of hyper/hypoglycemia and how to treat. Encouraged 150 minutes of physical activity per week. Follow a low carbohydrate diet. Encouraged at least 7 hours of sleep. The patient was informed of the goals of diabetes management. This can only be accomplished by watching their diet and exercise levels. We certainly use medicines to help attain these goals. The consequences of not controlling blood sugars were discussed. These include blindness, heart disease, stroke, kidney disease, and possibly need for dialysis. They were told to be careful with their foot care as diabetics often have nerve damage, infections and risk for limb amutations . They also need a dilated eye exam yearly. We discussed the issues of diet, exercise, medication, complication avoidance, reviewed the signs and symptoms of diabetes, hypoglycemic episodes, significance of HbA1C.       - External Referral To Nutrition Services    2. Essential hypertension   stable, continue current medications. Diet and exercise Seek emergent care if chest pain develops. 3. Mixed hyperlipidemia  stable, lipid panel reviewed, continue current medications. Diet and exercise      4. BMI 33.0-33.9,adult  Reduce calories and increase physical activity to achieve a slow and steady weight loss to improve blood pressure, cholesterol and diabetes. Answered all patient questions. Agrees to follow plan of care and to follow up in 1 months, sooner if needed. Call office if unexplained blood sugars less than 70 occur or above 400. Call office or access [employer] with any further questions or concerns. Be sure to bring glucometer/food log at next appointment.        Total time spent reviewing chart, labs, counseling patient and documenting on the date of the encounter: 60 min.       Electronically signed by TESFAYE Matt CNP on 4/14/2022 at 2:48 PM      (Please note that portions of this note were completed with a voice-recognition program. Efforts were made to edit the dictation but occasionally words are mis-transcribed.)

## 2022-04-26 DIAGNOSIS — N39.0 URINARY TRACT INFECTION WITHOUT HEMATURIA, SITE UNSPECIFIED: Primary | ICD-10-CM

## 2022-04-26 RX ORDER — CIPROFLOXACIN 500 MG/1
500 TABLET, FILM COATED ORAL 2 TIMES DAILY
Qty: 14 TABLET | Refills: 0 | Status: SHIPPED | OUTPATIENT
Start: 2022-04-26 | End: 2022-05-03

## 2022-04-28 ENCOUNTER — TELEPHONE (OUTPATIENT)
Dept: INTERNAL MEDICINE | Age: 82
End: 2022-04-28

## 2022-04-28 NOTE — TELEPHONE ENCOUNTER
You did a referral for patient 4-14 to [address] [place] [address]. Patient is still waiting to hear from them. She needs help as she has several issues. I tried to call the office and left a message to call us back. Patient would like you to call her Monday.   [phone]   Leave message for her to call back if no answer

## 2022-05-02 ENCOUNTER — OFFICE VISIT (OUTPATIENT)
Dept: FAMILY MEDICINE CLINIC | Age: 82
End: 2022-05-02
Payer: MEDICARE

## 2022-05-02 VITALS
DIASTOLIC BLOOD PRESSURE: 60 MMHG | TEMPERATURE: 95.3 F | RESPIRATION RATE: 20 BRPM | OXYGEN SATURATION: 99 % | HEART RATE: 53 BPM | SYSTOLIC BLOOD PRESSURE: 116 MMHG | BODY MASS INDEX: 32.83 KG/M2 | WEIGHT: 209.6 LBS

## 2022-05-02 DIAGNOSIS — N18.4 TYPE 2 DIABETES MELLITUS WITH STAGE 4 CHRONIC KIDNEY DISEASE, WITH LONG-TERM CURRENT USE OF INSULIN (HCC): ICD-10-CM

## 2022-05-02 DIAGNOSIS — I10 ESSENTIAL HYPERTENSION: ICD-10-CM

## 2022-05-02 DIAGNOSIS — Z79.4 TYPE 2 DIABETES MELLITUS WITH STAGE 4 CHRONIC KIDNEY DISEASE, WITH LONG-TERM CURRENT USE OF INSULIN (HCC): ICD-10-CM

## 2022-05-02 DIAGNOSIS — N18.4 ANEMIA DUE TO STAGE 4 CHRONIC KIDNEY DISEASE (HCC): ICD-10-CM

## 2022-05-02 DIAGNOSIS — R55 NEAR SYNCOPE: Primary | ICD-10-CM

## 2022-05-02 DIAGNOSIS — E78.2 MIXED HYPERLIPIDEMIA: ICD-10-CM

## 2022-05-02 DIAGNOSIS — E11.22 TYPE 2 DIABETES MELLITUS WITH STAGE 4 CHRONIC KIDNEY DISEASE, WITH LONG-TERM CURRENT USE OF INSULIN (HCC): ICD-10-CM

## 2022-05-02 DIAGNOSIS — R31.29 MICROSCOPIC HEMATURIA: ICD-10-CM

## 2022-05-02 DIAGNOSIS — E03.9 ACQUIRED HYPOTHYROIDISM: ICD-10-CM

## 2022-05-02 DIAGNOSIS — N39.0 URINARY TRACT INFECTION WITHOUT HEMATURIA, SITE UNSPECIFIED: ICD-10-CM

## 2022-05-02 DIAGNOSIS — D63.1 ANEMIA DUE TO STAGE 4 CHRONIC KIDNEY DISEASE (HCC): ICD-10-CM

## 2022-05-02 PROCEDURE — 1036F TOBACCO NON-USER: CPT | Performed by: FAMILY MEDICINE

## 2022-05-02 PROCEDURE — G8400 PT W/DXA NO RESULTS DOC: HCPCS | Performed by: FAMILY MEDICINE

## 2022-05-02 PROCEDURE — 1090F PRES/ABSN URINE INCON ASSESS: CPT | Performed by: FAMILY MEDICINE

## 2022-05-02 PROCEDURE — 4040F PNEUMOC VAC/ADMIN/RCVD: CPT | Performed by: FAMILY MEDICINE

## 2022-05-02 PROCEDURE — 1123F ACP DISCUSS/DSCN MKR DOCD: CPT | Performed by: FAMILY MEDICINE

## 2022-05-02 PROCEDURE — 99212 OFFICE O/P EST SF 10 MIN: CPT | Performed by: FAMILY MEDICINE

## 2022-05-02 PROCEDURE — G8417 CALC BMI ABV UP PARAM F/U: HCPCS | Performed by: FAMILY MEDICINE

## 2022-05-02 PROCEDURE — 3051F HG A1C>EQUAL 7.0%<8.0%: CPT | Performed by: FAMILY MEDICINE

## 2022-05-02 PROCEDURE — G8427 DOCREV CUR MEDS BY ELIG CLIN: HCPCS | Performed by: FAMILY MEDICINE

## 2022-05-02 PROCEDURE — 99214 OFFICE O/P EST MOD 30 MIN: CPT | Performed by: FAMILY MEDICINE

## 2022-05-02 RX ORDER — CARVEDILOL 3.12 MG/1
TABLET ORAL
COMMUNITY
Start: 2022-04-28 | End: 2022-07-13 | Stop reason: ALTCHOICE

## 2022-05-02 SDOH — ECONOMIC STABILITY: FOOD INSECURITY: WITHIN THE PAST 12 MONTHS, THE FOOD YOU BOUGHT JUST DIDN'T LAST AND YOU DIDN'T HAVE MONEY TO GET MORE.: NEVER TRUE

## 2022-05-02 SDOH — ECONOMIC STABILITY: FOOD INSECURITY: WITHIN THE PAST 12 MONTHS, YOU WORRIED THAT YOUR FOOD WOULD RUN OUT BEFORE YOU GOT MONEY TO BUY MORE.: NEVER TRUE

## 2022-05-02 ASSESSMENT — ENCOUNTER SYMPTOMS
ABDOMINAL PAIN: 0
SHORTNESS OF BREATH: 1
COUGH: 0
WHEEZING: 0
CHEST TIGHTNESS: 0

## 2022-05-02 ASSESSMENT — PATIENT HEALTH QUESTIONNAIRE - PHQ9
SUM OF ALL RESPONSES TO PHQ QUESTIONS 1-9: 1
SUM OF ALL RESPONSES TO PHQ QUESTIONS 1-9: 1
1. LITTLE INTEREST OR PLEASURE IN DOING THINGS: 0
2. FEELING DOWN, DEPRESSED OR HOPELESS: 1
SUM OF ALL RESPONSES TO PHQ9 QUESTIONS 1 & 2: 1
SUM OF ALL RESPONSES TO PHQ QUESTIONS 1-9: 1
SUM OF ALL RESPONSES TO PHQ QUESTIONS 1-9: 1

## 2022-05-02 ASSESSMENT — SOCIAL DETERMINANTS OF HEALTH (SDOH): HOW HARD IS IT FOR YOU TO PAY FOR THE VERY BASICS LIKE FOOD, HOUSING, MEDICAL CARE, AND HEATING?: NOT VERY HARD

## 2022-05-02 NOTE — TELEPHONE ENCOUNTER
Left additional voicemail for a call back to schedule appt. Called to notify patient. Patient states she does have an appt and they called her Friday afternoon to schedule appt.  Her appt is today at 5:45pm

## 2022-05-02 NOTE — TELEPHONE ENCOUNTER
Called [facility] to check on status of referral. Was forwarded to ext. [phone]. [facility] to return call. yes

## 2022-05-02 NOTE — PATIENT INSTRUCTIONS
DM:   NO sugar, decrease fruit intake, No juice, NO veggies with color other than green, NO sauteed onions or anything that caramelizes, you may eat raw onions. NO alcohol, try not to eat diabetic candies as they may cause diarrhea. Minimize your carbohydrate intake.     Pt is under care of [facility] NP diabetic specialist  Pt has appt tonight with dietician    Pt has appt with a cardiologist Dr. Chandra Avendano 05/09/2022 her pulse is in the 49-50's     She showed me her glucose reading for the past week was in the 100's -200 today was 172/160/180 on 05/01 was 68 at one time then 168/58309 at different times    Pt will take a urine cup home and on Friday she will give it to us    Fasting blood work and follow up in 074/2022

## 2022-05-02 NOTE — PROGRESS NOTES
Name: Mili Spencer  DOS: 5/2/2022  MRN: [id number]      Subjective: Mili Spencer is a 80 y.o. female being seen for   Chief Complaint   Patient presents with    Hypoglycemia     ER followup Seen at [facility] 3/31/22    Bradycardia     Seen in [facility] ER for Bradycardia. (4/21/22). She was at [facility] and she got dizzy.         Vitals:    05/02/22 1451   BP: 116/60   Pulse: 53   Resp: 20   Temp: 95.3 °F (35.2 °C)   SpO2: 99%     Allergies   Allergen Reactions    Lisinopril Other (See Comments)     Cough    Penicillins Swelling     Facial swelling    Ranolazine Rash     Past Medical History:   Diagnosis Date    CAD (coronary artery disease)     Diabetes mellitus (HCC)     Hyperlipidemia     Hypertension     Hypothyroidism     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    Bx proven by Dr Chandana Salvador Osteoarthritis     Sleep apnea      Past Surgical History:   Procedure Laterality Date    ANGIOPLASTY  2001    stent x 1    EYE SURGERY      FOOT SURGERY Left 07/31/2009    3rd toe amputation    HYSTERECTOMY, TOTAL ABDOMINAL  1985    TOE AMPUTATION      left 3rd toe    TOE AMPUTATION Left 3/31/2020    Left 2nd TOE AMPUTATION performed by Ming Ewing DPM at [address] [place] [address]  01/17/2020    Dr Raya Cramer- vulvar SCC carcinoma     Social History     Socioeconomic History    Marital status:      Spouse name: Not on file    Number of children: Not on file    Years of education: Not on file    Highest education level: Not on file   Occupational History    Not on file   Tobacco Use    Smoking status: Never Smoker    Smokeless tobacco: Never Used   Vaping Use    Vaping Use: Never used   Substance and Sexual Activity    Alcohol use: No    Drug use: No    Sexual activity: Not on file   Other Topics Concern    Not on file   Social History Narrative    Not on file     Social Determinants of Health     Financial Resource Strain: Low Risk     Difficulty of Paying Living Expenses: Not very hard   Food Insecurity: No Food Insecurity    Worried About Running Out of Food in the Last Year: Never true    Ezequiel of Food in the Last Year: Never true   Transportation Needs:     Lack of Transportation (Medical): Not on file    Lack of Transportation (Non-Medical): Not on file   Physical Activity:     Days of Exercise per Week: Not on file    Minutes of Exercise per Session: Not on file   Stress:     Feeling of Stress : Not on file   Social Connections:     Frequency of Communication with Friends and Family: Not on file    Frequency of Social Gatherings with Friends and Family: Not on file    Attends Evangelical Services: Not on file    Active Member of [address] [place] [address] [employer] or Organizations: Not on file    Attends Club or Organization Meetings: Not on file    Marital Status: Not on file   Intimate Partner Violence:     Fear of Current or Ex-Partner: Not on file    Emotionally Abused: Not on file    Physically Abused: Not on file    Sexually Abused: Not on file   Housing Stability:     Unable to Pay for Housing in the Last Year: Not on file    Number of [place] in the Last Year: Not on file    Unstable Housing in the Last Year: Not on file       Current Outpatient Medications   Medication Sig Dispense Refill    carvedilol (COREG) 3.125 MG tablet TAKE 1 TABLET BY MOUTH TWICE DAILY FOR HIGH BLOOD PRESSURE, MUST ADMINISTER WITH A MEAL OR FOOD.  ciprofloxacin (CIPRO) 500 MG tablet Take 1 tablet by mouth 2 times daily for 7 days 14 tablet 0    amLODIPine (NORVASC) 5 MG tablet Take 5 mg by mouth in the morning and at bedtime      guaiFENesin (DIABETIC TUSSIN EX) 100 MG/5ML liquid Take 200 mg by mouth 3 times daily as needed for Cough      furosemide (LASIX) 20 MG tablet Take 1 tablet by mouth daily 60 tablet 3    gabapentin (NEURONTIN) 300 MG capsule Take 1 capsule by mouth 3 times daily for 90 days.  270 capsule 0    levothyroxine (EUTHYROX) 150 MCG tablet Take 1 tablet by mouth Daily 90 tablet 0    Continuous Blood Gluc Sensor ([facility]) MISC by Does not apply route      albuterol sulfate HFA (VENTOLIN HFA) 108 (90 Base) MCG/ACT inhaler Inhale 2 puffs into the lungs every 6 hours as needed for Wheezing       simvastatin (ZOCOR) 20 MG tablet Take 1 tablet by mouth nightly 90 tablet 3    ferrous gluconate 324 (37.5 Fe) MG TABS Take 1 tablet by mouth once daily 90 tablet 3    oxybutynin (DITROPAN-XL) 10 MG extended release tablet Take 1 tablet by mouth daily 90 tablet 3    senna (SENOKOT) 8.6 MG tablet Take 1 tablet by mouth daily 90 tablet 3    nitroGLYCERIN (NITROSTAT) 0.4 MG SL tablet Place 0.4 mg under the tongue every 5 minutes as needed for Chest pain up to max of 3 total doses. If no relief after 1 dose, call 911.        B-D INS SYR ULTRAFINE 1CC/31G 31G X 5/16\" 1 ML MISC       fexofenadine (ALLEGRA) 180 MG tablet Take 180 mg by mouth daily      montelukast (SINGULAIR) 10 MG tablet TAKE ONE TABLET BY MOUTH NIGHTLY 90 tablet 1    fluticasone propionate (FLOVENT DISKUS) 100 MCG/BLIST AEPB inhaler Inhale 1 puff into the lungs daily      insulin glargine (LANTUS) 100 UNIT/ML injection vial Inject 50 Units into the skin nightly       fluticasone (FLONASE) 50 MCG/ACT nasal spray 1 spray by Nasal route daily      isosorbide mononitrate (IMDUR) 60 MG extended release tablet Take 60 mg by mouth every morning      insulin aspart (NOVOLOG) 100 UNIT/ML injection vial Indications: 12 units in AM, 16 units in afternoon, 16 units in PM with meals Plus sliding scale       aspirin 81 MG tablet Take 81 mg by mouth daily      acetaminophen (TYLENOL) 500 MG tablet Take 1,000 mg by mouth 3 times daily       vitamin B-12 (CYANOCOBALAMIN) 500 MCG tablet Take 500 mcg by mouth daily      CPAP Machine MISC by Does not apply route      calcium carbonate (OSCAL) 500 MG TABS tablet Take 600 mg by mouth daily       Omega-3 Fatty Acids (FISH OIL PO) Take 1,040 mg by mouth daily       Multiple Vitamins-Minerals (MULTIVITAMIN ADULTS PO) Take by mouth daily        No current facility-administered medications for this visit. Objective:  Pt is under care of [facility]. Pt states she has an appt with the dietitian at 5:45. She was in the ER last week. She went to get her car tags she almost passed out in the tag place. She went to [facility]. HR was low 39 when the ems people took her to the hospital. She has an appt with the cardiologist Dr. Dennis Villegas 05/09/2022 Under care of Dr. Mikie Toussaint podiatrist.    Review of Systems   Constitutional: Positive for fatigue (always tired). Negative for unexpected weight change. Eyes: Negative for visual disturbance. Respiratory: Positive for shortness of breath (at times, she has eposodes of it). Negative for cough, chest tightness and wheezing. Cardiovascular: Negative for chest pain, palpitations and leg swelling. Gastrointestinal: Negative for abdominal pain. Genitourinary: Negative for difficulty urinating. Musculoskeletal: Negative for arthralgias and myalgias. Skin: Negative for rash and wound. Neurological: Negative for dizziness, weakness and light-headedness. Psychiatric/Behavioral: Negative for agitation, confusion and suicidal ideas. Physical Exam  Constitutional:       Appearance: Normal appearance. HENT:      Head: Normocephalic and atraumatic. Right Ear: Tympanic membrane and external ear normal.      Left Ear: Tympanic membrane and external ear normal.      Nose: Nose normal.      Mouth/Throat:      Mouth: Mucous membranes are moist.      Pharynx: Oropharynx is clear. Eyes:      Extraocular Movements: Extraocular movements intact. Conjunctiva/sclera: Conjunctivae normal.      Pupils: Pupils are equal, round, and reactive to light. Cardiovascular:      Rate and Rhythm: Normal rate and regular rhythm. Pulses: Normal pulses. Heart sounds: Normal heart sounds.  No murmur heard.      Pulmonary:      Effort: Pulmonary effort is normal.      Breath sounds: Normal breath sounds. Abdominal:      General: Abdomen is flat. Bowel sounds are normal. There is no distension. Palpations: Abdomen is soft. There is no mass. Tenderness: There is no abdominal tenderness. There is no guarding. Musculoskeletal:         General: Normal range of motion. Cervical back: Normal range of motion and neck supple. Lymphadenopathy:      Cervical: No cervical adenopathy. Skin:     General: Skin is warm. Capillary Refill: Capillary refill takes less than 2 seconds. Neurological:      General: No focal deficit present. Mental Status: She is alert and oriented to person, place, and time. Gait: Gait abnormal (wearing a right shoe boot). Psychiatric:         Mood and Affect: Mood normal.         Behavior: Behavior normal.         Thought Content: Thought content normal.          Assessment:   Diagnosis Orders   1. Near syncope     2. Essential hypertension  Comprehensive Metabolic Panel   3. Type 2 diabetes mellitus with stage 4 chronic kidney disease, with long-term current use of insulin ([facility])  Comprehensive Metabolic Panel   4. Mixed hyperlipidemia  Lipid Panel   5. Acquired hypothyroidism  TSH with Reflex   6. Anemia due to stage 4 chronic kidney disease ([facility])  CBC with Auto Differential   7. Urinary tract infection without hematuria, site unspecified     8. Microscopic hematuria           Plan:  Orders Placed This Encounter   Procedures    Comprehensive Metabolic Panel     Standing Status:   Future     Standing Expiration Date:   11/2/2022    Lipid Panel     Standing Status:   Future     Standing Expiration Date:   11/2/2022     Order Specific Question:   Is Patient Fasting?/# of Hours     Answer:    Yes    TSH with Reflex     Standing Status:   Future     Standing Expiration Date:   11/2/2022    CBC with Auto Differential     Standing Status:   Future     Standing Expiration Date:   11/2/2022         Patient Instructions   DM:   NO sugar, decrease fruit intake, No juice, NO veggies with color other than green, NO sauteed onions or anything that caramelizes, you may eat raw onions. NO alcohol, try not to eat diabetic candies as they may cause diarrhea. Minimize your carbohydrate intake. Pt is under care of [facility] NP diabetic specialist  Pt has appt tonight with dietician    Pt has appt with a cardiologist Dr. Walton Marion 05/09/2022 her pulse is in the 49-50's     She showed me her glucose reading for the past week was in the 100's -200 today was 172/160/180 on 05/01 was 68 at one time then 168/72406 at different times    Pt will take a urine cup home and on Friday she will give it to us    Fasting blood work and follow up in 074/2022       Return in about 2 months (around 7/2/2022) for REVIEW LABS.      Gurwinder Quinones,

## 2022-05-06 DIAGNOSIS — N39.0 URINARY TRACT INFECTION WITHOUT HEMATURIA, SITE UNSPECIFIED: Primary | ICD-10-CM

## 2022-05-06 LAB
APPEARANCE FLUID: CLEAR
BILIRUBIN, POC: NORMAL
BLOOD URINE, POC: NORMAL
CLARITY, POC: CLEAR
COLOR, POC: NORMAL
GLUCOSE URINE, POC: NORMAL
KETONES, POC: NORMAL
LEUKOCYTE EST, POC: NORMAL
NITRITE, POC: NORMAL
PH, POC: 5.5
PROTEIN, POC: NORMAL
SPECIFIC GRAVITY, POC: 1.02
UROBILINOGEN, POC: 0.2

## 2022-05-09 RX ORDER — LEVOTHYROXINE SODIUM 150 UG/1
TABLET ORAL
Qty: 90 TABLET | Refills: 0 | Status: SHIPPED | OUTPATIENT
Start: 2022-05-09 | End: 2022-07-28

## 2022-05-09 NOTE — TELEPHONE ENCOUNTER
Sunita Lion is requesting a refill on the following medication(s):  Requested Prescriptions     Pending Prescriptions Disp Refills    EUTHYROX 150 MCG tablet [Pharmacy Med Name: Euthyrox 150 MCG Oral Tablet] 90 tablet 0     Sig: Take 1 tablet by mouth once daily       Last Visit Date (If Applicable):  5/2/4676    Next Visit Date:    6/15/2022

## 2022-05-17 ENCOUNTER — OFFICE VISIT (OUTPATIENT)
Dept: DIABETES SERVICES | Age: 82
End: 2022-05-17
Payer: MEDICARE

## 2022-05-17 VITALS
SYSTOLIC BLOOD PRESSURE: 148 MMHG | HEART RATE: 80 BPM | HEIGHT: 67 IN | RESPIRATION RATE: 14 BRPM | WEIGHT: 207 LBS | DIASTOLIC BLOOD PRESSURE: 68 MMHG | BODY MASS INDEX: 32.49 KG/M2

## 2022-05-17 DIAGNOSIS — I10 ESSENTIAL HYPERTENSION: ICD-10-CM

## 2022-05-17 DIAGNOSIS — E11.22 TYPE 2 DIABETES MELLITUS WITH STAGE 4 CHRONIC KIDNEY DISEASE, WITH LONG-TERM CURRENT USE OF INSULIN (HCC): Primary | ICD-10-CM

## 2022-05-17 DIAGNOSIS — Z79.4 TYPE 2 DIABETES MELLITUS WITH STAGE 4 CHRONIC KIDNEY DISEASE, WITH LONG-TERM CURRENT USE OF INSULIN (HCC): Primary | ICD-10-CM

## 2022-05-17 DIAGNOSIS — N18.4 TYPE 2 DIABETES MELLITUS WITH STAGE 4 CHRONIC KIDNEY DISEASE, WITH LONG-TERM CURRENT USE OF INSULIN (HCC): Primary | ICD-10-CM

## 2022-05-17 DIAGNOSIS — E78.2 MIXED HYPERLIPIDEMIA: ICD-10-CM

## 2022-05-17 PROCEDURE — 4040F PNEUMOC VAC/ADMIN/RCVD: CPT | Performed by: NURSE PRACTITIONER

## 2022-05-17 PROCEDURE — 99214 OFFICE O/P EST MOD 30 MIN: CPT | Performed by: NURSE PRACTITIONER

## 2022-05-17 PROCEDURE — G8417 CALC BMI ABV UP PARAM F/U: HCPCS | Performed by: NURSE PRACTITIONER

## 2022-05-17 PROCEDURE — 1123F ACP DISCUSS/DSCN MKR DOCD: CPT | Performed by: NURSE PRACTITIONER

## 2022-05-17 PROCEDURE — 1090F PRES/ABSN URINE INCON ASSESS: CPT | Performed by: NURSE PRACTITIONER

## 2022-05-17 PROCEDURE — 3051F HG A1C>EQUAL 7.0%<8.0%: CPT | Performed by: NURSE PRACTITIONER

## 2022-05-17 PROCEDURE — G8427 DOCREV CUR MEDS BY ELIG CLIN: HCPCS | Performed by: NURSE PRACTITIONER

## 2022-05-17 PROCEDURE — 1036F TOBACCO NON-USER: CPT | Performed by: NURSE PRACTITIONER

## 2022-05-17 PROCEDURE — G8400 PT W/DXA NO RESULTS DOC: HCPCS | Performed by: NURSE PRACTITIONER

## 2022-05-17 RX ORDER — INSULIN GLARGINE 100 [IU]/ML
46 INJECTION, SOLUTION SUBCUTANEOUS NIGHTLY
Qty: 10 ML | Refills: 3
Start: 2022-05-17 | End: 2022-07-06 | Stop reason: SDUPTHER

## 2022-05-17 ASSESSMENT — ENCOUNTER SYMPTOMS
DIARRHEA: 0
SHORTNESS OF BREATH: 0
ABDOMINAL PAIN: 0
RESPIRATORY NEGATIVE: 1

## 2022-05-17 NOTE — PROGRESS NOTES
[address], [address]  [facility] [address] [phone]        HISTORY:    Selina Vázquez presents today for evaluation and management of:  Chief Complaint   Patient presents with    1 Month Follow-Up    Diabetes       HPI    Interval History:    Past DM Medications   Metformin-ckd  Glimepiride-therapy completed     Current Diabetic Medications  Lantus 46 units at at bedtime and NovoLog 12 units in the a.m. 16 units with lunch and 16 with dinner sliding scale (has not taken any set novolog only using scale.      DKA episodes: 0    04/14/22   Selina Vázquez is a 80 y.o. female patient who presents to clinic today for her diabetes. she has a history of hypertension, CAD, ALDAIR, hypothyroidism, CKD, hyperlipidemia and obesity which contributes to her diabetes. Is here for initial diabetes management. . she denies any current signs or symptoms of hyper/hypoglycemia. she states they are taking their medications as prescribed without any adverse effects. She needs assistance with creating a meal plan for her diabetes and CKD, she also would like to establish somewhere closer for her diabetes management. Diet: regular  Exercise: none  BS testing: uses cgm daily with success and is adherent to cgm therapy  Issues: gets Tania Duarte from [facility]  Diabetic foot exam up-to-date: Yes  Diabetic retinal exam up-to-date: Yes  Hypoglycemia as needed treatment: juice, glucose tabs    05/17/22   Selina Vázquez is a 80 y.o. female patient who presents to clinic today for her diabetes. she has a history of hypertension, CAD, ALDAIR, hypothyroidism, CKD, hyperlipidemia and obesity which contributes to her diabetes. At previous visit diabetes counseling was provided. she denies any current signs or symptoms of hyper/hypoglycemia. she states they are taking their medications as prescribed without any adverse effects.    Diet: low carb low sodium smaller portions, following with dietician   Exercise: none  BS testing: uses cgm daily with success and is adherent to cgm therapy  Issues: denies   Diabetic foot exam up-to-date: Yes  Diabetic retinal exam up-to-date: Yes  Hypoglycemia as needed treatment: juice glucose tabs    High cholesterol-  Takes Zocor and fish oil and denies any adverse effects with its use. Watches diet and exercise.      Hypertension-  Takes Norvasc, Lasix, Coreg and denies any adverse effects with their use. Watches diet and exercise. Denies any chest pain, dizziness or edema.       Obesity- Working on weight loss. weight is down     CKD- she follow with nephrology and considering dialysis. She needs help with renal diet. 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    Bx proven by Dr Melida Early Sleep apnea      Family History   Problem Relation Age of Onset    High Blood Pressure Mother     Coronary Art Dis Father      Social History     Tobacco Use    Smoking status: Never Smoker    Smokeless tobacco: Never Used   Vaping Use    Vaping Use: Never used   Substance Use Topics    Alcohol use: No    Drug use: No     Allergies   Allergen Reactions    Lisinopril Other (See Comments)     Cough    Penicillins Swelling     Facial swelling    Ranolazine Rash       MEDICATIONS:  Current Outpatient Medications   Medication Sig Dispense Refill    insulin glargine (LANTUS) 100 UNIT/ML injection vial Inject 46 Units into the skin nightly 10 mL 3    EUTHYROX 150 MCG tablet Take 1 tablet by mouth once daily 90 tablet 0    carvedilol (COREG) 3.125 MG tablet TAKE 1 TABLET BY MOUTH TWICE DAILY FOR HIGH BLOOD PRESSURE, MUST ADMINISTER WITH A MEAL OR FOOD.       amLODIPine (NORVASC) 5 MG tablet Take 5 mg by mouth in the morning and at bedtime      guaiFENesin (DIABETIC TUSSIN EX) 100 MG/5ML liquid Take 200 mg by mouth 3 times daily as needed for Cough      furosemide (LASIX) 20 MG tablet Take 1 tablet by mouth daily 60 tablet 3    gabapentin (NEURONTIN) 300 MG capsule Take 1 capsule by mouth 3 times daily for 90 days. 270 capsule 0    albuterol sulfate HFA (VENTOLIN HFA) 108 (90 Base) MCG/ACT inhaler Inhale 2 puffs into the lungs every 6 hours as needed for Wheezing       simvastatin (ZOCOR) 20 MG tablet Take 1 tablet by mouth nightly 90 tablet 3    ferrous gluconate 324 (37.5 Fe) MG TABS Take 1 tablet by mouth once daily 90 tablet 3    oxybutynin (DITROPAN-XL) 10 MG extended release tablet Take 1 tablet by mouth daily 90 tablet 3    senna (SENOKOT) 8.6 MG tablet Take 1 tablet by mouth daily 90 tablet 3    nitroGLYCERIN (NITROSTAT) 0.4 MG SL tablet Place 0.4 mg under the tongue every 5 minutes as needed for Chest pain up to max of 3 total doses. If no relief after 1 dose, call 911.        B-D INS SYR ULTRAFINE 1CC/31G 31G X 5/16\" 1 ML MISC       fexofenadine (ALLEGRA) 180 MG tablet Take 180 mg by mouth daily      montelukast (SINGULAIR) 10 MG tablet TAKE ONE TABLET BY MOUTH NIGHTLY 90 tablet 1    fluticasone propionate (FLOVENT DISKUS) 100 MCG/BLIST AEPB inhaler Inhale 1 puff into the lungs daily      fluticasone (FLONASE) 50 MCG/ACT nasal spray 1 spray by Nasal route daily      isosorbide mononitrate (IMDUR) 60 MG extended release tablet Take 60 mg by mouth every morning      insulin aspart (NOVOLOG) 100 UNIT/ML injection vial Indications: 12 units in AM, 16 units in afternoon, 16 units in PM with meals Plus sliding scale       aspirin 81 MG tablet Take 81 mg by mouth daily      acetaminophen (TYLENOL) 500 MG tablet Take 1,000 mg by mouth 3 times daily       vitamin B-12 (CYANOCOBALAMIN) 500 MCG tablet Take 500 mcg by mouth daily      calcium carbonate (OSCAL) 500 MG TABS tablet Take 600 mg by mouth daily       Omega-3 Fatty Acids (FISH OIL PO) Take 1,040 mg by mouth daily       Multiple Vitamins-Minerals (MULTIVITAMIN ADULTS PO) Take by mouth daily       Continuous Blood Gluc Sensor ([address] [place] [address]) MISC by Does not apply route      CPAP Machine MISC by Does not apply route       No current facility-administered medications for this visit. Review ofSymptoms:  Review of Systems   Constitutional: Positive for fatigue. Negative for unexpected weight change. Eyes: Negative for visual disturbance. Respiratory: Negative. Negative for shortness of breath. Cardiovascular: Negative for chest pain and leg swelling. Gastrointestinal: Negative for abdominal pain and diarrhea. Endocrine: Negative for polydipsia, polyphagia and polyuria. Genitourinary: Negative. Musculoskeletal: Negative. Skin: Negative for rash and wound. Neurological: Negative for dizziness, tremors, seizures and headaches. Psychiatric/Behavioral: Negative. Negative for confusion and decreased concentration. Theremainder of a complete 14-point review of systems is negative. Vital Signs: BP (!) 148/68 (Site: Right Upper Arm, Position: Sitting, Cuff Size: Large Adult)   Pulse 80   Resp 14   Ht 5' 7\" (1.702 m)   Wt 207 lb (93.9 kg)   BMI 32.42 kg/m²      Wt Readings from Last 3 Encounters:   05/17/22 207 lb (93.9 kg)   05/02/22 209 lb 9.6 oz (95.1 kg)   04/20/22 218 lb (98.9 kg)     Body mass index is 32.42 kg/m².   LABS:  Hemoglobin A1C   Date Value Ref Range Status   04/13/2022 7.2 (H) 4.4 - 6.4 % Final   09/02/2021 7.1 % Final     Lab Results   Component Value Date    LABMICR 65.6 (H) 04/13/2022     Lab Results   Component Value Date     (L) 04/28/2022    K 5.0 04/28/2022     04/28/2022    CO2 26 04/28/2022    BUN 56 (H) 04/28/2022    CREATININE 3.5 (H) 04/28/2022    GLUCOSE 227 (H) 04/28/2022    CALCIUM 9.0 04/28/2022    PROT 6.1 (A) 02/09/2021    LABALBU 3.5 04/28/2022    BILITOT 0.6 04/28/2022    ALKPHOS 60 04/28/2022    AST 26 04/28/2022    ALT 14 04/28/2022    LABGLOM 13.4 04/28/2022    GFRAA 30 (L) 04/30/2020    GLOB 2.5 04/28/2022     Lab Results   Component Value Date    CHOL 130 04/13/2022    CHOL 133 04/29/2021    CHOL 124.0 06/10/2019     Lab Results   Component Value Date    TRIG 169 04/13/2022    TRIG 93 04/29/2021    TRIG 150.0 06/10/2019     Lab Results   Component Value Date    HDL 36 04/13/2022    HDL 50 04/29/2021    HDL 49 06/10/2019     Lab Results   Component Value Date    LDLCALC 60.2 04/13/2022    LDLCALC 64.4 04/29/2021    LDLCALC 45.0 06/10/2019     Lab Results   Component Value Date    VLDL 34 04/13/2022    VLDL 19 04/29/2021    VLDL 30.0 06/10/2019     Lab Results   Component Value Date    CHOLHDLRATIO 3.61 04/13/2022    CHOLHDLRATIO 2.66 04/29/2021    CHOLHDLRATIO 2.5 06/10/2019           Physical Exam  Constitutional:       Appearance: She is well-developed. Eyes:      Pupils: Pupils are equal, round, and reactive to light. Neck:      Thyroid: No thyroid mass, thyromegaly or thyroid tenderness. Cardiovascular:      Rate and Rhythm: Normal rate and regular rhythm. Heart sounds: Normal heart sounds. Pulmonary:      Effort: Pulmonary effort is normal.      Breath sounds: Normal breath sounds. Abdominal:      General: Bowel sounds are normal.      Palpations: Abdomen is soft. Skin:     General: Skin is warm and dry. Comments: Negative for open/nonhealing wounds. Negative for lipohypertrophy. Neurological:      Mental Status: She is alert and oriented to person, place, and time. ASSESSMENT/PLAN:     Diagnosis Orders   1. Type 2 diabetes mellitus with stage 4 chronic kidney disease, with long-term current use of insulin (HCC)  insulin glargine (LANTUS) 100 UNIT/ML injection vial   2. Essential hypertension     3. Mixed hyperlipidemia     4. BMI 32.0-32.9,adult       No orders of the defined types were placed in this encounter.     Orders Placed This Encounter   Medications    insulin glargine (LANTUS) 100 UNIT/ML injection vial     Sig: Inject 46 Units into the skin nightly     Dispense:  10 mL     Refill:  3     Requested Prescriptions     Signed Prescriptions Disp Refills    insulin glargine (LANTUS) 100 UNIT/ML injection vial 10 mL 3     Sig: Inject 46 Units into the skin nightly       1. Type 2 diabetes mellitus with stage 4 chronic kidney disease, with long-term current use of insulin ([facility])  - Unstable  HbA1C goal is less than 7%. - Fasting blood glucose goal is 70-120mg/dl and postprandial blood sugar goal is less than 180 mg/dl. - Labs reviewed including most recent A1c, microalbumin and kidney function. Repeat labs due in 3 months.    -We discussed in great detail dietary modifications they can make to better improve their blood sugars. -follow up diabetes education completed, all questions answered.  -unable to download cgm reader will get replacement ordered  -due to dietary changes needing less insulin, will adjust sliding scale and work on lower basal insulin. Patient Instructions   Sliding Scale Insulin Novolog    Blood sugar Action     <70  Drink Juice      No extra insulin  150 - 200 2 units subcutaneous Insulin  201 - 250 4 units subcutaneous Insulin  251 - 300 6 units subcutaneous Insulin  301 - 350 8 units subcutaneous Insulin  351 - 400 10 units subcutaneous Insulin   > 400  12 units subcutaneous Insulin          Discussed signs and symptoms of hyper/hypoglycemia and how to treat. Encouraged 150 minutes of physical activity per week. Follow a low carbohydrate diet. Encouraged at least 7 hours of sleep. The patient was informed of the goals of diabetes management. This can only be accomplished by watching their diet and exercise levels. We certainly use medicines to help attain these goals. The consequences of not controlling blood sugars were discussed.  These include blindness, heart disease, stroke, kidney disease, and possibly need for dialysis. They were told to be careful with their foot care as diabetics often have nerve damage, infections and risk for limb amutations . They also need a dilated eye exam yearly. We discussed the issues of diet, exercise, medication, complication avoidance, reviewed the signs and symptoms of diabetes, hypoglycemic episodes, significance of HbA1C.       - insulin glargine (LANTUS) 100 UNIT/ML injection vial; Inject 46 Units into the skin nightly  Dispense: 10 mL; Refill: 3    2. Essential hypertension   stable, continue current medications. Diet and exercise Seek emergent care if chest pain develops. 3. Mixed hyperlipidemia  stable, lipid panel reviewed, continue current medications. Diet and exercise      4. BMI 32.0-32.9,adult  Reduce calories and increase physical activity to achieve a slow and steady weight loss to improve blood pressure, cholesterol and diabetes. Answered all patient questions. Agrees to follow plan of care and to follow up in 1 months, sooner if needed. Call office if unexplained blood sugars less than 70 occur or above 400. Call office or access MyChart with any further questions or concerns. Be sure to bring glucometer/food log at next appointment. Total time spent reviewing chart, labs, counseling patient and documenting on the date of the encounter: 30 min.       Electronically signed by TESFAYE Preciado CNP on 5/17/2022 at 9:58 AM      (Please note that portions of this note were completed with a voice-recognition program. Efforts were made to edit the dictation but occasionally words are mis-transcribed.)

## 2022-05-17 NOTE — PATIENT INSTRUCTIONS
Sliding Scale Insulin Novolog    Blood sugar Action     <70  Drink Juice      No extra insulin  150 - 200 2 units subcutaneous Insulin  201 - 250 4 units subcutaneous Insulin  251 - 300 6 units subcutaneous Insulin  301 - 350 8 units subcutaneous Insulin  351 - 400 10 units subcutaneous Insulin   > 400  12 units subcutaneous Insulin

## 2022-05-19 ENCOUNTER — TELEPHONE (OUTPATIENT)
Dept: INTERNAL MEDICINE | Age: 82
End: 2022-05-19

## 2022-05-19 NOTE — TELEPHONE ENCOUNTER
Spoke with reinaldo and discussed issues with patient's meter not downloading report to computers. Did notify that we have tried different computers and cords. Readers before and after have downloaded with out issues. Reinaldo did document all so when patient calls that she will not have to explain. Ref # given to writer to forward to patient for when she calls them to give information. REF # [id number]. Open 8 AM-8 PM 7 days a week. Patient to call # [phone]    [facility] for patient to return call.

## 2022-05-23 DIAGNOSIS — D50.9 IRON DEFICIENCY ANEMIA, UNSPECIFIED IRON DEFICIENCY ANEMIA TYPE: ICD-10-CM

## 2022-05-23 DIAGNOSIS — R32 URINARY INCONTINENCE, UNSPECIFIED TYPE: ICD-10-CM

## 2022-05-23 RX ORDER — OXYBUTYNIN CHLORIDE 10 MG/1
10 TABLET, EXTENDED RELEASE ORAL DAILY
Qty: 90 TABLET | Refills: 3 | Status: SHIPPED | OUTPATIENT
Start: 2022-05-23

## 2022-05-23 RX ORDER — FERROUS GLUCONATE 324(37.5)
TABLET ORAL
Qty: 90 TABLET | Refills: 3 | Status: SHIPPED | OUTPATIENT
Start: 2022-05-23

## 2022-05-23 NOTE — TELEPHONE ENCOUNTER
Sha Goins is requesting a refill on the following medication(s):  Requested Prescriptions     Pending Prescriptions Disp Refills    ferrous gluconate 324 (37.5 Fe) MG TABS 90 tablet 3    oxybutynin (DITROPAN-XL) 10 MG extended release tablet 90 tablet 3     Sig: Take 1 tablet by mouth daily       Last Visit Date (If Applicable):  7/0/8563    Next Visit Date:    6/15/2022

## 2022-06-06 LAB
ALBUMIN/GLOBULIN RATIO: 1.4 G/DL
ALBUMIN: 3.6 G/DL (ref 3.5–5)
ALP BLD-CCNC: 82 UNITS/L (ref 38–126)
ALT SERPL-CCNC: 15 UNITS/L (ref 4–35)
ANION GAP SERPL CALCULATED.3IONS-SCNC: 7.9 MMOL/L
AST SERPL-CCNC: 25 UNITS/L (ref 14–36)
BASOPHILS %: 0.81 (ref 0–3)
BASOPHILS ABSOLUTE: 0.07 (ref 0–0.3)
BILIRUB SERPL-MCNC: 0.3 MG/DL (ref 0.2–1.3)
BUN BLDV-MCNC: 46 MG/DL (ref 7–17)
CALCIUM SERPL-MCNC: 8.9 MG/DL (ref 8.4–10.2)
CHLORIDE BLD-SCNC: 102 MMOL/L (ref 98–120)
CO2: 27 MMOL/L (ref 22–31)
CREAT SERPL-MCNC: 3.3 MG/DL (ref 0.5–1)
CREATININE, RANDOM URINE: 74 MG/DL (ref 20–370)
EOSINOPHILS %: 1.34 (ref 0–10)
EOSINOPHILS ABSOLUTE: 0.11 (ref 0–1.1)
GFR CALCULATED: 14.3
GLOBULIN: 2.6 G/DL
GLUCOSE: 114 MG/DL (ref 65–105)
HCT VFR BLD CALC: 32.6 % (ref 37–47)
HEMOGLOBIN: 10.7 (ref 12–16)
LYMPHOCYTE %: 14.34 (ref 20–51.1)
LYMPHOCYTES ABSOLUTE: 1.21 (ref 1–5.5)
MCH RBC QN AUTO: 31.9 PG (ref 28.5–32.5)
MCHC RBC AUTO-ENTMCNC: 32.8 G/DL (ref 32–37)
MCV RBC AUTO: 97.1 FL (ref 80–94)
MONOCYTES %: 7.47 (ref 1.7–9.3)
MONOCYTES ABSOLUTE: 0.63 (ref 0.1–1)
NEUTROPHILS %: 76.05 (ref 42.2–75.2)
NEUTROPHILS ABSOLUTE: 6.42 (ref 2–8.1)
PDW BLD-RTO: 12.2 % (ref 8.5–15.5)
PLATELET # BLD: 280.6 THOU/MM3 (ref 130–400)
POTASSIUM SERPL-SCNC: 4.3 MMOL/L (ref 3.6–5)
PROTEIN, URINE: 94 MG/DL (ref 0–12)
RBC: 3.35 M/UL (ref 4.2–5.4)
SODIUM BLD-SCNC: 136 MMOL/L (ref 135–145)
TOTAL PROTEIN, SERUM: 6.2 G/DL (ref 6.3–8.2)
VITAMIN D 25-HYDROXY: 49 NG/ML (ref 30–100)
WBC: 8.4 THOU/ML3 (ref 4.8–10.8)

## 2022-06-07 LAB — PTH INTACT: 27.52 PG/ML (ref 15–65)

## 2022-06-15 ENCOUNTER — OFFICE VISIT (OUTPATIENT)
Dept: FAMILY MEDICINE CLINIC | Age: 82
End: 2022-06-15
Payer: MEDICARE

## 2022-06-15 VITALS
HEIGHT: 67 IN | HEART RATE: 70 BPM | SYSTOLIC BLOOD PRESSURE: 130 MMHG | OXYGEN SATURATION: 97 % | BODY MASS INDEX: 31.67 KG/M2 | DIASTOLIC BLOOD PRESSURE: 78 MMHG | RESPIRATION RATE: 16 BRPM | TEMPERATURE: 99.9 F | WEIGHT: 201.8 LBS

## 2022-06-15 DIAGNOSIS — Z00.00 MEDICARE ANNUAL WELLNESS VISIT, SUBSEQUENT: ICD-10-CM

## 2022-06-15 DIAGNOSIS — E78.2 MIXED HYPERLIPIDEMIA: ICD-10-CM

## 2022-06-15 DIAGNOSIS — I10 ESSENTIAL HYPERTENSION: Primary | ICD-10-CM

## 2022-06-15 PROCEDURE — 99397 PER PM REEVAL EST PAT 65+ YR: CPT | Performed by: FAMILY MEDICINE

## 2022-06-15 PROCEDURE — G0439 PPPS, SUBSEQ VISIT: HCPCS | Performed by: FAMILY MEDICINE

## 2022-06-15 PROCEDURE — 1123F ACP DISCUSS/DSCN MKR DOCD: CPT | Performed by: FAMILY MEDICINE

## 2022-06-15 ASSESSMENT — PATIENT HEALTH QUESTIONNAIRE - PHQ9
SUM OF ALL RESPONSES TO PHQ9 QUESTIONS 1 & 2: 1
1. LITTLE INTEREST OR PLEASURE IN DOING THINGS: 0
SUM OF ALL RESPONSES TO PHQ QUESTIONS 1-9: 1
2. FEELING DOWN, DEPRESSED OR HOPELESS: 1
SUM OF ALL RESPONSES TO PHQ QUESTIONS 1-9: 1

## 2022-06-15 ASSESSMENT — LIFESTYLE VARIABLES
HOW MANY STANDARD DRINKS CONTAINING ALCOHOL DO YOU HAVE ON A TYPICAL DAY: PATIENT DECLINED
HOW OFTEN DO YOU HAVE A DRINK CONTAINING ALCOHOL: NEVER

## 2022-06-15 NOTE — PATIENT INSTRUCTIONS
Personalized Preventive Plan for Gordon Conde - 6/15/2022  Medicare offers a range of preventive health benefits. Some of the tests and screenings are paid in full while other may be subject to a deductible, co-insurance, and/or copay. Some of these benefits include a comprehensive review of your medical history including lifestyle, illnesses that may run in your family, and various assessments and screenings as appropriate. After reviewing your medical record and screening and assessments performed today your provider may have ordered immunizations, labs, imaging, and/or referrals for you. A list of these orders (if applicable) as well as your Preventive Care list are included within your After Visit Summary for your review. Other Preventive Recommendations:    · A preventive eye exam performed by an eye specialist is recommended every 1-2 years to screen for glaucoma; cataracts, macular degeneration, and other eye disorders. · A preventive dental visit is recommended every 6 months. · Try to get at least 150 minutes of exercise per week or 10,000 steps per day on a pedometer . · Order or download the FREE \"Exercise & Physical Activity: Your Everyday Guide\" from [employer]. Call 9-259.801.2549 or search [employer] online. · You need 6925-3975 mg of calcium and 3787-0020 IU of vitamin D per day. It is possible to meet your calcium requirement with diet alone, but a vitamin D supplement is usually necessary to meet this goal.  · When exposed to the sun, use a sunscreen that protects against both UVA and UVB radiation with an SPF of 30 or greater. Reapply every 2 to 3 hours or after sweating, drying off with a towel, or swimming. · Always wear a seat belt when traveling in a car. Always wear a helmet when riding a bicycle or motorcycle.       Mini mental exam     Return to office and do BW as scheduled

## 2022-06-15 NOTE — PROGRESS NOTES
Medicare Annual Wellness Visit    Irma Cannon is here for Medicare AWV, Hypertension, and Diabetes    Assessment & Plan   Essential hypertension  Mixed hyperlipidemia  Medicare annual wellness visit, subsequent      Recommendations for Preventive Services Due: see orders and patient instructions/AVS.  Recommended screening schedule for the next 5-10 years is provided to the patient in written form: see Patient Instructions/AVS.     Return for Medicare Annual Wellness Visit in 1 year. Subjective   The following acute and/or chronic problems were also addressed today:  diabetes under care aura ly, prescription review    Patient's complete Health Risk Assessment and screening values have been reviewed and are found in Flowsheets. The following problems were reviewed today and where indicated follow up appointments were made and/or referrals ordered. Positive Risk Factor Screenings with Interventions:               General Health and ACP:       Advance Directives     Power of  Living Will ACP-Advance Directive ACP-Power of     Not on File Not on File Not on File Not on File      General Health Risk Interventions:  · pt does not have a living will and will not do one    Health Habits/Nutrition:     Physical Activity:     Days of Exercise per Week: Not on file    Minutes of Exercise per Session: Not on file        Body mass index: (!) 31.6       Health Habits/Nutrition Interventions:  · pt has vision and hearing concerns, sees an optometrist she can not affort hearing aids             Objective   Vitals:    06/15/22 1505   BP: 130/78   Site: Left Upper Arm   Position: Sitting   Cuff Size: Large Adult   Pulse: 70   Resp: 16   Temp: 99.9 °F (37.7 °C)   SpO2: 97%   Weight: 201 lb 12.8 oz (91.5 kg)   Height: 5' 7\" (1.702 m)      Body mass index is 31.61 kg/m².                Allergies   Allergen Reactions    Lisinopril Other (See Comments)     Cough    Penicillins Swelling     Facial swelling    Ranolazine Rash     Prior to Visit Medications    Medication Sig Taking? Authorizing Provider   ferrous gluconate 324 (37.5 Fe) MG TABS I by mouth a day Yes Jessie Byrd, DO   oxybutynin (DITROPAN-XL) 10 MG extended release tablet Take 1 tablet by mouth daily Yes Jessie Byrd DO   insulin glargine (LANTUS) 100 UNIT/ML injection vial Inject 46 Units into the skin nightly Yes Britta Mahoney APRN - CNP   EUTHYROX 150 MCG tablet Take 1 tablet by mouth once daily Yes Jessie Byrd DO   carvedilol (COREG) 3.125 MG tablet TAKE 1 TABLET BY MOUTH TWICE DAILY FOR HIGH BLOOD PRESSURE, MUST ADMINISTER WITH A MEAL OR FOOD. Yes Historical Provider, MD   amLODIPine (NORVASC) 5 MG tablet Take 5 mg by mouth in the morning and at bedtime Yes Historical Provider, MD   guaiFENesin (DIABETIC TUSSIN EX) 100 MG/5ML liquid Take 200 mg by mouth 3 times daily as needed for Cough Yes Historical Provider, MD   Continuous Blood Gluc Sensor ([address] [place] [address]) MISC by Does not apply route Yes Historical Provider, MD   albuterol sulfate HFA (VENTOLIN HFA) 108 (90 Base) MCG/ACT inhaler Inhale 2 puffs into the lungs every 6 hours as needed for Wheezing  Yes Historical Provider, MD   simvastatin (ZOCOR) 20 MG tablet Take 1 tablet by mouth nightly Yes Jason Tripp MD   senna (SENOKOT) 8.6 MG tablet Take 1 tablet by mouth daily Yes Jason Tripp MD   nitroGLYCERIN (NITROSTAT) 0.4 MG SL tablet Place 0.4 mg under the tongue every 5 minutes as needed for Chest pain up to max of 3 total doses. If no relief after 1 dose, call 911.   Yes Historical Provider, MD PAEZ INS SYR ULTRAFINE 1CC/31G 31G X 5/16\" 1 ML MISC  Yes Historical Provider, MD   fexofenadine (ALLEGRA) 180 MG tablet Take 180 mg by mouth daily Yes Historical Provider, MD   montelukast (SINGULAIR) 10 MG tablet TAKE ONE TABLET BY MOUTH NIGHTLY Yes Ki Sandhu MD   fluticasone propionate (FLOVENT DISKUS) 100 MCG/BLIST AEPB inhaler Inhale 1 puff into the lungs daily Yes Historical Provider, MD   fluticasone (FLONASE) 50 MCG/ACT nasal spray 1 spray by Nasal route daily Yes Historical Provider, MD   isosorbide mononitrate (IMDUR) 60 MG extended release tablet Take 60 mg by mouth every morning Yes Historical Provider, MD   insulin aspart (NOVOLOG) 100 UNIT/ML injection vial Indications: 12 units in AM, 16 units in afternoon, 16 units in PM with meals Plus sliding scale  Yes John Hampton MD   aspirin 81 MG tablet Take 81 mg by mouth daily Yes Historical Provider, MD   acetaminophen (TYLENOL) 500 MG tablet Take 1,000 mg by mouth 3 times daily  Yes Historical Provider, MD   vitamin B-12 (CYANOCOBALAMIN) 500 MCG tablet Take 500 mcg by mouth daily Yes Historical Provider, MD   CPAP Machine MISC by Does not apply route Yes John Hampton MD   calcium carbonate (OSCAL) 500 MG TABS tablet Take 600 mg by mouth daily  Yes Historical Provider, MD   Omega-3 Fatty Acids (FISH OIL PO) Take 1,040 mg by mouth daily  Yes Historical Provider, MD   Multiple Vitamins-Minerals (MULTIVITAMIN ADULTS PO) Take by mouth daily  Yes Historical Provider, MD   furosemide (LASIX) 20 MG tablet Take 1 tablet by mouth daily  Jorje Thompson MD   gabapentin (NEURONTIN) 300 MG capsule Take 1 capsule by mouth 3 times daily for 90 days.   Joseph Driscoll DO       CareTeam (Including outside providers/suppliers regularly involved in providing care):   Patient Care Team:  Joseph Driscoll DO as PCP - General (Family Medicine)  Joseph Driscoll DO as PCP - [facility] Empaneled Provider     Reviewed and updated this visit:  Tobacco  Allergies  Meds  Med Hx  Surg Hx  Soc Hx  Fam Hx

## 2022-06-20 ENCOUNTER — HOSPITAL ENCOUNTER (OUTPATIENT)
Age: 82
Setting detail: SPECIMEN
Discharge: HOME | End: 2022-06-20
Payer: MEDICARE

## 2022-06-20 ENCOUNTER — OFFICE VISIT (OUTPATIENT)
Dept: FAMILY MEDICINE CLINIC | Age: 82
End: 2022-06-20
Payer: MEDICARE

## 2022-06-20 VITALS
TEMPERATURE: 97.3 F | BODY MASS INDEX: 31.55 KG/M2 | HEART RATE: 52 BPM | HEIGHT: 67 IN | RESPIRATION RATE: 18 BRPM | WEIGHT: 201 LBS | DIASTOLIC BLOOD PRESSURE: 76 MMHG | OXYGEN SATURATION: 99 % | SYSTOLIC BLOOD PRESSURE: 148 MMHG

## 2022-06-20 DIAGNOSIS — R35.0 INCREASED FREQUENCY OF URINATION: ICD-10-CM

## 2022-06-20 DIAGNOSIS — R30.9 PAIN WITH URINATION: ICD-10-CM

## 2022-06-20 DIAGNOSIS — N30.91 CYSTITIS WITH HEMATURIA: Primary | ICD-10-CM

## 2022-06-20 DIAGNOSIS — N30.91 CYSTITIS WITH HEMATURIA: ICD-10-CM

## 2022-06-20 LAB
BILIRUBIN, POC: NORMAL
BLOOD URINE, POC: NORMAL
CLARITY, POC: NORMAL
COLOR, POC: NORMAL
GLUCOSE URINE, POC: NORMAL
KETONES, POC: NORMAL
LEUKOCYTE EST, POC: NORMAL
NITRITE, POC: NORMAL
PH, POC: 6
PROTEIN, POC: NORMAL
SPECIFIC GRAVITY, POC: 1.01
UROBILINOGEN, POC: 0.2

## 2022-06-20 PROCEDURE — 1036F TOBACCO NON-USER: CPT | Performed by: NURSE PRACTITIONER

## 2022-06-20 PROCEDURE — 81002 URINALYSIS NONAUTO W/O SCOPE: CPT | Performed by: NURSE PRACTITIONER

## 2022-06-20 PROCEDURE — 1123F ACP DISCUSS/DSCN MKR DOCD: CPT | Performed by: NURSE PRACTITIONER

## 2022-06-20 PROCEDURE — 87077 CULTURE AEROBIC IDENTIFY: CPT

## 2022-06-20 PROCEDURE — G8400 PT W/DXA NO RESULTS DOC: HCPCS | Performed by: NURSE PRACTITIONER

## 2022-06-20 PROCEDURE — PBSHW POCT URINALYSIS DIPSTICK: Performed by: NURSE PRACTITIONER

## 2022-06-20 PROCEDURE — G8417 CALC BMI ABV UP PARAM F/U: HCPCS | Performed by: NURSE PRACTITIONER

## 2022-06-20 PROCEDURE — 99213 OFFICE O/P EST LOW 20 MIN: CPT | Performed by: NURSE PRACTITIONER

## 2022-06-20 PROCEDURE — 87186 SC STD MICRODIL/AGAR DIL: CPT

## 2022-06-20 PROCEDURE — 87086 URINE CULTURE/COLONY COUNT: CPT

## 2022-06-20 PROCEDURE — 1090F PRES/ABSN URINE INCON ASSESS: CPT | Performed by: NURSE PRACTITIONER

## 2022-06-20 PROCEDURE — G8427 DOCREV CUR MEDS BY ELIG CLIN: HCPCS | Performed by: NURSE PRACTITIONER

## 2022-06-20 RX ORDER — CIPROFLOXACIN 250 MG/1
250 TABLET, FILM COATED ORAL 2 TIMES DAILY
Qty: 6 TABLET | Refills: 0 | Status: SHIPPED | OUTPATIENT
Start: 2022-06-20 | End: 2022-06-23

## 2022-06-20 ASSESSMENT — ENCOUNTER SYMPTOMS
NAUSEA: 0
VOMITING: 0

## 2022-06-20 NOTE — PATIENT INSTRUCTIONS
Cipro as directed. Patient will be contacted upon receipt of final culture and sensitivity. Any additions or changes to medications or the plan of care will be made at that time. Follow up with primary care provider in 1 to 2 days if needed. Patient Education        Urinary Tract Infection (UTI) in Women: Care Instructions  Overview     A urinary tract infection, or UTI, is a general term for an infection anywhere between the kidneys and the urethra (where urine comes out). Most UTIs arebladder infections. They often cause pain or burning when you urinate. UTIs are caused by bacteria and can be cured with antibiotics. Be sure tocomplete your treatment so that the infection does not get worse. Follow-up care is a key part of your treatment and safety. Be sure to make and go to all appointments, and call your doctor if you are having problems. It's also a good idea to know your test results and keep alist of the medicines you take. How can you care for yourself at home?  Take your antibiotics as directed. Do not stop taking them just because you feel better. You need to take the full course of antibiotics.  Drink extra water and other fluids for the next day or two. This will help make the urine less concentrated and help wash out the bacteria that are causing the infection. (If you have kidney, heart, or liver disease and have to limit fluids, talk with your doctor before you increase the amount of fluids you drink.)   Avoid drinks that are carbonated or have caffeine. They can irritate the bladder.  Urinate often. Try to empty your bladder each time.  To relieve pain, take a hot bath or lay a heating pad set on low over your lower belly or genital area. Never go to sleep with a heating pad in place. To prevent UTIs   Drink plenty of water each day. This helps you urinate often, which clears bacteria from your system.  (If you have kidney, heart, or liver disease and have to limit fluids, talk with your doctor before you increase the amount of fluids you drink.)   Urinate when you need to.  If you are sexually active, urinate right after you have sex.  Change sanitary pads often.  Avoid douches, bubble baths, feminine hygiene sprays, and other feminine hygiene products that have deodorants.  After going to the bathroom, wipe from front to back. When should you call for help? Call your doctor now or seek immediate medical care if:     Symptoms such as fever, chills, nausea, or vomiting get worse or appear for the first time.      You have new pain in your back just below your rib cage. This is called flank pain.      There is new blood or pus in your urine.      You have any problems with your antibiotic medicine. Watch closely for changes in your health, and be sure to contact your doctor if:     You are not getting better after taking an antibiotic for 2 days.      Your symptoms go away but then come back. Where can you learn more? Go to [email or web address] and sign in to your [employer] account. Enter C151 in the Surplex box to learn more about \"Urinary Tract Infection (UTI) in Women: Care Instructions. \"     If you do not have an account, please click on the \"Sign Up Now\" link. Current as of: October 18, 2021               Content Version: 13.2  © 1373-6317 Healthwise, Incorporated. Care instructions adapted under license by [employer]. If you have questions about a medical condition or this instruction, always ask your healthcare professional. [place] [address] any warranty or liability for your use of this information.

## 2022-06-20 NOTE — PROGRESS NOTES
[address] [place] [address] [address] [facility], APRN-CNP  [address]  Phone:  [phone]  Fax:  [phone]  France Mann is a 80 y.o. female who presents today for her medical conditions/complaints as noted below. France Mann c/o of Urinary Tract Infection (Pt prsents to [facility] with complaints of cloudy urine, discomfort, and discomfort while urinating, and increased frequency of uriination.)      HPI:     Urinary Tract Infection   This is a new problem. The current episode started in the past 7 days. The problem has been gradually worsening. The quality of the pain is described as aching. There has been no fever. Associated symptoms include flank pain, frequency, hematuria and urgency. Pertinent negatives include no chills, nausea, possible pregnancy, sweats or vomiting. She has tried increased fluids for the symptoms.        Wt Readings from Last 3 Encounters:   06/20/22 201 lb (91.2 kg)   06/15/22 201 lb 12.8 oz (91.5 kg)   05/17/22 207 lb (93.9 kg)       Temp Readings from Last 3 Encounters:   06/20/22 97.3 °F (36.3 °C)   06/15/22 99.9 °F (37.7 °C)   05/02/22 95.3 °F (35.2 °C)       BP Readings from Last 3 Encounters:   06/20/22 (!) 148/76   06/15/22 130/78   05/17/22 (!) 148/68       Pulse Readings from Last 3 Encounters:   06/20/22 52   06/15/22 70   05/17/22 80        SpO2 Readings from Last 3 Encounters:   06/20/22 99%   06/15/22 97%   05/02/22 99%             Past Medical History:   Diagnosis Date    CAD (coronary artery disease)     Diabetes mellitus ([address]     Hyperlipidemia     Hypertension     Hypothyroidism     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    Bx proven by Dr Shari Gardiner Sleep apnea       Past Surgical History:   Procedure Laterality Date    ANGIOPLASTY  2001    stent x 1    EYE SURGERY      FOOT SURGERY Left 07/31/2009    3rd toe amputation    HYSTERECTOMY, TOTAL ABDOMINAL (CERVIX REMOVED)  1985    TOE AMPUTATION      left 3rd toe    TOE AMPUTATION Left 3/31/2020    Left 2nd TOE AMPUTATION performed by Robi Roman DPM at [facility]  01/17/2020    Dr Monika Hannon- vulvar SCC carcinoma     Family History   Problem Relation Age of Onset    High Blood Pressure Mother     Coronary Art Dis Father      Social History     Tobacco Use    Smoking status: Never Smoker    Smokeless tobacco: Never Used   Substance Use Topics    Alcohol use: No      Current Outpatient Medications   Medication Sig Dispense Refill    ciprofloxacin (CIPRO) 250 MG tablet Take 1 tablet by mouth 2 times daily for 3 days Take with food. 6 tablet 0    ferrous gluconate 324 (37.5 Fe) MG TABS I by mouth a day 90 tablet 3    oxybutynin (DITROPAN-XL) 10 MG extended release tablet Take 1 tablet by mouth daily 90 tablet 3    insulin glargine (LANTUS) 100 UNIT/ML injection vial Inject 46 Units into the skin nightly 10 mL 3    EUTHYROX 150 MCG tablet Take 1 tablet by mouth once daily 90 tablet 0    carvedilol (COREG) 3.125 MG tablet TAKE 1 TABLET BY MOUTH TWICE DAILY FOR HIGH BLOOD PRESSURE, MUST ADMINISTER WITH A MEAL OR FOOD.  amLODIPine (NORVASC) 5 MG tablet Take 5 mg by mouth in the morning and at bedtime      guaiFENesin (DIABETIC TUSSIN EX) 100 MG/5ML liquid Take 200 mg by mouth 3 times daily as needed for Cough      Continuous Blood Gluc Sensor ([address] [place] [address]) MISC by Does not apply route      albuterol sulfate HFA (VENTOLIN HFA) 108 (90 Base) MCG/ACT inhaler Inhale 2 puffs into the lungs every 6 hours as needed for Wheezing       simvastatin (ZOCOR) 20 MG tablet Take 1 tablet by mouth nightly 90 tablet 3    senna (SENOKOT) 8.6 MG tablet Take 1 tablet by mouth daily 90 tablet 3    nitroGLYCERIN (NITROSTAT) 0.4 MG SL tablet Place 0.4 mg under the tongue every 5 minutes as needed for Chest pain up to max of 3 total doses. If no relief after 1 dose, call 911.        B-D INS SYR ULTRAFINE 1CC/31G 31G X 5/16\" 1 ML MISC       fexofenadine (ALLEGRA) 180 MG tablet Take 180 mg by mouth daily      montelukast (SINGULAIR) 10 MG tablet TAKE ONE TABLET BY MOUTH NIGHTLY 90 tablet 1    fluticasone propionate (FLOVENT DISKUS) 100 MCG/BLIST AEPB inhaler Inhale 1 puff into the lungs daily      fluticasone (FLONASE) 50 MCG/ACT nasal spray 1 spray by Nasal route daily      isosorbide mononitrate (IMDUR) 60 MG extended release tablet Take 60 mg by mouth every morning      insulin aspart (NOVOLOG) 100 UNIT/ML injection vial Indications: 12 units in AM, 16 units in afternoon, 16 units in PM with meals Plus sliding scale       aspirin 81 MG tablet Take 81 mg by mouth daily      acetaminophen (TYLENOL) 500 MG tablet Take 1,000 mg by mouth 3 times daily       vitamin B-12 (CYANOCOBALAMIN) 500 MCG tablet Take 500 mcg by mouth daily      CPAP Machine MISC by Does not apply route      calcium carbonate (OSCAL) 500 MG TABS tablet Take 600 mg by mouth daily       Omega-3 Fatty Acids (FISH OIL PO) Take 1,040 mg by mouth daily       Multiple Vitamins-Minerals (MULTIVITAMIN ADULTS PO) Take by mouth daily       furosemide (LASIX) 20 MG tablet Take 1 tablet by mouth daily 60 tablet 3    gabapentin (NEURONTIN) 300 MG capsule Take 1 capsule by mouth 3 times daily for 90 days. 270 capsule 0     No current facility-administered medications for this visit. Allergies   Allergen Reactions    Lisinopril Other (See Comments)     Cough    Penicillins Swelling     Facial swelling    Ranolazine Rash       No exam data present    Subjective:      Review of Systems   Constitutional: Negative for chills. Gastrointestinal: Negative for nausea and vomiting. Genitourinary: Positive for flank pain, frequency, hematuria and urgency.        Objective:     BP (!) 148/76 (Site: Right Upper Arm, Position: Sitting, Cuff Size: Medium Adult)   Pulse 52   Temp 97.3 °F (36.3 °C)   Resp 18   Ht 5' 7\" (1.702 m)   Wt 201 lb (91.2 kg)   SpO2 99%   BMI 31.48 kg/m²     Physical Exam  Vitals reviewed. Constitutional:       General: She is not in acute distress. Appearance: She is obese. She is not ill-appearing or toxic-appearing. Pulmonary:      Effort: Pulmonary effort is normal.   Abdominal:      Tenderness: There is no right CVA tenderness or left CVA tenderness. Skin:     General: Skin is warm and dry. Capillary Refill: Capillary refill takes less than 2 seconds. Neurological:      General: No focal deficit present. Mental Status: She is alert and oriented to person, place, and time. Assessment:      Diagnosis Orders   1. Cystitis with hematuria  ciprofloxacin (CIPRO) 250 MG tablet    Culture, Urine   2. Increased frequency of urination  POCT Urinalysis no Micro   3. Pain with urination  POCT Urinalysis no Micro     Results for POC orders placed in visit on 06/20/22   POCT Urinalysis no Micro   Result Value Ref Range    Color, UA straw     Clarity, UA cloudy     Glucose, UA POC neg     Bilirubin, UA neg     Ketones, UA neg     Spec Grav, UA 1.015     Blood, UA POC 2+     pH, UA 6.0     Protein, UA POC 3+     Urobilinogen, UA 0.2     Leukocytes, UA 3+     Nitrite, UA neg                Plan:       Cipro as directed. Patient will be contacted upon receipt of final culture and sensitivity. Any additions or changes to medications or the plan of care will be made at that time. Follow up with primary care provider in 1 to 2 days if needed. Patient Instructions     Cipro as directed. Patient will be contacted upon receipt of final culture and sensitivity. Any additions or changes to medications or the plan of care will be made at that time. Follow up with primary care provider in 1 to 2 days if needed.     Patient Education        Urinary Tract Infection (UTI) in Women: Care Instructions  Overview     A urinary tract infection, or UTI, is a general term for an infection anywhere between the kidneys and the urethra (where urine comes out). Most UTIs arebladder infections. They often cause pain or burning when you urinate. UTIs are caused by bacteria and can be cured with antibiotics. Be sure tocomplete your treatment so that the infection does not get worse. Follow-up care is a key part of your treatment and safety. Be sure to make and go to all appointments, and call your doctor if you are having problems. It's also a good idea to know your test results and keep alist of the medicines you take. How can you care for yourself at home?  Take your antibiotics as directed. Do not stop taking them just because you feel better. You need to take the full course of antibiotics.  Drink extra water and other fluids for the next day or two. This will help make the urine less concentrated and help wash out the bacteria that are causing the infection. (If you have kidney, heart, or liver disease and have to limit fluids, talk with your doctor before you increase the amount of fluids you drink.)   Avoid drinks that are carbonated or have caffeine. They can irritate the bladder.  Urinate often. Try to empty your bladder each time.  To relieve pain, take a hot bath or lay a heating pad set on low over your lower belly or genital area. Never go to sleep with a heating pad in place. To prevent UTIs   Drink plenty of water each day. This helps you urinate often, which clears bacteria from your system. (If you have kidney, heart, or liver disease and have to limit fluids, talk with your doctor before you increase the amount of fluids you drink.)   Urinate when you need to.  If you are sexually active, urinate right after you have sex.  Change sanitary pads often.  Avoid douches, bubble baths, feminine hygiene sprays, and other feminine hygiene products that have deodorants.  After going to the bathroom, wipe from front to back. When should you call for help?    Call your doctor now or seek immediate medical care if:     Symptoms such as fever, chills, nausea, or vomiting get worse or appear for the first time.      You have new pain in your back just below your rib cage. This is called flank pain.      There is new blood or pus in your urine.      You have any problems with your antibiotic medicine. Watch closely for changes in your health, and be sure to contact your doctor if:     You are not getting better after taking an antibiotic for 2 days.      Your symptoms go away but then come back. Where can you learn more? Go to [email or web address] and sign in to your [employer] account. Enter K426 in the Punchd box to learn more about \"Urinary Tract Infection (UTI) in Women: Care Instructions. \"     If you do not have an account, please click on the \"Sign Up Now\" link. Current as of: October 18, 2021               Content Version: 13.2  © 9538-3626 [employer]. Care instructions adapted under license by [facility] ([facility]). If you have questions about a medical condition or this instruction, always ask your healthcare professional. [place] [address] any warranty or liability for your use of this information. Patient/Caregiver instructed on use, benefit, and side effects of prescribed medications. All patient/parent/caregiver questions answered. Patient/parent/caregiver voiced understanding. Reviewed health maintenance. Instructed to continue current medications, diet and exercise. Patient agreed with treatment plan. Follow up as directed.            Electronically signed by TESFAYE Burdick NP on6/20/2022

## 2022-06-22 LAB
CULTURE: ABNORMAL
SPECIMEN DESCRIPTION: ABNORMAL

## 2022-06-23 NOTE — RESULT ENCOUNTER NOTE
Test was ordered by writer and patient is currently under my care. Have her complete antibiotic which should be effective in treating her UTI. She only needs to return if she begins having symptoms again.

## 2022-07-01 ENCOUNTER — OFFICE VISIT (OUTPATIENT)
Dept: FAMILY MEDICINE CLINIC | Age: 82
End: 2022-07-01
Payer: MEDICARE

## 2022-07-01 ENCOUNTER — HOSPITAL ENCOUNTER (OUTPATIENT)
Age: 82
Setting detail: SPECIMEN
Discharge: HOME | End: 2022-07-01
Payer: MEDICARE

## 2022-07-01 VITALS
WEIGHT: 201.6 LBS | OXYGEN SATURATION: 98 % | HEART RATE: 55 BPM | DIASTOLIC BLOOD PRESSURE: 52 MMHG | TEMPERATURE: 98.9 F | BODY MASS INDEX: 31.58 KG/M2 | SYSTOLIC BLOOD PRESSURE: 132 MMHG | RESPIRATION RATE: 16 BRPM

## 2022-07-01 DIAGNOSIS — G62.9 PERIPHERAL POLYNEUROPATHY: ICD-10-CM

## 2022-07-01 DIAGNOSIS — N39.0 URINARY TRACT INFECTION WITHOUT HEMATURIA, SITE UNSPECIFIED: Primary | ICD-10-CM

## 2022-07-01 DIAGNOSIS — R30.9 PAIN WITH URINATION: ICD-10-CM

## 2022-07-01 LAB
BILIRUBIN, POC: ABNORMAL
BLOOD URINE, POC: ABNORMAL
CLARITY, POC: ABNORMAL
COLOR, POC: YELLOW
GLUCOSE URINE, POC: ABNORMAL
KETONES, POC: ABNORMAL
LEUKOCYTE EST, POC: ABNORMAL
NITRITE, POC: ABNORMAL
PH, POC: 5.5
PROTEIN, POC: ABNORMAL
SPECIFIC GRAVITY, POC: 1.01
UROBILINOGEN, POC: 0.2

## 2022-07-01 PROCEDURE — 99214 OFFICE O/P EST MOD 30 MIN: CPT | Performed by: FAMILY MEDICINE

## 2022-07-01 PROCEDURE — G8417 CALC BMI ABV UP PARAM F/U: HCPCS | Performed by: FAMILY MEDICINE

## 2022-07-01 PROCEDURE — 1090F PRES/ABSN URINE INCON ASSESS: CPT | Performed by: FAMILY MEDICINE

## 2022-07-01 PROCEDURE — 81002 URINALYSIS NONAUTO W/O SCOPE: CPT | Performed by: FAMILY MEDICINE

## 2022-07-01 PROCEDURE — PBSHW POCT URINALYSIS DIPSTICK: Performed by: FAMILY MEDICINE

## 2022-07-01 PROCEDURE — 87086 URINE CULTURE/COLONY COUNT: CPT

## 2022-07-01 PROCEDURE — G8427 DOCREV CUR MEDS BY ELIG CLIN: HCPCS | Performed by: FAMILY MEDICINE

## 2022-07-01 PROCEDURE — 1123F ACP DISCUSS/DSCN MKR DOCD: CPT | Performed by: FAMILY MEDICINE

## 2022-07-01 PROCEDURE — 87186 SC STD MICRODIL/AGAR DIL: CPT

## 2022-07-01 PROCEDURE — 99213 OFFICE O/P EST LOW 20 MIN: CPT | Performed by: FAMILY MEDICINE

## 2022-07-01 PROCEDURE — 1036F TOBACCO NON-USER: CPT | Performed by: FAMILY MEDICINE

## 2022-07-01 PROCEDURE — G8400 PT W/DXA NO RESULTS DOC: HCPCS | Performed by: FAMILY MEDICINE

## 2022-07-01 PROCEDURE — 87077 CULTURE AEROBIC IDENTIFY: CPT

## 2022-07-01 RX ORDER — GABAPENTIN 300 MG/1
300 CAPSULE ORAL 3 TIMES DAILY
Qty: 270 CAPSULE | Refills: 1 | Status: SHIPPED | OUTPATIENT
Start: 2022-07-01 | End: 2022-10-19

## 2022-07-01 RX ORDER — CIPROFLOXACIN 500 MG/1
500 TABLET, FILM COATED ORAL 2 TIMES DAILY
Qty: 14 TABLET | Refills: 0 | Status: SHIPPED | OUTPATIENT
Start: 2022-07-01 | End: 2022-07-08

## 2022-07-01 ASSESSMENT — ENCOUNTER SYMPTOMS
ABDOMINAL PAIN: 0
SHORTNESS OF BREATH: 0

## 2022-07-01 NOTE — PROGRESS NOTES
Name: Haroldo Malik  DOS: 7/1/2022  MRN: [id number]      Subjective: Haroldo Malik is a 80 y.o. female being seen for   Chief Complaint   Patient presents with    Urinary Tract Infection     Finished antibiotic one week ago. She notes cloudy urine and pressure- started last night.         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    Bx proven by Dr Kahtarine Da Silva Osteoarthritis     Sleep apnea      Past Surgical History:   Procedure Laterality Date    ANGIOPLASTY  2001    stent x 1    EYE SURGERY      FOOT SURGERY Left 07/31/2009    3rd toe amputation    HYSTERECTOMY, TOTAL ABDOMINAL (CERVIX REMOVED)  1985    TOE AMPUTATION      left 3rd toe    TOE AMPUTATION Left 3/31/2020    Left 2nd TOE AMPUTATION performed by Kelsey Chirinos DPM at [address] [place] [address]  01/17/2020    Dr Titi Traylor- vulvar SCC carcinoma     Social History     Socioeconomic History    Marital status:      Spouse name: Not on file    Number of children: Not on file    Years of education: Not on file    Highest education level: Not on file   Occupational History    Not on file   Tobacco Use    Smoking status: Never Smoker    Smokeless tobacco: Never Used   Vaping Use    Vaping Use: Never used   Substance and Sexual Activity    Alcohol use: No    Drug use: No    Sexual activity: Not on file   Other Topics Concern    Not on file   Social History Narrative    Not on file     Social Determinants of Health     Financial Resource Strain: Low Risk     Difficulty of Paying Living Expenses: Not very hard   Food Insecurity: No Food Insecurity    Worried About Running Out of Food in the Last Year: Never true    Ezequiel of Food in the Last Year: Never true   Transportation Needs:     Lack of Transportation (Medical): Not on file    Lack of Transportation (Non-Medical): Not on file   Physical Activity: Inactive    Days of Exercise per Week: 0 days    Minutes of Exercise per Session: 0 min   Stress:     Feeling of Stress : Not on file   Social Connections:     Frequency of Communication with Friends and Family: Not on file    Frequency of Social Gatherings with Friends and Family: Not on file    Attends Christian Services: Not on file    Active Member of [address] [place] [address] [employer] or Organizations: Not on file    Attends Club or Organization Meetings: Not on file    Marital Status: Not on file   Intimate Partner Violence:     Fear of Current or Ex-Partner: Not on file    Emotionally Abused: Not on file    Physically Abused: Not on file    Sexually Abused: Not on file   Housing Stability:     Unable to Pay for Housing in the Last Year: Not on file    Number of [place] in the Last Year: Not on file    Unstable Housing in the Last Year: Not on file       Current Outpatient Medications   Medication Sig Dispense Refill    gabapentin (NEURONTIN) 300 MG capsule Take 1 capsule by mouth 3 times daily for 90 days.  270 capsule 1    ciprofloxacin (CIPRO) 500 MG tablet Take 1 tablet by mouth 2 times daily for 7 days 14 tablet 0    ferrous gluconate 324 (37.5 Fe) MG TABS I by mouth a day 90 tablet 3    oxybutynin (DITROPAN-XL) 10 MG extended release tablet Take 1 tablet by mouth daily 90 tablet 3    insulin glargine (LANTUS) 100 UNIT/ML injection vial Inject 46 Units into the skin nightly 10 mL 3    EUTHYROX 150 MCG tablet Take 1 tablet by mouth once daily 90 tablet 0    amLODIPine (NORVASC) 5 MG tablet Take 5 mg by mouth in the morning and at bedtime      guaiFENesin (DIABETIC TUSSIN EX) 100 MG/5ML liquid Take 200 mg by mouth 3 times daily as needed for Cough      furosemide (LASIX) 20 MG tablet Take 1 tablet by mouth daily 60 tablet 3    Continuous Blood Gluc Sensor ([address] [place] [address]) MISC by Does not apply route      albuterol sulfate HFA (VENTOLIN HFA) 108 (90 Base) MCG/ACT inhaler Inhale 2 puffs into the lungs every 6 hours as needed for Wheezing       simvastatin (ZOCOR) 20 MG tablet Take 1 tablet by mouth nightly 90 tablet 3    senna (SENOKOT) 8.6 MG tablet Take 1 tablet by mouth daily 90 tablet 3    nitroGLYCERIN (NITROSTAT) 0.4 MG SL tablet Place 0.4 mg under the tongue every 5 minutes as needed for Chest pain up to max of 3 total doses. If no relief after 1 dose, call 911.        B-D INS SYR ULTRAFINE 1CC/31G 31G X 5/16\" 1 ML MISC       fexofenadine (ALLEGRA) 180 MG tablet Take 180 mg by mouth daily      montelukast (SINGULAIR) 10 MG tablet TAKE ONE TABLET BY MOUTH NIGHTLY 90 tablet 1    fluticasone propionate (FLOVENT DISKUS) 100 MCG/BLIST AEPB inhaler Inhale 1 puff into the lungs daily      fluticasone (FLONASE) 50 MCG/ACT nasal spray 1 spray by Nasal route daily      isosorbide mononitrate (IMDUR) 60 MG extended release tablet Take 60 mg by mouth every morning      insulin aspart (NOVOLOG) 100 UNIT/ML injection vial Indications: 12 units in AM, 16 units in afternoon, 16 units in PM with meals Plus sliding scale       aspirin 81 MG tablet Take 81 mg by mouth daily      acetaminophen (TYLENOL) 500 MG tablet Take 1,000 mg by mouth 3 times daily       vitamin B-12 (CYANOCOBALAMIN) 500 MCG tablet Take 500 mcg by mouth daily      CPAP Machine MISC by Does not apply route      calcium carbonate (OSCAL) 500 MG TABS tablet Take 600 mg by mouth daily       Omega-3 Fatty Acids (FISH OIL PO) Take 1,040 mg by mouth daily       Multiple Vitamins-Minerals (MULTIVITAMIN ADULTS PO) Take by mouth daily       carvedilol (COREG) 3.125 MG tablet TAKE 1 TABLET BY MOUTH TWICE DAILY FOR Patient Instructions   Nutrition Health Education:    Keep hydrated, walk 30 minutes minimum 3 times weekly as tolerated. Diet should consist of low fat, low sodium and high fiber. Nutritious foods such as fruits (if you're not diabetic), vegetables, lean meats, lean dairy, whole grains such as brown rice, quinoa, and dry beans. Marden Old with small amounts of heart healthy extra virgin olive oil. Be watchful of any extra salt/sugar/seasoning to your food. You should eat no more than 2 grams or 2,000 mg of salt daily. Salt will raise your BP. Avoid regular/diet sodas, caffeine, energy drinks as these are full of artificial ingredients/sweeteners, sugar, salt and chemicals that spike insulin and are harmful to your health. Sugar intake increases metabolic disfunction, type 2 diabetes, insulin resistance, addictive food behavior and obesity. Avoid all processed foods, foods from boxes, cans, microwave meals as these contain high salt, sugar or fat content and not much nutrition. Get at least 8 hrs of sleep every night and turn off all electronics at least 1 hour before bedtime as these decreases melatonin production and increases wakefulness. If your cholesterol is high, no greasy, fried, fast or fatty foods. Decrease red meat intake. No butter, hutchinson, lard or creams, no milk as these things clog your arteries and leads to heart attacks and death. If you smoke, smoking increases risk of lung disease, cancers, high BP, heart attack, stroke and death. Take your daily medications as prescribed and inform your family doctor if you are having any side effects or issues taking medications. NO fruit nor candy and decrease your carbohydrates as you have diabetes    Wipe from front to back   cipro 500 mg every 12 hrs  If no improvement or get worse within 2-3 days return to office, refer to urology or er    Return as scheduled and 10 days after treatment for repeat urine       Return for as schedules.      Marlen Fitzgerald, DO

## 2022-07-01 NOTE — PATIENT INSTRUCTIONS
Nutrition Health Education:    Keep hydrated, walk 30 minutes minimum 3 times weekly as tolerated. Diet should consist of low fat, low sodium and high fiber. Nutritious foods such as fruits (if you're not diabetic), vegetables, lean meats, lean dairy, whole grains such as brown rice, quinoa, and dry beans. Sharolyn Peoples with small amounts of heart healthy extra virgin olive oil. Be watchful of any extra salt/sugar/seasoning to your food. You should eat no more than 2 grams or 2,000 mg of salt daily. Salt will raise your BP. Avoid regular/diet sodas, caffeine, energy drinks as these are full of artificial ingredients/sweeteners, sugar, salt and chemicals that spike insulin and are harmful to your health. Sugar intake increases metabolic disfunction, type 2 diabetes, insulin resistance, addictive food behavior and obesity. Avoid all processed foods, foods from boxes, cans, microwave meals as these contain high salt, sugar or fat content and not much nutrition. Get at least 8 hrs of sleep every night and turn off all electronics at least 1 hour before bedtime as these decreases melatonin production and increases wakefulness. If your cholesterol is high, no greasy, fried, fast or fatty foods. Decrease red meat intake. No butter, hutchinson, lard or creams, no milk as these things clog your arteries and leads to heart attacks and death. If you smoke, smoking increases risk of lung disease, cancers, high BP, heart attack, stroke and death. Take your daily medications as prescribed and inform your family doctor if you are having any side effects or issues taking medications.   NO fruit nor candy and decrease your carbohydrates as you have diabetes    Wipe from front to back   cipro 500 mg every 12 hrs  If no improvement or get worse within 2-3 days return to office, refer to urology or er    Return as scheduled and 10 days after treatment for repeat urine

## 2022-07-03 LAB
CULTURE: ABNORMAL
SPECIMEN DESCRIPTION: ABNORMAL

## 2022-07-11 ENCOUNTER — OFFICE VISIT (OUTPATIENT)
Dept: FAMILY MEDICINE CLINIC | Age: 82
End: 2022-07-11
Payer: MEDICARE

## 2022-07-11 VITALS
WEIGHT: 203.2 LBS | DIASTOLIC BLOOD PRESSURE: 74 MMHG | SYSTOLIC BLOOD PRESSURE: 122 MMHG | RESPIRATION RATE: 17 BRPM | HEIGHT: 67 IN | OXYGEN SATURATION: 97 % | BODY MASS INDEX: 31.89 KG/M2 | TEMPERATURE: 98.4 F | HEART RATE: 64 BPM

## 2022-07-11 DIAGNOSIS — E03.9 ACQUIRED HYPOTHYROIDISM: ICD-10-CM

## 2022-07-11 DIAGNOSIS — N39.0 FREQUENT UTI: Primary | ICD-10-CM

## 2022-07-11 DIAGNOSIS — I10 ESSENTIAL HYPERTENSION: ICD-10-CM

## 2022-07-11 DIAGNOSIS — E78.2 MIXED HYPERLIPIDEMIA: ICD-10-CM

## 2022-07-11 DIAGNOSIS — C51.9 VULVAR CANCER (HCC): ICD-10-CM

## 2022-07-11 LAB
BILIRUBIN, POC: NEGATIVE
BLOOD URINE, POC: NEGATIVE
CLARITY, POC: CLEAR
COLOR, POC: YELLOW
GLUCOSE URINE, POC: NEGATIVE
KETONES, POC: NEGATIVE
LEUKOCYTE EST, POC: NEGATIVE
NITRITE, POC: NEGATIVE
PH, POC: 6
PROTEIN, POC: NORMAL
SPECIFIC GRAVITY, POC: 1.01
UROBILINOGEN, POC: 0.2

## 2022-07-11 PROCEDURE — PBSHW POCT URINALYSIS DIPSTICK: Performed by: FAMILY MEDICINE

## 2022-07-11 PROCEDURE — 81002 URINALYSIS NONAUTO W/O SCOPE: CPT | Performed by: FAMILY MEDICINE

## 2022-07-11 PROCEDURE — 99212 OFFICE O/P EST SF 10 MIN: CPT | Performed by: FAMILY MEDICINE

## 2022-07-11 PROCEDURE — 1123F ACP DISCUSS/DSCN MKR DOCD: CPT | Performed by: FAMILY MEDICINE

## 2022-07-11 PROCEDURE — G8400 PT W/DXA NO RESULTS DOC: HCPCS | Performed by: FAMILY MEDICINE

## 2022-07-11 PROCEDURE — 99213 OFFICE O/P EST LOW 20 MIN: CPT | Performed by: FAMILY MEDICINE

## 2022-07-11 PROCEDURE — 1090F PRES/ABSN URINE INCON ASSESS: CPT | Performed by: FAMILY MEDICINE

## 2022-07-11 PROCEDURE — 1036F TOBACCO NON-USER: CPT | Performed by: FAMILY MEDICINE

## 2022-07-11 PROCEDURE — G8427 DOCREV CUR MEDS BY ELIG CLIN: HCPCS | Performed by: FAMILY MEDICINE

## 2022-07-11 PROCEDURE — G8417 CALC BMI ABV UP PARAM F/U: HCPCS | Performed by: FAMILY MEDICINE

## 2022-07-11 ASSESSMENT — ENCOUNTER SYMPTOMS
COUGH: 0
CHOKING: 0
WHEEZING: 0
ABDOMINAL PAIN: 0
CHEST TIGHTNESS: 0
SHORTNESS OF BREATH: 0
PHOTOPHOBIA: 0

## 2022-07-11 NOTE — PATIENT INSTRUCTIONS
DM:   NO sugar, decrease fruit intake, No juice, NO veggies with color other than green, NO sauteed onions or anything that caramelizes, you may eat raw onions. NO alcohol, try not to eat diabetic candies as they may cause diarrhea. Minimize your carbohydrate intake.     Elevated Cholesterol:   No greasy, fried, fast, fatty foods   No trans fats   Decreased red meat intake to once every 2 months   No butter, hutchinson nor cream cheese, cheese   No egg yolk   NO milk   Decrease your cholesterol in diet    Elevated Blood Pressure:   No caffeine   No fast foods   Decrease salt in diet (consume nothing in a can, nothing in a box as these things contain high sodium)   No energy drinks   Buy a BP cuff and take blood pressure 3 times a day and write down blood pressure and pulse and bring in to me when you RTO   Lose weight and increase exercise if you are capable of exercising   Start only walking then may advance to brisk walking and lift low poundage free weights if you are capable      Wipe from front to back    repeat urine in office was normal    Refer to urology     Pt under care of riley  Pt is under care of crytal kline    Fasting blood work and follow up in 11/2022  Pt has labs and follow up in the future

## 2022-07-11 NOTE — PROGRESS NOTES
Name: Haroldo Malik  DOS: 7/11/2022  MRN: [id number]      Subjective:   Haroldo Malik is a 80 y.o. 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    Bx proven by Dr Katharine Da Silva Osteoarthritis     Sleep apnea      Past Surgical History:   Procedure Laterality Date    ANGIOPLASTY  2001    stent x 1    EYE SURGERY      FOOT SURGERY Left 07/31/2009    3rd toe amputation    HYSTERECTOMY, TOTAL ABDOMINAL (CERVIX REMOVED)  1985    TOE AMPUTATION      left 3rd toe    TOE AMPUTATION Left 3/31/2020    Left 2nd TOE AMPUTATION performed by Kelsey Chirinos DPM at [address] [place] [address]  01/17/2020    Dr Titi Traylor- vulvar SCC carcinoma     Social History     Socioeconomic History    Marital status:      Spouse name: None    Number of children: None    Years of education: None    Highest education level: None   Occupational History    None   Tobacco Use    Smoking status: Never Smoker    Smokeless tobacco: Never Used   Vaping Use    Vaping Use: Never used   Substance and Sexual Activity    Alcohol use: No    Drug use: No    Sexual activity: None   Other Topics Concern    None   Social History Narrative    None     Social Determinants of Health     Financial Resource Strain: Low Risk     Difficulty of Paying Living Expenses: Not very hard   Food Insecurity: No Food Insecurity    Worried About Running Out of Food in the Last Year: Never true    Ran Out of Food in the Last Year: Never true   Transportation Needs:     Lack of Transportation (Medical): Not on file    Lack of Transportation (Non-Medical): Not on file   Physical Activity: Inactive    Days of Exercise per Week: 0 days    Minutes of Exercise per Session: 0 min   Stress:     Feeling of Stress : Not on file   Social Connections:     Frequency of Communication with Friends and Family: Not on file    Frequency of Social Gatherings with Friends and Family: Not on file    Attends Qu[employer] Services: Not on file    Active Member of [address] [place] [address] [email or web address] or Organizations: Not on file    Attends Club or Organization Meetings: Not on file    Marital Status: Not on file   Intimate Partner Violence:     Fear of Current or Ex-Partner: Not on file    Emotionally Abused: Not on file    Physically Abused: Not on file    Sexually Abused: Not on file   Housing Stability:     Unable to Pay for Housing in the Last Year: Not on file    Number of [place] in the Last Year: Not on file    Unstable Housing in the Last Year: Not on file       Current Outpatient Medications   Medication Sig Dispense Refill    insulin glargine (LANTUS) 100 UNIT/ML injection vial Inject 46 Units into the skin nightly 10 mL 3    gabapentin (NEURONTIN) 300 MG capsule Take 1 capsule by mouth 3 times daily for 90 days. 270 capsule 1    ferrous gluconate 324 (37.5 Fe) MG TABS I by mouth a day 90 tablet 3    oxybutynin (DITROPAN-XL) 10 MG extended release tablet Take 1 tablet by mouth daily 90 tablet 3    EUTHYROX 150 MCG tablet Take 1 tablet by mouth once daily 90 tablet 0    carvedilol (COREG) 3.125 MG tablet TAKE 1 TABLET BY MOUTH TWICE DAILY FOR HIGH BLOOD PRESSURE, MUST ADMINISTER WITH A MEAL OR FOOD.       amLODIPine (NORVASC) 5 MG tablet Take 5 mg by mouth in the morning and at bedtime      guaiFENesin (DIABETIC TUSSIN EX) 100 MG/5ML liquid Take 200 mg by mouth 3 times daily as needed for Cough      Continuous Blood Gluc Sensor ([facility]) MISC by Does not apply route      albuterol sulfate HFA (VENTOLIN HFA) 108 (90 Base) MCG/ACT inhaler Inhale 2 puffs into the lungs every 6 hours as needed for Wheezing       simvastatin (ZOCOR) 20 MG tablet Take 1 tablet by mouth nightly 90 tablet 3    senna (SENOKOT) 8.6 MG tablet Take 1 tablet by mouth daily 90 tablet 3    nitroGLYCERIN (NITROSTAT) 0.4 MG SL tablet Place 0.4 mg under the tongue every 5 minutes as needed for Chest pain up to max of 3 total doses. If no relief after 1 dose, call 911.  B-D INS SYR ULTRAFINE 1CC/31G 31G X 5/16\" 1 ML MISC       fexofenadine (ALLEGRA) 180 MG tablet Take 180 mg by mouth daily      montelukast (SINGULAIR) 10 MG tablet TAKE ONE TABLET BY MOUTH NIGHTLY 90 tablet 1    fluticasone propionate (FLOVENT DISKUS) 100 MCG/BLIST AEPB inhaler Inhale 1 puff into the lungs daily      fluticasone (FLONASE) 50 MCG/ACT nasal spray 1 spray by Nasal route daily      isosorbide mononitrate (IMDUR) 60 MG extended release tablet Take 60 mg by mouth every morning      insulin aspart (NOVOLOG) 100 UNIT/ML injection vial Indications: 12 units in AM, 16 units in afternoon, 16 units in PM with meals Plus sliding scale       aspirin 81 MG tablet Take 81 mg by mouth daily      acetaminophen (TYLENOL) 500 MG tablet Take 1,000 mg by mouth 3 times daily       vitamin B-12 (CYANOCOBALAMIN) 500 MCG tablet Take 500 mcg by mouth daily      CPAP Machine MISC by Does not apply route      calcium carbonate (OSCAL) 500 MG TABS tablet Take 600 mg by mouth daily       Omega-3 Fatty Acids (FISH OIL PO) Take 1,040 mg by mouth daily       Multiple Vitamins-Minerals (MULTIVITAMIN ADULTS PO) Take by mouth daily       furosemide (LASIX) 20 MG tablet Take 1 tablet by mouth daily 60 tablet 3     No current facility-administered medications for this visit. Objective:  Pt is here follow up on UTI.  Pt was found to have a UTI and treated with cipro which the soft. There is no mass. Tenderness: There is no abdominal tenderness. There is no right CVA tenderness, left CVA tenderness or guarding. Musculoskeletal:         General: Normal range of motion. Cervical back: Normal range of motion and neck supple. Right lower leg: No edema. Left lower leg: No edema. Lymphadenopathy:      Cervical: No cervical adenopathy. Skin:     General: Skin is warm. Capillary Refill: Capillary refill takes less than 2 seconds. Neurological:      General: No focal deficit present. Mental Status: She is alert and oriented to person, place, and time. Gait: Gait abnormal.   Psychiatric:         Mood and Affect: Mood normal.         Behavior: Behavior normal.         Thought Content: Thought content normal.          Assessment:   Diagnosis Orders   1. Frequent UTI  Cheryle Polka, MD, [facility]    POCT Urinalysis no Micro   2. Essential hypertension  Comprehensive Metabolic Panel    Lipid Panel   3. Mixed hyperlipidemia  Comprehensive Metabolic Panel    Lipid Panel   4. Acquired hypothyroidism  TSH with Reflex   5. Vulvar cancer ([facility] [address]           Plan:  Orders Placed This Encounter   Procedures    Comprehensive Metabolic Panel     Standing Status:   Future     Standing Expiration Date:   11/11/2022    Lipid Panel     Standing Status:   Future     Standing Expiration Date:   11/11/2022     Order Specific Question:   Is Patient Fasting?/# of Hours     Answer:    Yes    TSH with Reflex     Standing Status:   Future     Standing Expiration Date:   11/11/2022   Cheryle Polka, MD, [facility]     Referral Priority:   Urgent     Referral Type:   Eval and Treat     Referral Reason:   Specialty Services Required     Referred to Provider:   Ana Mancini MD     Requested Specialty:   Urology     Number of Visits Requested:   1    POCT Urinalysis no Micro         Patient Instructions   DM:   NO sugar, decrease fruit intake, No juice, NO veggies with color other than green, NO sauteed onions or anything that caramelizes, you may eat raw onions. NO alcohol, try not to eat diabetic candies as they may cause diarrhea. Minimize your carbohydrate intake. Elevated Cholesterol:   No greasy, fried, fast, fatty foods   No trans fats   Decreased red meat intake to once every 2 months   No butter, hutchinson nor cream cheese, cheese   No egg yolk   NO milk   Decrease your cholesterol in diet    Elevated Blood Pressure:   No caffeine   No fast foods   Decrease salt in diet (consume nothing in a can, nothing in a box as these things contain high sodium)   No energy drinks   Buy a BP cuff and take blood pressure 3 times a day and write down blood pressure and pulse and bring in to me when you RTO   Lose weight and increase exercise if you are capable of exercising   Start only walking then may advance to brisk walking and lift low poundage free weights if you are capable      Wipe from front to back    repeat urine in office was normal    Refer to urology     Pt under care of riley  Pt is under care of crytal kline    Fasting blood work and follow up in 11/2022  Pt has labs and follow up in the future       Return in about 3 months (around 10/11/2022) for REVIEW LABS.      Irwin Villarreal, DO

## 2022-07-13 ENCOUNTER — OFFICE VISIT (OUTPATIENT)
Dept: DIABETES SERVICES | Age: 82
End: 2022-07-13
Payer: MEDICARE

## 2022-07-13 VITALS
RESPIRATION RATE: 14 BRPM | WEIGHT: 198 LBS | BODY MASS INDEX: 31.08 KG/M2 | SYSTOLIC BLOOD PRESSURE: 122 MMHG | HEART RATE: 60 BPM | DIASTOLIC BLOOD PRESSURE: 68 MMHG | HEIGHT: 67 IN

## 2022-07-13 DIAGNOSIS — Z79.4 TYPE 2 DIABETES MELLITUS WITH STAGE 4 CHRONIC KIDNEY DISEASE, WITH LONG-TERM CURRENT USE OF INSULIN (HCC): Primary | ICD-10-CM

## 2022-07-13 DIAGNOSIS — E78.2 MIXED HYPERLIPIDEMIA: ICD-10-CM

## 2022-07-13 DIAGNOSIS — N18.4 TYPE 2 DIABETES MELLITUS WITH STAGE 4 CHRONIC KIDNEY DISEASE, WITH LONG-TERM CURRENT USE OF INSULIN (HCC): Primary | ICD-10-CM

## 2022-07-13 DIAGNOSIS — I10 ESSENTIAL HYPERTENSION: ICD-10-CM

## 2022-07-13 DIAGNOSIS — E11.22 TYPE 2 DIABETES MELLITUS WITH STAGE 4 CHRONIC KIDNEY DISEASE, WITH LONG-TERM CURRENT USE OF INSULIN (HCC): Primary | ICD-10-CM

## 2022-07-13 PROCEDURE — 1123F ACP DISCUSS/DSCN MKR DOCD: CPT | Performed by: NURSE PRACTITIONER

## 2022-07-13 PROCEDURE — 99214 OFFICE O/P EST MOD 30 MIN: CPT | Performed by: NURSE PRACTITIONER

## 2022-07-13 PROCEDURE — G8427 DOCREV CUR MEDS BY ELIG CLIN: HCPCS | Performed by: NURSE PRACTITIONER

## 2022-07-13 PROCEDURE — G8400 PT W/DXA NO RESULTS DOC: HCPCS | Performed by: NURSE PRACTITIONER

## 2022-07-13 PROCEDURE — 1090F PRES/ABSN URINE INCON ASSESS: CPT | Performed by: NURSE PRACTITIONER

## 2022-07-13 PROCEDURE — G8417 CALC BMI ABV UP PARAM F/U: HCPCS | Performed by: NURSE PRACTITIONER

## 2022-07-13 PROCEDURE — 95251 CONT GLUC MNTR ANALYSIS I&R: CPT | Performed by: NURSE PRACTITIONER

## 2022-07-13 PROCEDURE — 1036F TOBACCO NON-USER: CPT | Performed by: NURSE PRACTITIONER

## 2022-07-13 PROCEDURE — 3051F HG A1C>EQUAL 7.0%<8.0%: CPT | Performed by: NURSE PRACTITIONER

## 2022-07-13 RX ORDER — VIT C/B6/B5/MAGNESIUM/HERB 173 50-5-6-5MG
CAPSULE ORAL
COMMUNITY
End: 2022-10-19

## 2022-07-13 RX ORDER — DOCUSATE SODIUM 100 MG/1
100 CAPSULE, LIQUID FILLED ORAL NIGHTLY
COMMUNITY

## 2022-07-13 RX ORDER — INSULIN GLARGINE 100 [IU]/ML
38 INJECTION, SOLUTION SUBCUTANEOUS NIGHTLY
Qty: 10 ML | Refills: 3 | Status: SHIPPED | OUTPATIENT
Start: 2022-07-13 | End: 2022-10-18 | Stop reason: SDUPTHER

## 2022-07-13 ASSESSMENT — ENCOUNTER SYMPTOMS
DIARRHEA: 0
ABDOMINAL PAIN: 0
SHORTNESS OF BREATH: 0
RESPIRATORY NEGATIVE: 1

## 2022-07-13 NOTE — PROGRESS NOTES
[facility] [address]  [address] [facility], [address]  [facility] [address] [phone]        HISTORY:    Josue Crystal presents today for evaluation and management of:  No chief complaint on file. HPI    Interval History:    Past DM Medications   Metformin-ckd  Glimepiride-therapy completed     Current Diabetic Medications  Lantus 40-46 units at at bedtime   Sliding Scale Insulin Novolog     Blood sugar   Action     <70               Drink Juice               No extra insulin  150 - 200         2 units subcutaneous Insulin  201 - 250         4 units subcutaneous Insulin  251 - 300         6 units subcutaneous Insulin  301 - 350         8 units subcutaneous Insulin  351 - 400         10 units subcutaneous Insulin   > 400              12 units subcutaneous Insulin        DKA episodes: 0    05/17/22   Josue Crystal is a 80 y.o. female patient who presents to clinic today for her diabetes. she has a history of hypertension, CAD, ALDAIR, hypothyroidism, CKD, hyperlipidemia and obesity which contributes to her diabetes. At previous visit diabetes counseling was provided. she denies any current signs or symptoms of hyper/hypoglycemia. she states they are taking their medications as prescribed without any adverse effects. Diet: low carb low sodium smaller portions, following with dietician   Exercise: none  BS testing: uses cgm daily with success and is adherent to cgm therapy  Issues: denies   Diabetic foot exam up-to-date: Yes  Diabetic retinal exam up-to-date: Yes  Hypoglycemia as needed treatment: juice glucose tabs    07/13/22   Josue Crystal is a 80 y.o. female patient who presents to clinic today for her diabetes. she has a history of hypertension, CAD, ALDAIR, hypothyroidism, CKD, hyperlipidemia and obesity  which contributes to her diabetes. At previous visit insulin was adjusted.  she denies any current signs or symptoms of hyper/hypoglycemia. she states they are taking their medications as prescribed without any adverse effects. She has been using less basal and meal time insulin. Diet: low carb low sodium smaller portions, following with dietician   Exercise: none  BS testing: uses cgm daily with success and is adherent to cgm therapy  Issues: denies   Diabetic foot exam up-to-date: Yes  Diabetic retinal exam up-to-date: Yes  Hypoglycemia as needed treatment: juice glucose tabs    High cholesterol-  Takes Zocor and fish oil and denies any adverse effects with its use.  Watches diet and exercise.      Hypertension-  Takes Norvasc, Lasix, and denies any adverse effects with their use. Watches diet and exercise. Denies any chest pain, dizziness or edema.       Obesity- Working on weight loss. weight is down     CKD- she follow with nephrology and considering dialysis.  She needs help with renal diet.  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    Bx proven by Dr Fish Green Sleep apnea      Family History   Problem Relation Age of Onset    High Blood Pressure Mother     Coronary Art Dis Father      Social History     Tobacco Use    Smoking status: Never Smoker    Smokeless tobacco: Never Used   Vaping Use    Vaping Use: Never used   Substance Use Topics    Alcohol use: No    Drug use: No     Allergies   Allergen Reactions    Lisinopril Other (See Comments)     Cough    Penicillins Swelling     Facial swelling    Ranolazine Rash       MEDICATIONS:  Current Outpatient Medications   Medication Sig Dispense Refill    docusate sodium (STOOL SOFTENER) 100 MG capsule Take 100 mg by mouth at bedtime      Carboxymethylcellulose Sodium (EYE DROPS OP) Apply 1 drop to eye 4 times daily as needed      psyllium (KONSYL) 28.3 % PACK Take 1 packet by mouth daily      turmeric 500 MG CAPS 1 tsp ground tumeric in 1 cup liquid daily.  insulin glargine (LANTUS) 100 UNIT/ML injection vial Inject 38 Units into the skin nightly 10 mL 3    gabapentin (NEURONTIN) 300 MG capsule Take 1 capsule by mouth 3 times daily for 90 days. 270 capsule 1    ferrous gluconate 324 (37.5 Fe) MG TABS I by mouth a day 90 tablet 3    oxybutynin (DITROPAN-XL) 10 MG extended release tablet Take 1 tablet by mouth daily 90 tablet 3    EUTHYROX 150 MCG tablet Take 1 tablet by mouth once daily 90 tablet 0    amLODIPine (NORVASC) 5 MG tablet Take 5 mg by mouth in the morning and at bedtime      guaiFENesin (DIABETIC TUSSIN EX) 100 MG/5ML liquid Take 200 mg by mouth 3 times daily as needed for Cough      furosemide (LASIX) 20 MG tablet Take 1 tablet by mouth daily 60 tablet 3    Continuous Blood Gluc Sensor ([address] [place] [address]) MISC by Does not apply route      albuterol sulfate HFA (VENTOLIN HFA) 108 (90 Base) MCG/ACT inhaler Inhale 2 puffs into the lungs every 6 hours as needed for Wheezing       simvastatin (ZOCOR) 20 MG tablet Take 1 tablet by mouth nightly 90 tablet 3    senna (SENOKOT) 8.6 MG tablet Take 1 tablet by mouth daily 90 tablet 3    nitroGLYCERIN (NITROSTAT) 0.4 MG SL tablet Place 0.4 mg under the tongue every 5 minutes as needed for Chest pain up to max of 3 total doses. If no relief after 1 dose, call 911.        fexofenadine (ALLEGRA) 180 MG tablet Take 180 mg by mouth daily      montelukast (SINGULAIR) 10 MG tablet TAKE ONE TABLET BY MOUTH NIGHTLY 90 tablet 1    fluticasone propionate (FLOVENT DISKUS) 100 MCG/BLIST AEPB inhaler Inhale 1 puff into the lungs daily      fluticasone (FLONASE) 50 MCG/ACT nasal spray 1 spray by Nasal route daily      isosorbide mononitrate (IMDUR) 60 MG extended release tablet Take 60 mg by mouth every morning      insulin aspart (NOVOLOG) 100 UNIT/ML injection vial Indications: TID sliding scale. max daily dose of 12 units.  aspirin 81 MG tablet Take 81 mg by mouth daily      acetaminophen (TYLENOL) 500 MG tablet Take 1,000 mg by mouth 3 times daily       vitamin B-12 (CYANOCOBALAMIN) 500 MCG tablet Take 1,000 mcg by mouth daily       CPAP Machine MISC by Does not apply route      calcium carbonate (OSCAL) 500 MG TABS tablet Take 600 mg by mouth daily       Omega-3 Fatty Acids (FISH OIL PO) Take 1,000 mg by mouth daily       Multiple Vitamins-Minerals (MULTIVITAMIN ADULTS PO) Take by mouth daily       B-D INS SYR ULTRAFINE 1CC/31G 31G X 5/16\" 1 ML MISC        No current facility-administered medications for this visit. Review ofSymptoms:  Review of Systems   Constitutional: Positive for fatigue. Negative for unexpected weight change. Eyes: Negative for visual disturbance. Respiratory: Negative. Negative for shortness of breath. Cardiovascular: Negative for chest pain and leg swelling. Gastrointestinal: Negative for abdominal pain and diarrhea. Endocrine: Negative for polydipsia, polyphagia and polyuria. Genitourinary: Negative. Musculoskeletal: Negative. Skin: Negative for rash and wound. Neurological: Negative for dizziness, tremors, seizures and headaches. Psychiatric/Behavioral: Negative. Negative for confusion and decreased concentration. Theremainder of a complete 14-point review of systems is negative. Vital Signs: /68 (Site: Left Upper Arm, Position: Sitting, Cuff Size: Large Adult)   Pulse 60   Resp 14   Ht 5' 7\" (1.702 m)   Wt 198 lb (89.8 kg)   BMI 31.01 kg/m²      Wt Readings from Last 3 Encounters:   07/13/22 198 lb (89.8 kg)   07/11/22 203 lb 3.2 oz (92.2 kg)   07/01/22 201 lb 9.6 oz (91.4 kg)     Body mass index is 31.01 kg/m².   LABS:  Hemoglobin A1C   Date Value Ref Range Status   04/13/2022 7.2 (H) 4.4 - 6.4 % Final   09/02/2021 7.1 % Final     Lab Results   Component Value Date    LABMICR 65.6 (H) 04/13/2022 Lab Results   Component Value Date     07/13/2022    K 4.3 07/13/2022     07/13/2022    CO2 29 07/13/2022    BUN 39 (H) 07/13/2022    CREATININE 3.2 (H) 07/13/2022    GLUCOSE 171 (H) 07/13/2022    CALCIUM 9.1 07/13/2022    PROT 6.1 (A) 02/09/2021    LABALBU 3.6 06/06/2022    BILITOT 0.3 06/06/2022    ALKPHOS 82 06/06/2022    AST 25 06/06/2022    ALT 15 06/06/2022    LABGLOM 14.9 07/13/2022    GFRAA 30 (L) 04/30/2020    GLOB 2.6 06/06/2022     Lab Results   Component Value Date    CHOL 130 04/13/2022    CHOL 133 04/29/2021    CHOL 124.0 06/10/2019     Lab Results   Component Value Date    TRIG 169 04/13/2022    TRIG 93 04/29/2021    TRIG 150.0 06/10/2019     Lab Results   Component Value Date    HDL 36 04/13/2022    HDL 50 04/29/2021    HDL 49 06/10/2019     Lab Results   Component Value Date    LDLCALC 60.2 04/13/2022    LDLCALC 64.4 04/29/2021    LDLCALC 45.0 06/10/2019     Lab Results   Component Value Date    VLDL 34 04/13/2022    VLDL 19 04/29/2021    VLDL 30.0 06/10/2019     Lab Results   Component Value Date    CHOLHDLRATIO 3.61 04/13/2022    CHOLHDLRATIO 2.66 04/29/2021    CHOLHDLRATIO 2.5 06/10/2019           Physical Exam  Constitutional:       Appearance: She is well-developed. Eyes:      Pupils: Pupils are equal, round, and reactive to light. Neck:      Thyroid: No thyroid mass, thyromegaly or thyroid tenderness. Cardiovascular:      Rate and Rhythm: Normal rate and regular rhythm. Heart sounds: Normal heart sounds. Pulmonary:      Effort: Pulmonary effort is normal.      Breath sounds: Normal breath sounds. Abdominal:      General: Bowel sounds are normal.      Palpations: Abdomen is soft. Skin:     General: Skin is warm and dry. Comments: Negative for open/nonhealing wounds. Negative for lipohypertrophy. Neurological:      Mental Status: She is alert and oriented to person, place, and time. ASSESSMENT/PLAN:     Diagnosis Orders   1.  Type 2 diabetes mellitus with stage 4 chronic kidney disease, with long-term current use of insulin (HCC)  insulin glargine (LANTUS) 100 UNIT/ML injection vial   2. Essential hypertension     3. Mixed hyperlipidemia     4. BMI 31.0-31.9,adult       No orders of the defined types were placed in this encounter. Orders Placed This Encounter   Medications    insulin glargine (LANTUS) 100 UNIT/ML injection vial     Sig: Inject 38 Units into the skin nightly     Dispense:  10 mL     Refill:  3     Requested Prescriptions     Signed Prescriptions Disp Refills    insulin glargine (LANTUS) 100 UNIT/ML injection vial 10 mL 3     Sig: Inject 38 Units into the skin nightly       1. Type 2 diabetes mellitus with stage 4 chronic kidney disease, with long-term current use of insulin (HCC)  - Unstable  HbA1C goal is less than 7%. - Fasting blood glucose goal is 70-120mg/dl and postprandial blood sugar goal is less than 180 mg/dl. - Labs reviewed including most recent A1c, microalbumin and kidney function. Repeat labs due in 3 months.    -We discussed in great detail dietary modifications they can make to better improve their blood sugars. --will call lab to add on a1c  -will decrease basal due to hypoglycemia and she has to eat extra to keep her glucose up.   -close follow up recommenced pt is agreeable to 3 months due to cost issues. CGM report downloaded and reviewed, scanned to media tab. Time in range 62% and hypoglycemia 1%. Predicted A1c per CGM report 7.2%. Discussed signs and symptoms of hyper/hypoglycemia and how to treat. Encouraged 150 minutes of physical activity per week. Follow a low carbohydrate diet. Encouraged at least 7 hours of sleep. The patient was informed of the goals of diabetes management. This can only be accomplished by watching their diet and exercise levels. We certainly use medicines to help attain these goals. The consequences of not controlling blood sugars were discussed.  These include blindness,

## 2022-07-28 RX ORDER — LEVOTHYROXINE SODIUM 150 UG/1
TABLET ORAL
Qty: 90 TABLET | Refills: 0 | Status: SHIPPED | OUTPATIENT
Start: 2022-07-28 | End: 2022-11-01

## 2022-07-28 NOTE — TELEPHONE ENCOUNTER
Patient calling back about Levothyroxin 150 mcg needing refilled to [facility]. I'm not seeing it on her list but it's been sent for authorization by pharmacy they state.

## 2022-08-04 RX ORDER — LEVOTHYROXINE SODIUM 150 UG/1
TABLET ORAL
Qty: 90 TABLET | Refills: 0 | OUTPATIENT
Start: 2022-08-04

## 2022-09-15 ENCOUNTER — TELEPHONE (OUTPATIENT)
Dept: INTERNAL MEDICINE | Age: 82
End: 2022-09-15

## 2022-09-15 NOTE — TELEPHONE ENCOUNTER
Patient called to know what Britta recommemds to do about her insulin the night before she surgery. She is having surgery Sept 26th in the afternoon. States they told her to take her insulin at night but she is NPO after midnight and doesn't want to go too low.

## 2022-09-19 NOTE — TELEPHONE ENCOUNTER
Please have her take half of her normal insulin dose the night before and she can resume normal dose after surgery and eating and drinking like normal.

## 2022-09-19 NOTE — TELEPHONE ENCOUNTER
Let patient know Pernell Henry recommendations regarding her insulin. Patient is still concerned  about her blood sugar dropping to low.

## 2022-09-19 NOTE — TELEPHONE ENCOUNTER
Patient agreed to hold insulin all together. She stated \"id rather go to high then go to low\". Understands to resume as normal after eating and drinking.

## 2022-10-03 ENCOUNTER — HOSPITAL ENCOUNTER (OUTPATIENT)
Age: 82
Setting detail: SPECIMEN
Discharge: HOME | End: 2022-10-03
Payer: MEDICARE

## 2022-10-03 ENCOUNTER — OFFICE VISIT (OUTPATIENT)
Dept: FAMILY MEDICINE CLINIC | Age: 82
End: 2022-10-03
Payer: MEDICARE

## 2022-10-03 VITALS
OXYGEN SATURATION: 97 % | HEART RATE: 59 BPM | BODY MASS INDEX: 30.76 KG/M2 | SYSTOLIC BLOOD PRESSURE: 122 MMHG | TEMPERATURE: 97.7 F | RESPIRATION RATE: 14 BRPM | HEIGHT: 67 IN | WEIGHT: 196 LBS | DIASTOLIC BLOOD PRESSURE: 66 MMHG

## 2022-10-03 DIAGNOSIS — N30.91 CYSTITIS WITH HEMATURIA: ICD-10-CM

## 2022-10-03 DIAGNOSIS — N30.91 CYSTITIS WITH HEMATURIA: Primary | ICD-10-CM

## 2022-10-03 DIAGNOSIS — R30.0 BURNING WITH URINATION: ICD-10-CM

## 2022-10-03 LAB
BILIRUBIN, POC: ABNORMAL
BLOOD URINE, POC: ABNORMAL
CLARITY, POC: ABNORMAL
COLOR, POC: YELLOW
GLUCOSE URINE, POC: ABNORMAL
KETONES, POC: ABNORMAL
LEUKOCYTE EST, POC: ABNORMAL
NITRITE, POC: ABNORMAL
PH, POC: 5.5
PROTEIN, POC: ABNORMAL
SPECIFIC GRAVITY, POC: 1.02
UROBILINOGEN, POC: 0.2

## 2022-10-03 PROCEDURE — 87077 CULTURE AEROBIC IDENTIFY: CPT

## 2022-10-03 PROCEDURE — 1036F TOBACCO NON-USER: CPT | Performed by: NURSE PRACTITIONER

## 2022-10-03 PROCEDURE — 99213 OFFICE O/P EST LOW 20 MIN: CPT | Performed by: NURSE PRACTITIONER

## 2022-10-03 PROCEDURE — 81002 URINALYSIS NONAUTO W/O SCOPE: CPT | Performed by: NURSE PRACTITIONER

## 2022-10-03 PROCEDURE — G8427 DOCREV CUR MEDS BY ELIG CLIN: HCPCS | Performed by: NURSE PRACTITIONER

## 2022-10-03 PROCEDURE — PBSHW POCT URINALYSIS DIPSTICK: Performed by: NURSE PRACTITIONER

## 2022-10-03 PROCEDURE — G8400 PT W/DXA NO RESULTS DOC: HCPCS | Performed by: NURSE PRACTITIONER

## 2022-10-03 PROCEDURE — 87086 URINE CULTURE/COLONY COUNT: CPT

## 2022-10-03 PROCEDURE — 1123F ACP DISCUSS/DSCN MKR DOCD: CPT | Performed by: NURSE PRACTITIONER

## 2022-10-03 PROCEDURE — 87186 SC STD MICRODIL/AGAR DIL: CPT

## 2022-10-03 PROCEDURE — G8484 FLU IMMUNIZE NO ADMIN: HCPCS | Performed by: NURSE PRACTITIONER

## 2022-10-03 PROCEDURE — 1090F PRES/ABSN URINE INCON ASSESS: CPT | Performed by: NURSE PRACTITIONER

## 2022-10-03 PROCEDURE — G8417 CALC BMI ABV UP PARAM F/U: HCPCS | Performed by: NURSE PRACTITIONER

## 2022-10-03 RX ORDER — CIPROFLOXACIN 250 MG/1
250 TABLET, FILM COATED ORAL 2 TIMES DAILY
Qty: 10 TABLET | Refills: 0 | Status: SHIPPED | OUTPATIENT
Start: 2022-10-03 | End: 2022-10-08

## 2022-10-03 RX ORDER — CHOLECALCIFEROL (VITAMIN D3) 1250 MCG
25 CAPSULE ORAL
COMMUNITY
Start: 2022-03-31

## 2022-10-03 RX ORDER — HYDROCODONE BITARTRATE AND ACETAMINOPHEN 5; 325 MG/1; MG/1
TABLET ORAL
COMMUNITY
Start: 2022-09-26

## 2022-10-03 NOTE — PROGRESS NOTES
[address] [place] [address] [facility], APRN-[facility]  [address]  Phone:  [phone]  Fax:  [phone]  Galdino Min is a [de-identified] y.o. female who presents today for her medical conditions/complaints as noted below. Galdino Min c/o of Urinary Tract Infection (Pt presents to walk in with complaints of cloudy urine and increased frequency of urination since last Wednesday.)      HPI:     Urinary Tract Infection  This is a recurrent problem. The current episode started in the past 7 days. The problem has been gradually worsening since onset. Associated symptoms include pain. The pain is present in the urethra. Her pain is at a severity of 3/10. Risk factors: CKD stage 4.      Wt Readings from Last 3 Encounters:   10/03/22 196 lb (88.9 kg)   09/14/22 196 lb (88.9 kg)   07/20/22 200 lb (90.7 kg)       Temp Readings from Last 3 Encounters:   10/03/22 97.7 °F (36.5 °C) (Tympanic)   07/11/22 98.4 °F (36.9 °C)   07/01/22 98.9 °F (37.2 °C)       BP Readings from Last 3 Encounters:   10/03/22 122/66   09/14/22 (!) 140/60   07/20/22 120/60       Pulse Readings from Last 3 Encounters:   10/03/22 59   09/14/22 78   07/20/22 64        SpO2 Readings from Last 3 Encounters:   10/03/22 97%   07/11/22 97%   07/01/22 98%             Past Medical History:   Diagnosis Date    CAD (coronary artery disease)     Diabetes mellitus ([facility] [address]     Hyperlipidemia     Hypertension     Hypothyroidism     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    Bx proven by Dr Chang Hamilton    Osteoarthritis     Sleep apnea       Past Surgical History:   Procedure Laterality Date    ANGIOPLASTY  2001    stent x 1    EYE SURGERY      FOOT SURGERY Left 07/31/2009    3rd toe amputation    HYSTERECTOMY, TOTAL ABDOMINAL (CERVIX REMOVED)  1985    TOE AMPUTATION      left 3rd toe    TOE AMPUTATION Left 3/31/2020    Left 2nd TOE AMPUTATION performed by Elicia Hernandez DPM at [address] [facility] 01/17/2020    Dr Sophy Edgar- vulvar SCC carcinoma     Family History   Problem Relation Age of Onset    High Blood Pressure Mother     Coronary Art Dis Father      Social History     Tobacco Use    Smoking status: Never    Smokeless tobacco: Never   Substance Use Topics    Alcohol use: No      Current Outpatient Medications   Medication Sig Dispense Refill    Cholecalciferol (VITAMIN D3) 1.25 MG (58036 UT) CAPS 25 mcg      HYDROcodone-acetaminophen (NORCO) 5-325 MG per tablet       ciprofloxacin (CIPRO) 250 MG tablet Take 1 tablet by mouth 2 times daily for 5 days Take with food. 10 tablet 0    EUTHYROX 150 MCG tablet Take 1 tablet by mouth once daily 90 tablet 0    docusate sodium (COLACE) 100 MG capsule Take 100 mg by mouth at bedtime      Carboxymethylcellulose Sodium (EYE DROPS OP) Apply 1 drop to eye 4 times daily as needed      psyllium (KONSYL) 28.3 % PACK Take 1 packet by mouth daily      turmeric 500 MG CAPS 1 tsp ground tumeric in 1 cup liquid daily.       insulin glargine (LANTUS) 100 UNIT/ML injection vial Inject 38 Units into the skin nightly 10 mL 3    ferrous gluconate 324 (37.5 Fe) MG TABS I by mouth a day 90 tablet 3    oxybutynin (DITROPAN-XL) 10 MG extended release tablet Take 1 tablet by mouth daily 90 tablet 3    amLODIPine (NORVASC) 5 MG tablet Take 5 mg by mouth in the morning and at bedtime      guaiFENesin (ROBITUSSIN) 100 MG/5ML liquid Take 200 mg by mouth 3 times daily as needed for Cough      Continuous Blood Gluc Sensor ([address] [place] [address]) MISC by Does not apply route      albuterol sulfate HFA (PROVENTIL;VENTOLIN;PROAIR) 108 (90 Base) MCG/ACT inhaler Inhale 2 puffs into the lungs every 6 hours as needed for Wheezing       simvastatin (ZOCOR) 20 MG tablet Take 1 tablet by mouth nightly 90 tablet 3    senna (SENOKOT) 8.6 MG tablet Take 1 tablet by mouth daily 90 tablet 3    nitroGLYCERIN (NITROSTAT) 0.4 MG SL tablet Place 0.4 mg under the tongue every 5 minutes as needed for Chest pain up to max of 3 total doses. If no relief after 1 dose, call 911. B-D INS SYR ULTRAFINE 1CC/31G 31G X 5/16\" 1 ML MISC       fexofenadine (ALLEGRA) 180 MG tablet Take 180 mg by mouth daily      montelukast (SINGULAIR) 10 MG tablet TAKE ONE TABLET BY MOUTH NIGHTLY 90 tablet 1    fluticasone propionate (FLOVENT) 100 MCG/BLIST AEPB inhaler Inhale 1 puff into the lungs daily      fluticasone (FLONASE) 50 MCG/ACT nasal spray 1 spray by Nasal route daily      isosorbide mononitrate (IMDUR) 60 MG extended release tablet Take 60 mg by mouth every morning      insulin aspart (NOVOLOG) 100 UNIT/ML injection vial Indications: TID sliding scale. max daily dose of 12 units. aspirin 81 MG tablet Take 81 mg by mouth daily      acetaminophen (TYLENOL) 500 MG tablet Take 1,000 mg by mouth 3 times daily       vitamin B-12 (CYANOCOBALAMIN) 500 MCG tablet Take 1,000 mcg by mouth daily       CPAP Machine MISC by Does not apply route      calcium carbonate (OSCAL) 500 MG TABS tablet Take 600 mg by mouth daily       Omega-3 Fatty Acids (FISH OIL PO) Take 1,000 mg by mouth daily       Multiple Vitamins-Minerals (MULTIVITAMIN ADULTS PO) Take by mouth daily       gabapentin (NEURONTIN) 300 MG capsule Take 1 capsule by mouth 3 times daily for 90 days. 270 capsule 1    furosemide (LASIX) 20 MG tablet Take 1 tablet by mouth daily 60 tablet 3     No current facility-administered medications for this visit. Allergies   Allergen Reactions    Lisinopril Other (See Comments)     Cough    Penicillins Swelling     Facial swelling    Ranolazine Rash       No results found. Subjective:      Review of Systems   Constitutional:  Negative for chills, diaphoresis, fatigue and fever. Gastrointestinal:  Negative for nausea and vomiting. Neurological:  Negative for dizziness. Psychiatric/Behavioral:  Negative for decreased concentration.       Objective:     /66 (Site: Left Lower Arm, Position: Sitting, Cuff Size: Medium Adult) Pulse 59   Temp 97.7 °F (36.5 °C) (Tympanic)   Resp 14   Ht 5' 7\" (1.702 m)   Wt 196 lb (88.9 kg)   SpO2 97%   BMI 30.70 kg/m²     Physical Exam  Vitals and nursing note reviewed. Constitutional:       Appearance: She is not ill-appearing or diaphoretic. HENT:      Head: Normocephalic. Cardiovascular:      Rate and Rhythm: Normal rate. Pulmonary:      Effort: Pulmonary effort is normal.   Abdominal:      Tenderness: There is no abdominal tenderness. There is no right CVA tenderness or left CVA tenderness. Skin:     General: Skin is warm and dry. Capillary Refill: Capillary refill takes less than 2 seconds. Neurological:      General: No focal deficit present. Mental Status: She is alert and oriented to person, place, and time. Psychiatric:         Mood and Affect: Mood normal.         Behavior: Behavior normal.       Assessment:      Diagnosis Orders   1. Cystitis with hematuria  Culture, Urine    ciprofloxacin (CIPRO) 250 MG tablet      2. Burning with urination  POCT Urinalysis no Micro        Results for POC orders placed in visit on 10/03/22   POCT Urinalysis no Micro   Result Value Ref Range    Color, UA yellow     Clarity, UA cloudy     Glucose, UA POC neg     Bilirubin, UA neg     Ketones, UA neg     Spec Grav, UA 1.020     Blood, UA POC 1+     pH, UA 5.5     Protein, UA POC 2+     Urobilinogen, UA 0.2     Leukocytes, UA 1+     Nitrite, UA pos                Plan:       Cipro as directed. Patient will be contacted upon receipt of final culture and sensitivity. Any additions or changes to medications or the plan of care will be made at that time. Follow up with primary care provider in 1 to 2 days if needed. Patient Instructions   Cipro as directed. Patient will be contacted upon receipt of final culture and sensitivity. Any additions or changes to medications or the plan of care will be made at that time.   Follow up with primary care provider in 1 to 2 days if needed. Call for symptoms after treatment is completed at [phone]. Patient/Caregiver instructed on use, benefit, and side effects of prescribed medications. All patient/parent/caregiver questions answered. Patient/parent/caregiver voiced understanding. Reviewed health maintenance. Instructed to continue current medications, diet and exercise. Patient agreed with treatment plan. Follow up as directed.            Electronically signed by TESFAYE Shaffer NP on10/4/2022

## 2022-10-03 NOTE — PATIENT INSTRUCTIONS
Cipro as directed. Patient will be contacted upon receipt of final culture and sensitivity. Any additions or changes to medications or the plan of care will be made at that time. Follow up with primary care provider in 1 to 2 days if needed. Call for symptoms after treatment is completed at [phone].

## 2022-10-04 ASSESSMENT — ENCOUNTER SYMPTOMS
VOMITING: 0
NAUSEA: 0

## 2022-10-04 NOTE — PATIENT INSTRUCTIONS
If you need to reach the Urologist or Urology Nurses for any health care questions or concerns please call [phone] and ask to speak with the [facility] Urology Nurse. The phone line is open from 8a-430p Monday thru Friday.

## 2022-10-05 ENCOUNTER — OFFICE VISIT (OUTPATIENT)
Dept: UROLOGY | Age: 82
End: 2022-10-05
Payer: MEDICARE

## 2022-10-05 VITALS — BODY MASS INDEX: 30.76 KG/M2 | OXYGEN SATURATION: 98 % | WEIGHT: 196 LBS | HEIGHT: 67 IN | HEART RATE: 98 BPM

## 2022-10-05 DIAGNOSIS — N39.0 RECURRENT UTI: Primary | ICD-10-CM

## 2022-10-05 DIAGNOSIS — N30.01 ACUTE CYSTITIS WITH HEMATURIA: ICD-10-CM

## 2022-10-05 DIAGNOSIS — N32.81 OAB (OVERACTIVE BLADDER): ICD-10-CM

## 2022-10-05 PROCEDURE — G8417 CALC BMI ABV UP PARAM F/U: HCPCS | Performed by: UROLOGY

## 2022-10-05 PROCEDURE — G8484 FLU IMMUNIZE NO ADMIN: HCPCS | Performed by: UROLOGY

## 2022-10-05 PROCEDURE — 1090F PRES/ABSN URINE INCON ASSESS: CPT | Performed by: UROLOGY

## 2022-10-05 PROCEDURE — 1123F ACP DISCUSS/DSCN MKR DOCD: CPT | Performed by: UROLOGY

## 2022-10-05 PROCEDURE — G8400 PT W/DXA NO RESULTS DOC: HCPCS | Performed by: UROLOGY

## 2022-10-05 PROCEDURE — G8427 DOCREV CUR MEDS BY ELIG CLIN: HCPCS | Performed by: UROLOGY

## 2022-10-05 PROCEDURE — 99204 OFFICE O/P NEW MOD 45 MIN: CPT | Performed by: UROLOGY

## 2022-10-05 PROCEDURE — 1036F TOBACCO NON-USER: CPT | Performed by: UROLOGY

## 2022-10-05 RX ORDER — PYRIDOXINE HCL (VITAMIN B6) 100 MG
500 TABLET ORAL DAILY
Qty: 30 CAPSULE | Refills: 11 | Status: SHIPPED | OUTPATIENT
Start: 2022-10-05

## 2022-10-05 NOTE — PROGRESS NOTES
MD MD [facility] Consultation / New Patient Visit    Patient:  Keron Kumar  YOB: 1940  Date: 10/5/2022  Consult requested from DO Tamie     HISTORY OF PRESENT ILLNESS:   The patient is a [de-identified] y.o. female who presents today for follow-up for the following problem(s): Over active bladder, recurrent UTI  Overall the problem(s) : are worsening. Associated Symptoms: No dysuria, gross hematuria. Pain Severity: Pain Score:   8    Today visit:   10/5/22   Presents with history of over active bladder, not controlled with oxybutynin 10 mg ER. Urinary incontinence. She has an active UTI - on Cipro, UCx - GNR, sensitivity pendin. She has CKD stage 5, and possible to start dialysis, she has COPD on CPAP. Summary of old records:   (Patient's old records, notes and chart reviewed and summarized above.)    Urinalysis today:  No results found for this visit on 10/05/22.     Last BUN and creatinine:  Lab Results   Component Value Date    BUN 48 (H) 09/06/2022     Lab Results   Component Value Date    CREATININE 3.1 (H) 09/06/2022       Imaging Reviewed during this Office Visit:   (results were independently reviewed by physician and radiology report verified)    PAST MEDICAL, FAMILY AND SOCIAL HISTORY:  Past Medical History:   Diagnosis Date    CAD (coronary artery disease)     Diabetes mellitus ([facility] [address]     Hyperlipidemia     Hypertension     Hypothyroidism     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    Bx proven by Dr Trung Bryant    Osteoarthritis     Sleep apnea      Past Surgical History:   Procedure Laterality Date    ANGIOPLASTY  2001    stent x 1    EYE SURGERY      FOOT SURGERY Left 07/31/2009    3rd toe amputation    HYSTERECTOMY, TOTAL ABDOMINAL (CERVIX REMOVED)  1985    TOE AMPUTATION      left 3rd toe    TOE AMPUTATION Left 3/31/2020    Left 2nd TOE AMPUTATION performed by Kishor Edmond DPM at [address] [facility] 01/17/2020    Dr Graham Li- vulvar SCC carcinoma     Family History   Problem Relation Age of Onset    High Blood Pressure Mother     Coronary Art Dis Father      Outpatient Medications Marked as Taking for the 10/5/22 encounter (Office Visit) with Silvino Hess MD   Medication Sig Dispense Refill    Cholecalciferol (VITAMIN D3) 1.25 MG (55787 UT) CAPS 25 mcg      HYDROcodone-acetaminophen (NORCO) 5-325 MG per tablet       ciprofloxacin (CIPRO) 250 MG tablet Take 1 tablet by mouth 2 times daily for 5 days Take with food. 10 tablet 0    EUTHYROX 150 MCG tablet Take 1 tablet by mouth once daily 90 tablet 0    docusate sodium (COLACE) 100 MG capsule Take 100 mg by mouth at bedtime      Carboxymethylcellulose Sodium (EYE DROPS OP) Apply 1 drop to eye 4 times daily as needed      psyllium (KONSYL) 28.3 % PACK Take 1 packet by mouth daily      turmeric 500 MG CAPS 1 tsp ground tumeric in 1 cup liquid daily. insulin glargine (LANTUS) 100 UNIT/ML injection vial Inject 38 Units into the skin nightly 10 mL 3    ferrous gluconate 324 (37.5 Fe) MG TABS I by mouth a day 90 tablet 3    oxybutynin (DITROPAN-XL) 10 MG extended release tablet Take 1 tablet by mouth daily 90 tablet 3    amLODIPine (NORVASC) 5 MG tablet Take 5 mg by mouth in the morning and at bedtime      guaiFENesin (ROBITUSSIN) 100 MG/5ML liquid Take 200 mg by mouth 3 times daily as needed for Cough      Continuous Blood Gluc Sensor ([address] [place] [address]) MISC by Does not apply route      albuterol sulfate HFA (PROVENTIL;VENTOLIN;PROAIR) 108 (90 Base) MCG/ACT inhaler Inhale 2 puffs into the lungs every 6 hours as needed for Wheezing       simvastatin (ZOCOR) 20 MG tablet Take 1 tablet by mouth nightly 90 tablet 3    senna (SENOKOT) 8.6 MG tablet Take 1 tablet by mouth daily 90 tablet 3    nitroGLYCERIN (NITROSTAT) 0.4 MG SL tablet Place 0.4 mg under the tongue every 5 minutes as needed for Chest pain up to max of 3 total doses.  If no relief after 1 dose, call 911. B-D INS SYR ULTRAFINE 1CC/31G 31G X 5/16\" 1 ML MISC       fexofenadine (ALLEGRA) 180 MG tablet Take 180 mg by mouth daily      montelukast (SINGULAIR) 10 MG tablet TAKE ONE TABLET BY MOUTH NIGHTLY 90 tablet 1    fluticasone propionate (FLOVENT) 100 MCG/BLIST AEPB inhaler Inhale 1 puff into the lungs daily      fluticasone (FLONASE) 50 MCG/ACT nasal spray 1 spray by Nasal route daily      isosorbide mononitrate (IMDUR) 60 MG extended release tablet Take 60 mg by mouth every morning      insulin aspart (NOVOLOG) 100 UNIT/ML injection vial Indications: TID sliding scale. max daily dose of 12 units. aspirin 81 MG tablet Take 81 mg by mouth daily      acetaminophen (TYLENOL) 500 MG tablet Take 1,000 mg by mouth 3 times daily       vitamin B-12 (CYANOCOBALAMIN) 500 MCG tablet Take 1,000 mcg by mouth daily       CPAP Machine MISC by Does not apply route      calcium carbonate (OSCAL) 500 MG TABS tablet Take 600 mg by mouth daily       Omega-3 Fatty Acids (FISH OIL PO) Take 1,000 mg by mouth daily       Multiple Vitamins-Minerals (MULTIVITAMIN ADULTS PO) Take by mouth daily          Lisinopril, Penicillins, and Ranolazine  Social History     Tobacco Use   Smoking Status Never   Smokeless Tobacco Never       Social History     Substance and Sexual Activity   Alcohol Use No       REVIEW OF SYSTEMS:  Constitutional: negative  Eyes: negative  Respiratory: negative  Cardiovascular: negative  Gastrointestinal: negative  Genitourinary: negative  Musculoskeletal: negative  Skin: negative   Neurological: negative  Hematological/Lymphatic: negative  Psychological: negative    Physical Exam:    This a [de-identified] y.o. female      Vitals:    10/05/22 0926   Pulse: 98   SpO2: 98%     Constitutional: Patient in no acute distress   Neuro: alert and oriented to person place and time.     Psych: Mood and affect normal.  Head: atraumatic normocephalic  Eyes: EOMi  HEENT: neck supple, trachea midline  Lungs: Respiratory effort normal  Cardiovascular:  Normal peripheral pulses  Abdomen: Soft, non-tender, non-distended, No CVA  Bladder: non-tender and not distended. FROMx4, no cyanosis clubbing edema  Skin: warm and dry      Assessment and Plan      1. Recurrent UTI    2. Acute cystitis with hematuria    3. OAB (overactive bladder)           Plan:      No follow-ups on file.   Over active bladder - continue oxybutynin 10 mg ER  Renal bladder US  Recurrent UTI - D-mannose and Cranberry extract  Timed voiding    Follow up 3 months

## 2022-10-06 LAB
CULTURE: ABNORMAL
SPECIMEN DESCRIPTION: ABNORMAL

## 2022-10-12 LAB
ALBUMIN/GLOBULIN RATIO: 1.2 G/DL
ALBUMIN: 3.6 G/DL (ref 3.5–5)
ALP BLD-CCNC: 89 UNITS/L (ref 38–126)
ALT SERPL-CCNC: 16 UNITS/L (ref 4–35)
ANION GAP SERPL CALCULATED.3IONS-SCNC: 10.6 MMOL/L
AST SERPL-CCNC: 21 UNITS/L (ref 14–36)
BILIRUB SERPL-MCNC: 0.3 MG/DL (ref 0.2–1.3)
BUN BLDV-MCNC: 51 MG/DL (ref 7–17)
CALCIUM SERPL-MCNC: 9 MG/DL (ref 8.4–10.2)
CHLORIDE BLD-SCNC: 101 MMOL/L (ref 98–120)
CHOLESTEROL/HDL RATIO: 2.61 RATIO (ref 0–4.5)
CHOLESTEROL: 115 MG/DL (ref 50–200)
CO2: 26 MMOL/L (ref 22–31)
CREAT SERPL-MCNC: 2.8 MG/DL (ref 0.5–1)
GFR CALCULATED: 17.3
GLOBULIN: 2.9 G/DL
GLUCOSE: 176 MG/DL (ref 65–105)
HDLC SERPL-MCNC: 44 MG/DL (ref 36–68)
LDL CHOLESTEROL CALCULATED: 50.4 MG/DL (ref 0–160)
POTASSIUM SERPL-SCNC: 4.6 MMOL/L (ref 3.6–5)
SODIUM BLD-SCNC: 138 MMOL/L (ref 135–145)
TOTAL PROTEIN, SERUM: 6.5 G/DL (ref 6.3–8.2)
TRIGL SERPL-MCNC: 103 MG/DL (ref 10–250)
TSH REFLEX FT4: 0.5 MIU/ML (ref 0.49–4.67)
VLDLC SERPL CALC-MCNC: 21 MG/DL (ref 0–50)

## 2022-10-18 ENCOUNTER — OFFICE VISIT (OUTPATIENT)
Dept: DIABETES SERVICES | Age: 82
End: 2022-10-18
Payer: MEDICARE

## 2022-10-18 VITALS
HEIGHT: 67 IN | HEART RATE: 60 BPM | WEIGHT: 197 LBS | BODY MASS INDEX: 30.92 KG/M2 | RESPIRATION RATE: 16 BRPM | DIASTOLIC BLOOD PRESSURE: 52 MMHG | SYSTOLIC BLOOD PRESSURE: 138 MMHG

## 2022-10-18 DIAGNOSIS — N18.4 TYPE 2 DIABETES MELLITUS WITH STAGE 4 CHRONIC KIDNEY DISEASE, WITH LONG-TERM CURRENT USE OF INSULIN (HCC): Primary | ICD-10-CM

## 2022-10-18 DIAGNOSIS — I10 ESSENTIAL HYPERTENSION: ICD-10-CM

## 2022-10-18 DIAGNOSIS — E11.22 TYPE 2 DIABETES MELLITUS WITH STAGE 4 CHRONIC KIDNEY DISEASE, WITH LONG-TERM CURRENT USE OF INSULIN (HCC): Primary | ICD-10-CM

## 2022-10-18 DIAGNOSIS — Z79.4 TYPE 2 DIABETES MELLITUS WITH STAGE 4 CHRONIC KIDNEY DISEASE, WITH LONG-TERM CURRENT USE OF INSULIN (HCC): Primary | ICD-10-CM

## 2022-10-18 DIAGNOSIS — E78.2 MIXED HYPERLIPIDEMIA: ICD-10-CM

## 2022-10-18 PROCEDURE — 95251 CONT GLUC MNTR ANALYSIS I&R: CPT | Performed by: NURSE PRACTITIONER

## 2022-10-18 PROCEDURE — 1123F ACP DISCUSS/DSCN MKR DOCD: CPT | Performed by: NURSE PRACTITIONER

## 2022-10-18 PROCEDURE — G8427 DOCREV CUR MEDS BY ELIG CLIN: HCPCS | Performed by: NURSE PRACTITIONER

## 2022-10-18 PROCEDURE — 99214 OFFICE O/P EST MOD 30 MIN: CPT

## 2022-10-18 PROCEDURE — G8400 PT W/DXA NO RESULTS DOC: HCPCS | Performed by: NURSE PRACTITIONER

## 2022-10-18 PROCEDURE — 3044F HG A1C LEVEL LT 7.0%: CPT | Performed by: NURSE PRACTITIONER

## 2022-10-18 PROCEDURE — 99214 OFFICE O/P EST MOD 30 MIN: CPT | Performed by: NURSE PRACTITIONER

## 2022-10-18 PROCEDURE — G8417 CALC BMI ABV UP PARAM F/U: HCPCS | Performed by: NURSE PRACTITIONER

## 2022-10-18 PROCEDURE — 1090F PRES/ABSN URINE INCON ASSESS: CPT | Performed by: NURSE PRACTITIONER

## 2022-10-18 PROCEDURE — PBSHW POCT GLYCOSYLATED HEMOGLOBIN (HGB A1C): Performed by: NURSE PRACTITIONER

## 2022-10-18 PROCEDURE — 83036 HEMOGLOBIN GLYCOSYLATED A1C: CPT | Performed by: NURSE PRACTITIONER

## 2022-10-18 PROCEDURE — 1036F TOBACCO NON-USER: CPT | Performed by: NURSE PRACTITIONER

## 2022-10-18 PROCEDURE — G8484 FLU IMMUNIZE NO ADMIN: HCPCS | Performed by: NURSE PRACTITIONER

## 2022-10-18 RX ORDER — INSULIN GLARGINE 100 [IU]/ML
34 INJECTION, SOLUTION SUBCUTANEOUS NIGHTLY
Qty: 10 ML | Refills: 3 | Status: SHIPPED | OUTPATIENT
Start: 2022-10-18 | End: 2022-10-25 | Stop reason: SDUPTHER

## 2022-10-18 RX ORDER — PREDNISOLONE ACETATE 10 MG/ML
SUSPENSION/ DROPS OPHTHALMIC
COMMUNITY
Start: 2022-10-05

## 2022-10-18 ASSESSMENT — ENCOUNTER SYMPTOMS
DIARRHEA: 0
ABDOMINAL PAIN: 0
SHORTNESS OF BREATH: 0
RESPIRATORY NEGATIVE: 1

## 2022-10-18 NOTE — PROGRESS NOTES
At previous visit . she denies any current signs or symptoms of hyper/hypoglycemia. she states they are taking their medications as prescribed without any adverse effects. She does admit to missing doses of meal time insulin and holding basal insulin due to lows. She does wake up with lows frequently   Diet: low carb low sodium smaller portions, following with dietician, does not eat at regular times   Exercise: none  BS testing: uses cgm daily with success and is adherent to cgm therapy  Issues: denies   Diabetic foot exam up-to-date: Yes  Diabetic retinal exam up-to-date: Yes  Hypoglycemia as needed treatment: juice glucose tabs    High cholesterol-  Takes Zocor and fish oil and denies any adverse effects with its use. Watches diet and exercise. Hypertension-  Takes Norvasc, Lasix, and denies any adverse effects with their use. Watches diet and exercise. Denies any chest pain, dizziness or edema. Obesity- Working on weight loss. weight is down     CKD- she follow with nephrology and considering dialysis. She needs help with renal diet.   follow up next month       Past Medical History:   Diagnosis Date    CAD (coronary artery disease)     Diabetes mellitus ([facility] [address]     Hyperlipidemia     Hypertension     Hypothyroidism     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    Bx proven by Dr Emile Rodrigez    Osteoarthritis     Sleep apnea      Family History   Problem Relation Age of Onset    High Blood Pressure Mother     Coronary Art Dis Father      Social History     Tobacco Use    Smoking status: Never    Smokeless tobacco: Never   Vaping Use    Vaping Use: Never used   Substance Use Topics    Alcohol use: No    Drug use: No     Allergies   Allergen Reactions    Lisinopril Other (See Comments)     Cough    Penicillins Swelling     Facial swelling    Ranolazine Rash       MEDICATIONS:  Current Outpatient Medications   Medication Sig Dispense Refill    prednisoLONE acetate (PRED FORTE) 1 % ophthalmic suspension insulin glargine (LANTUS) 100 UNIT/ML injection vial Inject 34 Units into the skin nightly 10 mL 3    D-Mannose 500 MG CAPS Take 1 tablet by mouth daily 30 capsule 11    Cranberry 500 MG CAPS Take 1 capsule by mouth daily 30 capsule 11    Cholecalciferol (VITAMIN D3) 1.25 MG (24633 UT) CAPS 25 mcg      HYDROcodone-acetaminophen (NORCO) 5-325 MG per tablet       EUTHYROX 150 MCG tablet Take 1 tablet by mouth once daily 90 tablet 0    docusate sodium (COLACE) 100 MG capsule Take 100 mg by mouth at bedtime      Carboxymethylcellulose Sodium (EYE DROPS OP) Apply 1 drop to eye 4 times daily as needed      psyllium (KONSYL) 28.3 % PACK Take 1 packet by mouth daily      turmeric 500 MG CAPS 1 tsp ground tumeric in 1 cup liquid daily. gabapentin (NEURONTIN) 300 MG capsule Take 1 capsule by mouth 3 times daily for 90 days. 270 capsule 1    ferrous gluconate 324 (37.5 Fe) MG TABS I by mouth a day 90 tablet 3    oxybutynin (DITROPAN-XL) 10 MG extended release tablet Take 1 tablet by mouth daily 90 tablet 3    amLODIPine (NORVASC) 5 MG tablet Take 5 mg by mouth in the morning and at bedtime      guaiFENesin (ROBITUSSIN) 100 MG/5ML liquid Take 200 mg by mouth 3 times daily as needed for Cough      furosemide (LASIX) 20 MG tablet Take 1 tablet by mouth daily 60 tablet 3    albuterol sulfate HFA (PROVENTIL;VENTOLIN;PROAIR) 108 (90 Base) MCG/ACT inhaler Inhale 2 puffs into the lungs every 6 hours as needed for Wheezing       simvastatin (ZOCOR) 20 MG tablet Take 1 tablet by mouth nightly 90 tablet 3    senna (SENOKOT) 8.6 MG tablet Take 1 tablet by mouth daily 90 tablet 3    nitroGLYCERIN (NITROSTAT) 0.4 MG SL tablet Place 0.4 mg under the tongue every 5 minutes as needed for Chest pain up to max of 3 total doses. If no relief after 1 dose, call 911.        fexofenadine (ALLEGRA) 180 MG tablet Take 180 mg by mouth daily      montelukast (SINGULAIR) 10 MG tablet TAKE ONE TABLET BY MOUTH NIGHTLY 90 tablet 1    fluticasone propionate (FLOVENT) 100 MCG/BLIST AEPB inhaler Inhale 1 puff into the lungs daily      fluticasone (FLONASE) 50 MCG/ACT nasal spray 1 spray by Nasal route daily      isosorbide mononitrate (IMDUR) 60 MG extended release tablet Take 60 mg by mouth every morning      insulin aspart (NOVOLOG) 100 UNIT/ML injection vial Indications: TID sliding scale. max daily dose of 12 units. aspirin 81 MG tablet Take 81 mg by mouth daily      acetaminophen (TYLENOL) 500 MG tablet Take 1,000 mg by mouth 3 times daily       vitamin B-12 (CYANOCOBALAMIN) 500 MCG tablet Take 1,000 mcg by mouth daily       calcium carbonate (OSCAL) 500 MG TABS tablet Take 600 mg by mouth daily       Omega-3 Fatty Acids (FISH OIL PO) Take 1,000 mg by mouth daily       Multiple Vitamins-Minerals (MULTIVITAMIN ADULTS PO) Take by mouth daily       Continuous Blood Gluc Sensor ([address] [place] [address]) MISC by Does not apply route      B-D INS SYR ULTRAFINE 1CC/31G 31G X 5/16\" 1 ML MISC       CPAP Machine MISC by Does not apply route       No current facility-administered medications for this visit. Review ofSymptoms:  Review of Systems   Constitutional:  Positive for fatigue. Negative for unexpected weight change. Eyes:  Negative for visual disturbance. Respiratory: Negative. Negative for shortness of breath. Cardiovascular:  Negative for chest pain and leg swelling. Gastrointestinal:  Negative for abdominal pain and diarrhea. Endocrine: Negative for polydipsia, polyphagia and polyuria. Genitourinary: Negative. Musculoskeletal: Negative. Skin:  Negative for rash and wound. Neurological:  Negative for dizziness, tremors, seizures and headaches. Psychiatric/Behavioral: Negative. Negative for confusion and decreased concentration. Theremainder of a complete 14-point review of systems is negative.        Vital Signs: BP (!) 138/52 (Site: Left Upper Arm, Position: Sitting, Cuff Size: Medium Adult)   Pulse 60   Resp 16   Ht 5' 7\" (1.702 m)   Wt 197 lb (89.4 kg)   BMI 30.85 kg/m²      Wt Readings from Last 3 Encounters:   10/18/22 197 lb (89.4 kg)   10/05/22 196 lb (88.9 kg)   10/03/22 196 lb (88.9 kg)     Body mass index is 30.85 kg/m². LABS:  Hemoglobin A1C   Date Value Ref Range Status   07/13/2022 6.4 4.4 - 6.4 % Final   04/13/2022 7.2 (H) 4.4 - 6.4 % Final     Lab Results   Component Value Date    LABMICR 65.6 (H) 04/13/2022     Lab Results   Component Value Date     10/12/2022    K 4.6 10/12/2022     10/12/2022    CO2 26 10/12/2022    BUN 51 (H) 10/12/2022    CREATININE 2.8 (H) 10/12/2022    GLUCOSE 176 (H) 10/12/2022    CALCIUM 9.0 10/12/2022    PROT 6.1 (A) 02/09/2021    LABALBU 3.6 10/12/2022    BILITOT 0.3 10/12/2022    ALKPHOS 89 10/12/2022    AST 21 10/12/2022    ALT 16 10/12/2022    LABGLOM 17.3 10/12/2022    GFRAA 30 (L) 04/30/2020    GLOB 2.9 10/12/2022     Lab Results   Component Value Date    CHOL 115 10/12/2022    CHOL 130 04/13/2022    CHOL 133 04/29/2021     Lab Results   Component Value Date    TRIG 103 10/12/2022    TRIG 169 04/13/2022    TRIG 93 04/29/2021     Lab Results   Component Value Date    HDL 44 10/12/2022    HDL 36 04/13/2022    HDL 50 04/29/2021     Lab Results   Component Value Date    LDLCALC 50.4 10/12/2022    LDLCALC 60.2 04/13/2022    LDLCALC 64.4 04/29/2021     Lab Results   Component Value Date    VLDL 21 10/12/2022    VLDL 34 04/13/2022    VLDL 19 04/29/2021     Lab Results   Component Value Date    CHOLHDLRATIO 2.61 10/12/2022    CHOLHDLRATIO 3.61 04/13/2022    CHOLHDLRATIO 2.66 04/29/2021           Physical Exam  Constitutional:       Appearance: She is well-developed. Eyes:      Pupils: Pupils are equal, round, and reactive to light. Neck:      Thyroid: No thyroid mass, thyromegaly or thyroid tenderness. Cardiovascular:      Rate and Rhythm: Normal rate and regular rhythm. Heart sounds: Normal heart sounds.    Pulmonary:      Effort: Pulmonary effort is normal.      Breath sounds: Normal breath sounds. Abdominal:      General: Bowel sounds are normal.      Palpations: Abdomen is soft. Skin:     General: Skin is warm and dry. Comments: Negative for open/nonhealing wounds. Negative for lipohypertrophy. Neurological:      Mental Status: She is alert and oriented to person, place, and time. ASSESSMENT/PLAN:     Diagnosis Orders   1. Type 2 diabetes mellitus with stage 4 chronic kidney disease, with long-term current use of insulin ([facility])  POCT Hb A1C (glycosylated hemoglobin)    insulin glargine (LANTUS) 100 UNIT/ML injection vial      2. Mixed hyperlipidemia        3. Essential hypertension          Orders Placed This Encounter   Procedures    POCT Hb A1C (glycosylated hemoglobin)     Orders Placed This Encounter   Medications    insulin glargine (LANTUS) 100 UNIT/ML injection vial     Sig: Inject 34 Units into the skin nightly     Dispense:  10 mL     Refill:  3     Requested Prescriptions     Signed Prescriptions Disp Refills    insulin glargine (LANTUS) 100 UNIT/ML injection vial 10 mL 3     Sig: Inject 34 Units into the skin nightly       1. Type 2 diabetes mellitus with stage 4 chronic kidney disease, with long-term current use of insulin ([facility])  - Unstable  HbA1C goal is less than 7%. - Fasting blood glucose goal is 70-120mg/dl and postprandial blood sugar goal is less than 180 mg/dl. - Labs reviewed including most recent A1c, microalbumin and kidney function. Repeat labs due in 3 months.    -We discussed in great detail dietary modifications they can make to better improve their blood sugars. -follow up diabetes education completed, all questions answered. CGM report downloaded and reviewed from the past 2 weeks scanned to media tab. Time in range 50%, 49% hyperglycemia, and hypoglycemia 1%. Predicted A1c per CGM report 7.6% and average glucose 181 mg/dl.    - will work on consistency with insulin   Patient Instructions   Decrease less than 70 occur or above 400. Call office or access [employer] with any further questions or concerns. Be sure to bring glucometer/food log at next appointment. Total time spent reviewing chart, labs, counseling patient and documenting on the date of the encounter: 30 min.       Electronically signed by TESFAYE Ho CNP on 10/18/2022 at 1:38 PM      (Please note that portions of this note were completed with a voice-recognition program. Efforts were made to edit the dictation but occasionally words are mis-transcribed.)

## 2022-10-18 NOTE — PATIENT INSTRUCTIONS
Decrease lantus to 34 units once daily same time every day     Sliding Scale Insulin Novolog before each meal      Blood sugar   Action     <70               Drink Juice               No extra insulin  150 - 200         2 units subcutaneous Insulin  201 - 250         4 units subcutaneous Insulin  251 - 300         6 units subcutaneous Insulin  301 - 350         8 units subcutaneous Insulin  351 - 400         10 units subcutaneous Insulin   > 400              12 units subcutaneous Insulin

## 2022-10-19 ENCOUNTER — OFFICE VISIT (OUTPATIENT)
Dept: FAMILY MEDICINE CLINIC | Age: 82
End: 2022-10-19
Payer: MEDICARE

## 2022-10-19 ENCOUNTER — TELEPHONE (OUTPATIENT)
Dept: FAMILY MEDICINE CLINIC | Age: 82
End: 2022-10-19

## 2022-10-19 VITALS
RESPIRATION RATE: 16 BRPM | WEIGHT: 197.6 LBS | HEIGHT: 67 IN | OXYGEN SATURATION: 98 % | BODY MASS INDEX: 31.01 KG/M2 | TEMPERATURE: 97.5 F | DIASTOLIC BLOOD PRESSURE: 82 MMHG | SYSTOLIC BLOOD PRESSURE: 136 MMHG | HEART RATE: 75 BPM

## 2022-10-19 DIAGNOSIS — E78.49 FAMILIAL COMBINED HYPERLIPIDEMIA: ICD-10-CM

## 2022-10-19 DIAGNOSIS — E03.9 ACQUIRED HYPOTHYROIDISM: Primary | ICD-10-CM

## 2022-10-19 PROBLEM — C51.9 VULVAR CANCER (HCC): Status: RESOLVED | Noted: 2019-12-30 | Resolved: 2022-10-19

## 2022-10-19 PROBLEM — C51.9: Status: RESOLVED | Noted: 2020-01-17 | Resolved: 2022-10-19

## 2022-10-19 PROCEDURE — G8417 CALC BMI ABV UP PARAM F/U: HCPCS

## 2022-10-19 PROCEDURE — 1123F ACP DISCUSS/DSCN MKR DOCD: CPT

## 2022-10-19 PROCEDURE — 1036F TOBACCO NON-USER: CPT

## 2022-10-19 PROCEDURE — 1090F PRES/ABSN URINE INCON ASSESS: CPT

## 2022-10-19 PROCEDURE — G8484 FLU IMMUNIZE NO ADMIN: HCPCS

## 2022-10-19 PROCEDURE — G8427 DOCREV CUR MEDS BY ELIG CLIN: HCPCS

## 2022-10-19 PROCEDURE — 99211 OFF/OP EST MAY X REQ PHY/QHP: CPT

## 2022-10-19 PROCEDURE — 99214 OFFICE O/P EST MOD 30 MIN: CPT

## 2022-10-19 PROCEDURE — G8400 PT W/DXA NO RESULTS DOC: HCPCS

## 2022-10-19 ASSESSMENT — ENCOUNTER SYMPTOMS
NAUSEA: 0
SINUS PRESSURE: 0
BACK PAIN: 0
CONSTIPATION: 0
COUGH: 0
CHEST TIGHTNESS: 0
RHINORRHEA: 0
ABDOMINAL PAIN: 0
DIARRHEA: 0
SHORTNESS OF BREATH: 0
COLOR CHANGE: 0
EYE ITCHING: 0
SINUS PAIN: 0
EYE DISCHARGE: 0
WHEEZING: 0

## 2022-10-19 NOTE — ASSESSMENT & PLAN NOTE
At goal, continue current medications and continue current treatment plan continue current dose of medication Zocor 20 mg tablets nightly. Continue diet and exercise as discussed in office. Exercise as tolerated, using arms more than legs. Continue to diet which is improving however continue to improve with less carbs, less processed foods, less sugars and pop.

## 2022-10-19 NOTE — PROGRESS NOTES
Fadi Momin (:  1940) is a [de-identified] y.o. female,Established patient, here for evaluation of the following chief complaint(s):  Hypertension (Pt got labs done on 10/3/22 and 10/12/22. Pt also had her A1C done yesterday 10/18/22. Pt is just here to get established. Pt sees [facility]. She sees urologist, nephrologist, cardiologist )         ASSESSMENT/PLAN:  1. Acquired hypothyroidism  Assessment & Plan:   At goal, continue current medications and continue current treatment plan TSH was reviewed and within therapeutic levels. Continue Euthyrox 150 mcg tablets once in the morning. Side effects of this medication are discussed and denied at this time. 2. Familial combined hyperlipidemia  Assessment & Plan:   At goal, continue current medications and continue current treatment plan continue current dose of medication Zocor 20 mg tablets nightly. Continue diet and exercise as discussed in office. Exercise as tolerated, using arms more than legs. Continue to diet which is improving however continue to improve with less carbs, less processed foods, less sugars and pop. Return in about 6 months (around 2023), or if symptoms worsen or fail to improve. Subjective   SUBJECTIVE/OBJECTIVE:  Srinivas Solo is here today for a follow-up visit. She does have a history of diabetes which is being managed by diabetic educator in [place]. She also sees vascular who placed a shunt in her right arm for dialysis if needed. Her kidney function does show improvement, and vascular was in agreement that she does not need dialysis at this current moment. She sees nephrologist for the her chronic kidney disease, as well as cardiology. She tells me she has truly changed her diet and has lost about 100 pounds over the last couple years. She continues to work on her diet, and activity.   She denies any complaints at this time, diet is discussed at length, she is to continue her current dosages of insulin and continue to work with diabetic education. She has had labs in October and these are reviewed. She is to continue current dosages of thyroid medication, and cholesterol medication as her lipid panel was reviewed with her today. Review of Systems   Constitutional:  Negative for chills, fatigue, fever and unexpected weight change. HENT:  Negative for congestion, ear pain, rhinorrhea, sinus pressure and sinus pain. Eyes:  Negative for discharge, itching and visual disturbance. Respiratory:  Negative for cough, chest tightness, shortness of breath and wheezing. Cardiovascular:  Negative for chest pain, palpitations and leg swelling. Gastrointestinal:  Negative for abdominal pain, constipation, diarrhea and nausea. Endocrine: Negative for cold intolerance, heat intolerance, polydipsia and polyphagia. Genitourinary:  Negative for dysuria, flank pain and frequency. Musculoskeletal:  Negative for arthralgias, back pain and myalgias. Skin:  Negative for color change. Allergic/Immunologic: Negative for environmental allergies and food allergies. Neurological:  Negative for dizziness, weakness, light-headedness, numbness and headaches. Psychiatric/Behavioral:  Negative for agitation, behavioral problems and confusion. The patient is not nervous/anxious. Objective   Physical Exam  Vitals and nursing note reviewed. Constitutional:       General: She is not in acute distress. Appearance: Normal appearance. She is not ill-appearing. HENT:      Head: Normocephalic and atraumatic. Right Ear: Tympanic membrane and external ear normal.      Left Ear: Tympanic membrane and external ear normal.      Nose: Nose normal. No congestion or rhinorrhea. Mouth/Throat:      Mouth: Mucous membranes are moist.      Pharynx: Oropharynx is clear. No posterior oropharyngeal erythema. Eyes:      Pupils: Pupils are equal, round, and reactive to light.    Cardiovascular:      Rate and Rhythm: Normal rate and regular rhythm. Pulses: Normal pulses. Heart sounds: Normal heart sounds. Pulmonary:      Effort: Pulmonary effort is normal.      Breath sounds: Normal breath sounds. No wheezing or rhonchi. Abdominal:      General: Bowel sounds are normal.      Palpations: There is no mass. Tenderness: There is no abdominal tenderness. Musculoskeletal:         General: No swelling or tenderness. Normal range of motion. Cervical back: Normal range of motion. No rigidity or tenderness. Skin:     General: Skin is warm and dry. Capillary Refill: Capillary refill takes less than 2 seconds. Coloration: Skin is not pale. Findings: No erythema. Neurological:      General: No focal deficit present. Mental Status: She is alert and oriented to person, place, and time. Psychiatric:         Mood and Affect: Mood normal.         Behavior: Behavior normal.         Thought Content: Thought content normal.         Judgment: Judgment normal.                An electronic signature was used to authenticate this note.     --TESFAYE Perkins - CNP

## 2022-10-19 NOTE — ASSESSMENT & PLAN NOTE
At goal, continue current medications and continue current treatment plan TSH was reviewed and within therapeutic levels. Continue Euthyrox 150 mcg tablets once in the morning. Side effects of this medication are discussed and denied at this time.

## 2022-10-25 ENCOUNTER — TELEPHONE (OUTPATIENT)
Dept: INTERNAL MEDICINE | Age: 82
End: 2022-10-25

## 2022-10-25 DIAGNOSIS — Z79.4 TYPE 2 DIABETES MELLITUS WITH STAGE 4 CHRONIC KIDNEY DISEASE, WITH LONG-TERM CURRENT USE OF INSULIN (HCC): ICD-10-CM

## 2022-10-25 DIAGNOSIS — E11.22 TYPE 2 DIABETES MELLITUS WITH STAGE 4 CHRONIC KIDNEY DISEASE, WITH LONG-TERM CURRENT USE OF INSULIN (HCC): ICD-10-CM

## 2022-10-25 DIAGNOSIS — N18.4 TYPE 2 DIABETES MELLITUS WITH STAGE 4 CHRONIC KIDNEY DISEASE, WITH LONG-TERM CURRENT USE OF INSULIN (HCC): ICD-10-CM

## 2022-10-25 RX ORDER — INSULIN GLARGINE 100 [IU]/ML
34 INJECTION, SOLUTION SUBCUTANEOUS NIGHTLY
Qty: 10 ML | Refills: 3 | Status: SHIPPED | OUTPATIENT
Start: 2022-10-25

## 2022-10-25 NOTE — TELEPHONE ENCOUNTER
Patient called and said she saw you on the 18th and she received a letter from [facility] [employer] dated the 18th that said her Glargine was denied. Can you please call her with explanation.   [phone]

## 2022-10-25 NOTE — TELEPHONE ENCOUNTER
Appears that payer has cancelled pa that was last completed. It may have not been necessary. Can we send a new script to pharmacy for her?

## 2022-11-01 RX ORDER — LEVOTHYROXINE SODIUM 0.15 MG/1
TABLET ORAL
Qty: 90 TABLET | Refills: 1 | Status: SHIPPED | OUTPATIENT
Start: 2022-11-01

## 2022-11-01 NOTE — TELEPHONE ENCOUNTER
Fadi Momin is requesting a refill on the following medication(s):  Requested Prescriptions     Pending Prescriptions Disp Refills    levothyroxine (SYNTHROID) 150 MCG tablet [Pharmacy Med Name: Levothyroxine Sodium 150 MCG Oral Tablet] 90 tablet 1     Sig: Take 1 tablet by mouth once daily       Last Visit Date (If Applicable):  43/57/8374    Next Visit Date:    4/19/2023

## 2022-11-08 RX ORDER — SIMVASTATIN 20 MG
20 TABLET ORAL NIGHTLY
Qty: 90 TABLET | Refills: 3 | Status: SHIPPED | OUTPATIENT
Start: 2022-11-08

## 2022-11-08 NOTE — TELEPHONE ENCOUNTER
Patient is out please refill this.       Miguel Falk is requesting a refill on the following medication(s):  Requested Prescriptions     Pending Prescriptions Disp Refills    simvastatin (ZOCOR) 20 MG tablet 90 tablet 3     Sig: Take 1 tablet by mouth nightly       Last Visit Date (If Applicable):  03/94/7369    Next Visit Date:    4/19/2023

## 2022-12-20 ENCOUNTER — OFFICE VISIT (OUTPATIENT)
Dept: DIABETES SERVICES | Age: 82
End: 2022-12-20
Payer: MEDICARE

## 2022-12-20 VITALS
HEART RATE: 60 BPM | DIASTOLIC BLOOD PRESSURE: 64 MMHG | SYSTOLIC BLOOD PRESSURE: 164 MMHG | RESPIRATION RATE: 20 BRPM | HEIGHT: 67 IN | WEIGHT: 189 LBS | BODY MASS INDEX: 29.66 KG/M2

## 2022-12-20 DIAGNOSIS — Z79.4 TYPE 2 DIABETES MELLITUS WITH STAGE 4 CHRONIC KIDNEY DISEASE, WITH LONG-TERM CURRENT USE OF INSULIN (HCC): Primary | ICD-10-CM

## 2022-12-20 DIAGNOSIS — I10 ESSENTIAL HYPERTENSION: ICD-10-CM

## 2022-12-20 DIAGNOSIS — E11.22 TYPE 2 DIABETES MELLITUS WITH STAGE 4 CHRONIC KIDNEY DISEASE, WITH LONG-TERM CURRENT USE OF INSULIN (HCC): Primary | ICD-10-CM

## 2022-12-20 DIAGNOSIS — N18.4 TYPE 2 DIABETES MELLITUS WITH STAGE 4 CHRONIC KIDNEY DISEASE, WITH LONG-TERM CURRENT USE OF INSULIN (HCC): Primary | ICD-10-CM

## 2022-12-20 DIAGNOSIS — E78.2 MIXED HYPERLIPIDEMIA: ICD-10-CM

## 2022-12-20 PROCEDURE — 1123F ACP DISCUSS/DSCN MKR DOCD: CPT | Performed by: NURSE PRACTITIONER

## 2022-12-20 PROCEDURE — 99214 OFFICE O/P EST MOD 30 MIN: CPT | Performed by: NURSE PRACTITIONER

## 2022-12-20 PROCEDURE — G8400 PT W/DXA NO RESULTS DOC: HCPCS | Performed by: NURSE PRACTITIONER

## 2022-12-20 PROCEDURE — 1090F PRES/ABSN URINE INCON ASSESS: CPT | Performed by: NURSE PRACTITIONER

## 2022-12-20 PROCEDURE — 3051F HG A1C>EQUAL 7.0%<8.0%: CPT | Performed by: NURSE PRACTITIONER

## 2022-12-20 PROCEDURE — G8484 FLU IMMUNIZE NO ADMIN: HCPCS | Performed by: NURSE PRACTITIONER

## 2022-12-20 PROCEDURE — G8417 CALC BMI ABV UP PARAM F/U: HCPCS | Performed by: NURSE PRACTITIONER

## 2022-12-20 PROCEDURE — 3074F SYST BP LT 130 MM HG: CPT | Performed by: NURSE PRACTITIONER

## 2022-12-20 PROCEDURE — 1036F TOBACCO NON-USER: CPT | Performed by: NURSE PRACTITIONER

## 2022-12-20 PROCEDURE — 3078F DIAST BP <80 MM HG: CPT | Performed by: NURSE PRACTITIONER

## 2022-12-20 PROCEDURE — G8427 DOCREV CUR MEDS BY ELIG CLIN: HCPCS | Performed by: NURSE PRACTITIONER

## 2022-12-20 PROCEDURE — 95251 CONT GLUC MNTR ANALYSIS I&R: CPT | Performed by: NURSE PRACTITIONER

## 2022-12-20 RX ORDER — INSULIN GLARGINE 100 [IU]/ML
30 INJECTION, SOLUTION SUBCUTANEOUS NIGHTLY
Qty: 10 ML | Refills: 3 | Status: SHIPPED | OUTPATIENT
Start: 2022-12-20 | End: 2022-12-21 | Stop reason: ALTCHOICE

## 2022-12-20 ASSESSMENT — ENCOUNTER SYMPTOMS
RESPIRATORY NEGATIVE: 1
DIARRHEA: 0
SHORTNESS OF BREATH: 0
ABDOMINAL PAIN: 0

## 2022-12-20 NOTE — PROGRESS NOTES
[address] [facility] INTERNAL MED A DEPARTMENT OF [place] [address]  [address] [facility], [address]  [facility] [address] [phone]        HISTORY:    Vidhi Gibson presents today for evaluation and management of:  Chief Complaint   Patient presents with    Diabetes    Follow-up       Patient Care Team:  TESFAYE Herman CNP as PCP - General (Nurse Practitioner)  TESFAYE Herman CNP as PCP - [facility] Empaneled Provider      Interval History:    Past DM Medications   Metformin-ckd  Glimepiride-therapy completed     Current Diabetic Medications  Lantus 34 units at at bedtime   Sliding Scale Insulin Novolog     Blood sugar   Action     <70               Drink Juice               No extra insulin  150 - 200         2 units subcutaneous Insulin  201 - 250         4 units subcutaneous Insulin  251 - 300         6 units subcutaneous Insulin  301 - 350         8 units subcutaneous Insulin  351 - 400         10 units subcutaneous Insulin   > 400              12 units subcutaneous Insulin        DKA episodes: 0    10/18/22   Vidhi Gibson is a [de-identified] y.o. female patient who presents to clinic today for her diabetes. she has a history of hypertension, CAD, ALDAIR, hypothyroidism, CKD, hyperlipidemia and obesity  which contributes to her diabetes. At previous visit . she denies any current signs or symptoms of hyper/hypoglycemia. she states they are taking their medications as prescribed without any adverse effects. She does admit to missing doses of meal time insulin and holding basal insulin due to lows.  She does wake up with lows frequently   Diet: low carb low sodium smaller portions, following with dietician, does not eat at regular times   Exercise: none  BS testing: uses cgm daily with success and is adherent to cgm therapy  Issues: denies   Diabetic foot exam up-to-date: Yes  Diabetic retinal exam up-to-date: Yes  Hypoglycemia as needed treatment: juice glucose tabs 12/20/22   Skylar [place] is a [de-identified] y.o. female patient who presents to clinic today for her diabetes. she has a history of hypertension, CAD, ALDAIR, hypothyroidism, CKD, hyperlipidemia and obesity  which contributes to her diabetes. At previous visit insulin was adjusted. she denies any current signs or symptoms of hyper/hypoglycemia. she states they are taking their medications as prescribed without any adverse effects. Diet: work on low carb low sodium diet  Exercise: none  BS testing: uses cgm daily with success and is adherent to cgm therapy  Hypoglycemia: Yes    Sweats and Tremors     Hypoglycemia Frequency: 3 per week  Hypoglycemia as needed treatment: juice   Issues: denies   Diabetic foot exam up-to-date: Yes  Diabetic retinal exam up-to-date: Yes    Diabetes complications:nephropathy and retinopathy      High cholesterol-  Takes Zocor and fish oil and denies any adverse effects with its use. Watches diet and exercise. Hypertension-  Takes Norvasc, Lasix, and denies any adverse effects with their use. Watches diet and exercise. Denies any chest pain, dizziness or edema. Obesity- Working on weight loss. weight is down     CKD- she follow with nephrology and had a recent fistula placed for hemodialysis.          Past Medical History:   Diagnosis Date    CAD (coronary artery disease)     Diabetes mellitus ([address]     Hyperlipidemia     Hypertension     Hypothyroidism     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    Bx proven by Dr Teresa Hudson    Osteoarthritis     Sleep apnea      Family History   Problem Relation Age of Onset    High Blood Pressure Mother     Coronary Art Dis Father      Social History     Tobacco Use    Smoking status: Never    Smokeless tobacco: Never   Vaping Use    Vaping Use: Never used   Substance Use Topics    Alcohol use: No    Drug use: No     Allergies   Allergen Reactions    Lisinopril Other (See Comments)     Cough    Penicillins Swelling     Facial swelling Ranolazine Rash       MEDICATIONS:  Current Outpatient Medications   Medication Sig Dispense Refill    insulin glargine (LANTUS) 100 UNIT/ML injection vial Inject 30 Units into the skin nightly 10 mL 3    furosemide (LASIX) 20 MG tablet Take 1 tablet by mouth once daily 90 tablet 3    simvastatin (ZOCOR) 20 MG tablet Take 1 tablet by mouth nightly 90 tablet 3    levothyroxine (SYNTHROID) 150 MCG tablet Take 1 tablet by mouth once daily 90 tablet 1    prednisoLONE acetate (PRED FORTE) 1 % ophthalmic suspension       D-Mannose 500 MG CAPS Take 1 tablet by mouth daily 30 capsule 11    Cranberry 500 MG CAPS Take 1 capsule by mouth daily 30 capsule 11    Cholecalciferol (VITAMIN D3) 1.25 MG (94538 UT) CAPS 25 mcg      HYDROcodone-acetaminophen (NORCO) 5-325 MG per tablet       docusate sodium (COLACE) 100 MG capsule Take 100 mg by mouth at bedtime      Carboxymethylcellulose Sodium (EYE DROPS OP) Apply 1 drop to eye 4 times daily as needed      psyllium (KONSYL) 28.3 % PACK Take 1 packet by mouth daily      gabapentin (NEURONTIN) 300 MG capsule Take 1 capsule by mouth 3 times daily for 90 days. 270 capsule 1    ferrous gluconate 324 (37.5 Fe) MG TABS I by mouth a day 90 tablet 3    oxybutynin (DITROPAN-XL) 10 MG extended release tablet Take 1 tablet by mouth daily 90 tablet 3    amLODIPine (NORVASC) 5 MG tablet Take 5 mg by mouth in the morning and at bedtime      guaiFENesin (ROBITUSSIN) 100 MG/5ML liquid Take 200 mg by mouth 3 times daily as needed for Cough      Continuous Blood Gluc Sensor ([address] [place] [address]) MISC by Does not apply route      albuterol sulfate HFA (PROVENTIL;VENTOLIN;PROAIR) 108 (90 Base) MCG/ACT inhaler Inhale 2 puffs into the lungs every 6 hours as needed for Wheezing       senna (SENOKOT) 8.6 MG tablet Take 1 tablet by mouth daily 90 tablet 3    nitroGLYCERIN (NITROSTAT) 0.4 MG SL tablet Place 0.4 mg under the tongue every 5 minutes as needed for Chest pain up to max of 3 total doses. If no relief after 1 dose, call 911. B-D INS SYR ULTRAFINE 1CC/31G 31G X 5/16\" 1 ML MISC       fexofenadine (ALLEGRA) 180 MG tablet Take 180 mg by mouth daily      montelukast (SINGULAIR) 10 MG tablet TAKE ONE TABLET BY MOUTH NIGHTLY 90 tablet 1    fluticasone propionate (FLOVENT) 100 MCG/BLIST AEPB inhaler Inhale 1 puff into the lungs daily      fluticasone (FLONASE) 50 MCG/ACT nasal spray 1 spray by Nasal route daily      isosorbide mononitrate (IMDUR) 60 MG extended release tablet Take 60 mg by mouth every morning      insulin aspart (NOVOLOG) 100 UNIT/ML injection vial Indications: TID sliding scale. max daily dose of 12 units. aspirin 81 MG tablet Take 81 mg by mouth daily      acetaminophen (TYLENOL) 500 MG tablet Take 1,000 mg by mouth 3 times daily       vitamin B-12 (CYANOCOBALAMIN) 500 MCG tablet Take 1,000 mcg by mouth daily       CPAP Machine MISC by Does not apply route      calcium carbonate (OSCAL) 500 MG TABS tablet Take 600 mg by mouth daily       Omega-3 Fatty Acids (FISH OIL PO) Take 1,000 mg by mouth daily       Multiple Vitamins-Minerals (MULTIVITAMIN ADULTS PO) Take by mouth daily        No current facility-administered medications for this visit. Review ofSymptoms:  Review of Systems   Constitutional:  Positive for fatigue. Negative for unexpected weight change. Eyes:  Negative for visual disturbance. Respiratory: Negative. Negative for shortness of breath. Cardiovascular:  Negative for chest pain and leg swelling. Gastrointestinal:  Negative for abdominal pain and diarrhea. Endocrine: Negative for polydipsia, polyphagia and polyuria. Genitourinary: Negative. Musculoskeletal: Negative. Skin:  Negative for rash and wound. Neurological:  Negative for dizziness, tremors, seizures and headaches. Psychiatric/Behavioral: Negative. Negative for confusion and decreased concentration. Theremainder of a complete 14-point review of systems is negative. Vital Signs: BP (!) 164/64 (Site: Left Upper Arm, Position: Sitting, Cuff Size: Medium Adult)   Pulse 60   Resp 20   Ht 5' 7\" (1.702 m)   Wt 189 lb (85.7 kg)   BMI 29.60 kg/m²      Wt Readings from Last 3 Encounters:   12/20/22 189 lb (85.7 kg)   11/16/22 194 lb (88 kg)   10/19/22 197 lb 9.6 oz (89.6 kg)     Body mass index is 29.6 kg/m². LABS:  Hemoglobin A1C   Date Value Ref Range Status   12/20/2022 7.3 % Final   07/13/2022 6.4 4.4 - 6.4 % Final     Lab Results   Component Value Date    LABMICR 65.6 (H) 04/13/2022     Lab Results   Component Value Date     12/14/2022    K 4.4 12/14/2022    CL 99 12/14/2022    CO2 30 12/14/2022    BUN 42 (H) 12/14/2022    CREATININE 3.1 (H) 12/14/2022    GLUCOSE 113 (H) 12/14/2022    CALCIUM 9.5 12/14/2022    PROT 6.1 (A) 02/09/2021    LABALBU 3.6 10/12/2022    BILITOT 0.3 10/12/2022    ALKPHOS 89 10/12/2022    AST 21 10/12/2022    ALT 16 10/12/2022    LABGLOM 15.4 12/14/2022    GFRAA 30 (L) 04/30/2020    GLOB 2.9 10/12/2022     Lab Results   Component Value Date    CHOL 115 10/12/2022    CHOL 130 04/13/2022    CHOL 133 04/29/2021     Lab Results   Component Value Date    TRIG 103 10/12/2022    TRIG 169 04/13/2022    TRIG 93 04/29/2021     Lab Results   Component Value Date    HDL 44 10/12/2022    HDL 36 04/13/2022    HDL 50 04/29/2021     Lab Results   Component Value Date    LDLCALC 50.4 10/12/2022    LDLCALC 60.2 04/13/2022    LDLCALC 64.4 04/29/2021     Lab Results   Component Value Date    VLDL 21 10/12/2022    VLDL 34 04/13/2022    VLDL 19 04/29/2021     Lab Results   Component Value Date    CHOLHDLRATIO 2.61 10/12/2022    CHOLHDLRATIO 3.61 04/13/2022    CHOLHDLRATIO 2.66 04/29/2021           Physical Exam  Constitutional:       Appearance: She is well-developed. Eyes:      Pupils: Pupils are equal, round, and reactive to light. Neck:      Thyroid: No thyroid mass, thyromegaly or thyroid tenderness.    Cardiovascular:      Rate and Rhythm: Normal rate and regular rhythm. Heart sounds: Normal heart sounds. Pulmonary:      Effort: Pulmonary effort is normal.      Breath sounds: Normal breath sounds. Abdominal:      General: Bowel sounds are normal.      Palpations: Abdomen is soft. Skin:     General: Skin is warm and dry. Comments: Negative for open/nonhealing wounds. Negative for lipohypertrophy. Neurological:      Mental Status: She is alert and oriented to person, place, and time. ASSESSMENT/PLAN:     Diagnosis Orders   1. Type 2 diabetes mellitus with stage 4 chronic kidney disease, with long-term current use of insulin (HCC)  insulin glargine (LANTUS) 100 UNIT/ML injection vial      2. Mixed hyperlipidemia        3. Essential hypertension          No orders of the defined types were placed in this encounter. Orders Placed This Encounter   Medications    insulin glargine (LANTUS) 100 UNIT/ML injection vial     Sig: Inject 30 Units into the skin nightly     Dispense:  10 mL     Refill:  3       Requested Prescriptions     Signed Prescriptions Disp Refills    insulin glargine (LANTUS) 100 UNIT/ML injection vial 10 mL 3     Sig: Inject 30 Units into the skin nightly       1. Type 2 diabetes mellitus with stage 4 chronic kidney disease, with long-term current use of insulin (HCC)  - Unstable  HbA1C goal is less than 7%. - Fasting blood glucose goal is 70-120mg/dl and postprandial blood sugar goal is less than 180 mg/dl. - Labs reviewed including most recent A1c, UACR and kidney function. Repeat labs due in 3 months.    -We discussed in great detail dietary modifications they can make to better improve their blood sugars. -follow up diabetes education completed, all questions answered. CGM report downloaded and reviewed from the past 2 weeks scanned to media tab. Time in range 55%, 42% hyperglycemia, and hypoglycemia 3%. Predicted A1c per CGM report 7.4% and average glucose 172 mg/dl.    Patient Instructions   Decrease lantus to 30 units once daily   Work on protein at bedtime       Discussed signs and symptoms of hyper/hypoglycemia and how to treat. Encouraged 150 minutes of physical activity per week. Follow a low carbohydrate diet. Encouraged at least 7 hours of sleep. The patient was informed of the goals of diabetes management. This can only be accomplished by watching their diet and exercise levels. We certainly use medicines to help attain these goals. The consequences of not controlling blood sugars were discussed. These include blindness, heart disease, stroke, kidney disease, and possibly need for dialysis. They were told to be careful with their foot care as diabetics often have nerve damage, infections and risk for limb amutations . They also need a dilated eye exam yearly. We discussed the issues of diet, exercise, medication, complication avoidance, reviewed the signs and symptoms of diabetes, hypoglycemic episodes, significance of HbA1C.     - insulin glargine (LANTUS) 100 UNIT/ML injection vial; Inject 30 Units into the skin nightly  Dispense: 10 mL; Refill: 3    2. Mixed hyperlipidemia  stable, lipid panel reviewed, continue current medications. Diet and exercise. 3. Essential hypertension   stable, continue current medications. Diet and exercise Seek emergent care if chest pain develops. Answered all patient questions. Agrees to follow plan of care and to follow up in 3 months, sooner if needed. Call office if unexplained blood sugars less than 70 occur or above 400. Call office or access MyChart with any further questions or concerns. Be sure to bring glucometer/food log at next appointment. Total time spent reviewing chart, labs, counseling patient and documenting on the date of the encounter: 30 min.       Electronically signed by TESFAYE Lynne CNP on 12/20/2022 at 2:18 PM      (Please note that portions of this note were completed with a voice-recognition program. Efforts were made to edit the dictation but occasionally words are mis-transcribed.)

## 2022-12-21 DIAGNOSIS — Z79.4 TYPE 2 DIABETES MELLITUS WITH STAGE 4 CHRONIC KIDNEY DISEASE, WITH LONG-TERM CURRENT USE OF INSULIN (HCC): ICD-10-CM

## 2022-12-21 DIAGNOSIS — N18.4 TYPE 2 DIABETES MELLITUS WITH STAGE 4 CHRONIC KIDNEY DISEASE, WITH LONG-TERM CURRENT USE OF INSULIN (HCC): ICD-10-CM

## 2022-12-21 DIAGNOSIS — E11.22 TYPE 2 DIABETES MELLITUS WITH STAGE 4 CHRONIC KIDNEY DISEASE, WITH LONG-TERM CURRENT USE OF INSULIN (HCC): ICD-10-CM

## 2022-12-21 RX ORDER — INSULIN GLARGINE 100 [IU]/ML
30 INJECTION, SOLUTION SUBCUTANEOUS NIGHTLY
Qty: 10 ML | Refills: 3 | Status: SHIPPED | OUTPATIENT
Start: 2022-12-21

## 2022-12-21 NOTE — TELEPHONE ENCOUNTER
Appears last was not sent to pharmacy, also pharmacy wants name brand lantus dispensed. Pended as FERCHO.

## 2023-01-17 ENCOUNTER — OFFICE VISIT (OUTPATIENT)
Dept: FAMILY MEDICINE CLINIC | Age: 83
End: 2023-01-17
Payer: MEDICARE

## 2023-01-17 VITALS
DIASTOLIC BLOOD PRESSURE: 78 MMHG | HEART RATE: 78 BPM | OXYGEN SATURATION: 96 % | SYSTOLIC BLOOD PRESSURE: 118 MMHG | BODY MASS INDEX: 29.79 KG/M2 | WEIGHT: 190.2 LBS

## 2023-01-17 DIAGNOSIS — E11.43 TYPE 2 DIABETES MELLITUS WITH DIABETIC AUTONOMIC NEUROPATHY, WITH LONG-TERM CURRENT USE OF INSULIN (HCC): ICD-10-CM

## 2023-01-17 DIAGNOSIS — E03.9 ACQUIRED HYPOTHYROIDISM: ICD-10-CM

## 2023-01-17 DIAGNOSIS — G62.9 PERIPHERAL POLYNEUROPATHY: ICD-10-CM

## 2023-01-17 DIAGNOSIS — Z89.419 HISTORY OF AMPUTATION OF GREAT TOE (HCC): ICD-10-CM

## 2023-01-17 DIAGNOSIS — Z76.89 ENCOUNTER TO ESTABLISH CARE: ICD-10-CM

## 2023-01-17 DIAGNOSIS — N18.5 CKD STAGE 5 DUE TO TYPE 2 DIABETES MELLITUS (HCC): ICD-10-CM

## 2023-01-17 DIAGNOSIS — I10 ESSENTIAL HYPERTENSION: ICD-10-CM

## 2023-01-17 DIAGNOSIS — I73.9 PERIPHERAL VASCULAR DISEASE (HCC): ICD-10-CM

## 2023-01-17 DIAGNOSIS — E11.22 CKD STAGE 5 DUE TO TYPE 2 DIABETES MELLITUS (HCC): ICD-10-CM

## 2023-01-17 DIAGNOSIS — Z79.4 TYPE 2 DIABETES MELLITUS WITH DIABETIC AUTONOMIC NEUROPATHY, WITH LONG-TERM CURRENT USE OF INSULIN (HCC): ICD-10-CM

## 2023-01-17 PROBLEM — L97.521 DIABETIC ULCER OF TOE OF LEFT FOOT ASSOCIATED WITH TYPE 2 DIABETES MELLITUS, LIMITED TO BREAKDOWN OF SKIN (HCC): Status: RESOLVED | Noted: 2017-12-11 | Resolved: 2023-01-17

## 2023-01-17 PROBLEM — E11.621 DIABETIC ULCER OF TOE OF LEFT FOOT ASSOCIATED WITH TYPE 2 DIABETES MELLITUS, LIMITED TO BREAKDOWN OF SKIN (HCC): Status: RESOLVED | Noted: 2017-12-11 | Resolved: 2023-01-17

## 2023-01-17 PROCEDURE — 3078F DIAST BP <80 MM HG: CPT | Performed by: FAMILY MEDICINE

## 2023-01-17 PROCEDURE — 1090F PRES/ABSN URINE INCON ASSESS: CPT | Performed by: FAMILY MEDICINE

## 2023-01-17 PROCEDURE — 3074F SYST BP LT 130 MM HG: CPT | Performed by: FAMILY MEDICINE

## 2023-01-17 PROCEDURE — G8400 PT W/DXA NO RESULTS DOC: HCPCS | Performed by: FAMILY MEDICINE

## 2023-01-17 PROCEDURE — 1036F TOBACCO NON-USER: CPT | Performed by: FAMILY MEDICINE

## 2023-01-17 PROCEDURE — 99214 OFFICE O/P EST MOD 30 MIN: CPT | Performed by: FAMILY MEDICINE

## 2023-01-17 PROCEDURE — G8417 CALC BMI ABV UP PARAM F/U: HCPCS | Performed by: FAMILY MEDICINE

## 2023-01-17 PROCEDURE — 1123F ACP DISCUSS/DSCN MKR DOCD: CPT | Performed by: FAMILY MEDICINE

## 2023-01-17 PROCEDURE — G8484 FLU IMMUNIZE NO ADMIN: HCPCS | Performed by: FAMILY MEDICINE

## 2023-01-17 PROCEDURE — G8427 DOCREV CUR MEDS BY ELIG CLIN: HCPCS | Performed by: FAMILY MEDICINE

## 2023-01-17 ASSESSMENT — PATIENT HEALTH QUESTIONNAIRE - PHQ9
1. LITTLE INTEREST OR PLEASURE IN DOING THINGS: 0
SUM OF ALL RESPONSES TO PHQ QUESTIONS 1-9: 0
2. FEELING DOWN, DEPRESSED OR HOPELESS: 0
SUM OF ALL RESPONSES TO PHQ9 QUESTIONS 1 & 2: 0
SUM OF ALL RESPONSES TO PHQ QUESTIONS 1-9: 0

## 2023-01-17 NOTE — PROGRESS NOTES
HPI:  Patient comes in today for   Chief Complaint   Patient presents with    New Patient     Has diabetic shoe forms from Dr. Yessi Menon   Here to establish care. Patient has history of diabetes, hypertension, coronary disease S/P stent, hypothyroidism, chronic kidney disease. ALDAIR on CPAP,OAB. Patient sees nephrology. Patient follows up with ophthalmology for her vision problems has had cataract surgery done last year. Patient follows up with diabetic clinic. Patient needs  new script for diabetic shoes. Has had toe deformities and amputation of her toes in her left foot h/o diabetic neuropathy. Jerry Smith     HISTORY:  Past Medical History:   Diagnosis Date    CAD (coronary artery disease)     Diabetes mellitus ([address]     Hyperlipidemia     Hypertension     Hypothyroidism     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    Bx proven by Dr Scarlet Everett    Osteoarthritis     Sleep apnea        Past Surgical History:   Procedure Laterality Date    ANGIOPLASTY  2001    stent x 1    EYE SURGERY      FOOT SURGERY Left 07/31/2009    3rd toe amputation    HYSTERECTOMY, TOTAL ABDOMINAL (CERVIX REMOVED)  1985    TOE AMPUTATION      left 3rd toe    TOE AMPUTATION Left 3/31/2020    Left 2nd TOE AMPUTATION performed by Russ Sainz DPM at [address]  01/17/2020    Dr Emerita Lugo- vulvar SCC carcinoma        Family History   Problem Relation Age of Onset    High Blood Pressure Mother     Coronary Art Dis Father        Social History     Socioeconomic History    Marital status:      Spouse name: Not on file    Number of children: Not on file    Years of education: Not on file    Highest education level: Not on file   Occupational History    Not on file   Tobacco Use    Smoking status: Never    Smokeless tobacco: Never   Vaping Use    Vaping Use: Never used   Substance and Sexual Activity    Alcohol use: No    Drug use: No    Sexual activity: Not on file   Other Topics Concern    Not on file   Social History Narrative    Not on file     Social Determinants of Health     Financial Resource Strain: Low Risk     Difficulty of Paying Living Expenses: Not very hard   Food Insecurity: No Food Insecurity    Worried About Running Out of Food in the Last Year: Never true    Ran Out of Food in the Last Year: Never true   Transportation Needs: Not on file   Physical Activity: Inactive    Days of Exercise per Week: 0 days    Minutes of Exercise per Session: 0 min   Stress: Not on file   Social Connections: Not on file   Intimate Partner Violence: Not on file   Housing Stability: Not on file       Current Outpatient Medications   Medication Sig Dispense Refill    LANTUS 100 UNIT/ML injection vial Inject 30 Units into the skin nightly 10 mL 3    simvastatin (ZOCOR) 20 MG tablet Take 1 tablet by mouth nightly 90 tablet 3    levothyroxine (SYNTHROID) 150 MCG tablet Take 1 tablet by mouth once daily 90 tablet 1    prednisoLONE acetate (PRED FORTE) 1 % ophthalmic suspension       HYDROcodone-acetaminophen (NORCO) 5-325 MG per tablet       psyllium (KONSYL) 28.3 % PACK Take 1 packet by mouth daily      oxybutynin (DITROPAN-XL) 10 MG extended release tablet Take 1 tablet by mouth daily 90 tablet 3    guaiFENesin (ROBITUSSIN) 100 MG/5ML liquid Take 200 mg by mouth 3 times daily as needed for Cough      senna (SENOKOT) 8.6 MG tablet Take 1 tablet by mouth daily 90 tablet 3    nitroGLYCERIN (NITROSTAT) 0.4 MG SL tablet Place 0.4 mg under the tongue every 5 minutes as needed for Chest pain up to max of 3 total doses. If no relief after 1 dose, call 911.       montelukast (SINGULAIR) 10 MG tablet TAKE ONE TABLET BY MOUTH NIGHTLY 90 tablet 1    isosorbide mononitrate (IMDUR) 60 MG extended release tablet Take 60 mg by mouth every morning      insulin aspart (NOVOLOG) 100 UNIT/ML injection vial Indications: TID sliding scale. max daily dose of 12 units.       acetaminophen (TYLENOL) 500 MG tablet Take 1,000 mg by mouth 3 times daily        vitamin B-12 (CYANOCOBALAMIN) 500 MCG tablet Take 1,000 mcg by mouth daily       Multiple Vitamins-Minerals (MULTIVITAMIN ADULTS PO) Take by mouth daily       furosemide (LASIX) 20 MG tablet Take 1 tablet by mouth once daily 90 tablet 3    D-Mannose 500 MG CAPS Take 1 tablet by mouth daily 30 capsule 11    Cranberry 500 MG CAPS Take 1 capsule by mouth daily 30 capsule 11    Cholecalciferol (VITAMIN D3) 1.25 MG (62913 UT) CAPS 25 mcg      docusate sodium (COLACE) 100 MG capsule Take 100 mg by mouth at bedtime      Carboxymethylcellulose Sodium (EYE DROPS OP) Apply 1 drop to eye 4 times daily as needed      gabapentin (NEURONTIN) 300 MG capsule Take 1 capsule by mouth 3 times daily for 90 days. 270 capsule 1    ferrous gluconate 324 (37.5 Fe) MG TABS I by mouth a day 90 tablet 3    amLODIPine (NORVASC) 5 MG tablet Take 5 mg by mouth in the morning and at bedtime      Continuous Blood Gluc Sensor ([address] [place] [address]) MISC by Does not apply route      albuterol sulfate HFA (PROVENTIL;VENTOLIN;PROAIR) 108 (90 Base) MCG/ACT inhaler Inhale 2 puffs into the lungs every 6 hours as needed for Wheezing       B-D INS SYR ULTRAFINE 1CC/31G 31G X 5/16\" 1 ML MISC       fexofenadine (ALLEGRA) 180 MG tablet Take 180 mg by mouth daily      fluticasone propionate (FLOVENT) 100 MCG/BLIST AEPB inhaler Inhale 1 puff into the lungs daily      fluticasone (FLONASE) 50 MCG/ACT nasal spray 1 spray by Nasal route daily      aspirin 81 MG tablet Take 81 mg by mouth daily      CPAP Machine MISC by Does not apply route      calcium carbonate (OSCAL) 500 MG TABS tablet Take 600 mg by mouth daily       Omega-3 Fatty Acids (FISH OIL PO) Take 1,000 mg by mouth daily        No current facility-administered medications for this visit.         Allergies   Allergen Reactions    Lisinopril Other (See Comments)     Cough    Penicillins Swelling     Facial swelling    Ranolazine Rash       REVIEW OF SYSTEMS:  General: No fevers, chills, change in weight  HEENT: No double vision,has blurry vision, has dry eyes,no runny nose, sore throat, tinnitus  Cardio: No chest pain, palpitations, LEDEZMA, edema, PND  Pulmonary: No cough, hemoptysis, SOB  GI: No nausea, vomiting, dysphagia, odynophagia, diarrhea, constipation. : No dysuria, hematuria, urgency,has  incontinence  Musculoskeletal: Has chronic low back pain. has shoulder pain  has seen ortho, no joint swelling  Neuro: No dizziness/lightheadedness, no seizures,has neuropathy  Endocrine: No polyuria, polydipsia, polyphagia, no temperature intolerance  Skin: No lesions or itching  No problems with ADLs  Sleep: h/o Sleep apnea is on CPAP nightly 6-7 hours  Psychiatric: No depression or anxiety,has stress    PHYSICAL EXAM:  VS:  /78 (Site: Left Upper Arm, Position: Sitting, Cuff Size: Medium Adult)   Pulse 78   Wt 190 lb 3.2 oz (86.3 kg)   SpO2 96%   BMI 29.79 kg/m²   General:  Alert and oriented, NAD  HEENT:  TMs, JANELLE, EOMI, Conjunctivae clear       Throat currently clear. NECK:  Supple without adenopathy or thyromegaly, no carotid bruits  LUNGS:  CTA all fields  HEART:  RRR without M, R, or G  ABDOMEN:  Soft and nontender without palpable abnormalities  EXTREMITIES: Right shoulder with swelling ad decreased ROM has right forearm fistula in place ,no leg edema, no calf tenderness. Foot exam:Has loss of 2nd and 3 rd toe left foot with great toe deformity. Right foot with toe deformities. Has loss of sensation to monofilament in both feet. Pulses felt . Has dry skin. NEURO:  No focal deficits. SKIN:  warm to touch,normal texture. No active lesions. ASSESSMENT/PLAN:     Diagnosis Orders   1. Encounter to establish care        2. Essential hypertension        3. Type 2 diabetes mellitus with diabetic autonomic neuropathy, with long-term current use of insulin ([facility])   DIABETES FOOT EXAM    Hemoglobin A1C      4. History of amputation of great toe ([facility]        5.  CKD stage 5 due to type 2 diabetes mellitus ([facility]        6. Peripheral vascular disease ([facility] [facility]        7. Peripheral polyneuropathy        8. Acquired hypothyroidism            Orders Placed This Encounter   Procedures    Hemoglobin A1C     Standing Status:   Future     Standing Expiration Date:   1/17/2024    TSH     Standing Status:   Future     Standing Expiration Date:   1/17/2024     DIABETES FOOT EXAM     Requested Prescriptions      No prescriptions requested or ordered in this encounter   Script for diabetic shoes completed. Foot care advised. Meds reviewed,continue current meds. Check TSH and hgba1c  F/u with nephrology. F/u with ophthalmology  Continue with diabetic  clinic  for diabetic management  Return in about 3 months (around 4/17/2023).     Electronically signed by Talha Vogt MD

## 2023-02-06 DIAGNOSIS — G62.9 PERIPHERAL POLYNEUROPATHY: ICD-10-CM

## 2023-02-06 RX ORDER — GABAPENTIN 300 MG/1
300 CAPSULE ORAL 3 TIMES DAILY
Qty: 270 CAPSULE | Refills: 1 | Status: SHIPPED | OUTPATIENT
Start: 2023-02-06 | End: 2023-05-07

## 2023-02-06 NOTE — TELEPHONE ENCOUNTER
Danika Valentino is requesting a refill on the following medication(s):  Requested Prescriptions     Pending Prescriptions Disp Refills    gabapentin (NEURONTIN) 300 MG capsule 270 capsule 1     Sig: Take 1 capsule by mouth 3 times daily for 90 days.        Last Visit Date (If Applicable):  7/83/5403    Next Visit Date:    4/18/2023

## 2023-03-07 ENCOUNTER — OFFICE VISIT (OUTPATIENT)
Dept: DIABETES SERVICES | Age: 83
End: 2023-03-07
Payer: MEDICARE

## 2023-03-07 VITALS
DIASTOLIC BLOOD PRESSURE: 64 MMHG | HEART RATE: 60 BPM | WEIGHT: 192 LBS | SYSTOLIC BLOOD PRESSURE: 118 MMHG | HEIGHT: 67 IN | RESPIRATION RATE: 16 BRPM | BODY MASS INDEX: 30.13 KG/M2

## 2023-03-07 DIAGNOSIS — Z79.4 TYPE 2 DIABETES MELLITUS WITH STAGE 4 CHRONIC KIDNEY DISEASE, WITH LONG-TERM CURRENT USE OF INSULIN (HCC): Primary | ICD-10-CM

## 2023-03-07 DIAGNOSIS — E11.3393 MODERATE NONPROLIFERATIVE DIABETIC RETINOPATHY OF BOTH EYES WITHOUT MACULAR EDEMA ASSOCIATED WITH TYPE 2 DIABETES MELLITUS (HCC): ICD-10-CM

## 2023-03-07 DIAGNOSIS — N18.4 TYPE 2 DIABETES MELLITUS WITH STAGE 4 CHRONIC KIDNEY DISEASE, WITH LONG-TERM CURRENT USE OF INSULIN (HCC): Primary | ICD-10-CM

## 2023-03-07 DIAGNOSIS — E66.09 CLASS 1 OBESITY DUE TO EXCESS CALORIES WITH SERIOUS COMORBIDITY AND BODY MASS INDEX (BMI) OF 30.0 TO 30.9 IN ADULT: ICD-10-CM

## 2023-03-07 DIAGNOSIS — E11.22 TYPE 2 DIABETES MELLITUS WITH STAGE 4 CHRONIC KIDNEY DISEASE, WITH LONG-TERM CURRENT USE OF INSULIN (HCC): Primary | ICD-10-CM

## 2023-03-07 DIAGNOSIS — E78.2 MIXED HYPERLIPIDEMIA: ICD-10-CM

## 2023-03-07 DIAGNOSIS — I10 ESSENTIAL HYPERTENSION: ICD-10-CM

## 2023-03-07 PROCEDURE — 3078F DIAST BP <80 MM HG: CPT | Performed by: NURSE PRACTITIONER

## 2023-03-07 PROCEDURE — 99214 OFFICE O/P EST MOD 30 MIN: CPT | Performed by: NURSE PRACTITIONER

## 2023-03-07 PROCEDURE — 1123F ACP DISCUSS/DSCN MKR DOCD: CPT | Performed by: NURSE PRACTITIONER

## 2023-03-07 PROCEDURE — 1090F PRES/ABSN URINE INCON ASSESS: CPT | Performed by: NURSE PRACTITIONER

## 2023-03-07 PROCEDURE — G8427 DOCREV CUR MEDS BY ELIG CLIN: HCPCS | Performed by: NURSE PRACTITIONER

## 2023-03-07 PROCEDURE — G8400 PT W/DXA NO RESULTS DOC: HCPCS | Performed by: NURSE PRACTITIONER

## 2023-03-07 PROCEDURE — G8417 CALC BMI ABV UP PARAM F/U: HCPCS | Performed by: NURSE PRACTITIONER

## 2023-03-07 PROCEDURE — 1036F TOBACCO NON-USER: CPT | Performed by: NURSE PRACTITIONER

## 2023-03-07 PROCEDURE — 95251 CONT GLUC MNTR ANALYSIS I&R: CPT | Performed by: NURSE PRACTITIONER

## 2023-03-07 PROCEDURE — 3074F SYST BP LT 130 MM HG: CPT | Performed by: NURSE PRACTITIONER

## 2023-03-07 PROCEDURE — G8484 FLU IMMUNIZE NO ADMIN: HCPCS | Performed by: NURSE PRACTITIONER

## 2023-03-07 RX ORDER — INSULIN GLARGINE 100 [IU]/ML
20 INJECTION, SOLUTION SUBCUTANEOUS NIGHTLY
Qty: 10 ML | Refills: 3
Start: 2023-03-07

## 2023-03-07 RX ORDER — MOMETASONE FUROATE 1 MG/G
OINTMENT TOPICAL
COMMUNITY

## 2023-03-07 ASSESSMENT — ENCOUNTER SYMPTOMS
RESPIRATORY NEGATIVE: 1
DIARRHEA: 0
SHORTNESS OF BREATH: 0
ABDOMINAL PAIN: 0

## 2023-03-07 NOTE — PROGRESS NOTES
[id number] [facility] INTERNAL MED A DEPARTMENT OF [place] [address]  [address] [facility], [address]  [facility] [address] [phone]        HISTORY:    Stephan Robin presents today for evaluation and management of:  Chief Complaint   Patient presents with    Diabetes    3 Month Follow-Up       Patient Care Team:  Starr Styles MD as PCP - General (Family Medicine)  Starr Styles MD as PCP - Empaneled Provider      Interval History:    Past DM Medications   Metformin-ckd  Glimepiride-therapy completed     Current Diabetic Medications  Lantus 30 units at at bedtime   Sliding Scale Insulin Novolog     Blood sugar   Action     <70               Drink Juice               No extra insulin  150 - 200         2 units subcutaneous Insulin  201 - 250         4 units subcutaneous Insulin  251 - 300         6 units subcutaneous Insulin  301 - 350         8 units subcutaneous Insulin  351 - 400         10 units subcutaneous Insulin   > 400              12 units subcutaneous Insulin        DKA episodes: 0    12/20/22   Stephan Robin is a [de-identified] y.o. female patient who presents to clinic today for her diabetes. she has a history of hypertension, CAD, ALDAIR, hypothyroidism, CKD, hyperlipidemia and obesity  which contributes to her diabetes. At previous visit insulin was adjusted. she denies any current signs or symptoms of hyper/hypoglycemia. she states they are taking their medications as prescribed without any adverse effects. Diet: work on low carb low sodium diet  Exercise: none  BS testing: uses cgm daily with success and is adherent to cgm therapy  Hypoglycemia: Yes    Sweats and Tremors                 Hypoglycemia Frequency: 3 per week  Hypoglycemia as needed treatment: juice   Issues: denies   Diabetic foot exam up-to-date: Yes  Diabetic retinal exam up-to-date: Yes     Diabetes complications:nephropathy and retinopathy    03/07/23   Stephan Robin is a [de-identified] y.o. female patient who presents to clinic today for her diabetes. she has a history of hypertension, CAD, ALDAIR, hypothyroidism, CKD, hyperlipidemia and obesity which contributes to her diabetes. At previous visit insulin was decreased. she denies any current signs or symptoms of hyper/hypoglycemia. she states they are taking their medications as prescribed without any adverse effects. Diet: work on low carb low sodium diet  Exercise: none  BS testing: uses cgm daily with success and is adherent to cgm therapy  Hypoglycemia: Yes    Sweats and Tremors                 Hypoglycemia Frequency: 3 per week  Hypoglycemia as needed treatment: juice   Issues: denies   Diabetic foot exam up-to-date: Yes  Diabetic retinal exam up-to-date: Yes     Diabetes complications:nephropathy and retinopathy      High cholesterol-  Takes Zocor and fish oil and denies any adverse effects with its use. Watches diet and exercise. Hypertension-  Takes Norvasc, Lasix, and denies any adverse effects with their use. Watches diet and exercise. Denies any chest pain, dizziness or edema. Obesity- Working on weight loss. CKD- she follow with nephrology last follow up 1/1/23. Due for labs, has not started dialysis.          Past Medical History:   Diagnosis Date    CAD (coronary artery disease)     Diabetes mellitus ([facility] [address]     Hyperlipidemia     Hypertension     Hypothyroidism     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    Bx proven by Dr Edith Harrison    Osteoarthritis     Sleep apnea      Family History   Problem Relation Age of Onset    High Blood Pressure Mother     Coronary Art Dis Father      Social History     Tobacco Use    Smoking status: Never    Smokeless tobacco: Never   Vaping Use    Vaping Use: Never used   Substance Use Topics    Alcohol use: No    Drug use: No     Allergies   Allergen Reactions    Lisinopril Other (See Comments)     Cough    Penicillins Swelling     Facial swelling    Ranolazine Rash MEDICATIONS:  Current Outpatient Medications   Medication Sig Dispense Refill    mometasone (ELOCON) 0.1 % ointment Apply topically daily as needed to affected area      vitamin D 25 MCG (1000 UT) CAPS Take 1 capsule by mouth daily      LANTUS 100 UNIT/ML injection vial Inject 20 Units into the skin nightly 10 mL 3    gabapentin (NEURONTIN) 300 MG capsule Take 1 capsule by mouth 3 times daily for 90 days.  270 capsule 1    furosemide (LASIX) 20 MG tablet Take 1 tablet by mouth once daily 90 tablet 3    simvastatin (ZOCOR) 20 MG tablet Take 1 tablet by mouth nightly 90 tablet 3    levothyroxine (SYNTHROID) 150 MCG tablet Take 1 tablet by mouth once daily 90 tablet 1    prednisoLONE acetate (PRED FORTE) 1 % ophthalmic suspension       Cranberry 500 MG CAPS Take 1 capsule by mouth daily 30 capsule 11    docusate sodium (COLACE) 100 MG capsule Take 100 mg by mouth at bedtime      Carboxymethylcellulose Sodium (EYE DROPS OP) Apply 1 drop to eye 4 times daily as needed      psyllium (KONSYL) 28.3 % PACK Take 1 packet by mouth daily      ferrous gluconate 324 (37.5 Fe) MG TABS I by mouth a day 90 tablet 3    oxybutynin (DITROPAN-XL) 10 MG extended release tablet Take 1 tablet by mouth daily 90 tablet 3    amLODIPine (NORVASC) 5 MG tablet Take 5 mg by mouth in the morning and at bedtime      guaiFENesin (ROBITUSSIN) 100 MG/5ML liquid Take 200 mg by mouth 3 times daily as needed for Cough      Continuous Blood Gluc Sensor ([address] [place] [address]) MISC by Does not apply route      albuterol sulfate HFA (PROVENTIL;VENTOLIN;PROAIR) 108 (90 Base) MCG/ACT inhaler Inhale 2 puffs into the lungs every 6 hours as needed for Wheezing       senna (SENOKOT) 8.6 MG tablet Take 1 tablet by mouth daily 90 tablet 3    fexofenadine (ALLEGRA) 180 MG tablet Take 180 mg by mouth daily      montelukast (SINGULAIR) 10 MG tablet TAKE ONE TABLET BY MOUTH NIGHTLY 90 tablet 1    fluticasone propionate (FLOVENT) 100 MCG/BLIST AEPB inhaler Inhale 1 puff into the lungs daily      fluticasone (FLONASE) 50 MCG/ACT nasal spray 1 spray by Nasal route daily      isosorbide mononitrate (IMDUR) 60 MG extended release tablet Take 60 mg by mouth every morning      insulin aspart (NOVOLOG) 100 UNIT/ML injection vial Inject three times daily per sliding scale. Max daily dose 36 units      aspirin 81 MG tablet Take 81 mg by mouth daily      acetaminophen (TYLENOL) 500 MG tablet Take 1,000 mg by mouth 3 times daily       vitamin B-12 (CYANOCOBALAMIN) 500 MCG tablet Take 1,000 mcg by mouth daily       calcium carbonate (OSCAL) 500 MG TABS tablet Take 600 mg by mouth daily       Omega-3 Fatty Acids (FISH OIL PO) Take 1,000 mg by mouth daily       Multiple Vitamins-Minerals (MULTIVITAMIN ADULTS PO) Take by mouth daily       nitroGLYCERIN (NITROSTAT) 0.4 MG SL tablet Place 0.4 mg under the tongue every 5 minutes as needed for Chest pain up to max of 3 total doses. If no relief after 1 dose, call 911. B-D INS SYR ULTRAFINE 1CC/31G 31G X 5/16\" 1 ML MISC       CPAP Machine MISC by Does not apply route       No current facility-administered medications for this visit. Review ofSymptoms:  Review of Systems   Constitutional:  Positive for fatigue. Negative for unexpected weight change. Eyes:  Negative for visual disturbance. Respiratory: Negative. Negative for shortness of breath. Cardiovascular:  Negative for chest pain and leg swelling. Gastrointestinal:  Negative for abdominal pain and diarrhea. Endocrine: Negative for polydipsia, polyphagia and polyuria. Genitourinary: Negative. Musculoskeletal: Negative. Skin:  Negative for rash and wound. Neurological:  Negative for dizziness, tremors, seizures and headaches. Psychiatric/Behavioral: Negative. Negative for confusion and decreased concentration. Theremainder of a complete 14-point review of systems is negative.        Vital Signs: /64 (Site: Left Upper Arm, Position: Sitting, Cuff Size: Large Adult)   Pulse 60   Resp 16   Ht 5' 7\" (1.702 m)   Wt 192 lb (87.1 kg)   BMI 30.07 kg/m²      Wt Readings from Last 3 Encounters:   03/07/23 192 lb (87.1 kg)   01/18/23 190 lb 3.2 oz (86.3 kg)   01/17/23 190 lb 3.2 oz (86.3 kg)     Body mass index is 30.07 kg/m². LABS:  Hemoglobin A1C   Date Value Ref Range Status   12/20/2022 7.3 % Final   07/13/2022 6.4 4.4 - 6.4 % Final     Lab Results   Component Value Date    LABMICR 65.6 (H) 04/13/2022     Lab Results   Component Value Date     01/11/2023    K 5.0 01/11/2023     01/11/2023    CO2 32 (H) 01/11/2023    BUN 48 (H) 01/11/2023    CREATININE 3.1 (H) 01/11/2023    GLUCOSE 234 (H) 01/11/2023    CALCIUM 9.8 01/11/2023    PROT 6.1 (A) 02/09/2021    LABALBU 3.6 10/12/2022    BILITOT 0.3 10/12/2022    ALKPHOS 89 10/12/2022    AST 21 10/12/2022    ALT 16 10/12/2022    LABGLOM 15.4 01/11/2023    GFRAA 30 (L) 04/30/2020    GLOB 2.9 10/12/2022     Lab Results   Component Value Date    CHOL 115 10/12/2022    CHOL 130 04/13/2022    CHOL 133 04/29/2021     Lab Results   Component Value Date    TRIG 103 10/12/2022    TRIG 169 04/13/2022    TRIG 93 04/29/2021     Lab Results   Component Value Date    HDL 44 10/12/2022    HDL 36 04/13/2022    HDL 50 04/29/2021     Lab Results   Component Value Date    LDLCALC 50.4 10/12/2022    LDLCALC 60.2 04/13/2022    LDLCALC 64.4 04/29/2021     Lab Results   Component Value Date    VLDL 21 10/12/2022    VLDL 34 04/13/2022    VLDL 19 04/29/2021     Lab Results   Component Value Date    CHOLHDLRATIO 2.61 10/12/2022    CHOLHDLRATIO 3.61 04/13/2022    CHOLHDLRATIO 2.66 04/29/2021           Physical Exam  Constitutional:       Appearance: She is well-developed. Eyes:      Pupils: Pupils are equal, round, and reactive to light. Neck:      Thyroid: No thyroid mass, thyromegaly or thyroid tenderness. Cardiovascular:      Rate and Rhythm: Normal rate and regular rhythm. Heart sounds: Normal heart sounds. Pulmonary:      Effort: Pulmonary effort is normal.      Breath sounds: Normal breath sounds. Abdominal:      General: Bowel sounds are normal.      Palpations: Abdomen is soft. Skin:     General: Skin is warm and dry. Comments: Negative for open/nonhealing wounds. Negative for lipohypertrophy. Neurological:      Mental Status: She is alert and oriented to person, place, and time. ASSESSMENT/PLAN:     Diagnosis Orders   1. Type 2 diabetes mellitus with stage 4 chronic kidney disease, with long-term current use of insulin ([address]        2. Moderate nonproliferative diabetic retinopathy of both eyes without macular edema associated with type 2 diabetes mellitus ([address]        3. Mixed hyperlipidemia        4. Essential hypertension        5. Class 1 obesity due to excess calories with serious comorbidity and body mass index (BMI) of 30.0 to 30.9 in adult          No orders of the defined types were placed in this encounter. Orders Placed This Encounter   Medications    LANTUS 100 UNIT/ML injection vial     Sig: Inject 20 Units into the skin nightly     Dispense:  10 mL     Refill:  3     Requested Prescriptions     Signed Prescriptions Disp Refills    LANTUS 100 UNIT/ML injection vial 10 mL 3     Sig: Inject 20 Units into the skin nightly       1. Type 2 diabetes mellitus with stage 4 chronic kidney disease, with long-term current use of insulin ([address]  2. Moderate nonproliferative diabetic retinopathy of both eyes without macular edema associated with type 2 diabetes mellitus (HCC)  - Unstable  HbA1C goal is less than 7%. - Fasting blood glucose goal is 70-120mg/dl and postprandial blood sugar goal is less than 180 mg/dl. - Labs reviewed including most recent A1c, UACR and kidney function. Repeat labs due in 1 week. -We discussed in great detail dietary modifications they can make to better improve their blood sugars. -follow up diabetes education completed, all questions answered.   CGM report downloaded and reviewed from the past 2 weeks scanned to media tab. Time in range 76%, 20% hyperglycemia, and hypoglycemia 4%. and average glucose 137 mg/dl. -eye exam up to date  -following with nephrology   Patient Instructions   Decrease Lantus to 20 units once daily     Continue Novolog  Sliding Scale      Blood sugar   Action     <70               Drink Juice               No extra insulin  150 - 200         2 units subcutaneous Insulin  201 - 250         4 units subcutaneous Insulin  251 - 300         6 units subcutaneous Insulin  301 - 350         8 units subcutaneous Insulin  351 - 400         10 units subcutaneous Insulin   > 400              12 units subcutaneous Insulin      Discussed signs and symptoms of hyper/hypoglycemia and how to treat. Encouraged 150 minutes of physical activity per week. Follow a low carbohydrate diet. Encouraged at least 7 hours of sleep. The patient was informed of the goals of diabetes management. This can only be accomplished by watching their diet and exercise levels. We certainly use medicines to help attain these goals. The consequences of not controlling blood sugars were discussed. These include blindness, heart disease, stroke, kidney disease, and possibly need for dialysis. They were told to be careful with their foot care as diabetics often have nerve damage, infections and risk for limb amutations . They also need a dilated eye exam yearly. We discussed the issues of diet, exercise, medication, complication avoidance, reviewed the signs and symptoms of diabetes, hypoglycemic episodes, significance of HbA1C.           3. Mixed hyperlipidemia  stable, lipid panel reviewed, continue current medications. Diet and exercise. 4. Essential hypertension   stable, continue current medications. Diet and exercise Seek emergent care if chest pain develops.        5. Class 1 obesity due to excess calories with serious comorbidity and body mass index (BMI) of 30.0 to 30.9 in adult  Reduce calories and increase physical activity to achieve a slow and steady weight loss to improve blood pressure, cholesterol and diabetes. Answered all patient questions. Agrees to follow plan of care and to follow up in 3 months, sooner if needed. Call office if unexplained blood sugars less than 70 occur or above 400. Call office or access [facility] with any further questions or concerns. Be sure to bring glucometer/food log at next appointment. Total time spent reviewing chart, labs, counseling patient and documenting on the date of the encounter: 30 min.       Electronically signed by TESFAYE Aceves CNP on 3/7/2023 at 3:06 PM      (Please note that portions of this note were completed with a voice-recognition program. Efforts were made to edit the dictation but occasionally words are mis-transcribed.)

## 2023-03-07 NOTE — PATIENT INSTRUCTIONS
Decrease Lantus to 20 units once daily     Continue Novolog ( double check expiration date, call if you need a refill)   Sliding Scale      Blood sugar   Action     <70               Drink Juice               No extra insulin  150 - 200         2 units subcutaneous Insulin  201 - 250         4 units subcutaneous Insulin  251 - 300         6 units subcutaneous Insulin  301 - 350         8 units subcutaneous Insulin  351 - 400         10 units subcutaneous Insulin   > 400              12 units subcutaneous Insulin

## 2023-03-20 ENCOUNTER — OFFICE VISIT (OUTPATIENT)
Dept: FAMILY MEDICINE CLINIC | Age: 83
End: 2023-03-20

## 2023-03-20 ENCOUNTER — HOSPITAL ENCOUNTER (OUTPATIENT)
Age: 83
Setting detail: SPECIMEN
Discharge: HOME | End: 2023-03-20
Payer: MEDICARE

## 2023-03-20 VITALS
TEMPERATURE: 97.3 F | HEIGHT: 65 IN | BODY MASS INDEX: 31.06 KG/M2 | HEART RATE: 59 BPM | WEIGHT: 186.4 LBS | DIASTOLIC BLOOD PRESSURE: 58 MMHG | RESPIRATION RATE: 20 BRPM | OXYGEN SATURATION: 99 % | SYSTOLIC BLOOD PRESSURE: 128 MMHG

## 2023-03-20 DIAGNOSIS — N39.0 URINARY TRACT INFECTION WITH HEMATURIA, SITE UNSPECIFIED: ICD-10-CM

## 2023-03-20 DIAGNOSIS — R31.9 URINARY TRACT INFECTION WITH HEMATURIA, SITE UNSPECIFIED: Primary | ICD-10-CM

## 2023-03-20 DIAGNOSIS — R31.9 URINARY TRACT INFECTION WITH HEMATURIA, SITE UNSPECIFIED: ICD-10-CM

## 2023-03-20 DIAGNOSIS — N39.0 URINARY TRACT INFECTION WITH HEMATURIA, SITE UNSPECIFIED: Primary | ICD-10-CM

## 2023-03-20 LAB
APPEARANCE FLUID: ABNORMAL
BILIRUBIN, POC: ABNORMAL
BLOOD URINE, POC: ABNORMAL
CLARITY, POC: ABNORMAL
COLOR, POC: YELLOW
GLUCOSE URINE, POC: ABNORMAL
KETONES, POC: ABNORMAL
LEUKOCYTE EST, POC: ABNORMAL
NITRITE, POC: ABNORMAL
PH, POC: 5.5
PROTEIN, POC: ABNORMAL
SPECIFIC GRAVITY, POC: 1.02
UROBILINOGEN, POC: 0.02

## 2023-03-20 PROCEDURE — 87086 URINE CULTURE/COLONY COUNT: CPT

## 2023-03-20 PROCEDURE — 87077 CULTURE AEROBIC IDENTIFY: CPT

## 2023-03-20 PROCEDURE — 87186 SC STD MICRODIL/AGAR DIL: CPT

## 2023-03-20 RX ORDER — CIPROFLOXACIN 250 MG/1
250 TABLET, FILM COATED ORAL 2 TIMES DAILY
Qty: 10 TABLET | Refills: 0 | Status: SHIPPED | OUTPATIENT
Start: 2023-03-20 | End: 2023-03-25

## 2023-03-20 SDOH — ECONOMIC STABILITY: FOOD INSECURITY: WITHIN THE PAST 12 MONTHS, THE FOOD YOU BOUGHT JUST DIDN'T LAST AND YOU DIDN'T HAVE MONEY TO GET MORE.: NEVER TRUE

## 2023-03-20 SDOH — ECONOMIC STABILITY: INCOME INSECURITY: HOW HARD IS IT FOR YOU TO PAY FOR THE VERY BASICS LIKE FOOD, HOUSING, MEDICAL CARE, AND HEATING?: SOMEWHAT HARD

## 2023-03-20 SDOH — ECONOMIC STABILITY: FOOD INSECURITY: WITHIN THE PAST 12 MONTHS, YOU WORRIED THAT YOUR FOOD WOULD RUN OUT BEFORE YOU GOT MONEY TO BUY MORE.: NEVER TRUE

## 2023-03-20 SDOH — ECONOMIC STABILITY: HOUSING INSECURITY
IN THE LAST 12 MONTHS, WAS THERE A TIME WHEN YOU DID NOT HAVE A STEADY PLACE TO SLEEP OR SLEPT IN A SHELTER (INCLUDING NOW)?: NO

## 2023-03-20 ASSESSMENT — PATIENT HEALTH QUESTIONNAIRE - PHQ9
SUM OF ALL RESPONSES TO PHQ QUESTIONS 1-9: 1
SUM OF ALL RESPONSES TO PHQ9 QUESTIONS 1 & 2: 1
1. LITTLE INTEREST OR PLEASURE IN DOING THINGS: 0
SUM OF ALL RESPONSES TO PHQ QUESTIONS 1-9: 1
2. FEELING DOWN, DEPRESSED OR HOPELESS: 1

## 2023-03-20 ASSESSMENT — ENCOUNTER SYMPTOMS
NAUSEA: 0
VOMITING: 0

## 2023-03-20 NOTE — PROGRESS NOTES
daily for urinary tract infection. Push oral fluids to flush infection. Patient will be contacted upon receipt of final culture and sensitivity. Any additions or changes to medications or the plan of care will be made at that time. Follow up with primary care provider in 1 to 2 days if needed. Patient Instructions   Cipro twice daily for urinary tract infection. Push oral fluids to flush infection. Patient will be contacted upon receipt of final culture and sensitivity. Any additions or changes to medications or the plan of care will be made at that time. Follow up with primary care provider in 1 to 2 days if needed. Patient/Caregiver instructed on use, benefit, and side effects of prescribed medications. All patient/parent/caregiver questions answered. Patient/parent/caregiver voiced understanding. Reviewed health maintenance. Instructed to continue current medications, diet and exercise. Patient agreed with treatment plan. Follow up as directed.            Electronically signed by TESFAYE Longoria NP on3/21/2023

## 2023-03-20 NOTE — PATIENT INSTRUCTIONS
Cipro twice daily for urinary tract infection. Push oral fluids to flush infection. Patient will be contacted upon receipt of final culture and sensitivity. Any additions or changes to medications or the plan of care will be made at that time. Follow up with primary care provider in 1 to 2 days if needed.

## 2023-03-23 LAB
MICROORGANISM SPEC CULT: ABNORMAL
SPECIMEN DESCRIPTION: ABNORMAL

## 2023-03-23 NOTE — RESULT ENCOUNTER NOTE
Reviewed results with patient. She is currently asymptomatic at this time. Told to call if her symptoms recur.

## 2023-04-05 PROBLEM — N19 UREMIA: Status: ACTIVE | Noted: 2023-04-05

## 2023-04-06 ENCOUNTER — HOSPITAL ENCOUNTER (INPATIENT)
Age: 83
LOS: 4 days | Discharge: HOME | DRG: 682 | End: 2023-04-10
Attending: FAMILY MEDICINE | Admitting: INTERNAL MEDICINE
Payer: MEDICARE

## 2023-04-06 PROBLEM — E66.811 CLASS 1 OBESITY DUE TO EXCESS CALORIES IN ADULT: Status: ACTIVE | Noted: 2018-08-21

## 2023-04-06 PROBLEM — I77.0 ARTERIOVENOUS FISTULA OF LEFT UPPER EXTREMITY (HCC): Status: ACTIVE | Noted: 2023-04-06

## 2023-04-06 PROBLEM — E66.09 CLASS 1 OBESITY DUE TO EXCESS CALORIES IN ADULT: Status: ACTIVE | Noted: 2018-08-21

## 2023-04-06 PROBLEM — N18.5 TYPE 2 DIABETES MELLITUS WITH STAGE 5 CHRONIC KIDNEY DISEASE NOT ON CHRONIC DIALYSIS, WITH LONG-TERM CURRENT USE OF INSULIN (HCC): Status: ACTIVE | Noted: 2017-06-13

## 2023-04-06 LAB
GLUCOSE BLD-MCNC: 154 MG/DL (ref 65–105)
SARS-COV-2 RDRP RESP QL NAA+PROBE: NOT DETECTED
SPECIMEN DESCRIPTION: NORMAL

## 2023-04-06 PROCEDURE — 99222 1ST HOSP IP/OBS MODERATE 55: CPT | Performed by: NURSE PRACTITIONER

## 2023-04-06 PROCEDURE — 2060000000 HC ICU INTERMEDIATE R&B

## 2023-04-06 PROCEDURE — 82947 ASSAY GLUCOSE BLOOD QUANT: CPT

## 2023-04-06 PROCEDURE — 2580000003 HC RX 258: Performed by: NURSE PRACTITIONER

## 2023-04-06 PROCEDURE — 6360000002 HC RX W HCPCS: Performed by: NURSE PRACTITIONER

## 2023-04-06 PROCEDURE — 6370000000 HC RX 637 (ALT 250 FOR IP): Performed by: NURSE PRACTITIONER

## 2023-04-06 PROCEDURE — 87635 SARS-COV-2 COVID-19 AMP PRB: CPT

## 2023-04-06 RX ORDER — SODIUM CHLORIDE 0.9 % (FLUSH) 0.9 %
5-40 SYRINGE (ML) INJECTION EVERY 12 HOURS SCHEDULED
Status: DISCONTINUED | OUTPATIENT
Start: 2023-04-06 | End: 2023-04-10 | Stop reason: HOSPADM

## 2023-04-06 RX ORDER — SODIUM CHLORIDE 0.9 % (FLUSH) 0.9 %
10 SYRINGE (ML) INJECTION PRN
Status: DISCONTINUED | OUTPATIENT
Start: 2023-04-06 | End: 2023-04-10 | Stop reason: HOSPADM

## 2023-04-06 RX ORDER — MAGNESIUM SULFATE 1 G/100ML
1000 INJECTION INTRAVENOUS PRN
Status: DISCONTINUED | OUTPATIENT
Start: 2023-04-06 | End: 2023-04-07

## 2023-04-06 RX ORDER — ALBUTEROL SULFATE 90 UG/1
2 AEROSOL, METERED RESPIRATORY (INHALATION) EVERY 6 HOURS PRN
Status: DISCONTINUED | OUTPATIENT
Start: 2023-04-06 | End: 2023-04-10 | Stop reason: HOSPADM

## 2023-04-06 RX ORDER — ACETAMINOPHEN 650 MG/1
650 SUPPOSITORY RECTAL EVERY 6 HOURS PRN
Status: DISCONTINUED | OUTPATIENT
Start: 2023-04-06 | End: 2023-04-10 | Stop reason: HOSPADM

## 2023-04-06 RX ORDER — ASPIRIN 81 MG/1
81 TABLET, CHEWABLE ORAL DAILY
Status: DISCONTINUED | OUTPATIENT
Start: 2023-04-07 | End: 2023-04-10 | Stop reason: HOSPADM

## 2023-04-06 RX ORDER — ACETAMINOPHEN 325 MG/1
650 TABLET ORAL EVERY 6 HOURS PRN
Status: DISCONTINUED | OUTPATIENT
Start: 2023-04-06 | End: 2023-04-10 | Stop reason: HOSPADM

## 2023-04-06 RX ORDER — INSULIN LISPRO 100 [IU]/ML
0-4 INJECTION, SOLUTION INTRAVENOUS; SUBCUTANEOUS NIGHTLY
Status: DISCONTINUED | OUTPATIENT
Start: 2023-04-06 | End: 2023-04-10 | Stop reason: HOSPADM

## 2023-04-06 RX ORDER — ONDANSETRON 2 MG/ML
4 INJECTION INTRAMUSCULAR; INTRAVENOUS EVERY 6 HOURS PRN
Status: DISCONTINUED | OUTPATIENT
Start: 2023-04-06 | End: 2023-04-10 | Stop reason: HOSPADM

## 2023-04-06 RX ORDER — ONDANSETRON 4 MG/1
4 TABLET, ORALLY DISINTEGRATING ORAL EVERY 8 HOURS PRN
Status: DISCONTINUED | OUTPATIENT
Start: 2023-04-06 | End: 2023-04-10 | Stop reason: HOSPADM

## 2023-04-06 RX ORDER — AMLODIPINE BESYLATE 5 MG/1
5 TABLET ORAL 2 TIMES DAILY
Status: DISCONTINUED | OUTPATIENT
Start: 2023-04-06 | End: 2023-04-10 | Stop reason: HOSPADM

## 2023-04-06 RX ORDER — LEVOTHYROXINE SODIUM 0.15 MG/1
150 TABLET ORAL DAILY
Status: DISCONTINUED | OUTPATIENT
Start: 2023-04-07 | End: 2023-04-10 | Stop reason: HOSPADM

## 2023-04-06 RX ORDER — DEXTROSE MONOHYDRATE 100 MG/ML
INJECTION, SOLUTION INTRAVENOUS CONTINUOUS PRN
Status: DISCONTINUED | OUTPATIENT
Start: 2023-04-06 | End: 2023-04-10 | Stop reason: HOSPADM

## 2023-04-06 RX ORDER — SODIUM CHLORIDE 9 MG/ML
INJECTION, SOLUTION INTRAVENOUS PRN
Status: DISCONTINUED | OUTPATIENT
Start: 2023-04-06 | End: 2023-04-10 | Stop reason: HOSPADM

## 2023-04-06 RX ORDER — ATORVASTATIN CALCIUM 10 MG/1
10 TABLET, FILM COATED ORAL DAILY
Status: DISCONTINUED | OUTPATIENT
Start: 2023-04-06 | End: 2023-04-10 | Stop reason: HOSPADM

## 2023-04-06 RX ORDER — POLYETHYLENE GLYCOL 3350 17 G/17G
17 POWDER, FOR SOLUTION ORAL DAILY PRN
Status: DISCONTINUED | OUTPATIENT
Start: 2023-04-06 | End: 2023-04-10 | Stop reason: HOSPADM

## 2023-04-06 RX ORDER — MONTELUKAST SODIUM 10 MG/1
10 TABLET ORAL NIGHTLY
Status: DISCONTINUED | OUTPATIENT
Start: 2023-04-06 | End: 2023-04-10 | Stop reason: HOSPADM

## 2023-04-06 RX ORDER — ISOSORBIDE MONONITRATE 60 MG/1
60 TABLET, EXTENDED RELEASE ORAL EVERY MORNING
Status: DISCONTINUED | OUTPATIENT
Start: 2023-04-07 | End: 2023-04-10 | Stop reason: HOSPADM

## 2023-04-06 RX ORDER — FLUTICASONE PROPIONATE 110 UG/1
2 AEROSOL, METERED RESPIRATORY (INHALATION) 2 TIMES DAILY
Status: DISCONTINUED | OUTPATIENT
Start: 2023-04-06 | End: 2023-04-10 | Stop reason: HOSPADM

## 2023-04-06 RX ORDER — FUROSEMIDE 20 MG/1
20 TABLET ORAL DAILY
Status: DISCONTINUED | OUTPATIENT
Start: 2023-04-07 | End: 2023-04-10 | Stop reason: HOSPADM

## 2023-04-06 RX ORDER — POTASSIUM CHLORIDE 20 MEQ/1
40 TABLET, EXTENDED RELEASE ORAL PRN
Status: DISCONTINUED | OUTPATIENT
Start: 2023-04-06 | End: 2023-04-07

## 2023-04-06 RX ORDER — INSULIN LISPRO 100 [IU]/ML
0-4 INJECTION, SOLUTION INTRAVENOUS; SUBCUTANEOUS
Status: DISCONTINUED | OUTPATIENT
Start: 2023-04-07 | End: 2023-04-10 | Stop reason: HOSPADM

## 2023-04-06 RX ORDER — INSULIN GLARGINE 100 [IU]/ML
20 INJECTION, SOLUTION SUBCUTANEOUS NIGHTLY
Status: DISCONTINUED | OUTPATIENT
Start: 2023-04-06 | End: 2023-04-10 | Stop reason: HOSPADM

## 2023-04-06 RX ORDER — SENNA PLUS 8.6 MG/1
1 TABLET ORAL DAILY
Status: DISCONTINUED | OUTPATIENT
Start: 2023-04-06 | End: 2023-04-10 | Stop reason: HOSPADM

## 2023-04-06 RX ORDER — HEPARIN SODIUM 5000 [USP'U]/ML
5000 INJECTION, SOLUTION INTRAVENOUS; SUBCUTANEOUS EVERY 8 HOURS SCHEDULED
Status: DISCONTINUED | OUTPATIENT
Start: 2023-04-06 | End: 2023-04-10 | Stop reason: HOSPADM

## 2023-04-06 RX ORDER — CALCIUM CARBONATE-CHOLECALCIFEROL TAB 250 MG-125 UNIT 250-125 MG-UNIT
2 TAB ORAL DAILY
Status: DISCONTINUED | OUTPATIENT
Start: 2023-04-06 | End: 2023-04-10 | Stop reason: HOSPADM

## 2023-04-06 RX ORDER — POTASSIUM CHLORIDE 7.45 MG/ML
10 INJECTION INTRAVENOUS PRN
Status: DISCONTINUED | OUTPATIENT
Start: 2023-04-06 | End: 2023-04-07

## 2023-04-06 RX ADMIN — INSULIN GLARGINE 20 UNITS: 100 INJECTION, SOLUTION SUBCUTANEOUS at 22:35

## 2023-04-06 RX ADMIN — HEPARIN SODIUM 5000 UNITS: 5000 INJECTION INTRAVENOUS; SUBCUTANEOUS at 22:35

## 2023-04-06 RX ADMIN — ATORVASTATIN CALCIUM 10 MG: 10 TABLET, FILM COATED ORAL at 22:33

## 2023-04-06 RX ADMIN — SENNOSIDES 8.6 MG: 8.6 TABLET, COATED ORAL at 22:34

## 2023-04-06 RX ADMIN — SODIUM CHLORIDE, PRESERVATIVE FREE 10 ML: 5 INJECTION INTRAVENOUS at 22:36

## 2023-04-06 RX ADMIN — AMLODIPINE BESYLATE 5 MG: 5 TABLET ORAL at 22:34

## 2023-04-06 RX ADMIN — MONTELUKAST 10 MG: 10 TABLET, FILM COATED ORAL at 22:34

## 2023-04-06 RX ADMIN — Medication 2 TABLET: at 22:33

## 2023-04-07 ENCOUNTER — APPOINTMENT (OUTPATIENT)
Dept: GENERAL RADIOLOGY | Age: 83
DRG: 682 | End: 2023-04-07
Attending: FAMILY MEDICINE
Payer: MEDICARE

## 2023-04-07 LAB
ABSOLUTE EOS #: 0.12 K/UL (ref 0–0.44)
ABSOLUTE IMMATURE GRANULOCYTE: 0.02 K/UL (ref 0–0.3)
ABSOLUTE LYMPH #: 1.67 K/UL (ref 1.1–3.7)
ABSOLUTE MONO #: 0.59 K/UL (ref 0.1–1.2)
ALBUMIN SERPL-MCNC: 3.9 G/DL (ref 3.5–5.2)
ALP SERPL-CCNC: 55 U/L (ref 35–104)
ALT SERPL-CCNC: 9 U/L (ref 5–33)
ANION GAP SERPL CALCULATED.3IONS-SCNC: 13 MMOL/L (ref 9–17)
AST SERPL-CCNC: 17 U/L
BASOPHILS # BLD: 1 % (ref 0–2)
BASOPHILS ABSOLUTE: 0.05 K/UL (ref 0–0.2)
BILIRUB SERPL-MCNC: 0.4 MG/DL (ref 0.3–1.2)
BUN SERPL-MCNC: 45 MG/DL (ref 8–23)
BUN/CREAT BLD: 15 (ref 9–20)
CALCIUM SERPL-MCNC: 9.7 MG/DL (ref 8.6–10.4)
CHLORIDE SERPL-SCNC: 101 MMOL/L (ref 98–107)
CO2 SERPL-SCNC: 23 MMOL/L (ref 20–31)
CREAT SERPL-MCNC: 3.02 MG/DL (ref 0.5–0.9)
EOSINOPHILS RELATIVE PERCENT: 2 % (ref 1–4)
EST. AVERAGE GLUCOSE BLD GHB EST-MCNC: 177 MG/DL
GFR SERPL CREATININE-BSD FRML MDRD: 15 ML/MIN/1.73M2
GLUCOSE BLD-MCNC: 133 MG/DL (ref 65–105)
GLUCOSE BLD-MCNC: 201 MG/DL (ref 65–105)
GLUCOSE BLD-MCNC: 225 MG/DL (ref 65–105)
GLUCOSE SERPL-MCNC: 301 MG/DL (ref 70–99)
HAV IGM SER QL: NONREACTIVE
HBA1C MFR BLD: 7.8 % (ref 4–6)
HBV CORE IGM SER QL: NONREACTIVE
HBV SURFACE AG SER QL: NONREACTIVE
HCT VFR BLD AUTO: 31.9 % (ref 36.3–47.1)
HCV AB SER QL: NONREACTIVE
HGB BLD-MCNC: 9.9 G/DL (ref 11.9–15.1)
IMMATURE GRANULOCYTES: 0 %
INR PPP: 1
LYMPHOCYTES # BLD: 24 % (ref 24–43)
MCH RBC QN AUTO: 29.3 PG (ref 25.2–33.5)
MCHC RBC AUTO-ENTMCNC: 31 G/DL (ref 28.4–34.8)
MCV RBC AUTO: 94.4 FL (ref 82.6–102.9)
MONOCYTES # BLD: 8 % (ref 3–12)
NRBC AUTOMATED: 0 PER 100 WBC
PDW BLD-RTO: 13 % (ref 11.8–14.4)
PHOSPHATE SERPL-MCNC: 4 MG/DL (ref 2.6–4.5)
PLATELET # BLD AUTO: 248 K/UL (ref 138–453)
PMV BLD AUTO: 10 FL (ref 8.1–13.5)
POTASSIUM SERPL-SCNC: 4.1 MMOL/L (ref 3.7–5.3)
PROT SERPL-MCNC: 6.9 G/DL (ref 6.4–8.3)
PROTHROMBIN TIME: 13.3 SEC (ref 11.5–14.2)
PTH-INTACT SERPL-MCNC: 13.6 PG/ML (ref 14–72)
RBC # BLD: 3.38 M/UL (ref 3.95–5.11)
SEG NEUTROPHILS: 65 % (ref 36–65)
SEGMENTED NEUTROPHILS ABSOLUTE COUNT: 4.67 K/UL (ref 1.5–8.1)
SODIUM SERPL-SCNC: 137 MMOL/L (ref 135–144)
TSH SERPL-ACNC: 1.53 UIU/ML (ref 0.3–5)
WBC # BLD AUTO: 7.1 K/UL (ref 3.5–11.3)

## 2023-04-07 PROCEDURE — 82947 ASSAY GLUCOSE BLOOD QUANT: CPT

## 2023-04-07 PROCEDURE — 80053 COMPREHEN METABOLIC PANEL: CPT

## 2023-04-07 PROCEDURE — 85610 PROTHROMBIN TIME: CPT

## 2023-04-07 PROCEDURE — 99231 SBSQ HOSP IP/OBS SF/LOW 25: CPT | Performed by: INTERNAL MEDICINE

## 2023-04-07 PROCEDURE — 6360000002 HC RX W HCPCS: Performed by: NURSE PRACTITIONER

## 2023-04-07 PROCEDURE — 2060000000 HC ICU INTERMEDIATE R&B

## 2023-04-07 PROCEDURE — 2580000003 HC RX 258: Performed by: NURSE PRACTITIONER

## 2023-04-07 PROCEDURE — 6370000000 HC RX 637 (ALT 250 FOR IP): Performed by: INTERNAL MEDICINE

## 2023-04-07 PROCEDURE — 71045 X-RAY EXAM CHEST 1 VIEW: CPT

## 2023-04-07 PROCEDURE — 84100 ASSAY OF PHOSPHORUS: CPT

## 2023-04-07 PROCEDURE — 6370000000 HC RX 637 (ALT 250 FOR IP): Performed by: NURSE PRACTITIONER

## 2023-04-07 PROCEDURE — 5A1D70Z PERFORMANCE OF URINARY FILTRATION, INTERMITTENT, LESS THAN 6 HOURS PER DAY: ICD-10-PCS | Performed by: INTERNAL MEDICINE

## 2023-04-07 PROCEDURE — 80074 ACUTE HEPATITIS PANEL: CPT

## 2023-04-07 PROCEDURE — 36415 COLL VENOUS BLD VENIPUNCTURE: CPT

## 2023-04-07 PROCEDURE — 84443 ASSAY THYROID STIM HORMONE: CPT

## 2023-04-07 PROCEDURE — 83970 ASSAY OF PARATHORMONE: CPT

## 2023-04-07 PROCEDURE — 85025 COMPLETE CBC W/AUTO DIFF WBC: CPT

## 2023-04-07 PROCEDURE — 83036 HEMOGLOBIN GLYCOSYLATED A1C: CPT

## 2023-04-07 PROCEDURE — 90935 HEMODIALYSIS ONE EVALUATION: CPT

## 2023-04-07 RX ORDER — BETAMETHASONE DIPROPIONATE 0.05 %
OINTMENT (GRAM) TOPICAL
COMMUNITY

## 2023-04-07 RX ORDER — LANOLIN ALCOHOL/MO/W.PET/CERES
324 CREAM (GRAM) TOPICAL 2 TIMES DAILY
Status: DISCONTINUED | OUTPATIENT
Start: 2023-04-07 | End: 2023-04-10 | Stop reason: HOSPADM

## 2023-04-07 RX ORDER — GABAPENTIN 100 MG/1
100 CAPSULE ORAL 3 TIMES DAILY
Status: DISCONTINUED | OUTPATIENT
Start: 2023-04-07 | End: 2023-04-10 | Stop reason: HOSPADM

## 2023-04-07 RX ADMIN — Medication 2 TABLET: at 08:39

## 2023-04-07 RX ADMIN — HEPARIN SODIUM 5000 UNITS: 5000 INJECTION INTRAVENOUS; SUBCUTANEOUS at 05:38

## 2023-04-07 RX ADMIN — LEVOTHYROXINE SODIUM 150 MCG: 150 TABLET ORAL at 05:38

## 2023-04-07 RX ADMIN — HEPARIN SODIUM 5000 UNITS: 5000 INJECTION INTRAVENOUS; SUBCUTANEOUS at 22:10

## 2023-04-07 RX ADMIN — MONTELUKAST 10 MG: 10 TABLET, FILM COATED ORAL at 22:08

## 2023-04-07 RX ADMIN — SENNOSIDES 8.6 MG: 8.6 TABLET, COATED ORAL at 08:38

## 2023-04-07 RX ADMIN — ISOSORBIDE MONONITRATE 60 MG: 60 TABLET, EXTENDED RELEASE ORAL at 08:38

## 2023-04-07 RX ADMIN — INSULIN GLARGINE 20 UNITS: 100 INJECTION, SOLUTION SUBCUTANEOUS at 22:11

## 2023-04-07 RX ADMIN — FUROSEMIDE 20 MG: 20 TABLET ORAL at 08:45

## 2023-04-07 RX ADMIN — INSULIN LISPRO 1 UNITS: 100 INJECTION, SOLUTION INTRAVENOUS; SUBCUTANEOUS at 08:40

## 2023-04-07 RX ADMIN — ACETAMINOPHEN 650 MG: 325 TABLET ORAL at 23:39

## 2023-04-07 RX ADMIN — ASPIRIN 81 MG CHEWABLE TABLET 81 MG: 81 TABLET CHEWABLE at 08:38

## 2023-04-07 RX ADMIN — SODIUM CHLORIDE, PRESERVATIVE FREE 10 ML: 5 INJECTION INTRAVENOUS at 08:38

## 2023-04-07 RX ADMIN — FERROUS SULFATE TAB EC 325 MG (65 MG FE EQUIVALENT) 325 MG: 325 (65 FE) TABLET DELAYED RESPONSE at 14:19

## 2023-04-07 RX ADMIN — FERROUS SULFATE TAB EC 325 MG (65 MG FE EQUIVALENT) 324 MG: 325 (65 FE) TABLET DELAYED RESPONSE at 22:11

## 2023-04-07 RX ADMIN — SODIUM CHLORIDE, PRESERVATIVE FREE 10 ML: 5 INJECTION INTRAVENOUS at 22:11

## 2023-04-07 RX ADMIN — HEPARIN SODIUM 5000 UNITS: 5000 INJECTION INTRAVENOUS; SUBCUTANEOUS at 14:19

## 2023-04-07 RX ADMIN — GABAPENTIN 100 MG: 100 CAPSULE ORAL at 14:19

## 2023-04-07 RX ADMIN — ATORVASTATIN CALCIUM 10 MG: 10 TABLET, FILM COATED ORAL at 08:39

## 2023-04-07 RX ADMIN — INSULIN LISPRO 1 UNITS: 100 INJECTION, SOLUTION INTRAVENOUS; SUBCUTANEOUS at 18:57

## 2023-04-07 RX ADMIN — GABAPENTIN 100 MG: 100 CAPSULE ORAL at 22:10

## 2023-04-07 ASSESSMENT — ENCOUNTER SYMPTOMS
COUGH: 0
CHEST TIGHTNESS: 0
GASTROINTESTINAL NEGATIVE: 1
SHORTNESS OF BREATH: 1
WHEEZING: 0
EYES NEGATIVE: 1

## 2023-04-07 ASSESSMENT — PAIN DESCRIPTION - ORIENTATION: ORIENTATION: RIGHT

## 2023-04-07 ASSESSMENT — PAIN DESCRIPTION - LOCATION: LOCATION: BACK;HIP

## 2023-04-07 ASSESSMENT — PAIN DESCRIPTION - DESCRIPTORS: DESCRIPTORS: ACHING

## 2023-04-07 ASSESSMENT — PAIN SCALES - GENERAL: PAINLEVEL_OUTOF10: 6

## 2023-04-08 LAB
ANION GAP SERPL CALCULATED.3IONS-SCNC: 11 MMOL/L (ref 9–17)
BUN SERPL-MCNC: 34 MG/DL (ref 8–23)
BUN/CREAT BLD: 13 (ref 9–20)
CALCIUM SERPL-MCNC: 8.9 MG/DL (ref 8.6–10.4)
CHLORIDE SERPL-SCNC: 100 MMOL/L (ref 98–107)
CO2 SERPL-SCNC: 26 MMOL/L (ref 20–31)
CREAT SERPL-MCNC: 2.68 MG/DL (ref 0.5–0.9)
GFR SERPL CREATININE-BSD FRML MDRD: 17 ML/MIN/1.73M2
GLUCOSE BLD-MCNC: 141 MG/DL (ref 65–105)
GLUCOSE BLD-MCNC: 150 MG/DL (ref 65–105)
GLUCOSE BLD-MCNC: 207 MG/DL (ref 65–105)
GLUCOSE BLD-MCNC: 247 MG/DL (ref 65–105)
GLUCOSE BLD-MCNC: 290 MG/DL (ref 65–105)
GLUCOSE SERPL-MCNC: 150 MG/DL (ref 70–99)
HCT VFR BLD AUTO: 29.6 % (ref 36.3–47.1)
HGB BLD-MCNC: 9.4 G/DL (ref 11.9–15.1)
MCH RBC QN AUTO: 29 PG (ref 25.2–33.5)
MCHC RBC AUTO-ENTMCNC: 31.8 G/DL (ref 28.4–34.8)
MCV RBC AUTO: 91.4 FL (ref 82.6–102.9)
NRBC AUTOMATED: 0 PER 100 WBC
PDW BLD-RTO: 12.8 % (ref 11.8–14.4)
PLATELET # BLD AUTO: 223 K/UL (ref 138–453)
PMV BLD AUTO: 10.1 FL (ref 8.1–13.5)
POTASSIUM SERPL-SCNC: 4 MMOL/L (ref 3.7–5.3)
RBC # BLD: 3.24 M/UL (ref 3.95–5.11)
SODIUM SERPL-SCNC: 137 MMOL/L (ref 135–144)
WBC # BLD AUTO: 5.8 K/UL (ref 3.5–11.3)

## 2023-04-08 PROCEDURE — 6370000000 HC RX 637 (ALT 250 FOR IP): Performed by: NURSE PRACTITIONER

## 2023-04-08 PROCEDURE — 85027 COMPLETE CBC AUTOMATED: CPT

## 2023-04-08 PROCEDURE — 36415 COLL VENOUS BLD VENIPUNCTURE: CPT

## 2023-04-08 PROCEDURE — 2060000000 HC ICU INTERMEDIATE R&B

## 2023-04-08 PROCEDURE — 6360000002 HC RX W HCPCS: Performed by: NURSE PRACTITIONER

## 2023-04-08 PROCEDURE — 90935 HEMODIALYSIS ONE EVALUATION: CPT

## 2023-04-08 PROCEDURE — 80048 BASIC METABOLIC PNL TOTAL CA: CPT

## 2023-04-08 PROCEDURE — 99232 SBSQ HOSP IP/OBS MODERATE 35: CPT | Performed by: FAMILY MEDICINE

## 2023-04-08 PROCEDURE — 2580000003 HC RX 258: Performed by: NURSE PRACTITIONER

## 2023-04-08 PROCEDURE — 6370000000 HC RX 637 (ALT 250 FOR IP): Performed by: INTERNAL MEDICINE

## 2023-04-08 RX ADMIN — GABAPENTIN 100 MG: 100 CAPSULE ORAL at 13:54

## 2023-04-08 RX ADMIN — SODIUM CHLORIDE, PRESERVATIVE FREE 10 ML: 5 INJECTION INTRAVENOUS at 22:04

## 2023-04-08 RX ADMIN — MONTELUKAST 10 MG: 10 TABLET, FILM COATED ORAL at 22:04

## 2023-04-08 RX ADMIN — AMLODIPINE BESYLATE 5 MG: 5 TABLET ORAL at 22:03

## 2023-04-08 RX ADMIN — ASPIRIN 81 MG CHEWABLE TABLET 81 MG: 81 TABLET CHEWABLE at 09:07

## 2023-04-08 RX ADMIN — HEPARIN SODIUM 5000 UNITS: 5000 INJECTION INTRAVENOUS; SUBCUTANEOUS at 22:03

## 2023-04-08 RX ADMIN — ACETAMINOPHEN 650 MG: 325 TABLET ORAL at 16:21

## 2023-04-08 RX ADMIN — HEPARIN SODIUM 5000 UNITS: 5000 INJECTION INTRAVENOUS; SUBCUTANEOUS at 05:52

## 2023-04-08 RX ADMIN — INSULIN GLARGINE 20 UNITS: 100 INJECTION, SOLUTION SUBCUTANEOUS at 22:04

## 2023-04-08 RX ADMIN — INSULIN LISPRO 1 UNITS: 100 INJECTION, SOLUTION INTRAVENOUS; SUBCUTANEOUS at 17:42

## 2023-04-08 RX ADMIN — FERROUS SULFATE TAB EC 325 MG (65 MG FE EQUIVALENT) 324 MG: 325 (65 FE) TABLET DELAYED RESPONSE at 09:07

## 2023-04-08 RX ADMIN — LEVOTHYROXINE SODIUM 150 MCG: 150 TABLET ORAL at 05:52

## 2023-04-08 RX ADMIN — HEPARIN SODIUM 5000 UNITS: 5000 INJECTION INTRAVENOUS; SUBCUTANEOUS at 13:54

## 2023-04-08 RX ADMIN — ATORVASTATIN CALCIUM 10 MG: 10 TABLET, FILM COATED ORAL at 09:06

## 2023-04-08 RX ADMIN — SENNOSIDES 8.6 MG: 8.6 TABLET, COATED ORAL at 09:06

## 2023-04-08 RX ADMIN — GABAPENTIN 100 MG: 100 CAPSULE ORAL at 22:03

## 2023-04-08 RX ADMIN — FERROUS SULFATE TAB EC 325 MG (65 MG FE EQUIVALENT) 324 MG: 325 (65 FE) TABLET DELAYED RESPONSE at 22:04

## 2023-04-08 RX ADMIN — Medication 2 TABLET: at 09:06

## 2023-04-08 RX ADMIN — SODIUM CHLORIDE, PRESERVATIVE FREE 10 ML: 5 INJECTION INTRAVENOUS at 09:06

## 2023-04-08 RX ADMIN — GABAPENTIN 100 MG: 100 CAPSULE ORAL at 09:07

## 2023-04-08 ASSESSMENT — ENCOUNTER SYMPTOMS
DIARRHEA: 0
ABDOMINAL PAIN: 0
VOMITING: 0
COUGH: 0
BLOOD IN STOOL: 0
NAUSEA: 0
WHEEZING: 0
RHINORRHEA: 0
CONSTIPATION: 0
CHEST TIGHTNESS: 0
SHORTNESS OF BREATH: 0

## 2023-04-08 ASSESSMENT — PAIN SCALES - GENERAL: PAINLEVEL_OUTOF10: 6

## 2023-04-08 ASSESSMENT — PAIN DESCRIPTION - LOCATION: LOCATION: BACK

## 2023-04-09 LAB
ANION GAP SERPL CALCULATED.3IONS-SCNC: 11 MMOL/L (ref 9–17)
BUN SERPL-MCNC: 28 MG/DL (ref 8–23)
BUN/CREAT BLD: 12 (ref 9–20)
CALCIUM SERPL-MCNC: 9.1 MG/DL (ref 8.6–10.4)
CHLORIDE SERPL-SCNC: 103 MMOL/L (ref 98–107)
CO2 SERPL-SCNC: 25 MMOL/L (ref 20–31)
CREAT SERPL-MCNC: 2.43 MG/DL (ref 0.5–0.9)
GFR SERPL CREATININE-BSD FRML MDRD: 20 ML/MIN/1.73M2
GLUCOSE BLD-MCNC: 105 MG/DL (ref 65–105)
GLUCOSE BLD-MCNC: 223 MG/DL (ref 65–105)
GLUCOSE BLD-MCNC: 226 MG/DL (ref 65–105)
GLUCOSE BLD-MCNC: 234 MG/DL (ref 65–105)
GLUCOSE BLD-MCNC: 276 MG/DL (ref 65–105)
GLUCOSE SERPL-MCNC: 151 MG/DL (ref 70–99)
HCT VFR BLD AUTO: 31.5 % (ref 36.3–47.1)
HGB BLD-MCNC: 9.9 G/DL (ref 11.9–15.1)
MCH RBC QN AUTO: 29.3 PG (ref 25.2–33.5)
MCHC RBC AUTO-ENTMCNC: 31.4 G/DL (ref 28.4–34.8)
MCV RBC AUTO: 93.2 FL (ref 82.6–102.9)
NRBC AUTOMATED: 0 PER 100 WBC
PDW BLD-RTO: 12.7 % (ref 11.8–14.4)
PLATELET # BLD AUTO: 206 K/UL (ref 138–453)
PMV BLD AUTO: 10.1 FL (ref 8.1–13.5)
POTASSIUM SERPL-SCNC: 3.8 MMOL/L (ref 3.7–5.3)
RBC # BLD: 3.38 M/UL (ref 3.95–5.11)
SODIUM SERPL-SCNC: 139 MMOL/L (ref 135–144)
WBC # BLD AUTO: 5.5 K/UL (ref 3.5–11.3)

## 2023-04-09 PROCEDURE — 82947 ASSAY GLUCOSE BLOOD QUANT: CPT

## 2023-04-09 PROCEDURE — 6370000000 HC RX 637 (ALT 250 FOR IP): Performed by: NURSE PRACTITIONER

## 2023-04-09 PROCEDURE — 2580000003 HC RX 258: Performed by: NURSE PRACTITIONER

## 2023-04-09 PROCEDURE — 6360000002 HC RX W HCPCS: Performed by: CLINICAL NURSE SPECIALIST

## 2023-04-09 PROCEDURE — 6360000002 HC RX W HCPCS: Performed by: NURSE PRACTITIONER

## 2023-04-09 PROCEDURE — 80048 BASIC METABOLIC PNL TOTAL CA: CPT

## 2023-04-09 PROCEDURE — 2060000000 HC ICU INTERMEDIATE R&B

## 2023-04-09 PROCEDURE — 99232 SBSQ HOSP IP/OBS MODERATE 35: CPT | Performed by: FAMILY MEDICINE

## 2023-04-09 PROCEDURE — 85027 COMPLETE CBC AUTOMATED: CPT

## 2023-04-09 PROCEDURE — 36415 COLL VENOUS BLD VENIPUNCTURE: CPT

## 2023-04-09 PROCEDURE — 6370000000 HC RX 637 (ALT 250 FOR IP): Performed by: INTERNAL MEDICINE

## 2023-04-09 RX ORDER — HYDRALAZINE HYDROCHLORIDE 20 MG/ML
5 INJECTION INTRAMUSCULAR; INTRAVENOUS EVERY 6 HOURS PRN
Status: DISCONTINUED | OUTPATIENT
Start: 2023-04-09 | End: 2023-04-10 | Stop reason: HOSPADM

## 2023-04-09 RX ADMIN — HYDRALAZINE HYDROCHLORIDE 5 MG: 20 INJECTION INTRAMUSCULAR; INTRAVENOUS at 00:36

## 2023-04-09 RX ADMIN — PSYLLIUM HUSK 1 PACKET: 3.4 POWDER ORAL at 08:27

## 2023-04-09 RX ADMIN — HEPARIN SODIUM 5000 UNITS: 5000 INJECTION INTRAVENOUS; SUBCUTANEOUS at 14:21

## 2023-04-09 RX ADMIN — SENNOSIDES 8.6 MG: 8.6 TABLET, COATED ORAL at 08:27

## 2023-04-09 RX ADMIN — ISOSORBIDE MONONITRATE 60 MG: 60 TABLET, EXTENDED RELEASE ORAL at 08:26

## 2023-04-09 RX ADMIN — ACETAMINOPHEN 650 MG: 325 TABLET ORAL at 08:26

## 2023-04-09 RX ADMIN — AMLODIPINE BESYLATE 5 MG: 5 TABLET ORAL at 20:43

## 2023-04-09 RX ADMIN — FUROSEMIDE 20 MG: 20 TABLET ORAL at 08:26

## 2023-04-09 RX ADMIN — AMLODIPINE BESYLATE 5 MG: 5 TABLET ORAL at 08:26

## 2023-04-09 RX ADMIN — MONTELUKAST 10 MG: 10 TABLET, FILM COATED ORAL at 20:43

## 2023-04-09 RX ADMIN — INSULIN LISPRO 2 UNITS: 100 INJECTION, SOLUTION INTRAVENOUS; SUBCUTANEOUS at 17:07

## 2023-04-09 RX ADMIN — LEVOTHYROXINE SODIUM 150 MCG: 150 TABLET ORAL at 05:21

## 2023-04-09 RX ADMIN — INSULIN GLARGINE 20 UNITS: 100 INJECTION, SOLUTION SUBCUTANEOUS at 20:44

## 2023-04-09 RX ADMIN — HEPARIN SODIUM 5000 UNITS: 5000 INJECTION INTRAVENOUS; SUBCUTANEOUS at 20:44

## 2023-04-09 RX ADMIN — INSULIN LISPRO 1 UNITS: 100 INJECTION, SOLUTION INTRAVENOUS; SUBCUTANEOUS at 12:26

## 2023-04-09 RX ADMIN — SODIUM CHLORIDE, PRESERVATIVE FREE 10 ML: 5 INJECTION INTRAVENOUS at 20:44

## 2023-04-09 RX ADMIN — ATORVASTATIN CALCIUM 10 MG: 10 TABLET, FILM COATED ORAL at 08:26

## 2023-04-09 RX ADMIN — ASPIRIN 81 MG CHEWABLE TABLET 81 MG: 81 TABLET CHEWABLE at 08:26

## 2023-04-09 RX ADMIN — GABAPENTIN 100 MG: 100 CAPSULE ORAL at 08:26

## 2023-04-09 RX ADMIN — GABAPENTIN 100 MG: 100 CAPSULE ORAL at 14:21

## 2023-04-09 RX ADMIN — SODIUM CHLORIDE, PRESERVATIVE FREE 10 ML: 5 INJECTION INTRAVENOUS at 08:27

## 2023-04-09 RX ADMIN — FERROUS SULFATE TAB EC 325 MG (65 MG FE EQUIVALENT) 324 MG: 325 (65 FE) TABLET DELAYED RESPONSE at 20:43

## 2023-04-09 RX ADMIN — GABAPENTIN 100 MG: 100 CAPSULE ORAL at 20:43

## 2023-04-09 RX ADMIN — HEPARIN SODIUM 5000 UNITS: 5000 INJECTION INTRAVENOUS; SUBCUTANEOUS at 05:21

## 2023-04-09 RX ADMIN — Medication 2 TABLET: at 08:26

## 2023-04-09 RX ADMIN — FERROUS SULFATE TAB EC 325 MG (65 MG FE EQUIVALENT) 324 MG: 325 (65 FE) TABLET DELAYED RESPONSE at 08:26

## 2023-04-09 ASSESSMENT — ENCOUNTER SYMPTOMS
SHORTNESS OF BREATH: 0
DIARRHEA: 0
CHEST TIGHTNESS: 0
WHEEZING: 0
NAUSEA: 0
BLOOD IN STOOL: 0
RHINORRHEA: 0
VOMITING: 0
CONSTIPATION: 0
ABDOMINAL PAIN: 0
COUGH: 0

## 2023-04-09 ASSESSMENT — PAIN SCALES - GENERAL: PAINLEVEL_OUTOF10: 6

## 2023-04-09 ASSESSMENT — PAIN DESCRIPTION - LOCATION: LOCATION: HIP

## 2023-04-09 ASSESSMENT — PAIN DESCRIPTION - ORIENTATION: ORIENTATION: RIGHT

## 2023-04-10 VITALS
WEIGHT: 179.23 LBS | OXYGEN SATURATION: 99 % | HEART RATE: 60 BPM | RESPIRATION RATE: 16 BRPM | DIASTOLIC BLOOD PRESSURE: 53 MMHG | HEIGHT: 66 IN | BODY MASS INDEX: 28.81 KG/M2 | SYSTOLIC BLOOD PRESSURE: 141 MMHG | TEMPERATURE: 98.1 F

## 2023-04-10 LAB
ANION GAP SERPL CALCULATED.3IONS-SCNC: 10 MMOL/L (ref 9–17)
BUN SERPL-MCNC: 40 MG/DL (ref 8–23)
BUN/CREAT BLD: 13 (ref 9–20)
CALCIUM SERPL-MCNC: 9.1 MG/DL (ref 8.6–10.4)
CHLORIDE SERPL-SCNC: 104 MMOL/L (ref 98–107)
CO2 SERPL-SCNC: 26 MMOL/L (ref 20–31)
CREAT SERPL-MCNC: 2.98 MG/DL (ref 0.5–0.9)
GFR SERPL CREATININE-BSD FRML MDRD: 15 ML/MIN/1.73M2
GLUCOSE BLD-MCNC: 116 MG/DL (ref 65–105)
GLUCOSE BLD-MCNC: 60 MG/DL (ref 65–105)
GLUCOSE BLD-MCNC: 62 MG/DL (ref 65–105)
GLUCOSE BLD-MCNC: 88 MG/DL (ref 65–105)
GLUCOSE BLD-MCNC: 92 MG/DL (ref 65–105)
GLUCOSE SERPL-MCNC: 58 MG/DL (ref 70–99)
HCT VFR BLD AUTO: 32.6 % (ref 36.3–47.1)
HGB BLD-MCNC: 10.1 G/DL (ref 11.9–15.1)
MCH RBC QN AUTO: 28.9 PG (ref 25.2–33.5)
MCHC RBC AUTO-ENTMCNC: 31 G/DL (ref 28.4–34.8)
MCV RBC AUTO: 93.4 FL (ref 82.6–102.9)
NRBC AUTOMATED: 0 PER 100 WBC
PDW BLD-RTO: 12.9 % (ref 11.8–14.4)
PLATELET # BLD AUTO: 217 K/UL (ref 138–453)
PMV BLD AUTO: 10.4 FL (ref 8.1–13.5)
POTASSIUM SERPL-SCNC: 3.5 MMOL/L (ref 3.7–5.3)
RBC # BLD: 3.49 M/UL (ref 3.95–5.11)
SODIUM SERPL-SCNC: 140 MMOL/L (ref 135–144)
WBC # BLD AUTO: 6.8 K/UL (ref 3.5–11.3)

## 2023-04-10 PROCEDURE — 97530 THERAPEUTIC ACTIVITIES: CPT

## 2023-04-10 PROCEDURE — 97166 OT EVAL MOD COMPLEX 45 MIN: CPT

## 2023-04-10 PROCEDURE — 6360000002 HC RX W HCPCS: Performed by: NURSE PRACTITIONER

## 2023-04-10 PROCEDURE — 97162 PT EVAL MOD COMPLEX 30 MIN: CPT

## 2023-04-10 PROCEDURE — 85027 COMPLETE CBC AUTOMATED: CPT

## 2023-04-10 PROCEDURE — 6370000000 HC RX 637 (ALT 250 FOR IP): Performed by: INTERNAL MEDICINE

## 2023-04-10 PROCEDURE — 82947 ASSAY GLUCOSE BLOOD QUANT: CPT

## 2023-04-10 PROCEDURE — 6370000000 HC RX 637 (ALT 250 FOR IP): Performed by: NURSE PRACTITIONER

## 2023-04-10 PROCEDURE — 2580000003 HC RX 258: Performed by: NURSE PRACTITIONER

## 2023-04-10 PROCEDURE — 99232 SBSQ HOSP IP/OBS MODERATE 35: CPT | Performed by: FAMILY MEDICINE

## 2023-04-10 PROCEDURE — 97535 SELF CARE MNGMENT TRAINING: CPT

## 2023-04-10 PROCEDURE — 80048 BASIC METABOLIC PNL TOTAL CA: CPT

## 2023-04-10 PROCEDURE — 36415 COLL VENOUS BLD VENIPUNCTURE: CPT

## 2023-04-10 PROCEDURE — 97116 GAIT TRAINING THERAPY: CPT

## 2023-04-10 PROCEDURE — 90935 HEMODIALYSIS ONE EVALUATION: CPT

## 2023-04-10 RX ADMIN — ATORVASTATIN CALCIUM 10 MG: 10 TABLET, FILM COATED ORAL at 09:15

## 2023-04-10 RX ADMIN — ASPIRIN 81 MG CHEWABLE TABLET 81 MG: 81 TABLET CHEWABLE at 09:26

## 2023-04-10 RX ADMIN — ACETAMINOPHEN 650 MG: 325 TABLET ORAL at 09:15

## 2023-04-10 RX ADMIN — AMLODIPINE BESYLATE 5 MG: 5 TABLET ORAL at 12:05

## 2023-04-10 RX ADMIN — FUROSEMIDE 20 MG: 20 TABLET ORAL at 12:06

## 2023-04-10 RX ADMIN — SENNOSIDES 8.6 MG: 8.6 TABLET, COATED ORAL at 12:06

## 2023-04-10 RX ADMIN — ISOSORBIDE MONONITRATE 60 MG: 60 TABLET, EXTENDED RELEASE ORAL at 12:06

## 2023-04-10 RX ADMIN — GABAPENTIN 100 MG: 100 CAPSULE ORAL at 14:46

## 2023-04-10 RX ADMIN — SODIUM CHLORIDE, PRESERVATIVE FREE 10 ML: 5 INJECTION INTRAVENOUS at 12:08

## 2023-04-10 RX ADMIN — LEVOTHYROXINE SODIUM 150 MCG: 150 TABLET ORAL at 05:02

## 2023-04-10 RX ADMIN — FERROUS SULFATE TAB EC 325 MG (65 MG FE EQUIVALENT) 324 MG: 325 (65 FE) TABLET DELAYED RESPONSE at 09:15

## 2023-04-10 RX ADMIN — GABAPENTIN 100 MG: 100 CAPSULE ORAL at 09:26

## 2023-04-10 RX ADMIN — PSYLLIUM HUSK 1 PACKET: 3.4 POWDER ORAL at 12:07

## 2023-04-10 RX ADMIN — HEPARIN SODIUM 5000 UNITS: 5000 INJECTION INTRAVENOUS; SUBCUTANEOUS at 05:02

## 2023-04-10 RX ADMIN — HEPARIN SODIUM 5000 UNITS: 5000 INJECTION INTRAVENOUS; SUBCUTANEOUS at 14:46

## 2023-04-10 RX ADMIN — Medication 2 TABLET: at 09:15

## 2023-04-10 ASSESSMENT — PAIN DESCRIPTION - LOCATION: LOCATION: HIP

## 2023-04-10 ASSESSMENT — ENCOUNTER SYMPTOMS
DIARRHEA: 0
RHINORRHEA: 0
WHEEZING: 0
BLOOD IN STOOL: 0
NAUSEA: 0
CHEST TIGHTNESS: 0
COUGH: 0
ABDOMINAL PAIN: 0
VOMITING: 0
SHORTNESS OF BREATH: 0
CONSTIPATION: 0

## 2023-04-10 ASSESSMENT — PAIN DESCRIPTION - ORIENTATION: ORIENTATION: RIGHT

## 2023-04-10 ASSESSMENT — PAIN SCALES - GENERAL: PAINLEVEL_OUTOF10: 3

## 2023-04-10 ASSESSMENT — PAIN DESCRIPTION - DESCRIPTORS: DESCRIPTORS: ACHING

## 2023-04-10 NOTE — CARE COORDINATION
Social work: Spoke to [place] who stated Pita/ is no longer at the facility and I would need to speak with Janay Sultana regarding outpatient chair. Await call from Janay Sultana. Clinicals faxed.

## 2023-04-10 NOTE — PROGRESS NOTES
Occupational Therapy  Facility/Department: [facility] PROGRESSIVE CARE  Occupational Therapy Initial Assessment    Name: Veronica Morse  : 1940  MRN: [id number]  Date of Service: 4/10/2023    MARTA Chau reports patient is medically stable for therapy treatment this date. Chart reviewed prior to treatment and patient is agreeable for therapy. All lines intact and patient positioned comfortably at end of treatment. All patient needs addressed prior to ending therapy session. Discharge Recommendations:  Patient would benefit from continued therapy after discharge  Due to recent hospitalization and medical condition, pt would benefit from additional intermittent skilled therapy at time of discharge. Please refer to the AM-PAC score for current functional status. OT Equipment Recommendations  Equipment Needed: Yes  Mobility Devices: ADL Assistive Devices  ADL Assistive Devices: Long-handled [place];Long-handled Sponge;Reacher;Emergency Alert System       Patient Diagnosis(es): There were no encounter diagnoses. Past Medical History:  has a past medical history of CAD (coronary artery disease), Diabetes mellitus ([address] Hyperlipidemia, Hypertension, Hypothyroidism, Lichen sclerosus et atrophicus of the vulva, Osteoarthritis, and Sleep apnea. Past Surgical History:  has a past surgical history that includes Tonsillectomy (); angioplasty (); Foot surgery (Left, 2009); eye surgery; Toe amputation; Vulvectomy (2020); Hysterectomy, total abdominal (); and Toe amputation (Left, 3/31/2020). PER H&P: Veronica Morse is a [de-identified] y.o. Non- / non  female who presents with No chief complaint on file. and is admitted to the hospital for the management of Uremia. Patient has known Stage V kidney disease and follows with Dr Tana Cabrera. She says Dr Tana Cabrera has been discussing beginning HD for approx 1 year. She had an AV fistula placed in 2022 for HD.  Recently she has

## 2023-04-10 NOTE — PROGRESS NOTES
Nephrology Progress Note        Subjective: The patient is seen and examined hemodialysis. The patient's dialysis lasting about 1.5 hours today. Unfortunate, when evaluated the patient, the patient apparently was reading some and has had infiltration of the access. Needles are still in place. Decision made to stop dialysis. The patient had about 1.5 hours of her treatment completed. No chest pain or shortness of breath or nausea or vomiting. Today, the patient had her third dialysis treatment. She is scheduled for possible discharge later today. We are looking at [place] [id number] for her outpatient dialysis treatments under my care. No chest pain or shortness of breath or nausea or vomiting on room air. No significant swelling of the extremities. Plan is for long-term dialysis with the patient now ESRD.         Objective:  CURRENT TEMPERATURE:  Temp: 98.6 °F (37 °C)  MAXIMUM TEMPERATURE OVER 24HRS:  Temp (24hrs), Av °F (36.7 °C), Min:97.3 °F (36.3 °C), Max:98.6 °F (37 °C)    CURRENT RESPIRATORY RATE:  Resp: 16  CURRENT PULSE:  Heart Rate: 57  CURRENT BLOOD PRESSURE:  BP: (!) 133/55  24HR BLOOD PRESSURE RANGE:  Systolic (83QUC), ATT:979 , Min:122 , ICN:083   ; Diastolic (87NBS), DKY:93, Min:47, Max:60    24HR INTAKE/OUTPUT:    Intake/Output Summary (Last 24 hours) at 4/10/2023 1124  Last data filed at 4/10/2023 6820  Gross per 24 hour   Intake 465 ml   Output 400 ml   Net 65 ml     Patient Vitals for the past 96 hrs (Last 3 readings):   Weight   04/10/23 0440 180 lb (81.6 kg)   23 0354 179 lb 3.2 oz (81.3 kg)   23 1217 175 lb 4.3 oz (79.5 kg)         Physical Exam:  General appearance:Awake, alert, in no acute distress  Skin: warm and dry, no rash or erythema  Eyes: conjunctivae normal and sclera anicteric  ENT:no thrush, moist mucous membranes  Neck:  No JVD, trachea is midline and no accessory muscle use  Pulmonary: Good bilateral air entry clear auscultation bilaterally

## 2023-04-10 NOTE — PROGRESS NOTES
HEMODIALYSIS POST TREATMENT NOTE    Treatment time ordered: 3.0Hrs    Actual treatment time: 1.5Hrs    UltraFiltration Goal: 1.0Kgs   UltraFiltration Removed: 0.3Kgs      Pre Treatment weight: 81.6Kgs  Post Treatment weight: 81.3Kgs  Estimated Dry Weight: BRANDI    Access used:     Central Venous Catheter:          Tunneled or Non-tunneled: N/A           Site: N/A          Access Flow: N/A      Internal Access:       AV Fistula or AV Graft: AVF         Site: Left upper arm       Access Flow: Good       Sign and symptoms of infection: N/A       If YES: N/A    Medications or blood products given: See MAR    Chronic outpatient schedule: BRANDI    Chronic outpatient unit: BRANDI    Summary of response to treatment:Patient tolerated treatment good. Patient was reading book and arm bent venous alarm sounded and patient felt pressure in upper arm, venous needle infultrated and unable to cannulate above infultrate. Dr. Katharine Friedman aware. Blood returned thru arterial needle and sites held by tech Wanda for 8 min per site. Bruit and thrill are still present. Explain if orders NOT met, was physician notified:Yes      ACES flowsheet faxed to patient unit/ placed in patient chart: Yes    Post assessment completed: Claudean Earthly    Report given to: [id number] documented Safety Checks include the followin) Access and face visible at all times. 2) All connections and blood lines are secure with no kinks. 3) NVL alarm engaged. 4) Hemosafe device applied (if applicable). 5) No collapse of Arterial or Venous blood chambers. 6) All blood lines / pump segments in the air detectors.

## 2023-04-10 NOTE — PLAN OF CARE
Pt remains safe and free from falls thus far in [facility]. Dialysis ran for 1.5 hours today before she infiltrated. Nephorology is aware and okay with her d/c today if we can get an outpatient dialysis chair. SR on cardiac monitor. Right fistula with thrill and bruit present. Takes 20 units lantus at night. Bed locked and lowest position. Call light in reach. Bed alarm/chair is on for safety. Will continue to monitor pt. Problem: Chronic Conditions and Co-morbidities  Goal: Patient's chronic conditions and co-morbidity symptoms are monitored and maintained or improved  4/10/2023 1309 by Van Vanegas RN  Outcome: Progressing     Problem: Discharge Planning  Goal: Discharge to home or other facility with appropriate resources  4/10/2023 1309 by Van Vanegas RN  Outcome: Progressing     Problem: Skin/Tissue Integrity  Goal: Absence of new skin breakdown  Description: 1. Monitor for areas of redness and/or skin breakdown  2. Assess vascular access sites hourly  3. Every 4-6 hours minimum:  Change oxygen saturation probe site  4. Every 4-6 hours:  If on nasal continuous positive airway pressure, respiratory therapy assess nares and determine need for appliance change or resting period.   4/10/2023 1309 by Van Vanegas RN  Outcome: Progressing     Problem: Safety - Adult  Goal: Free from fall injury  4/10/2023 1309 by Van Vanegas RN  Outcome: Progressing     Problem: ABCDS Injury Assessment  Goal: Absence of physical injury  4/10/2023 1309 by Van Vanegas RN  Outcome: Progressing

## 2023-04-10 NOTE — DISCHARGE SUMMARY
[facility]  Office: [phone]  Maribell Garcia DO, Kai Johnston MD, Telly Hill MD, Cristofer Oviedo MD, Yesica Forbes MD, José Miguel Camp MD, Rachel Koroma MD, Peyton Byers MD, Giles Canales MD, Luis Fernando Kapadia MD, Latosha Garcia DO, John Tipton MD, Tiffanie Springer MD, Laura Nguyen DO, Nick Eckert MD,  Jorge Landon DO, Matt Marie MD, Tucker Robles MD, Sara Benson [facility], [facility] CNP, Alecia Alexandre CNP, Beth Dorado CNS, Rox Arango CNP, Skylar Alicea CNP, Edwin Santa CNP, Arina Barillas CNP, Juan Carlos Perez CNP, Lisa Langford PA-C, Rachel Sanders CNP, Clemencia Bianchi CNP, [facility], Shanel Natividad CNP, [facility], [place] [address]      Discharge Summary     Patient ID: Manish Randolph  :  1940   MRN: [id number]     ACCOUNT:  [de-identified]   Patient Location : [address] [place] [address]  Patient's PCP: Ubaldo Agee MD  Admit Date: 2023   Discharge Date: 4/10/2023     Length of Stay: 4  Code Status:  Full Code  Admitting Physician: Tobi Jaquez MD  Discharge Physician: Cristofer Oviedo MD     Active Discharge Diagnosis :     Primary Problem  [facility][note 3] Problems    Diagnosis Date Noted    Arteriovenous fistula of right upper extremity ([facility] [I77.0] 2023    Uremia [N19] 2023    Chronic kidney disease, stage 5 ([address] [N18.5] 03/15/2022    Class 1 obesity due to excess calories in adult [E66.09] 2018    Essential hypertension [I10] 2017    Hypothyroidism [E03.9] 2017    Type 2 diabetes mellitus with stage 5 chronic kidney disease not on chronic dialysis, with long-term current use of insulin ([facility] [E11.22, N18.5, Z79.4] 2017    Coronary atherosclerosis of native coronary artery [I25.10] 2017       Admission Condition:  fair     Discharged Condition: stable    Hospital Stay:     Hospital Course:

## 2023-04-10 NOTE — CARE COORDINATION
Social work:  Spoke to [employer], she informed writer patient is okay to discharge today with plan to start outpatient dialysis Wednesday at [address] [facility] [place] location 4/13/22 AT 2:15- ARRIVE AT 1:30 FIRST DAY. HEPATITIS B PANEL FAXED.

## 2023-04-10 NOTE — PROGRESS NOTES
Physical Therapy  Facility/Department: [facility]  Physical Therapy Initial Assessment    Name: Amanda Casanova  : 1940  MRN: [id number]  Date of Service: 4/10/2023  MARTA Staples reports patient is medically stable for therapy treatment this date. Chart reviewed prior to treatment and patient is agreeable for therapy. All lines intact and patient positioned comfortably at end of treatment. All patient needs addressed prior to ending therapy session. Discharge Recommendations:  Patient would benefit from continued therapy after discharge   Due to recent hospitalization and medical condition, pt would benefit from additional intermittent skilled therapy at time of discharge. Please refer to the AM-PAC score for current functional status. Per H&P:Justa Wiley is a [de-identified] y.o. Non- / non  female who presents with No chief complaint on file. and is admitted to the hospital for the management of Uremia. Patient has known Stage V kidney disease and follows with Dr Ghada Dodd. She says Dr Ghada Dodd has been discussing beginning HD for approx 1 year. She had an AV fistula placed in 2022 for HD. Recently she has been feeling more fatigued and dyspneic with activity, her legs feel tight, and her feet also feel tight. She says she has been intentionally avoiding sweets, salt, and fatty foods and has been losing weight. She also has been under increased emotional stress due to her 's recent health issues. She has a PMH of DM, HTN, HLD, low thyroid, sleep apnea, and arthritis. She uses a rolling walker with a seat to ambulate. Patient Diagnosis(es): There were no encounter diagnoses. Past Medical History:  has a past medical history of CAD (coronary artery disease), Diabetes mellitus ([address] Hyperlipidemia, Hypertension, Hypothyroidism, Lichen sclerosus et atrophicus of the vulva, Osteoarthritis, and Sleep apnea.   Past Surgical History:  has a past surgical history that

## 2023-04-10 NOTE — PLAN OF CARE
Pt alert and oriented. VSS. SB on tele. SpO2 mid 90s on RA and CPAP at night. Afebrile. No c/o pain. Sugar in the 60s in early am; after OJ/crackers came back to 92. Working on [employer] HD chair. Possible d/c home tmrw. Bed alarm on, call light within reach. Will continue to monitor. Problem: Chronic Conditions and Co-morbidities  Goal: Patient's chronic conditions and co-morbidity symptoms are monitored and maintained or improved  Outcome: Progressing     Problem: Discharge Planning  Goal: Discharge to home or other facility with appropriate resources  4/10/2023 0615 by Tara Stokes RN  Outcome: Progressing  4/9/2023 1718 by Jai Rodriguez RN  Outcome: Progressing     Problem: Skin/Tissue Integrity  Goal: Absence of new skin breakdown  Description: 1. Monitor for areas of redness and/or skin breakdown  2. Assess vascular access sites hourly  3. Every 4-6 hours minimum:  Change oxygen saturation probe site  4. Every 4-6 hours:  If on nasal continuous positive airway pressure, respiratory therapy assess nares and determine need for appliance change or resting period.   4/10/2023 0615 by Tara Stokes RN  Outcome: Progressing  4/9/2023 1718 by Jai Rodriguez RN  Outcome: Progressing     Problem: Safety - Adult  Goal: Free from fall injury  4/10/2023 0615 by Tara Stokes RN  Outcome: Progressing  4/9/2023 1718 by Jai Rodriguez RN  Outcome: Progressing     Problem: ABCDS Injury Assessment  Goal: Absence of physical injury  Outcome: Progressing

## 2023-04-10 NOTE — PROGRESS NOTES
HEMODIALYSIS PRE-TREATMENT NOTE    Patient Identifiers prior to treatment: Name  MRN    Isolation Required: No                      Isolation Type: N/A       (please document if patient is being managed as a PUI/COVID-19 patient)        Hepatitis status:                           Date Drawn                             Result  Hepatitis B Surface Antigen 23     NEG                     Hepatitis B Surface Antibody N/A N/A        Hepatitis B Core Antibody 23 NEG           How was Hepatitis Status verified: Epic chart     Was a copy of the labs you documented provided to facility for the patient's chart: Yes    Hemodialysis orders verified: Yes by Dariusz Garcia    Access Within normal limits ( I.e. s/s of infection,...): WNL     Pre-Assessment completed: yes by Darisuz Garcia    Pre-dialysis report received from: Kymberly Staples                      Time: 0900

## 2023-04-10 NOTE — PROGRESS NOTES
Pt d/c home with outpatient dialysis chair on Wednesday at 1:30pm. Dicharge paperwork gone over and all questions were answered. All belongings were accounted for. Ivs and caradiac monitors were removed. Family came to  pt.

## 2023-04-10 NOTE — PROGRESS NOTES
[facility]  Office: [address], DO, Richy Cummings, DO, Juan Antonio Calzada, DO, Susanne Tim Blood, DO, Malcolm Amin MD, Ani Mott MD, Fran Monson MD, Kym Perez MD,  Federico Shipman MD, Tonny Bateman MD, Rannie Dubin, DO, Dayna Mathew MD,  Trav Davies MD, Eduardo Collier MD, Nick Manzanares DO, Harvinder Carr MD, Stephanie Zhong MD, Nancy Dwyer DO, Dung Weathers MD, Carrie Zhang MD, Alex Sheikh MD, Gayle Post MD,  Xena Campos, DO, Vallorie Mcardle, MD,  Rene Oscar, [facility],  Claude Cunha, CNP, Guillermina Mckenzie, CNP, Edson Dow, CNP,  Santos Worthington, [facility], Everardo Perry, CNP, Sammie Landau, CNP, Yamila Perez, CNP, Yanira Silva, CNP, Loretta Blackwell, CNP, Raya Cronin PA-C, Abad Vela, CNS, Pooja Jimenez, CNP, Guru Collins, [facility]      Daily Progress Note     Admit Date: 2023  Bed/Room No.  1010/1010-01  Admitting Physician : Fran Monson MD  Code Status :Full Code  Hospital Day:  LOS: 4 days   Chief Complaint:     Fatigue,  Generalized weakness  Decreased oral intake  Principal Problem:    Uremia  Active Problems:    Essential hypertension    Hypothyroidism    Coronary atherosclerosis of native coronary artery    Type 2 diabetes mellitus with stage 5 chronic kidney disease not on chronic dialysis, with long-term current use of insulin ([facility])    Class 1 obesity due to excess calories in adult    Chronic kidney disease, stage 5 ([address]    Arteriovenous fistula of right upper extremity ([address]  Resolved Problems:    * No resolved hospital problems. *    Subjective : Interval History/Significant events :  04/10/23    Patient has no new complaints. She is eating her breakfast.  Patient is breathing comfortably on room air. Vitals - Stable afebrile, normal sinus rhythm. Labs -BUN 40, creatinine 2.98  Hemoglobin 9.9. Nursing notes , Consults notes reviewed.  Overnight events and updates

## 2023-04-11 ENCOUNTER — CLINICAL DOCUMENTATION ONLY (OUTPATIENT)
Facility: CLINIC | Age: 83
End: 2023-04-11

## 2023-04-25 ENCOUNTER — TELEPHONE (OUTPATIENT)
Dept: FAMILY MEDICINE CLINIC | Age: 83
End: 2023-04-25

## 2023-05-05 NOTE — TELEPHONE ENCOUNTER
Patient is low on this please send in a refill for the below medication. Please send in a 90 day supply.        Adis Bradley is requesting a refill on the following medication(s):  Requested Prescriptions     Pending Prescriptions Disp Refills    levothyroxine (SYNTHROID) 150 MCG tablet 90 tablet 1     Sig: Take 1 tablet by mouth daily       Last Visit Date (If Applicable):  5/59/6923    Next Visit Date:    5/30/2023

## 2023-05-07 RX ORDER — LEVOTHYROXINE SODIUM 0.15 MG/1
150 TABLET ORAL DAILY
Qty: 90 TABLET | Refills: 1 | Status: SHIPPED | OUTPATIENT
Start: 2023-05-07

## 2023-05-16 ENCOUNTER — OFFICE VISIT (OUTPATIENT)
Dept: FAMILY MEDICINE CLINIC | Age: 83
End: 2023-05-16
Payer: MEDICARE

## 2023-05-16 VITALS
WEIGHT: 176 LBS | OXYGEN SATURATION: 100 % | HEART RATE: 60 BPM | BODY MASS INDEX: 27.62 KG/M2 | DIASTOLIC BLOOD PRESSURE: 60 MMHG | HEIGHT: 67 IN | SYSTOLIC BLOOD PRESSURE: 130 MMHG

## 2023-05-16 DIAGNOSIS — G62.9 PERIPHERAL POLYNEUROPATHY: ICD-10-CM

## 2023-05-16 DIAGNOSIS — E03.9 ACQUIRED HYPOTHYROIDISM: ICD-10-CM

## 2023-05-16 DIAGNOSIS — Z99.2 ESRD (END STAGE RENAL DISEASE) ON DIALYSIS (HCC): ICD-10-CM

## 2023-05-16 DIAGNOSIS — N18.6 ESRD (END STAGE RENAL DISEASE) ON DIALYSIS (HCC): ICD-10-CM

## 2023-05-16 DIAGNOSIS — I10 ESSENTIAL HYPERTENSION: Primary | ICD-10-CM

## 2023-05-16 PROCEDURE — 1036F TOBACCO NON-USER: CPT | Performed by: FAMILY MEDICINE

## 2023-05-16 PROCEDURE — G8417 CALC BMI ABV UP PARAM F/U: HCPCS | Performed by: FAMILY MEDICINE

## 2023-05-16 PROCEDURE — G8427 DOCREV CUR MEDS BY ELIG CLIN: HCPCS | Performed by: FAMILY MEDICINE

## 2023-05-16 PROCEDURE — 99212 OFFICE O/P EST SF 10 MIN: CPT | Performed by: FAMILY MEDICINE

## 2023-05-16 PROCEDURE — 1123F ACP DISCUSS/DSCN MKR DOCD: CPT | Performed by: FAMILY MEDICINE

## 2023-05-16 PROCEDURE — 3075F SYST BP GE 130 - 139MM HG: CPT | Performed by: FAMILY MEDICINE

## 2023-05-16 PROCEDURE — 99214 OFFICE O/P EST MOD 30 MIN: CPT | Performed by: FAMILY MEDICINE

## 2023-05-16 PROCEDURE — 1090F PRES/ABSN URINE INCON ASSESS: CPT | Performed by: FAMILY MEDICINE

## 2023-05-16 PROCEDURE — G8400 PT W/DXA NO RESULTS DOC: HCPCS | Performed by: FAMILY MEDICINE

## 2023-05-16 PROCEDURE — 3078F DIAST BP <80 MM HG: CPT | Performed by: FAMILY MEDICINE

## 2023-05-16 RX ORDER — ASCORBIC ACID, THIAMINE, RIBOFLAVIN, NIACINAMIDE, PYRIDOXINE, FOLIC ACID, COBALAMIN, BIOTIN, PANTOTHENIC ACID 100; 1.5; 1.7; 20; 10; 1; 6; 300; 1 MG/1; MG/1; MG/1; MG/1; MG/1; MG/1; UG/1; UG/1; MG/1
1 TABLET, COATED ORAL DAILY
COMMUNITY
Start: 2023-04-18

## 2023-05-16 NOTE — PROGRESS NOTES
Penicillins Swelling     Facial swelling    Ranolazine Rash       REVIEW OF SYSTEMS:  General: No fevers, chills, change in weight  HEENT: No double vision,has blurry vision, has dry eyes,no runny nose, sore throat, tinnitus  Cardio: No chest pain, palpitations, LEDEZMA, edema, PND  Pulmonary: No cough, hemoptysis, SOB  GI: No nausea, vomiting, dysphagia, odynophagia, diarrhea, constipation. : No dysuria, hematuria, urgency,has  incontinence  Musculoskeletal: Has chronic low back pain,has chronic right shoulder pain  has seen ortho, no joint swelling  Neuro: No dizziness/lightheadedness, no seizures,has neuropathy  Endocrine: No polyuria, polydipsia, polyphagia, no temperature intolerance  Skin: No lesions or itching  No problems with ADLs  Sleep: h/o Sleep apnea is on CPAP nightly 6-7 hours  Psychiatric: No depression or anxiety. PHYSICAL EXAM:  VS:  /60 (Site: Left Upper Arm, Position: Sitting, Cuff Size: Medium Adult)   Pulse 60   Ht 5' 7\" (1.702 m)   Wt 176 lb (79.8 kg)   SpO2 100%   BMI 27.57 kg/m²   General:  Alert and oriented, NAD  HEENT:  TMs, JANELLE, EOMI, Conjunctivae clear       Throat currently clear. NECK:  Supple without adenopathy or thyromegaly, no carotid bruits  LUNGS:  CTA all fields  HEART:  RRR without M, R, or G  ABDOMEN:  Soft and nontender without palpable abnormalities  EXTREMITIES: Right shoulder with swelling and decreased ROM has right forearm fistula in place ,no leg edema, no calf tenderness. Foot exam:Has loss of 2nd and 3 rd toe left foot with great toe deformity. Right foot with toe deformities. Has loss of sensation to monofilament in both feet. Pulses felt . Has dry skin. NEURO:  No focal deficits. SKIN:  warm to touch,normal texture. No active lesions. ASSESSMENT/PLAN:     Diagnosis Orders   1. Essential hypertension        2. ESRD (end stage renal disease) on dialysis ([facility]        3. Peripheral polyneuropathy  Vitamin B12 & Folate      4.  Acquired hypothyroidism

## 2023-05-18 LAB
FOLATE: > 20 NG/ML (ref 2.8–20)
VITAMIN B-12: 988 PG/ML (ref 239–931)

## 2023-05-23 RX ORDER — INSULIN GLARGINE 100 [IU]/ML
INJECTION, SOLUTION SUBCUTANEOUS
Qty: 30 ML | Refills: 0 | OUTPATIENT
Start: 2023-05-23

## 2023-05-23 RX ORDER — INSULIN GLARGINE 100 [IU]/ML
20 INJECTION, SOLUTION SUBCUTANEOUS NIGHTLY
Qty: 10 ML | Refills: 3 | Status: SHIPPED | OUTPATIENT
Start: 2023-05-23

## 2023-05-30 ENCOUNTER — OFFICE VISIT (OUTPATIENT)
Dept: FAMILY MEDICINE CLINIC | Age: 83
End: 2023-05-30
Payer: MEDICARE

## 2023-05-30 VITALS
OXYGEN SATURATION: 97 % | WEIGHT: 176 LBS | DIASTOLIC BLOOD PRESSURE: 50 MMHG | HEIGHT: 67 IN | SYSTOLIC BLOOD PRESSURE: 120 MMHG | BODY MASS INDEX: 27.62 KG/M2 | HEART RATE: 55 BPM

## 2023-05-30 DIAGNOSIS — Z99.2 ESRD (END STAGE RENAL DISEASE) ON DIALYSIS (HCC): ICD-10-CM

## 2023-05-30 DIAGNOSIS — N18.6 ESRD (END STAGE RENAL DISEASE) ON DIALYSIS (HCC): ICD-10-CM

## 2023-05-30 DIAGNOSIS — E11.9 COMPREHENSIVE DIABETIC FOOT EXAMINATION, TYPE 2 DM, ENCOUNTER FOR (HCC): ICD-10-CM

## 2023-05-30 DIAGNOSIS — E11.43 TYPE 2 DIABETES MELLITUS WITH DIABETIC AUTONOMIC NEUROPATHY, WITH LONG-TERM CURRENT USE OF INSULIN (HCC): Primary | ICD-10-CM

## 2023-05-30 DIAGNOSIS — I73.9 PERIPHERAL VASCULAR DISEASE (HCC): ICD-10-CM

## 2023-05-30 DIAGNOSIS — Z79.4 TYPE 2 DIABETES MELLITUS WITH DIABETIC AUTONOMIC NEUROPATHY, WITH LONG-TERM CURRENT USE OF INSULIN (HCC): Primary | ICD-10-CM

## 2023-05-30 DIAGNOSIS — I10 ESSENTIAL HYPERTENSION: ICD-10-CM

## 2023-05-30 DIAGNOSIS — R32 URINARY INCONTINENCE, UNSPECIFIED TYPE: ICD-10-CM

## 2023-05-30 PROCEDURE — 99214 OFFICE O/P EST MOD 30 MIN: CPT | Performed by: FAMILY MEDICINE

## 2023-05-30 PROCEDURE — G8427 DOCREV CUR MEDS BY ELIG CLIN: HCPCS | Performed by: FAMILY MEDICINE

## 2023-05-30 PROCEDURE — 3078F DIAST BP <80 MM HG: CPT | Performed by: FAMILY MEDICINE

## 2023-05-30 PROCEDURE — 1123F ACP DISCUSS/DSCN MKR DOCD: CPT | Performed by: FAMILY MEDICINE

## 2023-05-30 PROCEDURE — 1036F TOBACCO NON-USER: CPT | Performed by: FAMILY MEDICINE

## 2023-05-30 PROCEDURE — 1090F PRES/ABSN URINE INCON ASSESS: CPT | Performed by: FAMILY MEDICINE

## 2023-05-30 PROCEDURE — 99212 OFFICE O/P EST SF 10 MIN: CPT | Performed by: FAMILY MEDICINE

## 2023-05-30 PROCEDURE — 3051F HG A1C>EQUAL 7.0%<8.0%: CPT | Performed by: FAMILY MEDICINE

## 2023-05-30 PROCEDURE — 3074F SYST BP LT 130 MM HG: CPT | Performed by: FAMILY MEDICINE

## 2023-05-30 PROCEDURE — G8400 PT W/DXA NO RESULTS DOC: HCPCS | Performed by: FAMILY MEDICINE

## 2023-05-30 PROCEDURE — G8417 CALC BMI ABV UP PARAM F/U: HCPCS | Performed by: FAMILY MEDICINE

## 2023-05-30 RX ORDER — OXYBUTYNIN CHLORIDE 10 MG/1
10 TABLET, EXTENDED RELEASE ORAL DAILY
Qty: 90 TABLET | Refills: 3 | Status: SHIPPED | OUTPATIENT
Start: 2023-05-30

## 2023-05-30 NOTE — PROGRESS NOTES
HPI:  Patient comes in today for   Chief Complaint   Patient presents with    Diabetes     Pt is here for a diabetic foot exam. Pt states she needs new diabetic shoes and kash needs a new exam.    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 Patient follows with ophthalmology for her vision problems has had cataract surgery done last year. Patient follows up with diabetic clinic for diabetes management .h/o diabetic neuropathy     HISTORY:  Past Medical History:   Diagnosis Date    CAD (coronary artery disease)     Diabetes mellitus ([facility]     Hyperlipidemia     Hypertension     Hypothyroidism     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    Bx proven by Dr Derek Car    Osteoarthritis     Sleep apnea        Past Surgical History:   Procedure Laterality Date    ANGIOPLASTY  2001    stent x 1    EYE SURGERY      FOOT SURGERY Left 07/31/2009    3rd toe amputation    HYSTERECTOMY, TOTAL ABDOMINAL (CERVIX REMOVED)  1985    TOE AMPUTATION      left 3rd toe    TOE AMPUTATION Left 3/31/2020    Left 2nd TOE AMPUTATION performed by Jenise Davis DPM at [address]  01/17/2020    Dr Michelle Hastings- vulvar SCC carcinoma        Family History   Problem Relation Age of Onset    High Blood Pressure Mother     Heart Disease Mother     Coronary Art Dis Father        Social History     Socioeconomic History    Marital status:      Spouse name: Not on file    Number of children: Not on file    Years of education: Not on file    Highest education level: Not on file   Occupational History    Not on file   Tobacco Use    Smoking status: Never    Smokeless tobacco: Never   Vaping Use    Vaping Use: Never used   Substance and Sexual Activity    Alcohol use: No    Drug use:  No

## 2023-05-30 NOTE — TELEPHONE ENCOUNTER
Len Randall is requesting a refill on the following medication(s):  Requested Prescriptions     Pending Prescriptions Disp Refills    oxybutynin (DITROPAN-XL) 10 MG extended release tablet 90 tablet 3     Sig: Take 1 tablet by mouth daily       Last Visit Date (If Applicable):  3/16/7447    Next Visit Date:    5/30/2023

## 2023-06-20 ENCOUNTER — TELEPHONE (OUTPATIENT)
Dept: INTERNAL MEDICINE | Age: 83
End: 2023-06-20

## 2023-06-20 NOTE — TELEPHONE ENCOUNTER
Patient called in stating that she accidentally washed and destroyed her bld sugar monitor. Lance Meloemily is sending her a new one to our office. She wanted us to be aware that it will be showing up. Patient is doing finger sticks in the mean time.

## 2023-06-22 NOTE — TELEPHONE ENCOUNTER
Patient called in today and stated that she checked with [employer] and they stated they did not get the order for the replacement meter. Notified patient it was faxed yesterday 6/21/2023 and will get refaxed. Refaxed with ov note to fax number [phone].

## 2023-06-27 ENCOUNTER — TELEPHONE (OUTPATIENT)
Dept: INTERNAL MEDICINE | Age: 83
End: 2023-06-27

## 2023-06-29 ENCOUNTER — TELEPHONE (OUTPATIENT)
Dept: INTERNAL MEDICINE | Age: 83
End: 2023-06-29

## 2023-08-15 ENCOUNTER — OFFICE VISIT (OUTPATIENT)
Dept: FAMILY MEDICINE CLINIC | Age: 83
End: 2023-08-15
Payer: MEDICARE

## 2023-08-15 ENCOUNTER — OFFICE VISIT (OUTPATIENT)
Dept: DIABETES SERVICES | Age: 83
End: 2023-08-15
Payer: MEDICARE

## 2023-08-15 VITALS
HEIGHT: 67 IN | HEART RATE: 52 BPM | DIASTOLIC BLOOD PRESSURE: 82 MMHG | SYSTOLIC BLOOD PRESSURE: 130 MMHG | BODY MASS INDEX: 27.15 KG/M2 | WEIGHT: 173 LBS | OXYGEN SATURATION: 99 %

## 2023-08-15 VITALS
OXYGEN SATURATION: 100 % | BODY MASS INDEX: 27.31 KG/M2 | SYSTOLIC BLOOD PRESSURE: 115 MMHG | WEIGHT: 174 LBS | DIASTOLIC BLOOD PRESSURE: 58 MMHG | HEIGHT: 67 IN | HEART RATE: 57 BPM

## 2023-08-15 DIAGNOSIS — N18.6 ESRD (END STAGE RENAL DISEASE) ON DIALYSIS (HCC): ICD-10-CM

## 2023-08-15 DIAGNOSIS — Z79.4 TYPE 2 DIABETES MELLITUS WITH STAGE 5 CHRONIC KIDNEY DISEASE NOT ON CHRONIC DIALYSIS, WITH LONG-TERM CURRENT USE OF INSULIN (HCC): ICD-10-CM

## 2023-08-15 DIAGNOSIS — N18.5 TYPE 2 DIABETES MELLITUS WITH STAGE 5 CHRONIC KIDNEY DISEASE NOT ON CHRONIC DIALYSIS, WITH LONG-TERM CURRENT USE OF INSULIN (HCC): ICD-10-CM

## 2023-08-15 DIAGNOSIS — E11.22 TYPE 2 DIABETES MELLITUS WITH STAGE 4 CHRONIC KIDNEY DISEASE, WITH LONG-TERM CURRENT USE OF INSULIN (HCC): Primary | ICD-10-CM

## 2023-08-15 DIAGNOSIS — I10 ESSENTIAL HYPERTENSION: ICD-10-CM

## 2023-08-15 DIAGNOSIS — E11.22 TYPE 2 DIABETES MELLITUS WITH STAGE 5 CHRONIC KIDNEY DISEASE NOT ON CHRONIC DIALYSIS, WITH LONG-TERM CURRENT USE OF INSULIN (HCC): ICD-10-CM

## 2023-08-15 DIAGNOSIS — Z79.4 TYPE 2 DIABETES MELLITUS WITH STAGE 4 CHRONIC KIDNEY DISEASE, WITH LONG-TERM CURRENT USE OF INSULIN (HCC): Primary | ICD-10-CM

## 2023-08-15 DIAGNOSIS — E03.9 ACQUIRED HYPOTHYROIDISM: ICD-10-CM

## 2023-08-15 DIAGNOSIS — Z99.2 ESRD (END STAGE RENAL DISEASE) ON DIALYSIS (HCC): ICD-10-CM

## 2023-08-15 DIAGNOSIS — E78.2 MIXED HYPERLIPIDEMIA: ICD-10-CM

## 2023-08-15 DIAGNOSIS — N18.4 TYPE 2 DIABETES MELLITUS WITH STAGE 4 CHRONIC KIDNEY DISEASE, WITH LONG-TERM CURRENT USE OF INSULIN (HCC): Primary | ICD-10-CM

## 2023-08-15 LAB — HBA1C MFR BLD: 7.2 %

## 2023-08-15 PROCEDURE — 3074F SYST BP LT 130 MM HG: CPT | Performed by: FAMILY MEDICINE

## 2023-08-15 PROCEDURE — 1123F ACP DISCUSS/DSCN MKR DOCD: CPT | Performed by: NURSE PRACTITIONER

## 2023-08-15 PROCEDURE — 95251 CONT GLUC MNTR ANALYSIS I&R: CPT | Performed by: NURSE PRACTITIONER

## 2023-08-15 PROCEDURE — 3051F HG A1C>EQUAL 7.0%<8.0%: CPT | Performed by: FAMILY MEDICINE

## 2023-08-15 PROCEDURE — 99212 OFFICE O/P EST SF 10 MIN: CPT | Performed by: FAMILY MEDICINE

## 2023-08-15 PROCEDURE — 3078F DIAST BP <80 MM HG: CPT | Performed by: FAMILY MEDICINE

## 2023-08-15 PROCEDURE — 3074F SYST BP LT 130 MM HG: CPT | Performed by: NURSE PRACTITIONER

## 2023-08-15 PROCEDURE — 1090F PRES/ABSN URINE INCON ASSESS: CPT | Performed by: NURSE PRACTITIONER

## 2023-08-15 PROCEDURE — G8417 CALC BMI ABV UP PARAM F/U: HCPCS | Performed by: FAMILY MEDICINE

## 2023-08-15 PROCEDURE — 1036F TOBACCO NON-USER: CPT | Performed by: FAMILY MEDICINE

## 2023-08-15 PROCEDURE — 1036F TOBACCO NON-USER: CPT | Performed by: NURSE PRACTITIONER

## 2023-08-15 PROCEDURE — 99214 OFFICE O/P EST MOD 30 MIN: CPT | Performed by: NURSE PRACTITIONER

## 2023-08-15 PROCEDURE — 83037 HB GLYCOSYLATED A1C HOME DEV: CPT | Performed by: FAMILY MEDICINE

## 2023-08-15 PROCEDURE — 1090F PRES/ABSN URINE INCON ASSESS: CPT | Performed by: FAMILY MEDICINE

## 2023-08-15 PROCEDURE — 3078F DIAST BP <80 MM HG: CPT | Performed by: NURSE PRACTITIONER

## 2023-08-15 PROCEDURE — 3051F HG A1C>EQUAL 7.0%<8.0%: CPT | Performed by: NURSE PRACTITIONER

## 2023-08-15 PROCEDURE — G8427 DOCREV CUR MEDS BY ELIG CLIN: HCPCS | Performed by: FAMILY MEDICINE

## 2023-08-15 PROCEDURE — 1123F ACP DISCUSS/DSCN MKR DOCD: CPT | Performed by: FAMILY MEDICINE

## 2023-08-15 PROCEDURE — PBSHW POCT GLYCOSYLATED HEMOGLOBIN (HGB A1C): Performed by: FAMILY MEDICINE

## 2023-08-15 PROCEDURE — G8427 DOCREV CUR MEDS BY ELIG CLIN: HCPCS | Performed by: NURSE PRACTITIONER

## 2023-08-15 PROCEDURE — 99214 OFFICE O/P EST MOD 30 MIN: CPT | Performed by: FAMILY MEDICINE

## 2023-08-15 PROCEDURE — G8400 PT W/DXA NO RESULTS DOC: HCPCS | Performed by: FAMILY MEDICINE

## 2023-08-15 PROCEDURE — G8400 PT W/DXA NO RESULTS DOC: HCPCS | Performed by: NURSE PRACTITIONER

## 2023-08-15 PROCEDURE — 99212 OFFICE O/P EST SF 10 MIN: CPT | Performed by: NURSE PRACTITIONER

## 2023-08-15 PROCEDURE — G8417 CALC BMI ABV UP PARAM F/U: HCPCS | Performed by: NURSE PRACTITIONER

## 2023-08-15 ASSESSMENT — ENCOUNTER SYMPTOMS
SHORTNESS OF BREATH: 0
DIARRHEA: 0
ABDOMINAL PAIN: 0
RESPIRATORY NEGATIVE: 1

## 2023-08-15 NOTE — PROGRESS NOTES
324 (37.5 Fe) MG TABS I by mouth a day (Patient taking differently: Take 1 tablet by mouth daily) 90 tablet 3    amLODIPine (NORVASC) 5 MG tablet Take 1 tablet by mouth in the morning and at bedtime      Continuous Blood Gluc Sensor ([address] [place] [address]) MISC by Does not apply route      albuterol sulfate HFA (PROVENTIL;VENTOLIN;PROAIR) 108 (90 Base) MCG/ACT inhaler Inhale 2 puffs into the lungs every 6 hours as needed for Wheezing      fexofenadine (ALLEGRA) 180 MG tablet Take 1 tablet by mouth daily      montelukast (SINGULAIR) 10 MG tablet TAKE ONE TABLET BY MOUTH NIGHTLY 90 tablet 1    fluticasone (FLONASE) 50 MCG/ACT nasal spray 1 spray by Nasal route daily as needed      isosorbide mononitrate (IMDUR) 60 MG extended release tablet Take 1 tablet by mouth every morning      insulin aspart (NOVOLOG) 100 UNIT/ML injection vial 3 times daily (before meals) per sliding scale      aspirin 81 MG tablet Take 1 tablet by mouth daily      acetaminophen (TYLENOL) 500 MG tablet Take 2 tablets by mouth in the morning and at bedtime      CPAP Machine MISC by Does not apply route      Omega-3 Fatty Acids (FISH OIL PO) Take 1,000 mg by mouth daily       Multiple Vitamins-Minerals (MULTIVITAMIN ADULTS PO) Take by mouth daily       B Complex-C-Folic Acid (DIALYVITE TABLET) TABS Take 1 tablet by mouth daily (Patient not taking: Reported on 8/15/2023)      B-D INS SYR ULTRAFINE 1CC/31G 31G X 5/16\" 1 ML MISC       vitamin B-12 (CYANOCOBALAMIN) 500 MCG tablet Take 2 tablets by mouth daily (Patient not taking: Reported on 8/15/2023)      calcium carbonate (OSCAL) 500 MG TABS tablet Take 1 tablet by mouth daily (Patient not taking: Reported on 5/16/2023)       No current facility-administered medications for this visit.         Allergies   Allergen Reactions    Lisinopril Other (See Comments)     Cough    Penicillins Swelling     Facial swelling    Ranolazine Rash       REVIEW OF SYSTEMS:  General: No fevers, chills, change

## 2023-08-15 NOTE — PROGRESS NOTES
[address] [facility] INTERNAL MED A DEPARTMENT OF [address]  [address] [facility] [address] [phone]        HISTORY:    Rosangela Guan presents today for evaluation and management of:  Chief Complaint   Patient presents with    Diabetes     3 month follow up  Had POC A1C done earlier - 7.2       Patient Care Team:  Divina Meyers MD as PCP - General (Family Medicine)  Divina Meyers MD as PCP - Empaneled Provider  Ruth Quintana as Care Manager (Nephrology)      Interval History:    Past DM Medications   Metformin-ckd  Glimepiride-therapy completed     Current Diabetic Medications  Lantus 20 units at at bedtime   Sliding Scale Insulin Novolog     Blood sugar   Action     <70               Drink Juice               No extra insulin  150 - 200         2 units subcutaneous Insulin  201 - 250         4 units subcutaneous Insulin  251 - 300         6 units subcutaneous Insulin  301 - 350         8 units subcutaneous Insulin  351 - 400         10 units subcutaneous Insulin   > 400              12 units subcutaneous Insulin        DKA episodes: 0       03/07/23   Rosangela Guan is a 82 y.o. female patient who presents to clinic today for her diabetes. she has a history of hypertension, CAD, ALDAIR, hypothyroidism, CKD, hyperlipidemia and obesity which contributes to her diabetes. At previous visit insulin was decreased. she denies any current signs or symptoms of hyper/hypoglycemia. she states they are taking their medications as prescribed without any adverse effects.    Diet: work on low carb low sodium diet  Exercise: none  BS testing: uses cgm daily with success and is adherent to cgm therapy  Hypoglycemia: Yes    Sweats and Tremors                 Hypoglycemia Frequency: 3 per week  Hypoglycemia as needed treatment: juice   Issues: denies   Diabetic foot exam up-to-date: Yes  Diabetic retinal exam up-to-date: Yes     Diabetes
Detail Level: Detailed
General Sunscreen Counseling: I recommended a broad spectrum sunscreen with a SPF of 30 or higher.  Sunscreens should be applied at least 15 minutes prior to expected sun exposure and then every 2 hours after that as long as sun exposure continues. If swimming or exercising sunscreen should be reapplied every 45 minutes to an hour after getting wet or sweating.  Sun protective clothing can be used in lieu of sunscreen but must be worn the entire time you are exposed to the sun's rays.

## 2023-10-02 DIAGNOSIS — G62.9 PERIPHERAL POLYNEUROPATHY: ICD-10-CM

## 2023-10-02 RX ORDER — GABAPENTIN 300 MG/1
300 CAPSULE ORAL 3 TIMES DAILY
Qty: 270 CAPSULE | Refills: 0 | Status: SHIPPED | OUTPATIENT
Start: 2023-10-02 | End: 2023-12-31

## 2023-10-02 NOTE — TELEPHONE ENCOUNTER
Vira Cast is requesting a refill on the following medication(s):  Requested Prescriptions     Pending Prescriptions Disp Refills    gabapentin (NEURONTIN) 300 MG capsule [Pharmacy Med Name: Gabapentin 300 MG Oral Capsule] 270 capsule 0     Sig: Take 1 capsule by mouth 3 times daily.        Last Visit Date (If Applicable):  5/10/5736    Next Visit Date:    11/14/2023

## 2023-10-17 ENCOUNTER — TELEPHONE (OUTPATIENT)
Dept: INTERNAL MEDICINE | Age: 83
End: 2023-10-17

## 2023-10-17 NOTE — TELEPHONE ENCOUNTER
Patient has ruined her CGM again. She has talked to customer service as she did last time but not getting anywhere,   see notes from  6/27 and 29. She had  it in a pocket and threw it in the washer.   Can you send new rx to [address] [place] [address] and please call her to verify at [phone]

## 2023-10-17 NOTE — TELEPHONE ENCOUNTER
Placed order for [place] [address] to [id number] via Embedded Chat.   Called and left a voicemail with patient stating that this is her second CGM replacement in 3 months, and her insurance company may not pay for another, leaving her responsible for the cost.

## 2023-11-10 NOTE — TELEPHONE ENCOUNTER
Jarrod Jimenez is requesting a refill on the following medication(s):  Requested Prescriptions     Pending Prescriptions Disp Refills    levothyroxine (SYNTHROID) 150 MCG tablet [Pharmacy Med Name: Levothyroxine Sodium 150 MCG Oral Tablet] 90 tablet 1     Sig: Take 1 tablet by mouth once daily       Last Visit Date (If Applicable):  0/73/6282    Next Visit Date:    11/14/2023

## 2023-11-13 RX ORDER — LEVOTHYROXINE SODIUM 0.15 MG/1
150 TABLET ORAL DAILY
Qty: 90 TABLET | Refills: 1 | Status: SHIPPED | OUTPATIENT
Start: 2023-11-13

## 2023-11-14 ENCOUNTER — OFFICE VISIT (OUTPATIENT)
Dept: FAMILY MEDICINE CLINIC | Age: 83
End: 2023-11-14
Payer: MEDICARE

## 2023-11-14 VITALS
WEIGHT: 169 LBS | BODY MASS INDEX: 26.53 KG/M2 | OXYGEN SATURATION: 95 % | HEIGHT: 67 IN | HEART RATE: 51 BPM | SYSTOLIC BLOOD PRESSURE: 150 MMHG | DIASTOLIC BLOOD PRESSURE: 48 MMHG

## 2023-11-14 DIAGNOSIS — Z99.2 ESRD (END STAGE RENAL DISEASE) ON DIALYSIS (HCC): ICD-10-CM

## 2023-11-14 DIAGNOSIS — Z79.4 TYPE 2 DIABETES MELLITUS WITH STAGE 5 CHRONIC KIDNEY DISEASE NOT ON CHRONIC DIALYSIS, WITH LONG-TERM CURRENT USE OF INSULIN (HCC): ICD-10-CM

## 2023-11-14 DIAGNOSIS — I10 ESSENTIAL HYPERTENSION: ICD-10-CM

## 2023-11-14 DIAGNOSIS — N18.6 ESRD (END STAGE RENAL DISEASE) ON DIALYSIS (HCC): ICD-10-CM

## 2023-11-14 DIAGNOSIS — N18.5 TYPE 2 DIABETES MELLITUS WITH STAGE 5 CHRONIC KIDNEY DISEASE NOT ON CHRONIC DIALYSIS, WITH LONG-TERM CURRENT USE OF INSULIN (HCC): ICD-10-CM

## 2023-11-14 DIAGNOSIS — E03.9 ACQUIRED HYPOTHYROIDISM: ICD-10-CM

## 2023-11-14 DIAGNOSIS — Z00.00 MEDICARE ANNUAL WELLNESS VISIT, SUBSEQUENT: Primary | ICD-10-CM

## 2023-11-14 DIAGNOSIS — E11.22 TYPE 2 DIABETES MELLITUS WITH STAGE 5 CHRONIC KIDNEY DISEASE NOT ON CHRONIC DIALYSIS, WITH LONG-TERM CURRENT USE OF INSULIN (HCC): ICD-10-CM

## 2023-11-14 PROCEDURE — G8484 FLU IMMUNIZE NO ADMIN: HCPCS | Performed by: FAMILY MEDICINE

## 2023-11-14 PROCEDURE — 3078F DIAST BP <80 MM HG: CPT | Performed by: FAMILY MEDICINE

## 2023-11-14 PROCEDURE — 3077F SYST BP >= 140 MM HG: CPT | Performed by: FAMILY MEDICINE

## 2023-11-14 PROCEDURE — 99212 OFFICE O/P EST SF 10 MIN: CPT | Performed by: FAMILY MEDICINE

## 2023-11-14 PROCEDURE — G0439 PPPS, SUBSEQ VISIT: HCPCS | Performed by: FAMILY MEDICINE

## 2023-11-14 PROCEDURE — 3051F HG A1C>EQUAL 7.0%<8.0%: CPT | Performed by: FAMILY MEDICINE

## 2023-11-14 PROCEDURE — 1123F ACP DISCUSS/DSCN MKR DOCD: CPT | Performed by: FAMILY MEDICINE

## 2023-11-14 ASSESSMENT — PATIENT HEALTH QUESTIONNAIRE - PHQ9
SUM OF ALL RESPONSES TO PHQ QUESTIONS 1-9: 0
SUM OF ALL RESPONSES TO PHQ9 QUESTIONS 1 & 2: 0
1. LITTLE INTEREST OR PLEASURE IN DOING THINGS: 0
SUM OF ALL RESPONSES TO PHQ QUESTIONS 1-9: 0
SUM OF ALL RESPONSES TO PHQ QUESTIONS 1-9: 0
2. FEELING DOWN, DEPRESSED OR HOPELESS: 0
SUM OF ALL RESPONSES TO PHQ QUESTIONS 1-9: 0

## 2023-11-14 ASSESSMENT — LIFESTYLE VARIABLES
HOW MANY STANDARD DRINKS CONTAINING ALCOHOL DO YOU HAVE ON A TYPICAL DAY: PATIENT DOES NOT DRINK
HOW OFTEN DO YOU HAVE A DRINK CONTAINING ALCOHOL: NEVER

## 2023-11-14 NOTE — PROGRESS NOTES
Medicare Annual Wellness Visit    Name: Gosia Coates Date: 2023   MRN: [id number] Sex: Female   Age: 82 y.o. Ethnicity: Non- / Non    : 1940 Race: White (non-)      Annamarie Coates is here for Medicare AWV (Pt is here for AWV. )  Here for annual medicare wellness exam .Has h/o HTN,CAD,MR,DM type 2 with neuropathy,Hypothyroidism,ESRD on dialysis. ALDAIR on CPAP . has visual problems has had cataract surgery follows with ophthalmology is hard of hearing . Denies depression or memory problems. Use a walker and cane to ambulate has had a mechanical fall 2 months ago  Screenings for behavioral, psychosocial and functional/safety risks, and cognitive dysfunction are all negative except as indicated below. These results, as well as other patient data from the [address] [facility], are documented in Flowsheets linked to this Encounter. Allergies   Allergen Reactions    Lisinopril Other (See Comments)     Cough    Penicillins Swelling     Facial swelling    Ranolazine Rash       Prior to Visit Medications    Medication Sig Taking? Authorizing Provider   levothyroxine (SYNTHROID) 150 MCG tablet Take 1 tablet by mouth once daily Yes Monica Vega MD   gabapentin (NEURONTIN) 300 MG capsule Take 1 capsule by mouth 3 times daily for 90 days.  Yes Quique Ward MD   oxybutynin (DITROPAN-XL) 10 MG extended release tablet Take 1 tablet by mouth daily Yes Monica Vega MD   LANTUS 100 UNIT/ML injection vial Inject 20 Units into the skin nightly Yes Britta Mahoney, APRN - CNP   B Complex-C-Folic Acid (DIALYVITE TABLET) TABS Take 1 tablet by mouth daily Yes Ihsan Bauer MD   carboxymethylcellulose 1 % ophthalmic solution Place 1 drop into both eyes daily as needed for Dry Eyes Yes Ishan Bauer MD   betamethasone dipropionate 0.05 % ointment Apply topically daily prn Yes Ishan Bauer MD   vitamin D 25 MCG (1000 UT) CAPS Take 1

## 2023-11-15 RX ORDER — SIMVASTATIN 20 MG
20 TABLET ORAL NIGHTLY
Qty: 90 TABLET | Refills: 0 | Status: SHIPPED | OUTPATIENT
Start: 2023-11-15

## 2023-11-15 NOTE — TELEPHONE ENCOUNTER
Malissa Anderson is requesting a refill on the following medication(s):  Requested Prescriptions     Pending Prescriptions Disp Refills    simvastatin (ZOCOR) 20 MG tablet [Pharmacy Med Name: Simvastatin 20 MG Oral Tablet] 90 tablet 0     Sig: Take 1 tablet by mouth nightly       Last Visit Date (If Applicable):  99/04/0178    Next Visit Date:    2/13/2024

## 2023-11-21 ENCOUNTER — OFFICE VISIT (OUTPATIENT)
Dept: DIABETES SERVICES | Age: 83
End: 2023-11-21
Payer: MEDICARE

## 2023-11-21 VITALS
BODY MASS INDEX: 26.59 KG/M2 | DIASTOLIC BLOOD PRESSURE: 86 MMHG | WEIGHT: 169.8 LBS | OXYGEN SATURATION: 99 % | HEART RATE: 50 BPM | SYSTOLIC BLOOD PRESSURE: 128 MMHG

## 2023-11-21 DIAGNOSIS — E11.22 TYPE 2 DIABETES MELLITUS WITH STAGE 4 CHRONIC KIDNEY DISEASE, WITH LONG-TERM CURRENT USE OF INSULIN (HCC): Primary | ICD-10-CM

## 2023-11-21 DIAGNOSIS — E78.2 MIXED HYPERLIPIDEMIA: ICD-10-CM

## 2023-11-21 DIAGNOSIS — N18.4 TYPE 2 DIABETES MELLITUS WITH STAGE 4 CHRONIC KIDNEY DISEASE, WITH LONG-TERM CURRENT USE OF INSULIN (HCC): Primary | ICD-10-CM

## 2023-11-21 DIAGNOSIS — I10 ESSENTIAL HYPERTENSION: ICD-10-CM

## 2023-11-21 DIAGNOSIS — Z79.4 TYPE 2 DIABETES MELLITUS WITH STAGE 4 CHRONIC KIDNEY DISEASE, WITH LONG-TERM CURRENT USE OF INSULIN (HCC): Primary | ICD-10-CM

## 2023-11-21 LAB — HBA1C MFR BLD: 7.4 %

## 2023-11-21 PROCEDURE — G8400 PT W/DXA NO RESULTS DOC: HCPCS | Performed by: NURSE PRACTITIONER

## 2023-11-21 PROCEDURE — G8417 CALC BMI ABV UP PARAM F/U: HCPCS | Performed by: NURSE PRACTITIONER

## 2023-11-21 PROCEDURE — 95251 CONT GLUC MNTR ANALYSIS I&R: CPT | Performed by: NURSE PRACTITIONER

## 2023-11-21 PROCEDURE — 1036F TOBACCO NON-USER: CPT | Performed by: NURSE PRACTITIONER

## 2023-11-21 PROCEDURE — 99212 OFFICE O/P EST SF 10 MIN: CPT | Performed by: NURSE PRACTITIONER

## 2023-11-21 PROCEDURE — 1090F PRES/ABSN URINE INCON ASSESS: CPT | Performed by: NURSE PRACTITIONER

## 2023-11-21 PROCEDURE — G8484 FLU IMMUNIZE NO ADMIN: HCPCS | Performed by: NURSE PRACTITIONER

## 2023-11-21 PROCEDURE — PBSHW POCT GLYCOSYLATED HEMOGLOBIN (HGB A1C): Performed by: NURSE PRACTITIONER

## 2023-11-21 PROCEDURE — 1123F ACP DISCUSS/DSCN MKR DOCD: CPT | Performed by: NURSE PRACTITIONER

## 2023-11-21 PROCEDURE — G8427 DOCREV CUR MEDS BY ELIG CLIN: HCPCS | Performed by: NURSE PRACTITIONER

## 2023-11-21 PROCEDURE — 3051F HG A1C>EQUAL 7.0%<8.0%: CPT | Performed by: NURSE PRACTITIONER

## 2023-11-21 PROCEDURE — 99214 OFFICE O/P EST MOD 30 MIN: CPT | Performed by: NURSE PRACTITIONER

## 2023-11-21 PROCEDURE — 3074F SYST BP LT 130 MM HG: CPT | Performed by: NURSE PRACTITIONER

## 2023-11-21 PROCEDURE — 3079F DIAST BP 80-89 MM HG: CPT | Performed by: NURSE PRACTITIONER

## 2023-11-21 PROCEDURE — 83036 HEMOGLOBIN GLYCOSYLATED A1C: CPT | Performed by: NURSE PRACTITIONER

## 2023-11-21 ASSESSMENT — ENCOUNTER SYMPTOMS
SHORTNESS OF BREATH: 0
ABDOMINAL PAIN: 0
RESPIRATORY NEGATIVE: 1
DIARRHEA: 0

## 2023-11-21 NOTE — PROGRESS NOTES
[facility] INTERNAL MED A DEPARTMENT OF [address]  [address] [facility] [address] [phone]        HISTORY:    Daryl Montenegro presents today for evaluation and management of:  Chief Complaint   Patient presents with    Diabetes     3 month follow up       Patient Care Team:  Nito Neal MD as PCP - General (Family Medicine)  Nito Neal MD as PCP - Empaneled Provider  Diana Dumont as Care Manager (Nephrology)      Interval History:    Past DM Medications   Metformin-ckd  Glimepiride-therapy completed     Current Diabetic Medications  Lantus 20 units at at bedtime   Sliding Scale Insulin Novolog     Blood sugar   Action     <70               Drink Juice               No extra insulin  150 - 200         2 units subcutaneous Insulin  201 - 250         4 units subcutaneous Insulin  251 - 300         6 units subcutaneous Insulin  301 - 350         8 units subcutaneous Insulin  351 - 400         10 units subcutaneous Insulin   > 400              12 units subcutaneous Insulin        DKA episodes: 0    08/15/23   Daryl Montenegro is a 82 y.o. female patient who presents to clinic today for her diabetes. she has a history of hypertension, CAD, ALDAIR, hypothyroidism, CKD, hyperlipidemia and obesity which contributes to her diabetes. At previous visit insulin adjusted. she denies any current signs or symptoms of hyper/hypoglycemia. she states they are taking their medications as prescribed without any adverse effects.    Diet: work on low carb low sodium diet  Exercise: none  BS testing: uses cgm daily with success and is adherent to cgm therapy  Hypoglycemia: Yes    Sweats and Tremors                 Hypoglycemia Frequency: 3 per week  Hypoglycemia as needed treatment: juice   Issues: denies   Diabetic foot exam up-to-date: Yes  Diabetic retinal exam up-to-date: Yes     Diabetes complications:nephropathy and

## 2024-01-25 DIAGNOSIS — G62.9 PERIPHERAL POLYNEUROPATHY: ICD-10-CM

## 2024-01-26 NOTE — TELEPHONE ENCOUNTER
Justa called requesting a refill of the below medication which has been pended for you:     Requested Prescriptions     Pending Prescriptions Disp Refills    gabapentin (NEURONTIN) 300 MG capsule [Pharmacy Med Name: Gabapentin 300 MG Oral Capsule] 270 capsule 0     Sig: Take 1 capsule by mouth 3 times daily for 90 days.       Last Appointment Date: 11/14/2023  Next Appointment Date: 2/13/2024    Allergies   Allergen Reactions    Lisinopril Other (See Comments)     Cough    Penicillins Swelling     Facial swelling    Ranolazine Rash

## 2024-01-30 RX ORDER — GABAPENTIN 300 MG/1
300 CAPSULE ORAL 3 TIMES DAILY
Qty: 270 CAPSULE | Refills: 0 | Status: SHIPPED | OUTPATIENT
Start: 2024-01-30 | End: 2024-04-29

## 2024-02-16 RX ORDER — SIMVASTATIN 20 MG
20 TABLET ORAL NIGHTLY
Qty: 90 TABLET | Refills: 0 | OUTPATIENT
Start: 2024-02-16

## 2024-02-16 RX ORDER — SIMVASTATIN 20 MG
20 TABLET ORAL NIGHTLY
Qty: 90 TABLET | Refills: 1 | Status: SHIPPED | OUTPATIENT
Start: 2024-02-16

## 2024-02-16 NOTE — TELEPHONE ENCOUNTER
Jsuta Ledezma is requesting a refill on the following medication(s):  Requested Prescriptions     Pending Prescriptions Disp Refills    simvastatin (ZOCOR) 20 MG tablet 90 tablet 1     Sig: Take 1 tablet by mouth nightly       Last Visit Date (If Applicable):  11/14/2023    Next Visit Date:    2/20/2024

## 2024-02-20 ENCOUNTER — OFFICE VISIT (OUTPATIENT)
Dept: FAMILY MEDICINE CLINIC | Age: 84
End: 2024-02-20
Payer: MEDICARE

## 2024-02-20 ENCOUNTER — OFFICE VISIT (OUTPATIENT)
Dept: DIABETES SERVICES | Age: 84
End: 2024-02-20
Payer: MEDICARE

## 2024-02-20 VITALS
HEART RATE: 59 BPM | BODY MASS INDEX: 26.06 KG/M2 | OXYGEN SATURATION: 98 % | WEIGHT: 166 LBS | SYSTOLIC BLOOD PRESSURE: 130 MMHG | DIASTOLIC BLOOD PRESSURE: 62 MMHG | HEIGHT: 67 IN

## 2024-02-20 VITALS
OXYGEN SATURATION: 99 % | DIASTOLIC BLOOD PRESSURE: 50 MMHG | HEIGHT: 67 IN | BODY MASS INDEX: 41.28 KG/M2 | HEART RATE: 51 BPM | WEIGHT: 263 LBS | SYSTOLIC BLOOD PRESSURE: 132 MMHG

## 2024-02-20 DIAGNOSIS — E78.2 MIXED HYPERLIPIDEMIA: ICD-10-CM

## 2024-02-20 DIAGNOSIS — I10 ESSENTIAL HYPERTENSION: ICD-10-CM

## 2024-02-20 DIAGNOSIS — Z99.2 ESRD (END STAGE RENAL DISEASE) ON DIALYSIS (HCC): ICD-10-CM

## 2024-02-20 DIAGNOSIS — E66.01 CLASS 3 SEVERE OBESITY DUE TO EXCESS CALORIES WITH SERIOUS COMORBIDITY AND BODY MASS INDEX (BMI) OF 40.0 TO 44.9 IN ADULT (HCC): ICD-10-CM

## 2024-02-20 DIAGNOSIS — Z89.419 HISTORY OF AMPUTATION OF GREAT TOE (HCC): ICD-10-CM

## 2024-02-20 DIAGNOSIS — Z79.4 TYPE 2 DIABETES MELLITUS WITH STAGE 4 CHRONIC KIDNEY DISEASE, WITH LONG-TERM CURRENT USE OF INSULIN (HCC): Primary | ICD-10-CM

## 2024-02-20 DIAGNOSIS — G62.9 PERIPHERAL POLYNEUROPATHY: ICD-10-CM

## 2024-02-20 DIAGNOSIS — N25.81 SECONDARY HYPERPARATHYROIDISM (HCC): ICD-10-CM

## 2024-02-20 DIAGNOSIS — N18.5 TYPE 2 DIABETES MELLITUS WITH STAGE 5 CHRONIC KIDNEY DISEASE NOT ON CHRONIC DIALYSIS, WITH LONG-TERM CURRENT USE OF INSULIN (HCC): ICD-10-CM

## 2024-02-20 DIAGNOSIS — E66.01 OBESITY, CLASS III, BMI 40-49.9 (MORBID OBESITY) (HCC): ICD-10-CM

## 2024-02-20 DIAGNOSIS — Z79.4 TYPE 2 DIABETES MELLITUS WITH STAGE 5 CHRONIC KIDNEY DISEASE NOT ON CHRONIC DIALYSIS, WITH LONG-TERM CURRENT USE OF INSULIN (HCC): ICD-10-CM

## 2024-02-20 DIAGNOSIS — E11.22 TYPE 2 DIABETES MELLITUS WITH STAGE 5 CHRONIC KIDNEY DISEASE NOT ON CHRONIC DIALYSIS, WITH LONG-TERM CURRENT USE OF INSULIN (HCC): ICD-10-CM

## 2024-02-20 DIAGNOSIS — E11.22 TYPE 2 DIABETES MELLITUS WITH STAGE 4 CHRONIC KIDNEY DISEASE, WITH LONG-TERM CURRENT USE OF INSULIN (HCC): Primary | ICD-10-CM

## 2024-02-20 DIAGNOSIS — I73.9 PERIPHERAL VASCULAR DISEASE (HCC): ICD-10-CM

## 2024-02-20 DIAGNOSIS — N18.6 ESRD (END STAGE RENAL DISEASE) ON DIALYSIS (HCC): ICD-10-CM

## 2024-02-20 DIAGNOSIS — E11.9 COMPREHENSIVE DIABETIC FOOT EXAMINATION, TYPE 2 DM, ENCOUNTER FOR (HCC): Primary | ICD-10-CM

## 2024-02-20 DIAGNOSIS — N18.4 TYPE 2 DIABETES MELLITUS WITH STAGE 4 CHRONIC KIDNEY DISEASE, WITH LONG-TERM CURRENT USE OF INSULIN (HCC): Primary | ICD-10-CM

## 2024-02-20 LAB — HBA1C MFR BLD: 6.7 %

## 2024-02-20 PROCEDURE — G8427 DOCREV CUR MEDS BY ELIG CLIN: HCPCS | Performed by: NURSE PRACTITIONER

## 2024-02-20 PROCEDURE — 1090F PRES/ABSN URINE INCON ASSESS: CPT | Performed by: FAMILY MEDICINE

## 2024-02-20 PROCEDURE — 1123F ACP DISCUSS/DSCN MKR DOCD: CPT | Performed by: NURSE PRACTITIONER

## 2024-02-20 PROCEDURE — PBSHW POCT GLYCOSYLATED HEMOGLOBIN (HGB A1C): Performed by: NURSE PRACTITIONER

## 2024-02-20 PROCEDURE — 95251 CONT GLUC MNTR ANALYSIS I&R: CPT | Performed by: NURSE PRACTITIONER

## 2024-02-20 PROCEDURE — 1036F TOBACCO NON-USER: CPT | Performed by: FAMILY MEDICINE

## 2024-02-20 PROCEDURE — G8484 FLU IMMUNIZE NO ADMIN: HCPCS | Performed by: NURSE PRACTITIONER

## 2024-02-20 PROCEDURE — 99214 OFFICE O/P EST MOD 30 MIN: CPT | Performed by: FAMILY MEDICINE

## 2024-02-20 PROCEDURE — 1090F PRES/ABSN URINE INCON ASSESS: CPT | Performed by: NURSE PRACTITIONER

## 2024-02-20 PROCEDURE — 3075F SYST BP GE 130 - 139MM HG: CPT | Performed by: NURSE PRACTITIONER

## 2024-02-20 PROCEDURE — 3078F DIAST BP <80 MM HG: CPT | Performed by: NURSE PRACTITIONER

## 2024-02-20 PROCEDURE — G8400 PT W/DXA NO RESULTS DOC: HCPCS | Performed by: FAMILY MEDICINE

## 2024-02-20 PROCEDURE — G8484 FLU IMMUNIZE NO ADMIN: HCPCS | Performed by: FAMILY MEDICINE

## 2024-02-20 PROCEDURE — G8417 CALC BMI ABV UP PARAM F/U: HCPCS | Performed by: NURSE PRACTITIONER

## 2024-02-20 PROCEDURE — 3078F DIAST BP <80 MM HG: CPT | Performed by: FAMILY MEDICINE

## 2024-02-20 PROCEDURE — 1036F TOBACCO NON-USER: CPT | Performed by: NURSE PRACTITIONER

## 2024-02-20 PROCEDURE — 3075F SYST BP GE 130 - 139MM HG: CPT | Performed by: FAMILY MEDICINE

## 2024-02-20 PROCEDURE — G8417 CALC BMI ABV UP PARAM F/U: HCPCS | Performed by: FAMILY MEDICINE

## 2024-02-20 PROCEDURE — G8427 DOCREV CUR MEDS BY ELIG CLIN: HCPCS | Performed by: FAMILY MEDICINE

## 2024-02-20 PROCEDURE — 83036 HEMOGLOBIN GLYCOSYLATED A1C: CPT | Performed by: NURSE PRACTITIONER

## 2024-02-20 PROCEDURE — 1123F ACP DISCUSS/DSCN MKR DOCD: CPT | Performed by: FAMILY MEDICINE

## 2024-02-20 PROCEDURE — 99212 OFFICE O/P EST SF 10 MIN: CPT | Performed by: FAMILY MEDICINE

## 2024-02-20 PROCEDURE — G8400 PT W/DXA NO RESULTS DOC: HCPCS | Performed by: NURSE PRACTITIONER

## 2024-02-20 PROCEDURE — 99214 OFFICE O/P EST MOD 30 MIN: CPT | Performed by: NURSE PRACTITIONER

## 2024-02-20 PROCEDURE — 3044F HG A1C LEVEL LT 7.0%: CPT | Performed by: NURSE PRACTITIONER

## 2024-02-20 PROCEDURE — 99212 OFFICE O/P EST SF 10 MIN: CPT | Performed by: NURSE PRACTITIONER

## 2024-02-20 RX ORDER — FLUTICASONE FUROATE, UMECLIDINIUM BROMIDE AND VILANTEROL TRIFENATATE 100; 62.5; 25 UG/1; UG/1; UG/1
1 POWDER RESPIRATORY (INHALATION)
COMMUNITY
Start: 2023-07-25

## 2024-02-20 RX ORDER — INSULIN GLARGINE-YFGN 100 [IU]/ML
20 INJECTION, SOLUTION SUBCUTANEOUS NIGHTLY
Qty: 20 ML | Refills: 3 | Status: SHIPPED | OUTPATIENT
Start: 2024-02-20

## 2024-02-20 ASSESSMENT — ENCOUNTER SYMPTOMS
SHORTNESS OF BREATH: 0
DIARRHEA: 0
RESPIRATORY NEGATIVE: 1
ABDOMINAL PAIN: 0

## 2024-02-20 ASSESSMENT — PATIENT HEALTH QUESTIONNAIRE - PHQ9
2. FEELING DOWN, DEPRESSED OR HOPELESS: 0
1. LITTLE INTEREST OR PLEASURE IN DOING THINGS: 0
SUM OF ALL RESPONSES TO PHQ QUESTIONS 1-9: 0
SUM OF ALL RESPONSES TO PHQ9 QUESTIONS 1 & 2: 0
SUM OF ALL RESPONSES TO PHQ QUESTIONS 1-9: 0

## 2024-02-20 NOTE — PROGRESS NOTES
[facility] DEPARTMENT OF [facility]  [address] [facility] [address] [phone]        HISTORY:    Justa Ledezma presents today for evaluation and management of:  Chief Complaint   Patient presents with    Diabetes     3 month follow up       Patient Care Team:  Monica Vega MD as PCP - General (Family Medicine)  [facility]  Molly Merino as Care Manager (Nephrology)      Interval History:    Past DM Medications   Metformin-ckd  Glimepiride-therapy completed     Current Diabetic Medications  Lantus 20 units at at bedtime   Sliding Scale Insulin Novolog     Blood sugar   Action     <70               Drink Juice               No extra insulin  150 - 200         2 units subcutaneous Insulin  201 - 250         4 units subcutaneous Insulin  251 - 300         6 units subcutaneous Insulin  301 - 350         8 units subcutaneous Insulin  351 - 400         10 units subcutaneous Insulin   > 400              12 units subcutaneous Insulin        DKA episodes: 0    11/21/23   Justa Ledezma is a 83 y.o. female patient who presents to clinic today for her diabetes. she has a history of hypertension, CAD, ALDAIR, hypothyroidism, CKD, hyperlipidemia and obesity which contributes to her diabetes. At previous visit diabetes counseling was provided. she denies any current signs or symptoms of hyper/hypoglycemia. she states they are taking their medications as prescribed without any adverse effects.   Diet: work on low carb low sodium diet  Exercise: none  BS testing: uses cgm daily with success and is adherent to cgm therapy  Hypoglycemia: Yes    Sweats and Tremors                 Hypoglycemia Frequency: 3 per week  Hypoglycemia as needed treatment: juice   Issues: denies   Diabetic foot exam up-to-date: Yes  Diabetic retinal exam up-to-date: Yes     Diabetes complications:nephropathy and

## 2024-02-20 NOTE — PROGRESS NOTES
HPI:  Patient comes in today for   Chief Complaint   Patient presents with    Diabetes     Pt is here for a 3 month f/u.    Here for 3 months   h/o ,hypertension, hypothyroidism, CAD,chronic kidney disease on dailysis,OAB is stable h/o ALDAIR is on CPAP.Patient follows with ophthalmology for her vision problems has had cataract surgery done last year. Patient follows up with diabetic clinic for diabetes management .h/o diabetic neuropathy .Has diabetic shoes needs renewal soon follows with podaitry    HISTORY:  Past Medical History:   Diagnosis Date    CAD (coronary artery disease)     Diabetes mellitus (HCC)     Hyperlipidemia     Hypertension     Hypothyroidism     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    Bx proven by Dr SULLIVAN    Osteoarthritis     Sleep apnea        Past Surgical History:   Procedure Laterality Date    ANGIOPLASTY  2001    stent x 1    EYE SURGERY      FOOT SURGERY Left 07/31/2009    3rd toe amputation    HYSTERECTOMY, TOTAL ABDOMINAL (CERVIX REMOVED)  1985    TOE AMPUTATION      left 3rd toe    TOE AMPUTATION Left 3/31/2020    Left 2nd TOE AMPUTATION performed by Abner Malone DPM at [facility] OR    TONSILLECTOMY  1949    VULVECTOMY  01/17/2020    Dr Kelley- vulvar SCC carcinoma        Family History   Problem Relation Age of Onset    High Blood Pressure Mother     Heart Disease Mother     Coronary Art Dis Father        Social History     Socioeconomic History    Marital status:      Spouse name: Not on file    Number of children: Not on file    Years of education: Not on file    Highest education level: Not on file   Occupational History    Not on file   Tobacco Use    Smoking status: Never    Smokeless tobacco: Never   Vaping Use    Vaping Use: Never used   Substance and Sexual Activity    Alcohol use: No    Drug use: No    Sexual activity: Not on file   Other Topics Concern    Not on file   Social History Narrative    Not on file     Social Determinants of Health     Financial

## 2024-03-04 NOTE — RESULT ENCOUNTER NOTE
Inform patient of positive urine culture. The cipro will be effective in treating her infection. done

## 2024-04-22 DIAGNOSIS — R32 URINARY INCONTINENCE, UNSPECIFIED TYPE: ICD-10-CM

## 2024-04-22 RX ORDER — OXYBUTYNIN CHLORIDE 10 MG/1
10 TABLET, EXTENDED RELEASE ORAL DAILY
Qty: 90 TABLET | Refills: 0 | Status: SHIPPED | OUTPATIENT
Start: 2024-04-22

## 2024-04-22 NOTE — TELEPHONE ENCOUNTER
Justa Ledezma is requesting a refill on the following medication(s):  Requested Prescriptions     Pending Prescriptions Disp Refills    oxyBUTYnin (DITROPAN-XL) 10 MG extended release tablet [Pharmacy Med Name: Oxybutynin Chloride ER 10 MG Oral Tablet Extended Release 24 Hour] 90 tablet 0     Sig: Take 1 tablet by mouth once daily       Last Visit Date (If Applicable):  2/20/2024    Next Visit Date:    8/20/2024

## 2024-05-13 NOTE — TELEPHONE ENCOUNTER
Justa Ledezma is requesting a refill on the following medication(s):  Requested Prescriptions     Pending Prescriptions Disp Refills    levothyroxine (SYNTHROID) 150 MCG tablet [Pharmacy Med Name: Levothyroxine Sodium 150 MCG Oral Tablet] 90 tablet 0     Sig: Take 1 tablet by mouth once daily       Last Visit Date (If Applicable):  2/20/2024    Next Visit Date:    8/20/2024

## 2024-05-14 RX ORDER — LEVOTHYROXINE SODIUM 0.15 MG/1
150 TABLET ORAL DAILY
Qty: 90 TABLET | Refills: 0 | Status: SHIPPED | OUTPATIENT
Start: 2024-05-14

## 2024-05-21 ENCOUNTER — OFFICE VISIT (OUTPATIENT)
Dept: DIABETES SERVICES | Age: 84
End: 2024-05-21
Payer: MEDICARE

## 2024-05-21 VITALS
WEIGHT: 159 LBS | BODY MASS INDEX: 24.96 KG/M2 | OXYGEN SATURATION: 98 % | HEIGHT: 67 IN | DIASTOLIC BLOOD PRESSURE: 64 MMHG | SYSTOLIC BLOOD PRESSURE: 110 MMHG | HEART RATE: 51 BPM

## 2024-05-21 DIAGNOSIS — N18.4 TYPE 2 DIABETES MELLITUS WITH STAGE 4 CHRONIC KIDNEY DISEASE, WITH LONG-TERM CURRENT USE OF INSULIN (HCC): Primary | ICD-10-CM

## 2024-05-21 DIAGNOSIS — E11.22 TYPE 2 DIABETES MELLITUS WITH STAGE 4 CHRONIC KIDNEY DISEASE, WITH LONG-TERM CURRENT USE OF INSULIN (HCC): Primary | ICD-10-CM

## 2024-05-21 DIAGNOSIS — G62.9 PERIPHERAL POLYNEUROPATHY: ICD-10-CM

## 2024-05-21 DIAGNOSIS — Z79.4 TYPE 2 DIABETES MELLITUS WITH STAGE 4 CHRONIC KIDNEY DISEASE, WITH LONG-TERM CURRENT USE OF INSULIN (HCC): Primary | ICD-10-CM

## 2024-05-21 DIAGNOSIS — I10 ESSENTIAL HYPERTENSION: ICD-10-CM

## 2024-05-21 DIAGNOSIS — E78.2 MIXED HYPERLIPIDEMIA: ICD-10-CM

## 2024-05-21 LAB — HBA1C MFR BLD: 6.5 %

## 2024-05-21 PROCEDURE — G8400 PT W/DXA NO RESULTS DOC: HCPCS | Performed by: NURSE PRACTITIONER

## 2024-05-21 PROCEDURE — 3074F SYST BP LT 130 MM HG: CPT | Performed by: NURSE PRACTITIONER

## 2024-05-21 PROCEDURE — G8420 CALC BMI NORM PARAMETERS: HCPCS | Performed by: NURSE PRACTITIONER

## 2024-05-21 PROCEDURE — 3078F DIAST BP <80 MM HG: CPT | Performed by: NURSE PRACTITIONER

## 2024-05-21 PROCEDURE — G2211 COMPLEX E/M VISIT ADD ON: HCPCS | Performed by: NURSE PRACTITIONER

## 2024-05-21 PROCEDURE — 83036 HEMOGLOBIN GLYCOSYLATED A1C: CPT | Performed by: NURSE PRACTITIONER

## 2024-05-21 PROCEDURE — 1123F ACP DISCUSS/DSCN MKR DOCD: CPT | Performed by: NURSE PRACTITIONER

## 2024-05-21 PROCEDURE — G8427 DOCREV CUR MEDS BY ELIG CLIN: HCPCS | Performed by: NURSE PRACTITIONER

## 2024-05-21 PROCEDURE — 99214 OFFICE O/P EST MOD 30 MIN: CPT | Performed by: NURSE PRACTITIONER

## 2024-05-21 PROCEDURE — 1036F TOBACCO NON-USER: CPT | Performed by: NURSE PRACTITIONER

## 2024-05-21 PROCEDURE — 99212 OFFICE O/P EST SF 10 MIN: CPT | Performed by: NURSE PRACTITIONER

## 2024-05-21 PROCEDURE — 1090F PRES/ABSN URINE INCON ASSESS: CPT | Performed by: NURSE PRACTITIONER

## 2024-05-21 PROCEDURE — 3044F HG A1C LEVEL LT 7.0%: CPT | Performed by: NURSE PRACTITIONER

## 2024-05-21 PROCEDURE — 95251 CONT GLUC MNTR ANALYSIS I&R: CPT | Performed by: NURSE PRACTITIONER

## 2024-05-21 PROCEDURE — PBSHW POCT GLYCOSYLATED HEMOGLOBIN (HGB A1C): Performed by: NURSE PRACTITIONER

## 2024-05-21 RX ORDER — BLOOD SUGAR DIAGNOSTIC
1 STRIP MISCELLANEOUS DAILY
Qty: 100 EACH | Refills: 5 | Status: SHIPPED | OUTPATIENT
Start: 2024-05-21 | End: 2024-05-22 | Stop reason: SDUPTHER

## 2024-05-21 RX ORDER — BLOOD SUGAR DIAGNOSTIC
1 STRIP MISCELLANEOUS DAILY
Qty: 100 EACH | Refills: 5 | Status: SHIPPED | OUTPATIENT
Start: 2024-05-21 | End: 2024-05-21 | Stop reason: SDUPTHER

## 2024-05-21 ASSESSMENT — ENCOUNTER SYMPTOMS
RESPIRATORY NEGATIVE: 1
DIARRHEA: 0
SHORTNESS OF BREATH: 0
ABDOMINAL PAIN: 0

## 2024-05-21 NOTE — TELEPHONE ENCOUNTER
Justa Ledezma is requesting a refill on the following medication(s):  Requested Prescriptions     Pending Prescriptions Disp Refills    gabapentin (NEURONTIN) 300 MG capsule [Pharmacy Med Name: Gabapentin 300 MG Oral Capsule] 270 capsule 0     Sig: TAKE 1 CAPSULE BY MOUTH THREE TIMES DAILY FOR 90 DAYS       Last Visit Date (If Applicable):  2/20/2024    Next Visit Date:    8/20/2024

## 2024-05-21 NOTE — PROGRESS NOTES
[facility]  [address] [facility] [address] [phone]        HISTORY:    Justa Ledezma presents today for evaluation and management of:  Chief Complaint   Patient presents with    Diabetes     3 month follow up       Patient Care Team:  Monica Vega MD as PCP - General (Family Medicine)  [facility]  Molly Merino as Care Manager (Nephrology)      Interval History:    Past DM Medications   Metformin-ckd  Glimepiride-therapy completed     Current Diabetic Medications  Lantus 20 units at at bedtime   Sliding Scale Insulin Novolog     Blood sugar   Action     <70               Drink Juice               No extra insulin  150 - 200         2 units subcutaneous Insulin  201 - 250         4 units subcutaneous Insulin  251 - 300         6 units subcutaneous Insulin  301 - 350         8 units subcutaneous Insulin  351 - 400         10 units subcutaneous Insulin   > 400              12 units subcutaneous Insulin        DKA episodes: 0    02/20/24   Justa Ledezma is a 83 y.o. female patient who presents to clinic today for her diabetes. she has a history of hypertension, CAD, ALDAIR, hypothyroidism, CKD, hyperlipidemia and obesity which contributes to her diabetes. At previous visit diabetes counseling was provided. she denies any current signs or symptoms of hyper/hypoglycemia. she states they are taking their medications as prescribed without any adverse effects.   Diet: work on low carb low sodium diet  Exercise: none  BS testing: uses cgm daily with success and is adherent to cgm therapy  Hypoglycemia: Yes    Sweats and Tremors                 Hypoglycemia Frequency: 3 per week  Hypoglycemia as needed treatment: juice   Issues: denies   Diabetic foot exam up-to-date: Yes  Diabetic retinal exam up-to-date: Yes     Diabetes complications:nephropathy and

## 2024-05-21 NOTE — PROGRESS NOTES
[facility] pharmacy called stating that they need a diagnosis code sent over with the test strips.  Order pended with DX code added to sig.

## 2024-05-22 DIAGNOSIS — E11.22 TYPE 2 DIABETES MELLITUS WITH STAGE 4 CHRONIC KIDNEY DISEASE, WITH LONG-TERM CURRENT USE OF INSULIN (HCC): ICD-10-CM

## 2024-05-22 DIAGNOSIS — N18.4 TYPE 2 DIABETES MELLITUS WITH STAGE 4 CHRONIC KIDNEY DISEASE, WITH LONG-TERM CURRENT USE OF INSULIN (HCC): ICD-10-CM

## 2024-05-22 DIAGNOSIS — Z79.4 TYPE 2 DIABETES MELLITUS WITH STAGE 4 CHRONIC KIDNEY DISEASE, WITH LONG-TERM CURRENT USE OF INSULIN (HCC): ICD-10-CM

## 2024-05-22 RX ORDER — BLOOD SUGAR DIAGNOSTIC
1 STRIP MISCELLANEOUS DAILY
Qty: 100 EACH | Refills: 5 | Status: SHIPPED | OUTPATIENT
Start: 2024-05-22

## 2024-05-22 RX ORDER — GABAPENTIN 300 MG/1
CAPSULE ORAL
Qty: 270 CAPSULE | Refills: 0 | Status: SHIPPED | OUTPATIENT
Start: 2024-05-22 | End: 2024-08-20

## 2024-05-22 NOTE — PROGRESS NOTES
Pharmacy faxed stating \"as needed\" needs to be removed from the sig.     Order pended again for your signature.

## 2024-06-13 ENCOUNTER — HOSPITAL ENCOUNTER (OUTPATIENT)
Age: 84
Setting detail: SPECIMEN
Discharge: HOME | End: 2024-06-13
Payer: MEDICARE

## 2024-06-13 ENCOUNTER — OFFICE VISIT (OUTPATIENT)
Dept: FAMILY MEDICINE CLINIC | Age: 84
End: 2024-06-13
Payer: MEDICARE

## 2024-06-13 VITALS
BODY MASS INDEX: 24.8 KG/M2 | HEIGHT: 67 IN | HEART RATE: 60 BPM | OXYGEN SATURATION: 98 % | SYSTOLIC BLOOD PRESSURE: 130 MMHG | WEIGHT: 158 LBS | DIASTOLIC BLOOD PRESSURE: 70 MMHG | RESPIRATION RATE: 20 BRPM

## 2024-06-13 DIAGNOSIS — N30.01 ACUTE CYSTITIS WITH HEMATURIA: ICD-10-CM

## 2024-06-13 DIAGNOSIS — R82.90 CLOUDY URINE: ICD-10-CM

## 2024-06-13 DIAGNOSIS — R30.0 DYSURIA: ICD-10-CM

## 2024-06-13 DIAGNOSIS — N30.01 ACUTE CYSTITIS WITH HEMATURIA: Primary | ICD-10-CM

## 2024-06-13 LAB
BACTERIA URNS QL MICRO: ABNORMAL
BILIRUB UR QL STRIP: ABNORMAL
CLARITY UR: ABNORMAL
COLOR UR: ABNORMAL
EPI CELLS #/AREA URNS HPF: ABNORMAL /HPF (ref 0–5)
GLUCOSE UR STRIP-MCNC: NEGATIVE MG/DL
HGB UR QL STRIP.AUTO: ABNORMAL
KETONES UR STRIP-MCNC: ABNORMAL MG/DL
LEUKOCYTE ESTERASE UR QL STRIP: ABNORMAL
NITRITE UR QL STRIP: POSITIVE
PH UR STRIP: 6 [PH] (ref 5–6)
PROT UR STRIP-MCNC: ABNORMAL MG/DL
RBC #/AREA URNS HPF: ABNORMAL /HPF (ref 0–4)
SP GR UR STRIP: 1.02 (ref 1.01–1.02)
UROBILINOGEN UR STRIP-ACNC: NORMAL EU/DL (ref 0–1)
WBC #/AREA URNS HPF: ABNORMAL /HPF (ref 0–4)

## 2024-06-13 PROCEDURE — G8400 PT W/DXA NO RESULTS DOC: HCPCS | Performed by: NURSE PRACTITIONER

## 2024-06-13 PROCEDURE — 1036F TOBACCO NON-USER: CPT | Performed by: NURSE PRACTITIONER

## 2024-06-13 PROCEDURE — 87086 URINE CULTURE/COLONY COUNT: CPT

## 2024-06-13 PROCEDURE — 81001 URINALYSIS AUTO W/SCOPE: CPT

## 2024-06-13 PROCEDURE — G8420 CALC BMI NORM PARAMETERS: HCPCS | Performed by: NURSE PRACTITIONER

## 2024-06-13 PROCEDURE — 99213 OFFICE O/P EST LOW 20 MIN: CPT | Performed by: NURSE PRACTITIONER

## 2024-06-13 PROCEDURE — G8427 DOCREV CUR MEDS BY ELIG CLIN: HCPCS | Performed by: NURSE PRACTITIONER

## 2024-06-13 PROCEDURE — 87186 SC STD MICRODIL/AGAR DIL: CPT

## 2024-06-13 PROCEDURE — 3075F SYST BP GE 130 - 139MM HG: CPT | Performed by: NURSE PRACTITIONER

## 2024-06-13 PROCEDURE — 1090F PRES/ABSN URINE INCON ASSESS: CPT | Performed by: NURSE PRACTITIONER

## 2024-06-13 PROCEDURE — 99214 OFFICE O/P EST MOD 30 MIN: CPT | Performed by: NURSE PRACTITIONER

## 2024-06-13 PROCEDURE — 3078F DIAST BP <80 MM HG: CPT | Performed by: NURSE PRACTITIONER

## 2024-06-13 PROCEDURE — 87088 URINE BACTERIA CULTURE: CPT

## 2024-06-13 PROCEDURE — 1123F ACP DISCUSS/DSCN MKR DOCD: CPT | Performed by: NURSE PRACTITIONER

## 2024-06-13 RX ORDER — CIPROFLOXACIN 250 MG/1
250 TABLET, FILM COATED ORAL 2 TIMES DAILY
Qty: 10 TABLET | Refills: 0 | Status: SHIPPED | OUTPATIENT
Start: 2024-06-13 | End: 2024-06-18

## 2024-06-13 SDOH — ECONOMIC STABILITY: FOOD INSECURITY: WITHIN THE PAST 12 MONTHS, YOU WORRIED THAT YOUR FOOD WOULD RUN OUT BEFORE YOU GOT MONEY TO BUY MORE.: NEVER TRUE

## 2024-06-13 SDOH — ECONOMIC STABILITY: FOOD INSECURITY: WITHIN THE PAST 12 MONTHS, THE FOOD YOU BOUGHT JUST DIDN'T LAST AND YOU DIDN'T HAVE MONEY TO GET MORE.: NEVER TRUE

## 2024-06-13 SDOH — ECONOMIC STABILITY: INCOME INSECURITY: HOW HARD IS IT FOR YOU TO PAY FOR THE VERY BASICS LIKE FOOD, HOUSING, MEDICAL CARE, AND HEATING?: NOT HARD AT ALL

## 2024-06-13 NOTE — PROGRESS NOTES
06/13/2024 6.0  5.0 - 6.0 Final    Protein, UA 06/13/2024 3+ (A)  NEGATIVE mg/dL Final    Urobilinogen, Urine 06/13/2024 Normal  0.0 - 1.0 EU/dL Final    Nitrite, Urine 06/13/2024 POSITIVE (A)  NEGATIVE Final    Leukocyte Esterase, Urine 06/13/2024 2+ (A)  NEGATIVE Final    WBC, UA 06/13/2024 TOO NUMEROUS TO COUNT  0 - 4 /HPF Final    RBC, UA 06/13/2024 20 TO 50  0 - 4 /HPF Final    Epithelial Cells, UA 06/13/2024 0 TO 2  0 - 5 /HPF Final    Bacteria, UA 06/13/2024 MANY (A)  None Final   Office Visit on 05/21/2024   Component Date Value Ref Range Status    Hemoglobin A1C 05/21/2024 6.5  % Final         Subjective:   Urinary Tract Infection  This is a new problem. The current episode started in the past 7 days (tuesday night). The problem has been gradually worsening since onset. Risk factors: dialysis patient.     Review of Systems   Genitourinary:  Positive for dysuria.        Small amount of urine and very cloudy with burning sensation.  Dialysis patient for 14 months.       Past Medical History:   Past Medical History:   Diagnosis Date    CAD (coronary artery disease)     Diabetes mellitus (HCC)     Hyperlipidemia     Hypertension     Hypothyroidism     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    Bx proven by Dr SULLIVAN    Osteoarthritis     Sleep apnea         Past Surgical History:  has a past surgical history that includes Tonsillectomy (1949); angioplasty (2001); Foot surgery (Left, 07/31/2009); eye surgery; Toe amputation; Vulvectomy (01/17/2020); Hysterectomy, total abdominal (1985); and Toe amputation (Left, 3/31/2020).     Allergies:   Allergies   Allergen Reactions    Lisinopril Other (See Comments)     Cough    Paraphenylenediamine     Penicillins Swelling     Facial swelling    Ranolazine Rash       Social History:  reports that she has never smoked. She has never used smokeless tobacco. She reports that she does not drink alcohol and does not use drugs.     Objective:     Vitals:    06/13/24

## 2024-06-16 LAB
MICROORGANISM SPEC CULT: ABNORMAL
SERVICE CMNT-IMP: ABNORMAL
SPECIMEN DESCRIPTION: ABNORMAL

## 2024-07-30 ENCOUNTER — OFFICE VISIT (OUTPATIENT)
Dept: FAMILY MEDICINE CLINIC | Age: 84
End: 2024-07-30
Payer: MEDICARE

## 2024-07-30 VITALS
DIASTOLIC BLOOD PRESSURE: 50 MMHG | BODY MASS INDEX: 24.75 KG/M2 | RESPIRATION RATE: 20 BRPM | OXYGEN SATURATION: 94 % | TEMPERATURE: 99.5 F | HEIGHT: 67 IN | SYSTOLIC BLOOD PRESSURE: 90 MMHG | HEART RATE: 56 BPM

## 2024-07-30 DIAGNOSIS — I95.9 HYPOTENSION, UNSPECIFIED HYPOTENSION TYPE: Primary | ICD-10-CM

## 2024-07-30 PROCEDURE — 1036F TOBACCO NON-USER: CPT | Performed by: NURSE PRACTITIONER

## 2024-07-30 PROCEDURE — G8420 CALC BMI NORM PARAMETERS: HCPCS | Performed by: NURSE PRACTITIONER

## 2024-07-30 PROCEDURE — 99212 OFFICE O/P EST SF 10 MIN: CPT | Performed by: NURSE PRACTITIONER

## 2024-07-30 PROCEDURE — 1123F ACP DISCUSS/DSCN MKR DOCD: CPT | Performed by: NURSE PRACTITIONER

## 2024-07-30 PROCEDURE — G8427 DOCREV CUR MEDS BY ELIG CLIN: HCPCS | Performed by: NURSE PRACTITIONER

## 2024-07-30 PROCEDURE — 3078F DIAST BP <80 MM HG: CPT | Performed by: NURSE PRACTITIONER

## 2024-07-30 PROCEDURE — 1090F PRES/ABSN URINE INCON ASSESS: CPT | Performed by: NURSE PRACTITIONER

## 2024-07-30 PROCEDURE — 3074F SYST BP LT 130 MM HG: CPT | Performed by: NURSE PRACTITIONER

## 2024-07-30 PROCEDURE — G8400 PT W/DXA NO RESULTS DOC: HCPCS | Performed by: NURSE PRACTITIONER

## 2024-07-30 ASSESSMENT — ENCOUNTER SYMPTOMS
ABDOMINAL PAIN: 1
CONSTIPATION: 1
RECTAL PAIN: 1

## 2024-07-30 NOTE — PROGRESS NOTES
[facility] department of [place] [id number]  Phone: [phone]  Fax: [phone]    Justa Ledezma (:  1940) is a 83 y.o. female,Established patient, here for evaluation of the following chief complaint(s):  Constipation (Constipated for about 3-4 days. Stated liquid is coming around impaction. Took metamucil 2 times. Reported last BM since sometime last week. Stated rectum is now hurting from the pressure. Has not had dialysis since Friday. )      Assessment and Plan     1. Hypotension, unspecified hypotension type    Concern for hypotension. Symptoms sound as though she has a fecal impaction and has missed dialysis. Last dialysis was last Friday.   Emergency room was advised.   I called the [facility] ER and gave report to Virgie that patient would be coming to see them shortly.           Return for as needed.      Discussed exam, POCT findings, plan of care, and follow-up at length with patient and/or their caregiver. Reviewed all prescribed and recommended medications, administration and side effects. All questions were addressed and answered with verbalization of understanding. The patient and/or the caregiver was agreeable with the plan.   Subjective:   Constipation  Associated symptoms include abdominal pain (lower abdomen) and rectal pain.     Review of Systems   Constitutional:  Positive for activity change, appetite change and fatigue.   Gastrointestinal:  Positive for abdominal pain (lower abdomen), constipation and rectal pain.        Only liquid is leaking from rectum around feces.     Neurological:  Positive for weakness.       Past Medical History:   Past Medical History:   Diagnosis Date    CAD (coronary artery disease)     Diabetes mellitus (HCC)     Hyperlipidemia     Hypertension     Hypothyroidism     Lichen sclerosus et atrophicus of the vulva 2019    Bx proven by Dr SULLIVAN    Osteoarthritis     Sleep apnea         Past

## 2024-08-06 ENCOUNTER — OFFICE VISIT (OUTPATIENT)
Dept: FAMILY MEDICINE CLINIC | Age: 84
End: 2024-08-06
Payer: MEDICARE

## 2024-08-06 ENCOUNTER — HOSPITAL ENCOUNTER (OUTPATIENT)
Age: 84
Setting detail: SPECIMEN
Discharge: HOME | End: 2024-08-06
Payer: MEDICARE

## 2024-08-06 VITALS
OXYGEN SATURATION: 98 % | DIASTOLIC BLOOD PRESSURE: 48 MMHG | HEART RATE: 52 BPM | HEIGHT: 67 IN | BODY MASS INDEX: 24.01 KG/M2 | SYSTOLIC BLOOD PRESSURE: 120 MMHG | WEIGHT: 153 LBS

## 2024-08-06 DIAGNOSIS — R31.9 HEMATURIA, UNSPECIFIED TYPE: ICD-10-CM

## 2024-08-06 DIAGNOSIS — R32 URINARY INCONTINENCE, UNSPECIFIED TYPE: ICD-10-CM

## 2024-08-06 DIAGNOSIS — N30.00 ACUTE CYSTITIS WITHOUT HEMATURIA: ICD-10-CM

## 2024-08-06 DIAGNOSIS — R10.9 FLANK PAIN: ICD-10-CM

## 2024-08-06 DIAGNOSIS — R31.9 HEMATURIA, UNSPECIFIED TYPE: Primary | ICD-10-CM

## 2024-08-06 DIAGNOSIS — K59.00 CONSTIPATION, UNSPECIFIED CONSTIPATION TYPE: ICD-10-CM

## 2024-08-06 LAB
BILIRUBIN, POC: NORMAL
BLOOD URINE, POC: NORMAL
CLARITY, POC: NORMAL
COLOR, POC: NORMAL
GLUCOSE URINE, POC: NORMAL
KETONES, POC: NORMAL
LEUKOCYTE EST, POC: NORMAL
NITRITE, POC: NORMAL
PH, POC: 6
PROTEIN, POC: NORMAL
SPECIFIC GRAVITY, POC: 1.02
UROBILINOGEN, POC: 1

## 2024-08-06 PROCEDURE — 3074F SYST BP LT 130 MM HG: CPT

## 2024-08-06 PROCEDURE — 87086 URINE CULTURE/COLONY COUNT: CPT

## 2024-08-06 PROCEDURE — 99214 OFFICE O/P EST MOD 30 MIN: CPT

## 2024-08-06 PROCEDURE — 1036F TOBACCO NON-USER: CPT

## 2024-08-06 PROCEDURE — 3078F DIAST BP <80 MM HG: CPT

## 2024-08-06 PROCEDURE — G8400 PT W/DXA NO RESULTS DOC: HCPCS

## 2024-08-06 PROCEDURE — PBSHW POCT URINALYSIS DIPSTICK

## 2024-08-06 PROCEDURE — 81002 URINALYSIS NONAUTO W/O SCOPE: CPT

## 2024-08-06 PROCEDURE — 1123F ACP DISCUSS/DSCN MKR DOCD: CPT

## 2024-08-06 PROCEDURE — 1090F PRES/ABSN URINE INCON ASSESS: CPT

## 2024-08-06 PROCEDURE — G8427 DOCREV CUR MEDS BY ELIG CLIN: HCPCS

## 2024-08-06 PROCEDURE — 87077 CULTURE AEROBIC IDENTIFY: CPT

## 2024-08-06 PROCEDURE — G8420 CALC BMI NORM PARAMETERS: HCPCS

## 2024-08-06 RX ORDER — CIPROFLOXACIN 500 MG/1
500 TABLET, FILM COATED ORAL DAILY
Qty: 10 TABLET | Refills: 0 | Status: SHIPPED | OUTPATIENT
Start: 2024-08-06 | End: 2024-08-16

## 2024-08-06 ASSESSMENT — ENCOUNTER SYMPTOMS
DIARRHEA: 1
VOMITING: 0
NAUSEA: 1
CONSTIPATION: 1

## 2024-08-06 NOTE — PROGRESS NOTES
[facility]  [address] [place] [address]  Dept: [phone]    Date of Service:  8/6/2024    Justa Ledezma is a 83 y.o. female who presents in office today with Self.    Chief Complaint   Patient presents with    Urinary Tract Infection     Pt is here for a possible UTI. Pt states she has been having blood and pain while urination. Pt states the symptoms started last week. Pt states she had bowel issues last week too.         Diagnoses / Plan:   1. Hematuria, unspecified type  -     POCT Urinalysis no Micro  -     Culture, Urine; Future  -     CT ABDOMEN PELVIS WO CONTRAST; Future  -     ciprofloxacin (CIPRO) 500 MG tablet; Take 1 tablet by mouth daily for 10 days, Disp-10 tablet, R-0Normal  2. Acute cystitis without hematuria  -     CT ABDOMEN PELVIS WO CONTRAST; Future  -     ciprofloxacin (CIPRO) 500 MG tablet; Take 1 tablet by mouth daily for 10 days, Disp-10 tablet, R-0Normal  3. Constipation, unspecified constipation type  -     CT ABDOMEN PELVIS WO CONTRAST; Future  -     ciprofloxacin (CIPRO) 500 MG tablet; Take 1 tablet by mouth daily for 10 days, Disp-10 tablet, R-0Normal  4. Flank pain  -     CT ABDOMEN PELVIS WO CONTRAST; Future  -     ciprofloxacin (CIPRO) 500 MG tablet; Take 1 tablet by mouth daily for 10 days, Disp-10 tablet, R-0Normal   -With gross hematuria, flank pain, dysuria will get CT to rule out stone versus pyelo or other etiologies.  Start Cipro 500 once daily due to dialysis.  On days of dialysis take postdialysis.  Will send urine for culture.  Close follow-up with PCP or urologist in 1 week.  Office will call with CT results    Ciprofloxacin medication sent to the pharmacy.  Discussed medication desired effects, potential side effects, and how to take the medication.  Encouraged symptomatic treatment, rest, increase oral fluid intake.  Follow-up for worsening or persistent symptoms.  Patient verbalizes understanding regarding plan of care and all

## 2024-08-07 RX ORDER — OXYBUTYNIN CHLORIDE 10 MG/1
10 TABLET, EXTENDED RELEASE ORAL DAILY
Qty: 90 TABLET | Refills: 0 | Status: SHIPPED | OUTPATIENT
Start: 2024-08-07 | End: 2024-08-08 | Stop reason: SDUPTHER

## 2024-08-07 NOTE — TELEPHONE ENCOUNTER
Justa Ledezma is requesting a refill on the following medication(s):  Requested Prescriptions     Pending Prescriptions Disp Refills    oxyBUTYnin (DITROPAN-XL) 10 MG extended release tablet [Pharmacy Med Name: Oxybutynin Chloride ER 10 MG Oral Tablet Extended Release 24 Hour] 90 tablet 0     Sig: Take 1 tablet by mouth once daily       Last Visit Date (If Applicable):  7/30/2024    Next Visit Date:    8/6/2024

## 2024-08-08 ENCOUNTER — OFFICE VISIT (OUTPATIENT)
Dept: FAMILY MEDICINE CLINIC | Age: 84
End: 2024-08-08
Payer: MEDICARE

## 2024-08-08 VITALS
DIASTOLIC BLOOD PRESSURE: 52 MMHG | HEART RATE: 60 BPM | OXYGEN SATURATION: 99 % | HEIGHT: 67 IN | WEIGHT: 161 LBS | BODY MASS INDEX: 25.27 KG/M2 | SYSTOLIC BLOOD PRESSURE: 136 MMHG

## 2024-08-08 DIAGNOSIS — Z99.2 TYPE 2 DIABETES MELLITUS WITH CHRONIC KIDNEY DISEASE ON CHRONIC DIALYSIS, WITH LONG-TERM CURRENT USE OF INSULIN (HCC): Primary | ICD-10-CM

## 2024-08-08 DIAGNOSIS — Z79.4 TYPE 2 DIABETES MELLITUS WITH CHRONIC KIDNEY DISEASE ON CHRONIC DIALYSIS, WITH LONG-TERM CURRENT USE OF INSULIN (HCC): Primary | ICD-10-CM

## 2024-08-08 DIAGNOSIS — G62.9 NEUROPATHY: ICD-10-CM

## 2024-08-08 DIAGNOSIS — N18.6 TYPE 2 DIABETES MELLITUS WITH CHRONIC KIDNEY DISEASE ON CHRONIC DIALYSIS, WITH LONG-TERM CURRENT USE OF INSULIN (HCC): Primary | ICD-10-CM

## 2024-08-08 DIAGNOSIS — I13.2 HYPERTENSIVE HEART AND KIDNEY DISEASE, STAGE 5 CHRONIC KIDNEY DISEASE OR END STAGE RENAL DISEASE, WITH HEART FAILURE (HCC): ICD-10-CM

## 2024-08-08 DIAGNOSIS — I50.32 CHRONIC DIASTOLIC HEART FAILURE (HCC): ICD-10-CM

## 2024-08-08 DIAGNOSIS — G62.9 PERIPHERAL POLYNEUROPATHY: ICD-10-CM

## 2024-08-08 DIAGNOSIS — R32 URINARY INCONTINENCE, UNSPECIFIED TYPE: ICD-10-CM

## 2024-08-08 DIAGNOSIS — E11.22 TYPE 2 DIABETES MELLITUS WITH CHRONIC KIDNEY DISEASE ON CHRONIC DIALYSIS, WITH LONG-TERM CURRENT USE OF INSULIN (HCC): Primary | ICD-10-CM

## 2024-08-08 DIAGNOSIS — E03.9 ACQUIRED HYPOTHYROIDISM: ICD-10-CM

## 2024-08-08 DIAGNOSIS — N90.4 LICHEN SCLEROSUS ET ATROPHICUS OF THE VULVA: ICD-10-CM

## 2024-08-08 PROBLEM — I12.0 HYPERTENSIVE KIDNEY DISEASE WITH END STAGE CHRONIC KIDNEY DISEASE ON DIALYSIS (HCC): Status: ACTIVE | Noted: 2024-08-08

## 2024-08-08 PROBLEM — K59.00 CONSTIPATION: Status: RESOLVED | Noted: 2019-12-30 | Resolved: 2024-08-08

## 2024-08-08 PROBLEM — R42 DIZZINESS: Status: RESOLVED | Noted: 2018-02-16 | Resolved: 2024-08-08

## 2024-08-08 PROBLEM — I13.10 HYPERTENSIVE HEART AND RENAL DISEASE: Status: ACTIVE | Noted: 2024-08-08

## 2024-08-08 PROBLEM — E87.5 HYPERKALEMIA: Status: RESOLVED | Noted: 2019-11-27 | Resolved: 2024-08-08

## 2024-08-08 PROBLEM — E66.01 MORBIDLY OBESE (HCC): Status: RESOLVED | Noted: 2020-11-02 | Resolved: 2024-08-08

## 2024-08-08 PROBLEM — E66.811 CLASS 1 OBESITY DUE TO EXCESS CALORIES IN ADULT: Status: RESOLVED | Noted: 2018-08-21 | Resolved: 2024-08-08

## 2024-08-08 PROBLEM — N30.00 ACUTE CYSTITIS WITHOUT HEMATURIA: Status: RESOLVED | Noted: 2019-10-02 | Resolved: 2024-08-08

## 2024-08-08 PROBLEM — N18.30 STAGE 3 CHRONIC KIDNEY DISEASE (HCC): Status: RESOLVED | Noted: 2019-04-23 | Resolved: 2024-08-08

## 2024-08-08 PROBLEM — D50.9 IRON DEFICIENCY ANEMIA: Status: RESOLVED | Noted: 2019-04-23 | Resolved: 2024-08-08

## 2024-08-08 PROBLEM — J30.0 VASOMOTOR RHINITIS: Status: RESOLVED | Noted: 2017-06-13 | Resolved: 2024-08-08

## 2024-08-08 PROBLEM — I49.1 PAC (PREMATURE ATRIAL CONTRACTION): Status: RESOLVED | Noted: 2020-11-09 | Resolved: 2024-08-08

## 2024-08-08 PROBLEM — H61.23 BILATERAL IMPACTED CERUMEN: Status: RESOLVED | Noted: 2021-10-19 | Resolved: 2024-08-08

## 2024-08-08 PROBLEM — K57.90 DIVERTICULOSIS: Status: RESOLVED | Noted: 2021-09-02 | Resolved: 2024-08-08

## 2024-08-08 PROBLEM — H91.93 BILATERAL HEARING LOSS: Status: RESOLVED | Noted: 2021-10-19 | Resolved: 2024-08-08

## 2024-08-08 PROBLEM — I49.3 PVC (PREMATURE VENTRICULAR CONTRACTION): Status: RESOLVED | Noted: 2021-08-30 | Resolved: 2024-08-08

## 2024-08-08 PROBLEM — E66.09 CLASS 1 OBESITY DUE TO EXCESS CALORIES IN ADULT: Status: RESOLVED | Noted: 2018-08-21 | Resolved: 2024-08-08

## 2024-08-08 PROBLEM — N19 UREMIA: Status: RESOLVED | Noted: 2023-04-05 | Resolved: 2024-08-08

## 2024-08-08 PROCEDURE — 3044F HG A1C LEVEL LT 7.0%: CPT | Performed by: STUDENT IN AN ORGANIZED HEALTH CARE EDUCATION/TRAINING PROGRAM

## 2024-08-08 PROCEDURE — 0509F URINE INCON PLAN DOCD: CPT | Performed by: STUDENT IN AN ORGANIZED HEALTH CARE EDUCATION/TRAINING PROGRAM

## 2024-08-08 PROCEDURE — 99214 OFFICE O/P EST MOD 30 MIN: CPT | Performed by: STUDENT IN AN ORGANIZED HEALTH CARE EDUCATION/TRAINING PROGRAM

## 2024-08-08 PROCEDURE — 1123F ACP DISCUSS/DSCN MKR DOCD: CPT | Performed by: STUDENT IN AN ORGANIZED HEALTH CARE EDUCATION/TRAINING PROGRAM

## 2024-08-08 PROCEDURE — G8427 DOCREV CUR MEDS BY ELIG CLIN: HCPCS | Performed by: STUDENT IN AN ORGANIZED HEALTH CARE EDUCATION/TRAINING PROGRAM

## 2024-08-08 PROCEDURE — G8417 CALC BMI ABV UP PARAM F/U: HCPCS | Performed by: STUDENT IN AN ORGANIZED HEALTH CARE EDUCATION/TRAINING PROGRAM

## 2024-08-08 PROCEDURE — G8400 PT W/DXA NO RESULTS DOC: HCPCS | Performed by: STUDENT IN AN ORGANIZED HEALTH CARE EDUCATION/TRAINING PROGRAM

## 2024-08-08 PROCEDURE — 1090F PRES/ABSN URINE INCON ASSESS: CPT | Performed by: STUDENT IN AN ORGANIZED HEALTH CARE EDUCATION/TRAINING PROGRAM

## 2024-08-08 PROCEDURE — 1036F TOBACCO NON-USER: CPT | Performed by: STUDENT IN AN ORGANIZED HEALTH CARE EDUCATION/TRAINING PROGRAM

## 2024-08-08 RX ORDER — MONTELUKAST SODIUM 10 MG/1
TABLET ORAL
Qty: 90 TABLET | Refills: 1
Start: 2024-08-08

## 2024-08-08 RX ORDER — OXYBUTYNIN CHLORIDE 10 MG/1
10 TABLET, EXTENDED RELEASE ORAL DAILY
Qty: 90 TABLET | Refills: 0 | Status: SHIPPED | OUTPATIENT
Start: 2024-08-08

## 2024-08-08 RX ORDER — BETAMETHASONE DIPROPIONATE 0.05 %
OINTMENT (GRAM) TOPICAL
Qty: 45 G | Refills: 0 | Status: SHIPPED | OUTPATIENT
Start: 2024-08-08

## 2024-08-08 RX ORDER — FEXOFENADINE HCL 60 MG/1
60 TABLET, FILM COATED ORAL
Qty: 30 TABLET | Refills: 2 | Status: SHIPPED | OUTPATIENT
Start: 2024-08-09

## 2024-08-08 RX ORDER — GABAPENTIN 100 MG/1
100 CAPSULE ORAL DAILY
Qty: 90 CAPSULE | Refills: 0 | Status: SHIPPED | OUTPATIENT
Start: 2024-08-08 | End: 2024-11-06

## 2024-08-08 NOTE — PATIENT INSTRUCTIONS
Try discontinuing the oxybutynin.  We are changing the Allegra to 60mg three times per week.  We are decreasing your gabapentin to 100mg daily.  Get your thyroid lab done prior to our next visit together.  Get your labs done for Britta Mahoney, the diabetes management provider. She ordered a cholesterol level. You can also get the TSH (thyroid lab) I ordered today at the same time.  Let's return in a few weeks with these medication changes to discuss if there is anything else.

## 2024-08-08 NOTE — PROGRESS NOTES
Exam  Constitutional:       General: She is not in acute distress.  HENT:      Head: Normocephalic and atraumatic.      Right Ear: External ear normal.      Left Ear: External ear normal.      Mouth/Throat:      Mouth: Mucous membranes are moist.      Pharynx: No posterior oropharyngeal erythema.   Eyes:      Extraocular Movements: Extraocular movements intact.      Conjunctiva/sclera: Conjunctivae normal.   Cardiovascular:      Rate and Rhythm: Normal rate and regular rhythm.   Pulmonary:      Effort: No respiratory distress.      Breath sounds: Normal breath sounds.   Abdominal:      General: There is no distension.      Tenderness: There is no abdominal tenderness.   Musculoskeletal:         General: No deformity. Normal range of motion.      Cervical back: Normal range of motion and neck supple.      Right lower leg: No edema.      Left lower leg: No edema.      Comments: Right arm AV fistula   Skin:     General: Skin is warm and dry.      Findings: No lesion.   Neurological:      General: No focal deficit present.      Mental Status: She is alert and oriented to person, place, and time.   Psychiatric:         Mood and Affect: Mood normal.         Behavior: Behavior normal.           Please be aware portions of this note were completed using voice recognition software and unforeseen errors may have occurred    I personally reviewed the patient's past medical history, current medications, allergies, surgical history, family history and social history.  Updates were made as necessary.    Electronically signed by Neto Morel MD on 8/12/2024 at 9:16 AM

## 2024-08-09 LAB
MICROORGANISM SPEC CULT: NORMAL
SERVICE CMNT-IMP: NORMAL
SPECIMEN DESCRIPTION: NORMAL

## 2024-08-12 RX ORDER — LEVOTHYROXINE SODIUM 0.15 MG/1
150 TABLET ORAL DAILY
Qty: 90 TABLET | Refills: 0 | Status: SHIPPED | OUTPATIENT
Start: 2024-08-12

## 2024-08-12 NOTE — TELEPHONE ENCOUNTER
Justa Ledezma is requesting a refill on the following medication(s):  Requested Prescriptions     Pending Prescriptions Disp Refills    levothyroxine (SYNTHROID) 150 MCG tablet 90 tablet 0     Sig: Take 1 tablet by mouth daily       Last Visit Date (If Applicable):  8/8/2024    Next Visit Date:    9/17/2024

## 2024-08-21 RX ORDER — INSULIN ASPART 100 [IU]/ML
INJECTION, SOLUTION INTRAVENOUS; SUBCUTANEOUS
Qty: 10 ML | Refills: 5 | Status: SHIPPED | OUTPATIENT
Start: 2024-08-21

## 2024-08-21 NOTE — TELEPHONE ENCOUNTER
Refill Novolog Vial (uses sliding scale)  [facility]  Next OV 24  Would like to p/u Thursday (would not let me pend d/t being  from another Dr, needs new script placed)

## 2024-08-26 RX ORDER — SIMVASTATIN 20 MG
20 TABLET ORAL NIGHTLY
Qty: 90 TABLET | Refills: 0 | Status: SHIPPED | OUTPATIENT
Start: 2024-08-26

## 2024-08-26 NOTE — TELEPHONE ENCOUNTER
Justa Ledezma is requesting a refill on the following medication(s):  Requested Prescriptions     Pending Prescriptions Disp Refills    simvastatin (ZOCOR) 20 MG tablet 90 tablet 1     Sig: Take 1 tablet by mouth nightly       Last Visit Date (If Applicable):  8/8/2024    Next Visit Date:    9/17/2024

## 2024-09-03 ENCOUNTER — TELEPHONE (OUTPATIENT)
Dept: FAMILY MEDICINE CLINIC | Age: 84
End: 2024-09-03

## 2024-09-05 ENCOUNTER — OFFICE VISIT (OUTPATIENT)
Dept: FAMILY MEDICINE CLINIC | Age: 84
End: 2024-09-05
Payer: MEDICARE

## 2024-09-05 VITALS
OXYGEN SATURATION: 99 % | SYSTOLIC BLOOD PRESSURE: 136 MMHG | WEIGHT: 161 LBS | HEART RATE: 63 BPM | DIASTOLIC BLOOD PRESSURE: 64 MMHG | BODY MASS INDEX: 25.22 KG/M2

## 2024-09-05 DIAGNOSIS — G62.9 PERIPHERAL POLYNEUROPATHY: ICD-10-CM

## 2024-09-05 DIAGNOSIS — N18.5 CHRONIC KIDNEY DISEASE, STAGE 5 (HCC): ICD-10-CM
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
## 2024-09-05 DIAGNOSIS — G62.9 NEUROPATHY: ICD-10-CM

## 2024-09-05 DIAGNOSIS — I65.23 CALCIFICATION OF BOTH CAROTID ARTERIES: Primary | ICD-10-CM

## 2024-09-05 LAB — TSH SERPL DL<=0.05 MIU/L-ACNC: 3.03 MIU/ML (ref 0.49–4.67)

## 2024-09-05 PROCEDURE — 99212 OFFICE O/P EST SF 10 MIN: CPT | Performed by: STUDENT IN AN ORGANIZED HEALTH CARE EDUCATION/TRAINING PROGRAM

## 2024-09-05 RX ORDER — GABAPENTIN 300 MG/1
300 CAPSULE ORAL
Qty: 36 CAPSULE | Refills: 3
Start: 2024-09-06 | End: 2024-12-05

## 2024-09-05 NOTE — PATIENT INSTRUCTIONS
Gabapentin - Take 300mg on Monday nights, Wednesday nights, and Friday nights. No other doses. We will try this and see how it goes.  I would recommend getting your carotid ultrasound done when able.  We'll see you again in 2 weeks.

## 2024-09-05 NOTE — PROGRESS NOTES
[address], [address]  [place] [address]  Phone: [phone]  Fax: [phone]      Date of Visit:  24  Patient Name: Justa Ledezma   Patient :  1940     ASSESSMENT/PLAN     1. Calcification of both carotid arteries  -     Vascular duplex carotid bilateral; Future  2. Fall, subsequent encounter  3. Peripheral polyneuropathy  -     gabapentin (NEURONTIN) 300 MG capsule; Take 1 capsule by mouth three times a week for 90 days., Disp-36 capsule, R-3NO PRINT  4. Neuropathy  -     gabapentin (NEURONTIN) 300 MG capsule; Take 1 capsule by mouth three times a week for 90 days., Disp-36 capsule, R-3NO PRINT  5. Chronic kidney disease, stage 5 (HCC)     ED follow-up performed today.  Recent fall, no fractures on CT head and CT cervical spine.  Patient recovering well, no falls since.  Disorder multiple occasions, but continues to improve daily.    On CT of cervical spine heavy calcifications in carotid arteries bilaterally noted.  Have ordered duplex as above.    Multiple medications adjusted at last visit, including significant reduction in gabapentin given patient is on dialysis.  She feels her neuropathy symptoms have significantly worsened however, therefore we will increase to 300 mg 3 times weekly after dialysis.  Prior, she was taking 600 mg daily.  Do note the patient seems much clearer cognitively today than she was at last visit, patient likely on significantly too large of a gabapentin dose given end-stage renal disease prior to her most recent visit    Follow up in 2-weeks    - Questions/concerns answered. Patient verbalized and expressed understanding. Medications, laboratory testing, imaging, consultation, and follow up as documented in this encounter.       HPI:     Justa Ledezma is a 83 y.o. female with   Patient Active Problem List   Diagnosis    Familial combined hyperlipidemia    Hypothyroidism    Coronary

## 2024-09-17 ENCOUNTER — OFFICE VISIT (OUTPATIENT)
Dept: DIABETES SERVICES | Age: 84
End: 2024-09-17
Payer: MEDICARE

## 2024-09-17 ENCOUNTER — OFFICE VISIT (OUTPATIENT)
Dept: FAMILY MEDICINE CLINIC | Age: 84
End: 2024-09-17
Payer: MEDICARE

## 2024-09-17 VITALS — SYSTOLIC BLOOD PRESSURE: 112 MMHG | HEART RATE: 68 BPM | DIASTOLIC BLOOD PRESSURE: 52 MMHG | OXYGEN SATURATION: 98 %

## 2024-09-17 VITALS
DIASTOLIC BLOOD PRESSURE: 62 MMHG | BODY MASS INDEX: 24.01 KG/M2 | HEART RATE: 66 BPM | SYSTOLIC BLOOD PRESSURE: 98 MMHG | WEIGHT: 153 LBS | HEIGHT: 67 IN

## 2024-09-17 DIAGNOSIS — Z79.4 TYPE 2 DIABETES MELLITUS WITH CHRONIC KIDNEY DISEASE ON CHRONIC DIALYSIS, WITH LONG-TERM CURRENT USE OF INSULIN (HCC): Primary | ICD-10-CM

## 2024-09-17 DIAGNOSIS — Z79.4 TYPE 2 DIABETES MELLITUS WITH STAGE 4 CHRONIC KIDNEY DISEASE, WITH LONG-TERM CURRENT USE OF INSULIN (HCC): Primary | ICD-10-CM

## 2024-09-17 DIAGNOSIS — I10 ESSENTIAL HYPERTENSION: ICD-10-CM

## 2024-09-17 DIAGNOSIS — E03.9 ACQUIRED HYPOTHYROIDISM: ICD-10-CM

## 2024-09-17 DIAGNOSIS — Z99.2 TYPE 2 DIABETES MELLITUS WITH CHRONIC KIDNEY DISEASE ON CHRONIC DIALYSIS, WITH LONG-TERM CURRENT USE OF INSULIN (HCC): Primary | ICD-10-CM

## 2024-09-17 DIAGNOSIS — E78.2 MIXED HYPERLIPIDEMIA: ICD-10-CM

## 2024-09-17 DIAGNOSIS — E11.22 TYPE 2 DIABETES MELLITUS WITH CHRONIC KIDNEY DISEASE ON CHRONIC DIALYSIS, WITH LONG-TERM CURRENT USE OF INSULIN (HCC): Primary | ICD-10-CM

## 2024-09-17 DIAGNOSIS — E11.22 TYPE 2 DIABETES MELLITUS WITH STAGE 4 CHRONIC KIDNEY DISEASE, WITH LONG-TERM CURRENT USE OF INSULIN (HCC): Primary | ICD-10-CM

## 2024-09-17 DIAGNOSIS — N18.6 TYPE 2 DIABETES MELLITUS WITH CHRONIC KIDNEY DISEASE ON CHRONIC DIALYSIS, WITH LONG-TERM CURRENT USE OF INSULIN (HCC): Primary | ICD-10-CM

## 2024-09-17 DIAGNOSIS — N18.4 TYPE 2 DIABETES MELLITUS WITH STAGE 4 CHRONIC KIDNEY DISEASE, WITH LONG-TERM CURRENT USE OF INSULIN (HCC): Primary | ICD-10-CM

## 2024-09-17 DIAGNOSIS — J30.1 SEASONAL ALLERGIC RHINITIS DUE TO POLLEN: ICD-10-CM

## 2024-09-17 LAB — HBA1C MFR BLD: 7.1 %

## 2024-09-17 PROCEDURE — G8420 CALC BMI NORM PARAMETERS: HCPCS | Performed by: NURSE PRACTITIONER

## 2024-09-17 PROCEDURE — G8400 PT W/DXA NO RESULTS DOC: HCPCS | Performed by: NURSE PRACTITIONER

## 2024-09-17 PROCEDURE — 3074F SYST BP LT 130 MM HG: CPT | Performed by: STUDENT IN AN ORGANIZED HEALTH CARE EDUCATION/TRAINING PROGRAM

## 2024-09-17 PROCEDURE — 99214 OFFICE O/P EST MOD 30 MIN: CPT | Performed by: STUDENT IN AN ORGANIZED HEALTH CARE EDUCATION/TRAINING PROGRAM

## 2024-09-17 PROCEDURE — G8400 PT W/DXA NO RESULTS DOC: HCPCS | Performed by: STUDENT IN AN ORGANIZED HEALTH CARE EDUCATION/TRAINING PROGRAM

## 2024-09-17 PROCEDURE — 3051F HG A1C>EQUAL 7.0%<8.0%: CPT | Performed by: NURSE PRACTITIONER

## 2024-09-17 PROCEDURE — 99212 OFFICE O/P EST SF 10 MIN: CPT | Performed by: NURSE PRACTITIONER

## 2024-09-17 PROCEDURE — 3078F DIAST BP <80 MM HG: CPT | Performed by: STUDENT IN AN ORGANIZED HEALTH CARE EDUCATION/TRAINING PROGRAM

## 2024-09-17 PROCEDURE — 95251 CONT GLUC MNTR ANALYSIS I&R: CPT | Performed by: NURSE PRACTITIONER

## 2024-09-17 PROCEDURE — 1090F PRES/ABSN URINE INCON ASSESS: CPT | Performed by: STUDENT IN AN ORGANIZED HEALTH CARE EDUCATION/TRAINING PROGRAM

## 2024-09-17 PROCEDURE — PBSHW POCT GLYCOSYLATED HEMOGLOBIN (HGB A1C): Performed by: NURSE PRACTITIONER

## 2024-09-17 PROCEDURE — G8427 DOCREV CUR MEDS BY ELIG CLIN: HCPCS | Performed by: STUDENT IN AN ORGANIZED HEALTH CARE EDUCATION/TRAINING PROGRAM

## 2024-09-17 PROCEDURE — 1123F ACP DISCUSS/DSCN MKR DOCD: CPT | Performed by: NURSE PRACTITIONER

## 2024-09-17 PROCEDURE — G8420 CALC BMI NORM PARAMETERS: HCPCS | Performed by: STUDENT IN AN ORGANIZED HEALTH CARE EDUCATION/TRAINING PROGRAM

## 2024-09-17 PROCEDURE — 1123F ACP DISCUSS/DSCN MKR DOCD: CPT | Performed by: STUDENT IN AN ORGANIZED HEALTH CARE EDUCATION/TRAINING PROGRAM

## 2024-09-17 PROCEDURE — 99212 OFFICE O/P EST SF 10 MIN: CPT | Performed by: STUDENT IN AN ORGANIZED HEALTH CARE EDUCATION/TRAINING PROGRAM

## 2024-09-17 PROCEDURE — 99214 OFFICE O/P EST MOD 30 MIN: CPT | Performed by: NURSE PRACTITIONER

## 2024-09-17 PROCEDURE — G2211 COMPLEX E/M VISIT ADD ON: HCPCS | Performed by: NURSE PRACTITIONER

## 2024-09-17 PROCEDURE — 1036F TOBACCO NON-USER: CPT | Performed by: NURSE PRACTITIONER

## 2024-09-17 PROCEDURE — 3078F DIAST BP <80 MM HG: CPT | Performed by: NURSE PRACTITIONER

## 2024-09-17 PROCEDURE — G8427 DOCREV CUR MEDS BY ELIG CLIN: HCPCS | Performed by: NURSE PRACTITIONER

## 2024-09-17 PROCEDURE — 1036F TOBACCO NON-USER: CPT | Performed by: STUDENT IN AN ORGANIZED HEALTH CARE EDUCATION/TRAINING PROGRAM

## 2024-09-17 PROCEDURE — 83036 HEMOGLOBIN GLYCOSYLATED A1C: CPT | Performed by: NURSE PRACTITIONER

## 2024-09-17 PROCEDURE — 3074F SYST BP LT 130 MM HG: CPT | Performed by: NURSE PRACTITIONER

## 2024-09-17 PROCEDURE — 3051F HG A1C>EQUAL 7.0%<8.0%: CPT | Performed by: STUDENT IN AN ORGANIZED HEALTH CARE EDUCATION/TRAINING PROGRAM

## 2024-09-17 PROCEDURE — 1090F PRES/ABSN URINE INCON ASSESS: CPT | Performed by: NURSE PRACTITIONER

## 2024-09-17 RX ORDER — FEXOFENADINE HCL 60 MG/1
60 TABLET, FILM COATED ORAL DAILY
Qty: 90 TABLET | Refills: 1 | Status: SHIPPED | OUTPATIENT
Start: 2024-09-17

## 2024-09-17 ASSESSMENT — ENCOUNTER SYMPTOMS
SHORTNESS OF BREATH: 0
DIARRHEA: 0
ABDOMINAL PAIN: 0
RESPIRATORY NEGATIVE: 1

## 2024-09-26 ENCOUNTER — OFFICE VISIT (OUTPATIENT)
Dept: FAMILY MEDICINE CLINIC | Age: 84
End: 2024-09-26
Payer: MEDICARE

## 2024-09-26 VITALS
DIASTOLIC BLOOD PRESSURE: 56 MMHG | SYSTOLIC BLOOD PRESSURE: 118 MMHG | OXYGEN SATURATION: 97 % | BODY MASS INDEX: 24.43 KG/M2 | HEART RATE: 70 BPM | WEIGHT: 156 LBS

## 2024-09-26 DIAGNOSIS — I65.23 BILATERAL CAROTID ARTERY STENOSIS: Primary | ICD-10-CM

## 2024-09-26 PROCEDURE — G8400 PT W/DXA NO RESULTS DOC: HCPCS | Performed by: STUDENT IN AN ORGANIZED HEALTH CARE EDUCATION/TRAINING PROGRAM

## 2024-09-26 PROCEDURE — 1090F PRES/ABSN URINE INCON ASSESS: CPT | Performed by: STUDENT IN AN ORGANIZED HEALTH CARE EDUCATION/TRAINING PROGRAM

## 2024-09-26 PROCEDURE — 1036F TOBACCO NON-USER: CPT | Performed by: STUDENT IN AN ORGANIZED HEALTH CARE EDUCATION/TRAINING PROGRAM

## 2024-09-26 PROCEDURE — 99212 OFFICE O/P EST SF 10 MIN: CPT | Performed by: STUDENT IN AN ORGANIZED HEALTH CARE EDUCATION/TRAINING PROGRAM

## 2024-09-26 PROCEDURE — G8420 CALC BMI NORM PARAMETERS: HCPCS | Performed by: STUDENT IN AN ORGANIZED HEALTH CARE EDUCATION/TRAINING PROGRAM

## 2024-09-26 PROCEDURE — 1123F ACP DISCUSS/DSCN MKR DOCD: CPT | Performed by: STUDENT IN AN ORGANIZED HEALTH CARE EDUCATION/TRAINING PROGRAM

## 2024-09-26 PROCEDURE — G8427 DOCREV CUR MEDS BY ELIG CLIN: HCPCS | Performed by: STUDENT IN AN ORGANIZED HEALTH CARE EDUCATION/TRAINING PROGRAM

## 2024-09-26 PROCEDURE — 99213 OFFICE O/P EST LOW 20 MIN: CPT | Performed by: STUDENT IN AN ORGANIZED HEALTH CARE EDUCATION/TRAINING PROGRAM

## 2024-09-26 RX ORDER — ATORVASTATIN CALCIUM 40 MG/1
40 TABLET, FILM COATED ORAL DAILY
Qty: 90 TABLET | Refills: 1 | Status: SHIPPED | OUTPATIENT
Start: 2024-09-26

## 2024-11-05 NOTE — TELEPHONE ENCOUNTER
Justa Ledezma is requesting a refill on the following medication(s):  Requested Prescriptions     Pending Prescriptions Disp Refills    levothyroxine (SYNTHROID) 150 MCG tablet [Pharmacy Med Name: Levothyroxine Sodium 150 MCG Oral Tablet] 90 tablet 1     Sig: Take 1 tablet by mouth once daily       Last Visit Date (If Applicable):  9/26/2024    Next Visit Date:    12/3/2024

## 2024-11-06 RX ORDER — LEVOTHYROXINE SODIUM 150 UG/1
150 TABLET ORAL DAILY
Qty: 90 TABLET | Refills: 1 | Status: SHIPPED | OUTPATIENT
Start: 2024-11-06

## 2024-12-03 ENCOUNTER — OFFICE VISIT (OUTPATIENT)
Dept: FAMILY MEDICINE CLINIC | Age: 84
End: 2024-12-03
Payer: MEDICARE

## 2024-12-03 VITALS
WEIGHT: 155 LBS | SYSTOLIC BLOOD PRESSURE: 138 MMHG | OXYGEN SATURATION: 98 % | HEART RATE: 54 BPM | BODY MASS INDEX: 24.28 KG/M2 | DIASTOLIC BLOOD PRESSURE: 50 MMHG

## 2024-12-03 DIAGNOSIS — Z91.81 AT HIGH RISK FOR FALLS: ICD-10-CM
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
## 2024-12-03 DIAGNOSIS — S06.0X0D CONCUSSION WITHOUT LOSS OF CONSCIOUSNESS, SUBSEQUENT ENCOUNTER: ICD-10-CM

## 2024-12-03 LAB
ALBUMIN/GLOBULIN RATIO: 1.7 G/DL
ALBUMIN: 4 G/DL (ref 3.5–5)
ALP BLD-CCNC: 101 UNITS/L (ref 38–126)
ALT SERPL-CCNC: 33 UNITS/L (ref 4–35)
ANION GAP SERPL CALCULATED.3IONS-SCNC: 6.1 MMOL/L (ref 3–11)
AST SERPL-CCNC: 39 UNITS/L (ref 14–36)
BILIRUB SERPL-MCNC: 0.7 MG/DL (ref 0.2–1.3)
BUN BLDV-MCNC: 35 MG/DL (ref 7–17)
CALCIUM SERPL-MCNC: 8.8 MG/DL (ref 8.4–10.2)
CHLORIDE BLD-SCNC: 103 MMOL/L (ref 98–120)
CHOLESTEROL, TOTAL: 122 MG/DL (ref 50–200)
CHOLESTEROL/HDL RATIO: 1.4 RATIO (ref 0–4.5)
CO2: 29 MMOL/L (ref 22–31)
CREAT SERPL-MCNC: 2.7 MG/DL (ref 0.5–1)
GFR, ESTIMATED: 17.9
GLOBULIN: 2.4 G/DL
GLUCOSE: 271 MG/DL (ref 65–105)
HDLC SERPL-MCNC: 87 MG/DL (ref 36–68)
LDL CHOLESTEROL: 16 MG/DL (ref 0–160)
POTASSIUM SERPL-SCNC: 4.4 MMOL/L (ref 3.6–5)
SODIUM BLD-SCNC: 137 MMOL/L (ref 135–145)
TOTAL PROTEIN: 6.4 G/DL (ref 6.3–8.2)
TRIGL SERPL-MCNC: 95 MG/DL (ref 10–250)
VLDLC SERPL CALC-MCNC: 19 MG/DL (ref 0–50)

## 2024-12-03 PROCEDURE — 99213 OFFICE O/P EST LOW 20 MIN: CPT | Performed by: STUDENT IN AN ORGANIZED HEALTH CARE EDUCATION/TRAINING PROGRAM

## 2024-12-03 RX ORDER — FLUTICASONE FUROATE, UMECLIDINIUM BROMIDE AND VILANTEROL TRIFENATATE 200; 62.5; 25 UG/1; UG/1; UG/1
1 POWDER RESPIRATORY (INHALATION) DAILY
COMMUNITY
Start: 2024-10-28

## 2024-12-03 RX ORDER — SEVELAMER CARBONATE 800 MG/1
1 TABLET, FILM COATED ORAL 3 TIMES DAILY
COMMUNITY
Start: 2024-10-29

## 2024-12-03 RX ORDER — ALBUTEROL SULFATE 90 UG/1
2 INHALANT RESPIRATORY (INHALATION) EVERY 6 HOURS PRN
Qty: 18 G | Refills: 1 | Status: SHIPPED | OUTPATIENT
Start: 2024-12-03

## 2024-12-03 NOTE — PROGRESS NOTES
[address], [address]  [place] [address]  Phone: [phone]  Fax: [phone]      Date of Visit:  12/3/24  Patient Name: Justa Ledezma   Patient :  1940     ASSESSMENT/PLAN     1. Fall, subsequent encounter  2. At high risk for falls  3. Concussion without loss of consciousness, subsequent encounter     Patient here for ED follow-up after recent fall.  Had head trauma, no brain bleeds evident on imaging.  States mild cognitive deficit since then, does continue to improve.  She is also under a lot of stress with life stressors, feels this is not contributing well either.  Offered referral for behavioral/counseling, declines at this time.  However will think about it.  No red like signs or neurologic symptoms at this time.  No changes to medication regimen at this time.    Follow up in 3 months, will discuss chronic medical conditions    - Questions/concerns answered. Patient verbalized and expressed understanding. Medications, laboratory testing, imaging, consultation, and follow up as documented in this encounter.       HPI:     Justa Ledezma is a 84 y.o. 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

## 2024-12-18 ENCOUNTER — TELEPHONE (OUTPATIENT)
Dept: INTERNAL MEDICINE | Age: 84
End: 2024-12-18

## 2024-12-18 RX ORDER — INSULIN GLARGINE 300 U/ML
20 INJECTION, SOLUTION SUBCUTANEOUS NIGHTLY
Qty: 2 ADJUSTABLE DOSE PRE-FILLED PEN SYRINGE | Refills: 3 | Status: SHIPPED | OUTPATIENT
Start: 2024-12-18

## 2024-12-18 NOTE — TELEPHONE ENCOUNTER
[facility] sent a letter informing office that semglee will no longer be a covered formulary in 2025.  Formulary alternatives are Toujeo Solos star u 300, Toujeo max u 300, or Lantus u-100.  Letter canned to this encounter.  Please advise [facility] [place]

## 2024-12-19 ENCOUNTER — TELEPHONE (OUTPATIENT)
Dept: FAMILY MEDICINE CLINIC | Age: 84
End: 2024-12-19

## 2024-12-19 NOTE — TELEPHONE ENCOUNTER
Called over to [facility] for medical records per patient. She states she was seen earlier this week and her doctor (couldn't remember the name), asked her to increase dosage of Allegra. Dr. Morel lowered it due to her being on dialysis.     I told patient to continue taking what Dr Morel prescribed until we get clarification on the 60mg of Allegra 3 times a week.

## 2024-12-20 ENCOUNTER — TELEPHONE (OUTPATIENT)
Dept: INTERNAL MEDICINE | Age: 84
End: 2024-12-20

## 2024-12-20 DIAGNOSIS — N18.6 TYPE 2 DIABETES MELLITUS WITH CHRONIC KIDNEY DISEASE ON CHRONIC DIALYSIS, WITH LONG-TERM CURRENT USE OF INSULIN (HCC): Primary | ICD-10-CM

## 2024-12-20 DIAGNOSIS — Z99.2 TYPE 2 DIABETES MELLITUS WITH CHRONIC KIDNEY DISEASE ON CHRONIC DIALYSIS, WITH LONG-TERM CURRENT USE OF INSULIN (HCC): Primary | ICD-10-CM

## 2024-12-20 DIAGNOSIS — Z79.4 TYPE 2 DIABETES MELLITUS WITH CHRONIC KIDNEY DISEASE ON CHRONIC DIALYSIS, WITH LONG-TERM CURRENT USE OF INSULIN (HCC): Primary | ICD-10-CM

## 2024-12-20 DIAGNOSIS — E11.22 TYPE 2 DIABETES MELLITUS WITH CHRONIC KIDNEY DISEASE ON CHRONIC DIALYSIS, WITH LONG-TERM CURRENT USE OF INSULIN (HCC): Primary | ICD-10-CM

## 2024-12-20 NOTE — TELEPHONE ENCOUNTER
Patient called in very rude and upset about change in her insulin and was screaming at writer because she did not ask for this medication to be changed and crystal sent in for something different and she does not want that stupid pen thing. Writer then attempted to advise patient that this change was made due to her insurance no longer covering a vial and needle. Patient continue to be upset and was yelling at writer. Writer did ask patient to please not yell and that she would have to reach out to her insurance company and speak with them concerning them no longer covering vials. Patient hung up on writer.     Writer did reach out to pharmacy and they do not have pen needles for patient. Patient is requesting on call to send pen needles to [facility] [place]. Please advise

## 2024-12-20 NOTE — TELEPHONE ENCOUNTER
Patient called in very upset stating that she doesn't understand why she was given a insulin pen and not her original vial, syringe and needle. She stated that she does not know how to use the pen and that she received a pamphlet with tiny print that she can barely read. She stated that medicare will not pay for any more insulin until her pens are used up, but she doesn't know how to use them. She would like a call back to discuss a solution.

## 2024-12-20 NOTE — TELEPHONE ENCOUNTER
Left message for patient to call back.     Please see 12/18/24 telephone encounter regarding the change in her insulin.

## 2025-01-03 ENCOUNTER — TELEPHONE (OUTPATIENT)
Dept: FAMILY MEDICINE CLINIC | Age: 85
End: 2025-01-03

## 2025-01-03 NOTE — TELEPHONE ENCOUNTER
Called patient and left message to reschedule appointment on 3/4/25 due to PCP being out of office

## 2025-01-07 ENCOUNTER — OFFICE VISIT (OUTPATIENT)
Dept: DIABETES SERVICES | Age: 85
End: 2025-01-07
Payer: MEDICARE

## 2025-01-07 VITALS
SYSTOLIC BLOOD PRESSURE: 136 MMHG | HEIGHT: 67 IN | DIASTOLIC BLOOD PRESSURE: 62 MMHG | OXYGEN SATURATION: 92 % | BODY MASS INDEX: 24.52 KG/M2 | WEIGHT: 156.2 LBS | HEART RATE: 76 BPM

## 2025-01-07 DIAGNOSIS — I10 ESSENTIAL HYPERTENSION: ICD-10-CM

## 2025-01-07 DIAGNOSIS — E78.2 MIXED HYPERLIPIDEMIA: ICD-10-CM

## 2025-01-07 DIAGNOSIS — N18.4 TYPE 2 DIABETES MELLITUS WITH STAGE 4 CHRONIC KIDNEY DISEASE, WITH LONG-TERM CURRENT USE OF INSULIN (HCC): Primary | ICD-10-CM

## 2025-01-07 DIAGNOSIS — E11.22 TYPE 2 DIABETES MELLITUS WITH STAGE 4 CHRONIC KIDNEY DISEASE, WITH LONG-TERM CURRENT USE OF INSULIN (HCC): Primary | ICD-10-CM

## 2025-01-07 DIAGNOSIS — Z79.4 TYPE 2 DIABETES MELLITUS WITH STAGE 4 CHRONIC KIDNEY DISEASE, WITH LONG-TERM CURRENT USE OF INSULIN (HCC): Primary | ICD-10-CM

## 2025-01-07 LAB — HBA1C MFR BLD: 7.8 %

## 2025-01-07 PROCEDURE — G8427 DOCREV CUR MEDS BY ELIG CLIN: HCPCS | Performed by: NURSE PRACTITIONER

## 2025-01-07 PROCEDURE — 1160F RVW MEDS BY RX/DR IN RCRD: CPT | Performed by: NURSE PRACTITIONER

## 2025-01-07 PROCEDURE — 1159F MED LIST DOCD IN RCRD: CPT | Performed by: NURSE PRACTITIONER

## 2025-01-07 PROCEDURE — 3051F HG A1C>EQUAL 7.0%<8.0%: CPT | Performed by: NURSE PRACTITIONER

## 2025-01-07 PROCEDURE — G8420 CALC BMI NORM PARAMETERS: HCPCS | Performed by: NURSE PRACTITIONER

## 2025-01-07 PROCEDURE — G8400 PT W/DXA NO RESULTS DOC: HCPCS | Performed by: NURSE PRACTITIONER

## 2025-01-07 PROCEDURE — PBSHW POCT GLYCOSYLATED HEMOGLOBIN (HGB A1C): Performed by: NURSE PRACTITIONER

## 2025-01-07 PROCEDURE — 1036F TOBACCO NON-USER: CPT | Performed by: NURSE PRACTITIONER

## 2025-01-07 PROCEDURE — 1090F PRES/ABSN URINE INCON ASSESS: CPT | Performed by: NURSE PRACTITIONER

## 2025-01-07 PROCEDURE — 3078F DIAST BP <80 MM HG: CPT | Performed by: NURSE PRACTITIONER

## 2025-01-07 PROCEDURE — M1299 PR FLU IMMUNIZE ORDER/ADMIN: HCPCS | Performed by: NURSE PRACTITIONER

## 2025-01-07 PROCEDURE — 95251 CONT GLUC MNTR ANALYSIS I&R: CPT | Performed by: NURSE PRACTITIONER

## 2025-01-07 PROCEDURE — 1123F ACP DISCUSS/DSCN MKR DOCD: CPT | Performed by: NURSE PRACTITIONER

## 2025-01-07 PROCEDURE — 3075F SYST BP GE 130 - 139MM HG: CPT | Performed by: NURSE PRACTITIONER

## 2025-01-07 PROCEDURE — 99214 OFFICE O/P EST MOD 30 MIN: CPT | Performed by: NURSE PRACTITIONER

## 2025-01-07 PROCEDURE — 83036 HEMOGLOBIN GLYCOSYLATED A1C: CPT | Performed by: NURSE PRACTITIONER

## 2025-01-07 PROCEDURE — G2211 COMPLEX E/M VISIT ADD ON: HCPCS | Performed by: NURSE PRACTITIONER

## 2025-01-07 PROCEDURE — 99212 OFFICE O/P EST SF 10 MIN: CPT | Performed by: NURSE PRACTITIONER

## 2025-01-07 ASSESSMENT — ENCOUNTER SYMPTOMS
RESPIRATORY NEGATIVE: 1
SHORTNESS OF BREATH: 0

## 2025-01-07 NOTE — PATIENT INSTRUCTIONS
Sliding Scale Insulin Novolog 2++     Blood sugar   Action     <70               Drink Juice               No extra insulin  150 - 200         2 units subcutaneous Insulin  201 - 250         4 units subcutaneous Insulin  251 - 300         6 units subcutaneous Insulin  301 - 350         8 units subcutaneous Insulin  351 - 400         10 units subcutaneous Insulin   > 400              12 units subcutaneous Insulin

## 2025-01-07 NOTE — PROGRESS NOTES
Patient Care Team:  Neto Morel MD as PCP - General (Family Medicine)  [facility]  Molly Merino as Care Manager (Nephrology)    Past DM Medications   Metformin-ckd  Glimepiride-therapy completed     Current Diabetic Medications  toujeo 20 units at at bedtime   Sliding Scale Insulin Novolog     Blood sugar   Action     <70               Drink Juice               No extra insulin  150 - 200         2 units subcutaneous Insulin  201 - 250         4 units subcutaneous Insulin  251 - 300         6 units subcutaneous Insulin  301 - 350         8 units subcutaneous Insulin  351 - 400         10 units subcutaneous Insulin   > 400              12 units subcutaneous Insulin        DKA episodes: 0      History of Present Illness  The patient presents for the management of diabetes mellitus, hyperlipidemia, and hypertension.    The patient is currently on a regimen of Toujeo, administered at a dosage of 20 units daily, following a switch from Lantus. They also utilize NovoLog preprandially, adjusting the dosage based on their blood glucose levels. NovoLog is withheld if their blood glucose is below 100 or 150 mg/dL. The patient reports experiencing postprandial hyperglycemia, with blood glucose levels often reaching 400 mg/dL in the afternoon, but not preprandially. They attribute this to their thrice-weekly hemodialysis sessions, during which they do not consume food. Occasionally, they consume a beverage provided at the dialysis center, which they find unpalatable. Their dietitian has advised increasing protein intake, specifically recommending the consumption of two eggs daily; however, they have only been able to incorporate one egg into their daily diet. Their typical breakfast consists of a scrambled egg with onion, bell pepper, jalapeno peppers, and tomato ketchup, served with toast. They have been attempting to reduce their bread intake, opting for thin slices

## 2025-01-21 ENCOUNTER — OFFICE VISIT (OUTPATIENT)
Dept: FAMILY MEDICINE CLINIC | Age: 85
End: 2025-01-21
Payer: MEDICARE

## 2025-01-21 VITALS
HEART RATE: 58 BPM | BODY MASS INDEX: 23.99 KG/M2 | OXYGEN SATURATION: 98 % | DIASTOLIC BLOOD PRESSURE: 82 MMHG | WEIGHT: 153.2 LBS | SYSTOLIC BLOOD PRESSURE: 126 MMHG

## 2025-01-21 DIAGNOSIS — U07.1 COVID-19: Primary | ICD-10-CM

## 2025-01-21 LAB
INFLUENZA A ANTIGEN, POC: NEGATIVE
INFLUENZA B ANTIGEN, POC: NEGATIVE
LOT EXPIRE DATE: ABNORMAL
LOT KIT NUMBER: ABNORMAL
SARS-COV-2, POC: DETECTED
VALID INTERNAL CONTROL: ABNORMAL
VENDOR AND KIT NAME POC: ABNORMAL

## 2025-01-21 PROCEDURE — 1159F MED LIST DOCD IN RCRD: CPT | Performed by: STUDENT IN AN ORGANIZED HEALTH CARE EDUCATION/TRAINING PROGRAM

## 2025-01-21 PROCEDURE — G8427 DOCREV CUR MEDS BY ELIG CLIN: HCPCS | Performed by: STUDENT IN AN ORGANIZED HEALTH CARE EDUCATION/TRAINING PROGRAM

## 2025-01-21 PROCEDURE — PBSHW POCT COVID-19 & INFLUENZA A/B: Performed by: STUDENT IN AN ORGANIZED HEALTH CARE EDUCATION/TRAINING PROGRAM

## 2025-01-21 PROCEDURE — 99213 OFFICE O/P EST LOW 20 MIN: CPT | Performed by: STUDENT IN AN ORGANIZED HEALTH CARE EDUCATION/TRAINING PROGRAM

## 2025-01-21 PROCEDURE — 1090F PRES/ABSN URINE INCON ASSESS: CPT | Performed by: STUDENT IN AN ORGANIZED HEALTH CARE EDUCATION/TRAINING PROGRAM

## 2025-01-21 PROCEDURE — 87428 SARSCOV & INF VIR A&B AG IA: CPT | Performed by: STUDENT IN AN ORGANIZED HEALTH CARE EDUCATION/TRAINING PROGRAM

## 2025-01-21 PROCEDURE — G8420 CALC BMI NORM PARAMETERS: HCPCS | Performed by: STUDENT IN AN ORGANIZED HEALTH CARE EDUCATION/TRAINING PROGRAM

## 2025-01-21 PROCEDURE — G8400 PT W/DXA NO RESULTS DOC: HCPCS | Performed by: STUDENT IN AN ORGANIZED HEALTH CARE EDUCATION/TRAINING PROGRAM

## 2025-01-21 PROCEDURE — 99212 OFFICE O/P EST SF 10 MIN: CPT | Performed by: STUDENT IN AN ORGANIZED HEALTH CARE EDUCATION/TRAINING PROGRAM

## 2025-01-21 PROCEDURE — 1036F TOBACCO NON-USER: CPT | Performed by: STUDENT IN AN ORGANIZED HEALTH CARE EDUCATION/TRAINING PROGRAM

## 2025-01-21 PROCEDURE — 1123F ACP DISCUSS/DSCN MKR DOCD: CPT | Performed by: STUDENT IN AN ORGANIZED HEALTH CARE EDUCATION/TRAINING PROGRAM

## 2025-01-21 SDOH — ECONOMIC STABILITY: FOOD INSECURITY: WITHIN THE PAST 12 MONTHS, THE FOOD YOU BOUGHT JUST DIDN'T LAST AND YOU DIDN'T HAVE MONEY TO GET MORE.: NEVER TRUE

## 2025-01-21 SDOH — ECONOMIC STABILITY: FOOD INSECURITY: WITHIN THE PAST 12 MONTHS, YOU WORRIED THAT YOUR FOOD WOULD RUN OUT BEFORE YOU GOT MONEY TO BUY MORE.: NEVER TRUE

## 2025-01-21 ASSESSMENT — PATIENT HEALTH QUESTIONNAIRE - PHQ9
2. FEELING DOWN, DEPRESSED OR HOPELESS: NOT AT ALL
SUM OF ALL RESPONSES TO PHQ QUESTIONS 1-9: 0
SUM OF ALL RESPONSES TO PHQ QUESTIONS 1-9: 0
1. LITTLE INTEREST OR PLEASURE IN DOING THINGS: NOT AT ALL
SUM OF ALL RESPONSES TO PHQ9 QUESTIONS 1 & 2: 0
SUM OF ALL RESPONSES TO PHQ QUESTIONS 1-9: 0
SUM OF ALL RESPONSES TO PHQ QUESTIONS 1-9: 0

## 2025-01-21 NOTE — PROGRESS NOTES
[address], [address]  [place] [address]  Phone: [phone]  Fax: [phone]      Date of Visit:  25  Patient Name: Justa Ledezma   Patient :  1940     ASSESSMENT/PLAN     1. COVID-19  -     POCT COVID-19 & Influenza A/B    COVID-19 positive.  Past treatment window.  Supportive care recommendations discussed.  Return to ED precautions given.    - Questions/concerns answered. Patient verbalized and expressed understanding. Medications, laboratory testing, imaging, consultation, and follow up as documented in this encounter.       HPI:     Justa Ledezma is a 84 y.o. female with   Patient Active Problem List   Diagnosis    Familial combined hyperlipidemia    Hypothyroidism    Coronary atherosclerosis of native coronary artery    Peripheral vascular disease (HCC)    Mitral regurgitation    Obstructive sleep apnea    History of amputation of great toe (HCC)    Orthostatic hypotension    Mixed stress and urge urinary incontinence    Lichen sclerosus et atrophicus of the vulva    Anemia due to chronic kidney disease    Osteomyelitis of toe of left foot    Secondary hyperparathyroidism (HCC)    Moderate nonproliferative diabetic retinopathy of both eyes without macular edema associated with type 2 diabetes mellitus (HCC)    Chronic kidney disease, stage 5 (HCC)    Mixed hyperlipidemia    Arteriovenous fistula of right upper extremity (HCC)    Hypertensive heart and renal disease    Type 2 diabetes mellitus with chronic kidney disease on chronic dialysis, with long-term current use of insulin (HCC)     who presents today to discuss   Chief Complaint   Patient presents with    Cough     Cough and cold symptoms, congestion, runny nose. Symptoms started a week ago. Came in contact with someone that had congestion and runny nose. Coughing up yellow, thick mucous. Patient sounds congested.        HPI: Patient presents today for acute

## 2025-01-21 NOTE — PATIENT INSTRUCTIONS
Call us on Friday if not better and we will send in an antibiotic to treat sinus infection.    In the meantime, I would try to take dextromethorphan-guaifenesin (which is the same thing as the package you showed me today).

## 2025-03-11 ENCOUNTER — OFFICE VISIT (OUTPATIENT)
Dept: FAMILY MEDICINE CLINIC | Age: 85
End: 2025-03-11
Payer: MEDICARE

## 2025-03-11 VITALS
BODY MASS INDEX: 24.23 KG/M2 | OXYGEN SATURATION: 97 % | DIASTOLIC BLOOD PRESSURE: 74 MMHG | HEIGHT: 67 IN | HEART RATE: 54 BPM | WEIGHT: 154.4 LBS | SYSTOLIC BLOOD PRESSURE: 132 MMHG

## 2025-03-11 DIAGNOSIS — Z00.00 MEDICARE ANNUAL WELLNESS VISIT, SUBSEQUENT: Primary | ICD-10-CM

## 2025-03-11 PROCEDURE — G0439 PPPS, SUBSEQ VISIT: HCPCS | Performed by: STUDENT IN AN ORGANIZED HEALTH CARE EDUCATION/TRAINING PROGRAM

## 2025-03-11 PROCEDURE — 1159F MED LIST DOCD IN RCRD: CPT | Performed by: STUDENT IN AN ORGANIZED HEALTH CARE EDUCATION/TRAINING PROGRAM

## 2025-03-11 PROCEDURE — 1123F ACP DISCUSS/DSCN MKR DOCD: CPT | Performed by: STUDENT IN AN ORGANIZED HEALTH CARE EDUCATION/TRAINING PROGRAM

## 2025-03-11 RX ORDER — VADADUSTAT 150 MG/1
300 TABLET, FILM COATED ORAL DAILY
COMMUNITY

## 2025-03-11 ASSESSMENT — PATIENT HEALTH QUESTIONNAIRE - PHQ9
3. TROUBLE FALLING OR STAYING ASLEEP: NEARLY EVERY DAY
5. POOR APPETITE OR OVEREATING: NEARLY EVERY DAY
7. TROUBLE CONCENTRATING ON THINGS, SUCH AS READING THE NEWSPAPER OR WATCHING TELEVISION: NEARLY EVERY DAY
SUM OF ALL RESPONSES TO PHQ QUESTIONS 1-9: 21
1. LITTLE INTEREST OR PLEASURE IN DOING THINGS: NOT AT ALL
SUM OF ALL RESPONSES TO PHQ QUESTIONS 1-9: 21
9. THOUGHTS THAT YOU WOULD BE BETTER OFF DEAD, OR OF HURTING YOURSELF: NOT AT ALL
8. MOVING OR SPEAKING SO SLOWLY THAT OTHER PEOPLE COULD HAVE NOTICED. OR THE OPPOSITE, BEING SO FIGETY OR RESTLESS THAT YOU HAVE BEEN MOVING AROUND A LOT MORE THAN USUAL: NEARLY EVERY DAY
10. IF YOU CHECKED OFF ANY PROBLEMS, HOW DIFFICULT HAVE THESE PROBLEMS MADE IT FOR YOU TO DO YOUR WORK, TAKE CARE OF THINGS AT HOME, OR GET ALONG WITH OTHER PEOPLE: VERY DIFFICULT
6. FEELING BAD ABOUT YOURSELF - OR THAT YOU ARE A FAILURE OR HAVE LET YOURSELF OR YOUR FAMILY DOWN: NEARLY EVERY DAY
4. FEELING TIRED OR HAVING LITTLE ENERGY: NEARLY EVERY DAY
2. FEELING DOWN, DEPRESSED OR HOPELESS: NEARLY EVERY DAY

## 2025-03-11 ASSESSMENT — LIFESTYLE VARIABLES
HOW OFTEN DO YOU HAVE A DRINK CONTAINING ALCOHOL: NEVER
HOW MANY STANDARD DRINKS CONTAINING ALCOHOL DO YOU HAVE ON A TYPICAL DAY: PATIENT DOES NOT DRINK

## 2025-03-11 ASSESSMENT — COLUMBIA-SUICIDE SEVERITY RATING SCALE - C-SSRS
1. WITHIN THE PAST MONTH, HAVE YOU WISHED YOU WERE DEAD OR WISHED YOU COULD GO TO SLEEP AND NOT WAKE UP?: NO
6. HAVE YOU EVER DONE ANYTHING, STARTED TO DO ANYTHING, OR PREPARED TO DO ANYTHING TO END YOUR LIFE?: NO
2. HAVE YOU ACTUALLY HAD ANY THOUGHTS OF KILLING YOURSELF?: NO

## 2025-03-11 NOTE — PROGRESS NOTES
aneurysm One-time screening in patients if you have a family history of abdominal aortic aneurysms, or you're a man 65-75 and have smoked at least 100 cigarettes in your lifetime NA NA   Low Dose CT/Lung Cancer  Annual ages 50 to 77 (80) years who have a 20 pack-year smoking history and currently smoke or have quit within the past 15 years NA NA   Glaucoma screening       Covered yearly for those:  with diabetes mellitus  with a family history of glaucoma  -Americans aged 50 and older  -Americans aged 65 and older Has not had recently Recommended to have yearly   HIV/Hepatitis C/STI testing STI/HIV:  Annually as necessary  Hepatitis C:  Once in a lifetime unless high risk behavior NA - neg in past NA       Does the patient have an active prescription for opioids? no   Is the patient using the prescribed opioid(s) as indicated? N/A  Does the patient currently have any opioids in the house? no    Advance directives: No, information given to patient today; reiterated importance. Will ask case management/ team to assist in filling out if patient desires.  Code Status: Full Code confirmed     Medicare Annual Wellness Visit    Justa Ledezma is here for Medicare AWV    Assessment & Plan   Medicare annual wellness visit, subsequent     No follow-ups on file.     Subjective       Patient's complete Health Risk Assessment and screening values have been reviewed and are found in Flowsheets. The following problems were reviewed today and where indicated follow up appointments were made and/or referrals ordered.    Positive Risk Factor Screenings with Interventions:    Fall Risk:  Do you feel unsteady or are you worried about falling? : (!) yes  2 or more falls in past year?: (!) yes  Fall with injury in past year?: no     Interventions:    Reviewed medications, home hazards, visual acuity, and co-morbidities that can increase risk for falls  See AVS for additional education material

## 2025-03-11 NOTE — PATIENT INSTRUCTIONS
Medicare Service Recommended Frequency Last Done Due next   Pneumonia vaccine After 65, Prevnar 20 ONCE  If has had both Prevnar 13 and Pneumovax 23 - Need Prevnar 20 five years after most recent pneumococcal vaccination  If has had only Prevnar 13 OR Pneumovax 23 - Need Prevnar 20 one year after most recent pneumococcal vaccination Prevnar 20 in 2023 You do not need again. You have completed the series.   Influenza vaccine Yearly 2024 2025   Zoster/Shingles Shingrix vaccine:  2 shots 2-6 months apart; recommended even if has had Zostavax 2021 - completed series Series completed. You do not need again.   COVID Variable 2023 Per CDC recommendations    Tetanus vaccine (Td or Tdap) Every 10 years  2021 2031   RSV vaccine One injection 2023 Series completed. You do not need again.   Mammogram Females: Every 1-2 years (ages ~40-75) 2017 - no longer doing NA. Let us know if you change your mind about future screening.   Pap Smear   Females:   Age 21-30: Every 3 years w/o hrHPV  Age 31-65: Every 5 years w/ hrHPV  Age 65+: Not recommended unless history of abnormal NA - past screening age NA   Colorectal Cancer Screening FIT test yearly, Cologuard every 3 years, Colonoscopy every 10 years *unless otherwise indicated*;     Every 5 years with personal family history in first degree relative - need to start screening at age 40 or 5 years before age family member was diagnosed at Has been many years, declining future screening NA - past screening age   Cardiovascular/cholesterol screening As determined by your physician Dec 2024 Dec 2025   Diabetes screening   As determined by your physician Every 3 months, has diabetes Every 3 months   Osteoporosis screening   DEXA every 2 years for females (ages 65-85) OR males on long term corticosteroid therapy or with history of fragility fractures 2017 - normal; declining future screening Let us know if you change your mind about any future screening   Screening for abdominal aortic

## 2025-03-17 DIAGNOSIS — I65.23 BILATERAL CAROTID ARTERY STENOSIS: ICD-10-CM

## 2025-03-17 RX ORDER — ATORVASTATIN CALCIUM 40 MG/1
40 TABLET, FILM COATED ORAL DAILY
Qty: 90 TABLET | Refills: 1 | Status: SHIPPED | OUTPATIENT
Start: 2025-03-17

## 2025-03-17 NOTE — TELEPHONE ENCOUNTER
Justa Ledezma is calling to request a refill on the following medication(s):  Requested Prescriptions     Pending Prescriptions Disp Refills    atorvastatin (LIPITOR) 40 MG tablet [Pharmacy Med Name: Atorvastatin Calcium 40 MG Oral Tablet] 90 tablet 0     Sig: Take 1 tablet by mouth once daily       Last Visit Date (If Applicable):  3/11/2025    Next Visit Date:    6/24/2025

## 2025-04-15 ENCOUNTER — OFFICE VISIT (OUTPATIENT)
Dept: DIABETES SERVICES | Age: 85
End: 2025-04-15
Payer: MEDICARE

## 2025-04-15 VITALS
BODY MASS INDEX: 24.17 KG/M2 | HEIGHT: 67 IN | HEART RATE: 58 BPM | SYSTOLIC BLOOD PRESSURE: 112 MMHG | OXYGEN SATURATION: 99 % | DIASTOLIC BLOOD PRESSURE: 86 MMHG | WEIGHT: 154 LBS

## 2025-04-15 DIAGNOSIS — N18.4 TYPE 2 DIABETES MELLITUS WITH STAGE 4 CHRONIC KIDNEY DISEASE, WITH LONG-TERM CURRENT USE OF INSULIN (HCC): Primary | ICD-10-CM

## 2025-04-15 DIAGNOSIS — Z79.4 TYPE 2 DIABETES MELLITUS WITH STAGE 4 CHRONIC KIDNEY DISEASE, WITH LONG-TERM CURRENT USE OF INSULIN (HCC): Primary | ICD-10-CM

## 2025-04-15 DIAGNOSIS — E78.2 MIXED HYPERLIPIDEMIA: ICD-10-CM

## 2025-04-15 DIAGNOSIS — I10 ESSENTIAL HYPERTENSION: ICD-10-CM

## 2025-04-15 DIAGNOSIS — E11.22 TYPE 2 DIABETES MELLITUS WITH STAGE 4 CHRONIC KIDNEY DISEASE, WITH LONG-TERM CURRENT USE OF INSULIN (HCC): Primary | ICD-10-CM

## 2025-04-15 LAB — HBA1C MFR BLD: 6.9 %

## 2025-04-15 PROCEDURE — G2211 COMPLEX E/M VISIT ADD ON: HCPCS | Performed by: NURSE PRACTITIONER

## 2025-04-15 PROCEDURE — 95251 CONT GLUC MNTR ANALYSIS I&R: CPT | Performed by: NURSE PRACTITIONER

## 2025-04-15 PROCEDURE — 3074F SYST BP LT 130 MM HG: CPT | Performed by: NURSE PRACTITIONER

## 2025-04-15 PROCEDURE — 1159F MED LIST DOCD IN RCRD: CPT | Performed by: NURSE PRACTITIONER

## 2025-04-15 PROCEDURE — 3044F HG A1C LEVEL LT 7.0%: CPT | Performed by: NURSE PRACTITIONER

## 2025-04-15 PROCEDURE — G8427 DOCREV CUR MEDS BY ELIG CLIN: HCPCS | Performed by: NURSE PRACTITIONER

## 2025-04-15 PROCEDURE — 1160F RVW MEDS BY RX/DR IN RCRD: CPT | Performed by: NURSE PRACTITIONER

## 2025-04-15 PROCEDURE — G8400 PT W/DXA NO RESULTS DOC: HCPCS | Performed by: NURSE PRACTITIONER

## 2025-04-15 PROCEDURE — PBSHW POCT GLYCOSYLATED HEMOGLOBIN (HGB A1C): Performed by: NURSE PRACTITIONER

## 2025-04-15 PROCEDURE — 99214 OFFICE O/P EST MOD 30 MIN: CPT | Performed by: NURSE PRACTITIONER

## 2025-04-15 PROCEDURE — G8420 CALC BMI NORM PARAMETERS: HCPCS | Performed by: NURSE PRACTITIONER

## 2025-04-15 PROCEDURE — 1123F ACP DISCUSS/DSCN MKR DOCD: CPT | Performed by: NURSE PRACTITIONER

## 2025-04-15 PROCEDURE — 3079F DIAST BP 80-89 MM HG: CPT | Performed by: NURSE PRACTITIONER

## 2025-04-15 PROCEDURE — 83036 HEMOGLOBIN GLYCOSYLATED A1C: CPT | Performed by: NURSE PRACTITIONER

## 2025-04-15 PROCEDURE — 1090F PRES/ABSN URINE INCON ASSESS: CPT | Performed by: NURSE PRACTITIONER

## 2025-04-15 PROCEDURE — 99212 OFFICE O/P EST SF 10 MIN: CPT

## 2025-04-15 PROCEDURE — 1036F TOBACCO NON-USER: CPT | Performed by: NURSE PRACTITIONER

## 2025-04-15 ASSESSMENT — ENCOUNTER SYMPTOMS
SHORTNESS OF BREATH: 0
RESPIRATORY NEGATIVE: 1

## 2025-04-15 NOTE — PROGRESS NOTES
Patient Care Team:  Neto Morel MD as PCP - General (Family Medicine)  [facility]  Molly Merino as Care Manager (Nephrology)    Past DM Medications   Metformin-ckd  Glimepiride-therapy completed     Current Diabetic Medications  toujeo 20 units at at bedtime   Sliding Scale Insulin Novolog     Blood sugar   Action     <70               Drink Juice               No extra insulin  150 - 200         2 units subcutaneous Insulin  201 - 250         4 units subcutaneous Insulin  251 - 300         6 units subcutaneous Insulin  301 - 350         8 units subcutaneous Insulin  351 - 400         10 units subcutaneous Insulin   > 400              12 units subcutaneous Insulin        DKA episodes: 0       History of Present Illness  The patient presents for evaluation and management of diabetes mellitus, hyperlipidemia, and hypertension.    The primary concern pertains to diabetes management. The patient is currently on a regimen of Toujeo (insulin glargine) 20 units administered once daily at bedtime and NovoLog (insulin aspart), which is taken three times daily before meals. Blood glucose levels are typically elevated in the evening, a pattern attributed to late meal times. The patient consumes one main meal per day, usually after returning from dialysis on Monday, Wednesday, and Friday. Breakfast typically consists of scrambled eggs, toast, and juice. Nocturnal hypoglycemic episodes are managed by keeping apple juice, peanut butter, and crackers at the bedside. Morning blood glucose levels are generally within normal range. The patient reports a decreased appetite, often necessitating forced eating. They experience malaise when blood glucose levels drop too low. NovoLog is administered before dinner if blood glucose levels exceed 150 mg/dL. Occasionally, insulin administration is forgotten before eating but is usually administered postprandially. The patient has been

## 2025-04-16 ENCOUNTER — RESULTS FOLLOW-UP (OUTPATIENT)
Dept: INTERNAL MEDICINE | Age: 85
End: 2025-04-16

## 2025-05-13 ENCOUNTER — OFFICE VISIT (OUTPATIENT)
Dept: FAMILY MEDICINE CLINIC | Age: 85
End: 2025-05-13
Payer: MEDICARE

## 2025-05-13 VITALS
SYSTOLIC BLOOD PRESSURE: 130 MMHG | BODY MASS INDEX: 24.4 KG/M2 | WEIGHT: 155.8 LBS | OXYGEN SATURATION: 100 % | DIASTOLIC BLOOD PRESSURE: 70 MMHG | HEART RATE: 58 BPM

## 2025-05-13 DIAGNOSIS — H61.23 BILATERAL HEARING LOSS DUE TO CERUMEN IMPACTION: Primary | ICD-10-CM

## 2025-05-13 PROCEDURE — 99212 OFFICE O/P EST SF 10 MIN: CPT | Performed by: STUDENT IN AN ORGANIZED HEALTH CARE EDUCATION/TRAINING PROGRAM

## 2025-05-13 RX ORDER — VIT B COMPLX C/FOLIC ACID/ZINC 0.8MG-50MG
1 TABLET ORAL DAILY
COMMUNITY
Start: 2025-04-14

## 2025-05-13 RX ORDER — BISACODYL 5 MG/1
5 TABLET, DELAYED RELEASE ORAL DAILY PRN
COMMUNITY
Start: 2024-11-12

## 2025-05-13 RX ORDER — SEVELAMER CARBONATE 800 MG/1
1 TABLET, FILM COATED ORAL 3 TIMES DAILY
COMMUNITY
Start: 2024-11-12

## 2025-05-13 RX ORDER — LEVOTHYROXINE SODIUM 150 UG/1
150 TABLET ORAL DAILY
Qty: 90 TABLET | Refills: 1 | Status: SHIPPED | OUTPATIENT
Start: 2025-05-13

## 2025-05-13 NOTE — PATIENT INSTRUCTIONS
Jun Pastor - you had very mild bilateral cerumen impaction. We irrigated them today with no issue. Your tympanic membrane otherwise looked okay. See the audiologist again after having your ears cleaned out as we discussed.

## 2025-05-13 NOTE — PROGRESS NOTES
[address], [address]  [place] [address]  Phone: [phone]  Fax: [phone]      Date of Visit:  25  Patient Name: Justa Ledezma   Patient :  1940     ASSESSMENT/PLAN     1. Bilateral hearing loss due to cerumen impaction     Bilateral ears irrigated.  Tolerated procedure well.    - Questions/concerns answered. Patient verbalized and expressed understanding. Medications, laboratory testing, imaging, consultation, and follow up as documented in this encounter.       HPI:     Justa Ledezma is a 84 y.o. 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        HPI: Patient presents today for bilateral ear irrigation.  Was evaluated by audiologist within the last couple weeks, recommended she get ear cleared out before formal testing.      REVIEW OF SYSTEM      As in HPI    REVIEWED INFORMATION      Current Outpatient Medications

## 2025-06-02 DIAGNOSIS — J30.1 SEASONAL ALLERGIC RHINITIS DUE TO POLLEN: ICD-10-CM

## 2025-06-02 RX ORDER — FEXOFENADINE HCL 60 MG/1
60 TABLET, FILM COATED ORAL DAILY
Qty: 90 TABLET | Refills: 0 | Status: SHIPPED | OUTPATIENT
Start: 2025-06-02

## 2025-06-02 NOTE — TELEPHONE ENCOUNTER
Justa Ledezma is requesting a refill on the following medication(s):  Requested Prescriptions     Pending Prescriptions Disp Refills    fexofenadine (ALLEGRA) 60 MG tablet [Pharmacy Med Name: Fexofenadine HCl 60 MG Oral Tablet] 90 tablet 0     Sig: Take 1 tablet by mouth once daily       Last Visit Date (If Applicable):  5/13/2025    Next Visit Date:    6/24/2025

## 2025-06-03 ENCOUNTER — RESULTS FOLLOW-UP (OUTPATIENT)
Dept: FAMILY MEDICINE CLINIC | Age: 85
End: 2025-06-03

## 2025-06-24 ENCOUNTER — OFFICE VISIT (OUTPATIENT)
Dept: FAMILY MEDICINE CLINIC | Age: 85
End: 2025-06-24
Payer: MEDICARE

## 2025-06-24 VITALS
DIASTOLIC BLOOD PRESSURE: 60 MMHG | SYSTOLIC BLOOD PRESSURE: 108 MMHG | OXYGEN SATURATION: 93 % | HEART RATE: 60 BPM | WEIGHT: 154 LBS | BODY MASS INDEX: 24.12 KG/M2

## 2025-06-24 DIAGNOSIS — Z79.4 TYPE 2 DIABETES MELLITUS WITH CHRONIC KIDNEY DISEASE ON CHRONIC DIALYSIS, WITH LONG-TERM CURRENT USE OF INSULIN (HCC): Primary | ICD-10-CM

## 2025-06-24 DIAGNOSIS — N18.6 TYPE 2 DIABETES MELLITUS WITH CHRONIC KIDNEY DISEASE ON CHRONIC DIALYSIS, WITH LONG-TERM CURRENT USE OF INSULIN (HCC): Primary | ICD-10-CM

## 2025-06-24 DIAGNOSIS — I50.32 CHRONIC DIASTOLIC HEART FAILURE (HCC): ICD-10-CM

## 2025-06-24 DIAGNOSIS — E11.22 TYPE 2 DIABETES MELLITUS WITH CHRONIC KIDNEY DISEASE ON CHRONIC DIALYSIS, WITH LONG-TERM CURRENT USE OF INSULIN (HCC): Primary | ICD-10-CM

## 2025-06-24 DIAGNOSIS — Z99.2 TYPE 2 DIABETES MELLITUS WITH CHRONIC KIDNEY DISEASE ON CHRONIC DIALYSIS, WITH LONG-TERM CURRENT USE OF INSULIN (HCC): Primary | ICD-10-CM

## 2025-06-24 DIAGNOSIS — N90.4 LICHEN SCLEROSUS ET ATROPHICUS OF THE VULVA: ICD-10-CM

## 2025-06-24 PROCEDURE — 3044F HG A1C LEVEL LT 7.0%: CPT | Performed by: STUDENT IN AN ORGANIZED HEALTH CARE EDUCATION/TRAINING PROGRAM

## 2025-06-24 PROCEDURE — 1159F MED LIST DOCD IN RCRD: CPT | Performed by: STUDENT IN AN ORGANIZED HEALTH CARE EDUCATION/TRAINING PROGRAM

## 2025-06-24 PROCEDURE — 99214 OFFICE O/P EST MOD 30 MIN: CPT | Performed by: STUDENT IN AN ORGANIZED HEALTH CARE EDUCATION/TRAINING PROGRAM

## 2025-06-24 PROCEDURE — G8420 CALC BMI NORM PARAMETERS: HCPCS | Performed by: STUDENT IN AN ORGANIZED HEALTH CARE EDUCATION/TRAINING PROGRAM

## 2025-06-24 PROCEDURE — 1090F PRES/ABSN URINE INCON ASSESS: CPT | Performed by: STUDENT IN AN ORGANIZED HEALTH CARE EDUCATION/TRAINING PROGRAM

## 2025-06-24 PROCEDURE — G8427 DOCREV CUR MEDS BY ELIG CLIN: HCPCS | Performed by: STUDENT IN AN ORGANIZED HEALTH CARE EDUCATION/TRAINING PROGRAM

## 2025-06-24 PROCEDURE — 1123F ACP DISCUSS/DSCN MKR DOCD: CPT | Performed by: STUDENT IN AN ORGANIZED HEALTH CARE EDUCATION/TRAINING PROGRAM

## 2025-06-24 PROCEDURE — 1036F TOBACCO NON-USER: CPT | Performed by: STUDENT IN AN ORGANIZED HEALTH CARE EDUCATION/TRAINING PROGRAM

## 2025-06-24 PROCEDURE — G8400 PT W/DXA NO RESULTS DOC: HCPCS | Performed by: STUDENT IN AN ORGANIZED HEALTH CARE EDUCATION/TRAINING PROGRAM

## 2025-06-24 RX ORDER — BETAMETHASONE DIPROPIONATE 0.05 %
OINTMENT (GRAM) TOPICAL
Qty: 45 G | Refills: 0 | Status: SHIPPED | OUTPATIENT
Start: 2025-06-24

## 2025-06-24 NOTE — PATIENT INSTRUCTIONS
STOP the amlodipine  Continue having them check your blood pressures at dialysis - come back sooner than 3 months if they tell you your pressures are too high or too lwo

## 2025-06-24 NOTE — PROGRESS NOTES
[address], [address]  [place] [address]  Phone: [phone]  Fax: [phone]      Date of Visit:  25  Patient Name: Justa Ledezma   Patient :  1940     ASSESSMENT/PLAN     1. Type 2 diabetes mellitus with chronic kidney disease on chronic dialysis, with long-term current use of insulin ([facility])  -      DIABETES FOOT EXAM  -     Diabetic Shoe  2. Lichen sclerosus et atrophicus of the vulva  Overview:  Bx proven by Dr SULLIVAN  Orders:  -     betamethasone dipropionate 0.05 % ointment; Apply topically daily prnApply topically daily prn, Disp-45 g, R-0, Normal  3. Chronic diastolic heart failure ([facility])     Diabetic foot exam completed today.  Patient with near-total absence of sensation, prior amputations, decreased dorsal pedal pulses, callus formation.  Recommend custom diabetic shoes.  Will be getting at [facility]. A1c well controlled currently.    Monitor dyspnea symptoms. No concerning description. Further workup if continues when heat wave resolves.    - Questions/concerns answered. Patient verbalized and expressed understanding. Medications, laboratory testing, imaging, consultation, and follow up as documented in this encounter.       HPI:     Justa Ledezma is a 84 y.o. 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

## 2025-07-01 ENCOUNTER — TELEPHONE (OUTPATIENT)
Dept: FAMILY MEDICINE CLINIC | Age: 85
End: 2025-07-01

## 2025-07-01 NOTE — TELEPHONE ENCOUNTER
The Pharm was calling because they need a script for Dietetic shoes and inserts, and they also need the office notes faxed, to [phone]

## 2025-07-15 ENCOUNTER — TELEPHONE (OUTPATIENT)
Dept: FAMILY MEDICINE CLINIC | Age: 85
End: 2025-07-15

## 2025-07-15 NOTE — TELEPHONE ENCOUNTER
Patients she is not going to sleep tonight re: her lab work and her PTH being low.     Would like a call back today.

## 2025-07-15 NOTE — TELEPHONE ENCOUNTER
Not acutely concerned. It is because of the kidney disease and her being on dialysis. It has been low before as well, it is actually improved now compared to previous. She should not be anxious about it. No further recommendations or anything she is doing wrong. We will call and let her know if there is anything else she needs to do.

## 2025-08-05 ENCOUNTER — OFFICE VISIT (OUTPATIENT)
Dept: DIABETES SERVICES | Age: 85
End: 2025-08-05
Payer: MEDICARE

## 2025-08-05 VITALS
BODY MASS INDEX: 24.48 KG/M2 | DIASTOLIC BLOOD PRESSURE: 68 MMHG | WEIGHT: 156 LBS | HEIGHT: 67 IN | SYSTOLIC BLOOD PRESSURE: 110 MMHG | HEART RATE: 62 BPM

## 2025-08-05 DIAGNOSIS — E78.2 MIXED HYPERLIPIDEMIA: ICD-10-CM

## 2025-08-05 DIAGNOSIS — I10 ESSENTIAL HYPERTENSION: ICD-10-CM

## 2025-08-05 DIAGNOSIS — Z79.4 TYPE 2 DIABETES MELLITUS WITH STAGE 4 CHRONIC KIDNEY DISEASE, WITH LONG-TERM CURRENT USE OF INSULIN (HCC): Primary | ICD-10-CM

## 2025-08-05 DIAGNOSIS — E11.22 TYPE 2 DIABETES MELLITUS WITH STAGE 4 CHRONIC KIDNEY DISEASE, WITH LONG-TERM CURRENT USE OF INSULIN (HCC): Primary | ICD-10-CM

## 2025-08-05 DIAGNOSIS — N18.4 TYPE 2 DIABETES MELLITUS WITH STAGE 4 CHRONIC KIDNEY DISEASE, WITH LONG-TERM CURRENT USE OF INSULIN (HCC): Primary | ICD-10-CM

## 2025-08-05 LAB — HBA1C MFR BLD: 6.2 %

## 2025-08-05 PROCEDURE — 99212 OFFICE O/P EST SF 10 MIN: CPT | Performed by: NURSE PRACTITIONER

## 2025-08-05 PROCEDURE — G8420 CALC BMI NORM PARAMETERS: HCPCS | Performed by: NURSE PRACTITIONER

## 2025-08-05 PROCEDURE — 1123F ACP DISCUSS/DSCN MKR DOCD: CPT | Performed by: NURSE PRACTITIONER

## 2025-08-05 PROCEDURE — 95251 CONT GLUC MNTR ANALYSIS I&R: CPT | Performed by: NURSE PRACTITIONER

## 2025-08-05 PROCEDURE — 1036F TOBACCO NON-USER: CPT | Performed by: NURSE PRACTITIONER

## 2025-08-05 PROCEDURE — 83036 HEMOGLOBIN GLYCOSYLATED A1C: CPT | Performed by: NURSE PRACTITIONER

## 2025-08-05 PROCEDURE — 1090F PRES/ABSN URINE INCON ASSESS: CPT | Performed by: NURSE PRACTITIONER

## 2025-08-05 PROCEDURE — 3074F SYST BP LT 130 MM HG: CPT | Performed by: NURSE PRACTITIONER

## 2025-08-05 PROCEDURE — 1160F RVW MEDS BY RX/DR IN RCRD: CPT | Performed by: NURSE PRACTITIONER

## 2025-08-05 PROCEDURE — 3078F DIAST BP <80 MM HG: CPT | Performed by: NURSE PRACTITIONER

## 2025-08-05 PROCEDURE — G8400 PT W/DXA NO RESULTS DOC: HCPCS | Performed by: NURSE PRACTITIONER

## 2025-08-05 PROCEDURE — PBSHW POCT GLYCOSYLATED HEMOGLOBIN (HGB A1C): Performed by: NURSE PRACTITIONER

## 2025-08-05 PROCEDURE — G2211 COMPLEX E/M VISIT ADD ON: HCPCS | Performed by: NURSE PRACTITIONER

## 2025-08-05 PROCEDURE — G8427 DOCREV CUR MEDS BY ELIG CLIN: HCPCS | Performed by: NURSE PRACTITIONER

## 2025-08-05 PROCEDURE — 99214 OFFICE O/P EST MOD 30 MIN: CPT | Performed by: NURSE PRACTITIONER

## 2025-08-05 PROCEDURE — 3044F HG A1C LEVEL LT 7.0%: CPT | Performed by: NURSE PRACTITIONER

## 2025-08-05 PROCEDURE — 1159F MED LIST DOCD IN RCRD: CPT | Performed by: NURSE PRACTITIONER

## 2025-08-05 RX ORDER — INSULIN GLARGINE 300 U/ML
18 INJECTION, SOLUTION SUBCUTANEOUS EVERY MORNING
Qty: 2 ADJUSTABLE DOSE PRE-FILLED PEN SYRINGE | Refills: 3
Start: 2025-08-05

## 2025-08-05 RX ORDER — AMLODIPINE BESYLATE 5 MG/1
5 TABLET ORAL DAILY
COMMUNITY

## 2025-08-05 ASSESSMENT — ENCOUNTER SYMPTOMS
RESPIRATORY NEGATIVE: 1
SHORTNESS OF BREATH: 0

## 2025-09-02 DIAGNOSIS — J30.1 SEASONAL ALLERGIC RHINITIS DUE TO POLLEN: ICD-10-CM

## 2025-09-02 RX ORDER — PREDNISONE 5 MG/1
60 TABLET ORAL DAILY
Qty: 90 TABLET | Refills: 1 | Status: SHIPPED | OUTPATIENT
Start: 2025-09-02

## (undated) DEVICE — PENCIL ES L3M BTTN SWCH HOLSTER W/ BLDE ELECTRD EDGE

## (undated) DEVICE — ELECTROSURGERY PENCIL ADAPTOR: Brand: PENADAPT

## (undated) DEVICE — Z DISCONTINUED BY MEDLINE USE 2711682 TRAY SKIN PREP DRY W/ PREM GLV

## (undated) DEVICE — STANDARD HYPODERMIC NEEDLE,POLYPROPYLENE HUB: Brand: MONOJECT

## (undated) DEVICE — RASP SURG W5.5XL11MM REPL RECIP SM TEAR CRSS CUT STRYKR FOR

## (undated) DEVICE — Device

## (undated) DEVICE — GAUZE,SPONGE,FLUFF,6"X6.75",STRL,5/TRAY: Brand: MEDLINE

## (undated) DEVICE — SUTURE VCRL SZ 3-0 L27IN ABSRB UD L26MM CT-2 1/2 CIR J232H

## (undated) DEVICE — SOLUTION IV IRRIG POUR BRL 0.9% SODIUM CHL 2F7124

## (undated) DEVICE — GLOVE SURG SZ 8 L12IN THK91MIL BRN LTX FREE POLYCHLOROPRENE

## (undated) DEVICE — 9165 UNIVERSAL PATIENT PLATE: Brand: 3M™

## (undated) DEVICE — BANDAGE,ELASTIC,ESMARK,STERILE,4"X9',LF: Brand: MEDLINE

## (undated) DEVICE — SYRINGE, LUER LOCK, 10ML: Brand: MEDLINE

## (undated) DEVICE — PAD,ABDOMINAL,5"X9",ST,LF,25/BX: Brand: MEDLINE INDUSTRIES, INC.

## (undated) DEVICE — GOWN,AURORA,NONRNF,XL,30/CS: Brand: MEDLINE

## (undated) DEVICE — PADDING,UNDERCAST,COTTON, 4"X4YD STERILE: Brand: MEDLINE

## (undated) DEVICE — PACK SURG PROC REMINDER N WVN DISPOSABLE BEAC TIME OUT

## (undated) DEVICE — PACK PROCEDURE SURG EXTREMITY MIN

## (undated) DEVICE — ELECTROSURGICAL PENCIL BUTTON SWITCH E-Z CLEAN COATED BLADE ELECTRODE 10 FT (3 M) CORD HOLSTER: Brand: MEGADYNE

## (undated) DEVICE — SUTURE NONABSORBABLE MONOFILAMENT 2-0 FS 18 IN ETHILON 664H

## (undated) DEVICE — BANDAGE COBAN 4 IN COMPR W4INXL5YD FOAM COHESIVE QUIK STK SELF ADH SFT

## (undated) DEVICE — COVER LT HNDL BLU PLAS

## (undated) DEVICE — SOLUTION PREP 4OZ 10% POVIDONE IOD GEL

## (undated) DEVICE — GLOVE SURG SZ 65 L12IN THK91MIL BRN LTX FREE

## (undated) DEVICE — BNDG,ELSTC,MATRIX,STRL,4"X5YD,LF,HOOK&LP: Brand: MEDLINE